# Patient Record
Sex: MALE | Race: BLACK OR AFRICAN AMERICAN | NOT HISPANIC OR LATINO | Employment: OTHER | URBAN - METROPOLITAN AREA
[De-identification: names, ages, dates, MRNs, and addresses within clinical notes are randomized per-mention and may not be internally consistent; named-entity substitution may affect disease eponyms.]

---

## 2017-02-23 ENCOUNTER — TRANSCRIBE ORDERS (OUTPATIENT)
Dept: PAIN MEDICINE | Facility: CLINIC | Age: 77
End: 2017-02-23

## 2017-02-23 DIAGNOSIS — M54.2 CERVICALGIA: Primary | ICD-10-CM

## 2017-08-19 ENCOUNTER — HOSPITAL ENCOUNTER (EMERGENCY)
Facility: HOSPITAL | Age: 77
Discharge: HOME | End: 2017-08-19
Attending: EMERGENCY MEDICINE | Admitting: EMERGENCY MEDICINE

## 2017-08-19 ENCOUNTER — APPOINTMENT (OUTPATIENT)
Dept: CT IMAGING | Facility: HOSPITAL | Age: 77
End: 2017-08-19

## 2017-08-19 VITALS
WEIGHT: 185 LBS | HEIGHT: 70 IN | TEMPERATURE: 97.9 F | RESPIRATION RATE: 16 BRPM | OXYGEN SATURATION: 96 % | HEART RATE: 69 BPM | SYSTOLIC BLOOD PRESSURE: 126 MMHG | DIASTOLIC BLOOD PRESSURE: 90 MMHG | BODY MASS INDEX: 26.48 KG/M2

## 2017-08-19 DIAGNOSIS — R10.30 LOWER ABDOMINAL PAIN: ICD-10-CM

## 2017-08-19 DIAGNOSIS — N32.89 MASS OF URINARY BLADDER: Primary | ICD-10-CM

## 2017-08-19 LAB
ALBUMIN SERPL-MCNC: 4.2 G/DL (ref 3.2–4.8)
ALBUMIN/GLOB SERPL: 1.2 G/DL (ref 1.5–2.5)
ALP SERPL-CCNC: 147 U/L (ref 25–100)
ALT SERPL W P-5'-P-CCNC: 10 U/L (ref 7–40)
ANION GAP SERPL CALCULATED.3IONS-SCNC: 7 MMOL/L (ref 3–11)
AST SERPL-CCNC: 18 U/L (ref 0–33)
BASOPHILS # BLD AUTO: 0.02 10*3/MM3 (ref 0–0.2)
BASOPHILS NFR BLD AUTO: 0.3 % (ref 0–1)
BILIRUB SERPL-MCNC: 0.4 MG/DL (ref 0.3–1.2)
BILIRUB UR QL STRIP: NEGATIVE
BUN BLD-MCNC: 12 MG/DL (ref 9–23)
BUN/CREAT SERPL: 13.3 (ref 7–25)
CALCIUM SPEC-SCNC: 9.4 MG/DL (ref 8.7–10.4)
CHLORIDE SERPL-SCNC: 106 MMOL/L (ref 99–109)
CLARITY UR: CLEAR
CO2 SERPL-SCNC: 29 MMOL/L (ref 20–31)
COLOR UR: YELLOW
CREAT BLD-MCNC: 0.9 MG/DL (ref 0.6–1.3)
D-LACTATE SERPL-SCNC: 0.8 MMOL/L (ref 0.5–2)
DEPRECATED RDW RBC AUTO: 48.4 FL (ref 37–54)
EOSINOPHIL # BLD AUTO: 0.31 10*3/MM3 (ref 0–0.3)
EOSINOPHIL NFR BLD AUTO: 5.1 % (ref 0–3)
ERYTHROCYTE [DISTWIDTH] IN BLOOD BY AUTOMATED COUNT: 15 % (ref 11.3–14.5)
GFR SERPL CREATININE-BSD FRML MDRD: 99 ML/MIN/1.73
GLOBULIN UR ELPH-MCNC: 3.5 GM/DL
GLUCOSE BLD-MCNC: 102 MG/DL (ref 70–100)
GLUCOSE UR STRIP-MCNC: NEGATIVE MG/DL
HCT VFR BLD AUTO: 38.1 % (ref 38.9–50.9)
HGB BLD-MCNC: 12.4 G/DL (ref 13.1–17.5)
HGB UR QL STRIP.AUTO: NEGATIVE
IMM GRANULOCYTES # BLD: 0.01 10*3/MM3 (ref 0–0.03)
IMM GRANULOCYTES NFR BLD: 0.2 % (ref 0–0.6)
KETONES UR QL STRIP: NEGATIVE
LEUKOCYTE ESTERASE UR QL STRIP.AUTO: NEGATIVE
LIPASE SERPL-CCNC: 32 U/L (ref 6–51)
LYMPHOCYTES # BLD AUTO: 1.53 10*3/MM3 (ref 0.6–4.8)
LYMPHOCYTES NFR BLD AUTO: 25.2 % (ref 24–44)
MCH RBC QN AUTO: 28.6 PG (ref 27–31)
MCHC RBC AUTO-ENTMCNC: 32.5 G/DL (ref 32–36)
MCV RBC AUTO: 88 FL (ref 80–99)
MONOCYTES # BLD AUTO: 0.72 10*3/MM3 (ref 0–1)
MONOCYTES NFR BLD AUTO: 11.8 % (ref 0–12)
NEUTROPHILS # BLD AUTO: 3.49 10*3/MM3 (ref 1.5–8.3)
NEUTROPHILS NFR BLD AUTO: 57.4 % (ref 41–71)
NITRITE UR QL STRIP: NEGATIVE
PH UR STRIP.AUTO: 6.5 [PH] (ref 5–8)
PLATELET # BLD AUTO: 223 10*3/MM3 (ref 150–450)
PMV BLD AUTO: 9.9 FL (ref 6–12)
POTASSIUM BLD-SCNC: 3.2 MMOL/L (ref 3.5–5.5)
PROT SERPL-MCNC: 7.7 G/DL (ref 5.7–8.2)
PROT UR QL STRIP: NEGATIVE
RBC # BLD AUTO: 4.33 10*6/MM3 (ref 4.2–5.76)
SODIUM BLD-SCNC: 142 MMOL/L (ref 132–146)
SP GR UR STRIP: 1.01 (ref 1–1.03)
UROBILINOGEN UR QL STRIP: NORMAL
WBC NRBC COR # BLD: 6.08 10*3/MM3 (ref 3.5–10.8)

## 2017-08-19 PROCEDURE — 83690 ASSAY OF LIPASE: CPT | Performed by: EMERGENCY MEDICINE

## 2017-08-19 PROCEDURE — 0 IOPAMIDOL 61 % SOLUTION: Performed by: EMERGENCY MEDICINE

## 2017-08-19 PROCEDURE — 85025 COMPLETE CBC W/AUTO DIFF WBC: CPT | Performed by: EMERGENCY MEDICINE

## 2017-08-19 PROCEDURE — 74177 CT ABD & PELVIS W/CONTRAST: CPT

## 2017-08-19 PROCEDURE — 83605 ASSAY OF LACTIC ACID: CPT | Performed by: EMERGENCY MEDICINE

## 2017-08-19 PROCEDURE — 80053 COMPREHEN METABOLIC PANEL: CPT | Performed by: EMERGENCY MEDICINE

## 2017-08-19 PROCEDURE — 99283 EMERGENCY DEPT VISIT LOW MDM: CPT

## 2017-08-19 PROCEDURE — 81003 URINALYSIS AUTO W/O SCOPE: CPT | Performed by: EMERGENCY MEDICINE

## 2017-08-19 PROCEDURE — 96360 HYDRATION IV INFUSION INIT: CPT

## 2017-08-19 RX ORDER — OXYCODONE AND ACETAMINOPHEN 10; 325 MG/1; MG/1
1 TABLET ORAL EVERY 6 HOURS PRN
COMMUNITY

## 2017-08-19 RX ORDER — NAPROXEN 500 MG/1
500 TABLET ORAL 2 TIMES DAILY PRN
Qty: 10 TABLET | Refills: 0 | Status: SHIPPED | OUTPATIENT
Start: 2017-08-19 | End: 2017-08-24

## 2017-08-19 RX ORDER — TAMSULOSIN HYDROCHLORIDE 0.4 MG/1
1 CAPSULE ORAL NIGHTLY
COMMUNITY

## 2017-08-19 RX ORDER — SODIUM CHLORIDE 0.9 % (FLUSH) 0.9 %
10 SYRINGE (ML) INJECTION AS NEEDED
Status: DISCONTINUED | OUTPATIENT
Start: 2017-08-19 | End: 2017-08-19 | Stop reason: HOSPADM

## 2017-08-19 RX ORDER — ASPIRIN 81 MG/1
81 TABLET ORAL DAILY
COMMUNITY
End: 2021-10-07

## 2017-08-19 RX ADMIN — SODIUM CHLORIDE 1000 ML: 9 INJECTION, SOLUTION INTRAVENOUS at 05:08

## 2017-08-19 RX ADMIN — IOPAMIDOL 85 ML: 612 INJECTION, SOLUTION INTRAVENOUS at 06:15

## 2017-08-19 NOTE — ED PROVIDER NOTES
Subjective   Patient is a 77 y.o. male presenting with abdominal pain.   Abdominal Pain   Pain location:  Suprapubic  Pain quality: cramping    Pain radiates to:  Does not radiate  Onset quality:  Gradual  Duration:  8 weeks  Timing:  Intermittent  Progression:  Unchanged  Chronicity:  Chronic  Context: previous surgery    Context: not awakening from sleep, not recent illness, not sick contacts and not suspicious food intake    Relieved by:  Nothing  Worsened by:  Position changes  Ineffective treatments:  OTC medications (narcotic based pain medication)  Associated symptoms: no chest pain, no chills, no cough, no diarrhea, no dysuria, no fatigue, no fever, no melena, no nausea, no shortness of breath, no sore throat and no vomiting        Review of Systems   Constitutional: Negative for chills, fatigue and fever.   HENT: Negative for congestion, ear pain, postnasal drip, sinus pressure and sore throat.    Eyes: Negative for pain, redness and visual disturbance.   Respiratory: Negative for cough, chest tightness and shortness of breath.    Cardiovascular: Negative for chest pain, palpitations and leg swelling.   Gastrointestinal: Positive for abdominal pain. Negative for anal bleeding, blood in stool, diarrhea, melena, nausea and vomiting.   Endocrine: Negative for polydipsia and polyuria.   Genitourinary: Negative for difficulty urinating, dysuria, frequency and urgency.   Musculoskeletal: Negative for arthralgias, back pain and neck pain.   Skin: Negative for pallor and rash.   Allergic/Immunologic: Negative for environmental allergies and immunocompromised state.   Neurological: Negative for dizziness, weakness and headaches.   Hematological: Negative for adenopathy.   Psychiatric/Behavioral: Negative for confusion, self-injury and suicidal ideas. The patient is not nervous/anxious.    All other systems reviewed and are negative.      Past Medical History:   Diagnosis Date   • Gout    • Hypertension    • Migraine     • Sciatica    • Thyroid disease        No Known Allergies    Past Surgical History:   Procedure Laterality Date   • BACK SURGERY     • NECK SURGERY         History reviewed. No pertinent family history.    Social History     Social History   • Marital status:      Spouse name: N/A   • Number of children: N/A   • Years of education: N/A     Social History Main Topics   • Smoking status: Former Smoker   • Smokeless tobacco: None      Comment: QUIT 30 YEARS AGO    • Alcohol use No   • Drug use: No   • Sexual activity: Not Asked     Other Topics Concern   • None     Social History Narrative           Objective   Physical Exam   Constitutional: He is oriented to person, place, and time. He appears well-developed and well-nourished.  Non-toxic appearance. No distress.   HENT:   Head: Normocephalic and atraumatic.   Right Ear: External ear normal.   Left Ear: External ear normal.   Nose: Nose normal.   Eyes: EOM and lids are normal. Pupils are equal, round, and reactive to light.   Neck: Normal range of motion. Neck supple. No tracheal deviation present.   Cardiovascular: Normal rate, regular rhythm and normal heart sounds.  Exam reveals no gallop, no friction rub and no decreased pulses.    No murmur heard.  Pulmonary/Chest: Effort normal and breath sounds normal. No respiratory distress. He has no decreased breath sounds. He has no wheezes. He has no rhonchi. He has no rales.   Abdominal: Soft. Normal appearance and bowel sounds are normal. There is tenderness in the right lower quadrant and left lower quadrant. There is no rebound, no guarding and no CVA tenderness.       Musculoskeletal: Normal range of motion. He exhibits no deformity.   Lymphadenopathy:     He has no cervical adenopathy.   Neurological: He is alert and oriented to person, place, and time. He has normal strength. No cranial nerve deficit or sensory deficit.   Skin: Skin is warm and dry. No rash noted. He is not diaphoretic.   Psychiatric: He  has a normal mood and affect. His speech is normal and behavior is normal. Judgment and thought content normal. Cognition and memory are normal.   Nursing note and vitals reviewed.      Procedures         ED Course  ED Course                  MDM  Number of Diagnoses or Management Options  Lower abdominal pain: new and requires workup  Mass of urinary bladder: new and requires workup  Diagnosis management comments: Patient had CT that showed debris versus mass in bladder, no other acute abnormalities, and previous graft of aorta was normal in appearance.     Urine clean and clean, no signs of blood.     Patient has been referred to Dr. Alegre of Urology for outpatient evaluation.     DC home appearing well, advised to take home pain medication as prescribed.        Amount and/or Complexity of Data Reviewed  Clinical lab tests: ordered and reviewed  Tests in the radiology section of CPT®: ordered and reviewed  Decide to obtain previous medical records or to obtain history from someone other than the patient: yes  Obtain history from someone other than the patient: yes  Review and summarize past medical records: yes  Independent visualization of images, tracings, or specimens: yes    Patient Progress  Patient progress: stable      Final diagnoses:   Mass of urinary bladder   Lower abdominal pain            Tomasa Sandoval MD  08/19/17 1000

## 2017-09-12 ENCOUNTER — APPOINTMENT (OUTPATIENT)
Dept: GENERAL RADIOLOGY | Facility: HOSPITAL | Age: 77
End: 2017-09-12

## 2017-09-12 ENCOUNTER — HOSPITAL ENCOUNTER (EMERGENCY)
Facility: HOSPITAL | Age: 77
Discharge: HOME | End: 2017-09-12
Attending: EMERGENCY MEDICINE | Admitting: EMERGENCY MEDICINE

## 2017-09-12 VITALS
HEIGHT: 70 IN | TEMPERATURE: 98.4 F | SYSTOLIC BLOOD PRESSURE: 145 MMHG | DIASTOLIC BLOOD PRESSURE: 93 MMHG | HEART RATE: 70 BPM | RESPIRATION RATE: 18 BRPM | OXYGEN SATURATION: 94 % | BODY MASS INDEX: 26.48 KG/M2 | WEIGHT: 185 LBS

## 2017-09-12 DIAGNOSIS — R05.3 PERSISTENT COUGH: ICD-10-CM

## 2017-09-12 DIAGNOSIS — R10.9 NONSPECIFIC ABDOMINAL PAIN: Primary | ICD-10-CM

## 2017-09-12 LAB
ALBUMIN SERPL-MCNC: 3.9 G/DL (ref 3.2–4.8)
ALBUMIN/GLOB SERPL: 1.2 G/DL (ref 1.5–2.5)
ALP SERPL-CCNC: 145 U/L (ref 25–100)
ALT SERPL W P-5'-P-CCNC: 19 U/L (ref 7–40)
ANION GAP SERPL CALCULATED.3IONS-SCNC: 6 MMOL/L (ref 3–11)
AST SERPL-CCNC: 24 U/L (ref 0–33)
BACTERIA UR QL AUTO: ABNORMAL /HPF
BASOPHILS # BLD AUTO: 0.01 10*3/MM3 (ref 0–0.2)
BASOPHILS NFR BLD AUTO: 0.2 % (ref 0–1)
BILIRUB SERPL-MCNC: 0.4 MG/DL (ref 0.3–1.2)
BILIRUB UR QL STRIP: NEGATIVE
BUN BLD-MCNC: 7 MG/DL (ref 9–23)
BUN/CREAT SERPL: 8.8 (ref 7–25)
CALCIUM SPEC-SCNC: 9 MG/DL (ref 8.7–10.4)
CHLORIDE SERPL-SCNC: 109 MMOL/L (ref 99–109)
CLARITY UR: ABNORMAL
CO2 SERPL-SCNC: 26 MMOL/L (ref 20–31)
COLOR UR: YELLOW
CREAT BLD-MCNC: 0.8 MG/DL (ref 0.6–1.3)
DEPRECATED RDW RBC AUTO: 47.3 FL (ref 37–54)
EOSINOPHIL # BLD AUTO: 0.29 10*3/MM3 (ref 0–0.3)
EOSINOPHIL NFR BLD AUTO: 4.4 % (ref 0–3)
ERYTHROCYTE [DISTWIDTH] IN BLOOD BY AUTOMATED COUNT: 14.9 % (ref 11.3–14.5)
GFR SERPL CREATININE-BSD FRML MDRD: 114 ML/MIN/1.73
GLOBULIN UR ELPH-MCNC: 3.2 GM/DL
GLUCOSE BLD-MCNC: 93 MG/DL (ref 70–100)
GLUCOSE UR STRIP-MCNC: NEGATIVE MG/DL
HCT VFR BLD AUTO: 36.4 % (ref 38.9–50.9)
HGB BLD-MCNC: 11.8 G/DL (ref 13.1–17.5)
HGB UR QL STRIP.AUTO: NEGATIVE
HOLD SPECIMEN: NORMAL
HOLD SPECIMEN: NORMAL
HYALINE CASTS UR QL AUTO: ABNORMAL /LPF
IMM GRANULOCYTES # BLD: 0 10*3/MM3 (ref 0–0.03)
IMM GRANULOCYTES NFR BLD: 0 % (ref 0–0.6)
KETONES UR QL STRIP: NEGATIVE
LEUKOCYTE ESTERASE UR QL STRIP.AUTO: NEGATIVE
LIPASE SERPL-CCNC: 32 U/L (ref 6–51)
LYMPHOCYTES # BLD AUTO: 1.46 10*3/MM3 (ref 0.6–4.8)
LYMPHOCYTES NFR BLD AUTO: 22.4 % (ref 24–44)
MCH RBC QN AUTO: 28.2 PG (ref 27–31)
MCHC RBC AUTO-ENTMCNC: 32.4 G/DL (ref 32–36)
MCV RBC AUTO: 87.1 FL (ref 80–99)
MONOCYTES # BLD AUTO: 0.73 10*3/MM3 (ref 0–1)
MONOCYTES NFR BLD AUTO: 11.2 % (ref 0–12)
NEUTROPHILS # BLD AUTO: 4.03 10*3/MM3 (ref 1.5–8.3)
NEUTROPHILS NFR BLD AUTO: 61.8 % (ref 41–71)
NITRITE UR QL STRIP: NEGATIVE
PH UR STRIP.AUTO: 7 [PH] (ref 5–8)
PLATELET # BLD AUTO: 221 10*3/MM3 (ref 150–450)
PMV BLD AUTO: 9.4 FL (ref 6–12)
POTASSIUM BLD-SCNC: 3.2 MMOL/L (ref 3.5–5.5)
PROT SERPL-MCNC: 7.1 G/DL (ref 5.7–8.2)
PROT UR QL STRIP: NEGATIVE
RBC # BLD AUTO: 4.18 10*6/MM3 (ref 4.2–5.76)
RBC # UR: ABNORMAL /HPF
REF LAB TEST METHOD: ABNORMAL
SODIUM BLD-SCNC: 141 MMOL/L (ref 132–146)
SP GR UR STRIP: 1.01 (ref 1–1.03)
SQUAMOUS #/AREA URNS HPF: ABNORMAL /HPF
UROBILINOGEN UR QL STRIP: ABNORMAL
WBC NRBC COR # BLD: 6.52 10*3/MM3 (ref 3.5–10.8)
WBC UR QL AUTO: ABNORMAL /HPF
WHOLE BLOOD HOLD SPECIMEN: NORMAL
WHOLE BLOOD HOLD SPECIMEN: NORMAL

## 2017-09-12 PROCEDURE — 81001 URINALYSIS AUTO W/SCOPE: CPT | Performed by: EMERGENCY MEDICINE

## 2017-09-12 PROCEDURE — 71020 HC CHEST PA AND LATERAL: CPT

## 2017-09-12 PROCEDURE — 99284 EMERGENCY DEPT VISIT MOD MDM: CPT

## 2017-09-12 PROCEDURE — 83690 ASSAY OF LIPASE: CPT | Performed by: EMERGENCY MEDICINE

## 2017-09-12 PROCEDURE — 80053 COMPREHEN METABOLIC PANEL: CPT | Performed by: EMERGENCY MEDICINE

## 2017-09-12 PROCEDURE — 85025 COMPLETE CBC W/AUTO DIFF WBC: CPT | Performed by: EMERGENCY MEDICINE

## 2017-09-12 RX ORDER — OXYCODONE AND ACETAMINOPHEN 10; 325 MG/1; MG/1
1 TABLET ORAL ONCE
Status: COMPLETED | OUTPATIENT
Start: 2017-09-12 | End: 2017-09-12

## 2017-09-12 RX ORDER — SODIUM CHLORIDE 0.9 % (FLUSH) 0.9 %
10 SYRINGE (ML) INJECTION AS NEEDED
Status: DISCONTINUED | OUTPATIENT
Start: 2017-09-12 | End: 2017-09-13 | Stop reason: HOSPADM

## 2017-09-12 RX ADMIN — OXYCODONE HYDROCHLORIDE AND ACETAMINOPHEN 1 TABLET: 10; 325 TABLET ORAL at 19:55

## 2017-09-12 NOTE — ED PROVIDER NOTES
Subjective   HPI Comments: George Lomeli is a 77 y.o.male who presents to the ED with c/o abdominal pain. He reports having an abdominal aortic aneurysm repaired 5-6 months ago at [facility]. Since, he has been experiencing chronic lower abdominal pain radiating into his back. The pain has remained constant since and has very gradually worsened over time. His pain specifically worsens with ambulation and position changes such as bending, sitting, and standing. The pain does not worsen during bowel movements. He was seen here on 8/19 for the same complaints. He had a CT abdomen/pelvis performed showing a mass on his bladder. He also c/o cough producing green/yellow sputum but denies nausea, vomiting, abnormal bowel movements, fevers, or any other complaints at this time.    Patient is a 77 y.o. male presenting with abdominal pain.   History provided by:  Patient  Abdominal Pain   Pain location:  LLQ and RLQ  Pain radiates to:  Back  Pain severity:  Moderate  Onset quality:  Gradual  Timing:  Constant  Progression:  Worsening  Chronicity:  Chronic  Context: previous surgery (AAA repair )    Relieved by:  Nothing  Worsened by:  Position changes (Ambulation )  Ineffective treatments:  None tried  Associated symptoms: cough    Associated symptoms: no constipation, no diarrhea, no fever, no nausea and no vomiting    Risk factors: being elderly        Review of Systems   Constitutional: Negative for fever.   Respiratory: Positive for cough.    Gastrointestinal: Positive for abdominal pain. Negative for blood in stool, constipation, diarrhea, nausea and vomiting.   Musculoskeletal: Positive for back pain.   All other systems reviewed and are negative.      Past Medical History:   Diagnosis Date   • Gout    • Hypertension    • Migraine    • Sciatica    • Thyroid disease        No Known Allergies    Past Surgical History:   Procedure Laterality Date   • BACK SURGERY     • NECK SURGERY         History reviewed. No pertinent family  history.    Social History     Social History   • Marital status:      Spouse name: N/A   • Number of children: N/A   • Years of education: N/A     Social History Main Topics   • Smoking status: Former Smoker   • Smokeless tobacco: None      Comment: QUIT 30 YEARS AGO    • Alcohol use No   • Drug use: No   • Sexual activity: Not Asked     Other Topics Concern   • None     Social History Narrative         Objective   Physical Exam   Constitutional: He is oriented to person, place, and time. He appears well-developed and well-nourished. No distress.   HENT:   Head: Normocephalic and atraumatic.   Nose: Nose normal.   Mouth/Throat: Oropharynx is clear and moist.   Airway patent. Pharynx benign. Appears well hydrated.    Eyes: Conjunctivae are normal. No scleral icterus.   Neck: Normal range of motion. Neck supple.   Cardiovascular: Normal rate, regular rhythm and normal heart sounds.    No murmur heard.  Pulmonary/Chest: Effort normal. No respiratory distress. He has rales (left base).   Abdominal: Soft. Bowel sounds are normal. There is tenderness (With palpation of the right lower quadrant it induces pain in the left lower quadrant ).   Musculoskeletal: Normal range of motion. He exhibits no edema.   Lymphadenopathy:     He has no cervical adenopathy.   Neurological: He is alert and oriented to person, place, and time.   Skin: Skin is warm and dry.   Psychiatric: He has a normal mood and affect. His behavior is normal.   Nursing note and vitals reviewed.      Procedures         ED Course  ED Course     Recent Results (from the past 24 hour(s))   Comprehensive Metabolic Panel    Collection Time: 09/12/17  6:21 PM   Result Value Ref Range    Glucose 93 70 - 100 mg/dL    BUN 7 (L) 9 - 23 mg/dL    Creatinine 0.80 0.60 - 1.30 mg/dL    Sodium 141 132 - 146 mmol/L    Potassium 3.2 (L) 3.5 - 5.5 mmol/L    Chloride 109 99 - 109 mmol/L    CO2 26.0 20.0 - 31.0 mmol/L    Calcium 9.0 8.7 - 10.4 mg/dL    Total Protein 7.1  5.7 - 8.2 g/dL    Albumin 3.90 3.20 - 4.80 g/dL    ALT (SGPT) 19 7 - 40 U/L    AST (SGOT) 24 0 - 33 U/L    Alkaline Phosphatase 145 (H) 25 - 100 U/L    Total Bilirubin 0.4 0.3 - 1.2 mg/dL    eGFR  African Amer 114 >60 mL/min/1.73    Globulin 3.2 gm/dL    A/G Ratio 1.2 (L) 1.5 - 2.5 g/dL    BUN/Creatinine Ratio 8.8 7.0 - 25.0    Anion Gap 6.0 3.0 - 11.0 mmol/L   Lipase    Collection Time: 09/12/17  6:21 PM   Result Value Ref Range    Lipase 32 6 - 51 U/L   Light Blue Top    Collection Time: 09/12/17  6:21 PM   Result Value Ref Range    Extra Tube hold for add-on    Green Top (Gel)    Collection Time: 09/12/17  6:21 PM   Result Value Ref Range    Extra Tube Hold for add-ons.    Lavender Top    Collection Time: 09/12/17  6:21 PM   Result Value Ref Range    Extra Tube hold for add-on    Gold Top - SST    Collection Time: 09/12/17  6:21 PM   Result Value Ref Range    Extra Tube Hold for add-ons.    CBC Auto Differential    Collection Time: 09/12/17  6:21 PM   Result Value Ref Range    WBC 6.52 3.50 - 10.80 10*3/mm3    RBC 4.18 (L) 4.20 - 5.76 10*6/mm3    Hemoglobin 11.8 (L) 13.1 - 17.5 g/dL    Hematocrit 36.4 (L) 38.9 - 50.9 %    MCV 87.1 80.0 - 99.0 fL    MCH 28.2 27.0 - 31.0 pg    MCHC 32.4 32.0 - 36.0 g/dL    RDW 14.9 (H) 11.3 - 14.5 %    RDW-SD 47.3 37.0 - 54.0 fl    MPV 9.4 6.0 - 12.0 fL    Platelets 221 150 - 450 10*3/mm3    Neutrophil % 61.8 41.0 - 71.0 %    Lymphocyte % 22.4 (L) 24.0 - 44.0 %    Monocyte % 11.2 0.0 - 12.0 %    Eosinophil % 4.4 (H) 0.0 - 3.0 %    Basophil % 0.2 0.0 - 1.0 %    Immature Grans % 0.0 0.0 - 0.6 %    Neutrophils, Absolute 4.03 1.50 - 8.30 10*3/mm3    Lymphocytes, Absolute 1.46 0.60 - 4.80 10*3/mm3    Monocytes, Absolute 0.73 0.00 - 1.00 10*3/mm3    Eosinophils, Absolute 0.29 0.00 - 0.30 10*3/mm3    Basophils, Absolute 0.01 0.00 - 0.20 10*3/mm3    Immature Grans, Absolute 0.00 0.00 - 0.03 10*3/mm3   Urinalysis With / Culture If Indicated    Collection Time: 09/12/17  7:59 PM   Result  Value Ref Range    Color, UA Yellow Yellow, Straw    Appearance, UA Cloudy (A) Clear    pH, UA 7.0 5.0 - 8.0    Specific Gravity, UA 1.011 1.001 - 1.030    Glucose, UA Negative Negative    Ketones, UA Negative Negative    Bilirubin, UA Negative Negative    Blood, UA Negative Negative    Protein, UA Negative Negative    Leuk Esterase, UA Negative Negative    Nitrite, UA Negative Negative    Urobilinogen, UA 1.0 E.U./dL 0.2 - 1.0 E.U./dL   Urinalysis, Microscopic Only    Collection Time: 09/12/17  7:59 PM   Result Value Ref Range    RBC, UA 0-2 None Seen, 0-2 /HPF    WBC, UA 0-2 (A) None Seen /HPF    Bacteria, UA None Seen None Seen, Trace /HPF    Squamous Epithelial Cells, UA None Seen None Seen, 0-2 /HPF    Hyaline Casts, UA 0-6 0 - 6 /LPF    Methodology Automated Microscopy      Note: In addition to lab results from this visit, the labs listed above may include labs taken at another facility or during a different encounter within the last 24 hours. Please correlate lab times with ED admission and discharge times for further clarification of the services performed during this visit.    XR Chest 2 View   Final Result   Abnormal   1.  No acute findings.  No consolidation.   2.  COPD.        THIS DOCUMENT HAS BEEN ELECTRONICALLY SIGNED BY ALEA MIRAMONTES MD        Vitals:    09/12/17 1800 09/12/17 1900 09/12/17 1956 09/12/17 2100   BP: 122/89 137/92  145/93   BP Location:       Patient Position:       Pulse:  67  67   Resp:       Temp:       TempSrc:       SpO2: 96% 95% 96% 96%   Weight:       Height:         Medications   sodium chloride 0.9 % flush 10 mL (not administered)   oxyCODONE-acetaminophen (PERCOCET)  MG per tablet 1 tablet (1 tablet Oral Given 9/12/17 1955)     ECG/EMG Results (last 24 hours)     ** No results found for the last 24 hours. **                        MDM    Final diagnoses:   Nonspecific abdominal pain   Persistent cough       Documentation assistance provided by jess Lewis.   Information recorded by the scribe was done at my direction and has been verified and validated by me.     Nallely Lewis  09/12/17 1750       Nallely Lewis  09/12/17 1915       Kyler Warner MD  09/12/17 2766

## 2017-09-13 NOTE — DISCHARGE INSTRUCTIONS
Dr. Connor Douglas probably won't be able to see you this week, but call tomorrow to set up the earliest possible appointment.  Continue your usual activities, medications, and diet.  Return to the ER if you have worsening pain or other worrisome problems.

## 2017-09-21 ENCOUNTER — APPOINTMENT (OUTPATIENT)
Dept: GENERAL RADIOLOGY | Facility: HOSPITAL | Age: 77
End: 2017-09-21

## 2017-09-21 ENCOUNTER — HOSPITAL ENCOUNTER (OUTPATIENT)
Facility: HOSPITAL | Age: 77
Setting detail: OBSERVATION
Discharge: HOME | End: 2017-09-22
Attending: EMERGENCY MEDICINE | Admitting: FAMILY MEDICINE

## 2017-09-21 DIAGNOSIS — R07.9 CHEST PAIN, UNSPECIFIED: Primary | ICD-10-CM

## 2017-09-21 LAB
ALBUMIN SERPL-MCNC: 4.5 G/DL (ref 3.2–4.8)
ALBUMIN/GLOB SERPL: 1.2 G/DL (ref 1.5–2.5)
ALP SERPL-CCNC: 174 U/L (ref 25–100)
ALT SERPL W P-5'-P-CCNC: 18 U/L (ref 7–40)
ANION GAP SERPL CALCULATED.3IONS-SCNC: 6 MMOL/L (ref 3–11)
AST SERPL-CCNC: 24 U/L (ref 0–33)
BASOPHILS # BLD AUTO: 0.01 10*3/MM3 (ref 0–0.2)
BASOPHILS NFR BLD AUTO: 0.1 % (ref 0–1)
BILIRUB SERPL-MCNC: 0.5 MG/DL (ref 0.3–1.2)
BNP SERPL-MCNC: 54 PG/ML (ref 0–100)
BUN BLD-MCNC: 8 MG/DL (ref 9–23)
BUN/CREAT SERPL: 8 (ref 7–25)
CALCIUM SPEC-SCNC: 9.8 MG/DL (ref 8.7–10.4)
CHLORIDE SERPL-SCNC: 106 MMOL/L (ref 99–109)
CO2 SERPL-SCNC: 27 MMOL/L (ref 20–31)
CREAT BLD-MCNC: 1 MG/DL (ref 0.6–1.3)
DEPRECATED RDW RBC AUTO: 48.6 FL (ref 37–54)
EOSINOPHIL # BLD AUTO: 0.34 10*3/MM3 (ref 0–0.3)
EOSINOPHIL NFR BLD AUTO: 4.5 % (ref 0–3)
ERYTHROCYTE [DISTWIDTH] IN BLOOD BY AUTOMATED COUNT: 15.1 % (ref 11.3–14.5)
GFR SERPL CREATININE-BSD FRML MDRD: 88 ML/MIN/1.73
GLOBULIN UR ELPH-MCNC: 3.9 GM/DL
GLUCOSE BLD-MCNC: 105 MG/DL (ref 70–100)
HCT VFR BLD AUTO: 39.2 % (ref 38.9–50.9)
HGB BLD-MCNC: 12.9 G/DL (ref 13.1–17.5)
IMM GRANULOCYTES # BLD: 0.01 10*3/MM3 (ref 0–0.03)
IMM GRANULOCYTES NFR BLD: 0.1 % (ref 0–0.6)
LIPASE SERPL-CCNC: 39 U/L (ref 6–51)
LYMPHOCYTES # BLD AUTO: 1.89 10*3/MM3 (ref 0.6–4.8)
LYMPHOCYTES NFR BLD AUTO: 24.8 % (ref 24–44)
MCH RBC QN AUTO: 28.9 PG (ref 27–31)
MCHC RBC AUTO-ENTMCNC: 32.9 G/DL (ref 32–36)
MCV RBC AUTO: 87.7 FL (ref 80–99)
MONOCYTES # BLD AUTO: 0.83 10*3/MM3 (ref 0–1)
MONOCYTES NFR BLD AUTO: 10.9 % (ref 0–12)
NEUTROPHILS # BLD AUTO: 4.54 10*3/MM3 (ref 1.5–8.3)
NEUTROPHILS NFR BLD AUTO: 59.6 % (ref 41–71)
PLATELET # BLD AUTO: 224 10*3/MM3 (ref 150–450)
PMV BLD AUTO: 10.1 FL (ref 6–12)
POTASSIUM BLD-SCNC: 3.3 MMOL/L (ref 3.5–5.5)
PROT SERPL-MCNC: 8.4 G/DL (ref 5.7–8.2)
RBC # BLD AUTO: 4.47 10*6/MM3 (ref 4.2–5.76)
SODIUM BLD-SCNC: 139 MMOL/L (ref 132–146)
TROPONIN I SERPL-MCNC: 0.01 NG/ML (ref 0–0.07)
WBC NRBC COR # BLD: 7.62 10*3/MM3 (ref 3.5–10.8)

## 2017-09-21 PROCEDURE — 99284 EMERGENCY DEPT VISIT MOD MDM: CPT

## 2017-09-21 PROCEDURE — 83880 ASSAY OF NATRIURETIC PEPTIDE: CPT | Performed by: EMERGENCY MEDICINE

## 2017-09-21 PROCEDURE — 93010 ELECTROCARDIOGRAM REPORT: CPT | Performed by: INTERNAL MEDICINE

## 2017-09-21 PROCEDURE — 93005 ELECTROCARDIOGRAM TRACING: CPT | Performed by: EMERGENCY MEDICINE

## 2017-09-21 PROCEDURE — 84484 ASSAY OF TROPONIN QUANT: CPT

## 2017-09-21 PROCEDURE — 83690 ASSAY OF LIPASE: CPT | Performed by: EMERGENCY MEDICINE

## 2017-09-21 PROCEDURE — 80053 COMPREHEN METABOLIC PANEL: CPT | Performed by: EMERGENCY MEDICINE

## 2017-09-21 PROCEDURE — 83735 ASSAY OF MAGNESIUM: CPT | Performed by: INTERNAL MEDICINE

## 2017-09-21 PROCEDURE — 85025 COMPLETE CBC W/AUTO DIFF WBC: CPT | Performed by: EMERGENCY MEDICINE

## 2017-09-21 PROCEDURE — 71010 HC CHEST PA OR AP: CPT

## 2017-09-21 RX ORDER — NITROGLYCERIN 0.4 MG/1
0.4 TABLET SUBLINGUAL
Status: DISCONTINUED | OUTPATIENT
Start: 2017-09-21 | End: 2017-09-22 | Stop reason: SDUPTHER

## 2017-09-21 RX ORDER — SODIUM CHLORIDE 0.9 % (FLUSH) 0.9 %
10 SYRINGE (ML) INJECTION AS NEEDED
Status: DISCONTINUED | OUTPATIENT
Start: 2017-09-21 | End: 2017-09-22 | Stop reason: HOSPADM

## 2017-09-21 RX ORDER — ASPIRIN 81 MG/1
324 TABLET, CHEWABLE ORAL ONCE
Status: COMPLETED | OUTPATIENT
Start: 2017-09-21 | End: 2017-09-21

## 2017-09-21 RX ADMIN — NITROGLYCERIN 0.4 MG: 0.4 TABLET SUBLINGUAL at 22:51

## 2017-09-21 RX ADMIN — ASPIRIN 81 MG 324 MG: 81 TABLET ORAL at 22:22

## 2017-09-22 ENCOUNTER — APPOINTMENT (OUTPATIENT)
Dept: CARDIOLOGY | Facility: HOSPITAL | Age: 77
End: 2017-09-22
Attending: INTERNAL MEDICINE

## 2017-09-22 VITALS
HEIGHT: 70 IN | HEART RATE: 68 BPM | BODY MASS INDEX: 26.48 KG/M2 | RESPIRATION RATE: 18 BRPM | TEMPERATURE: 97.7 F | OXYGEN SATURATION: 97 % | DIASTOLIC BLOOD PRESSURE: 92 MMHG | WEIGHT: 185 LBS | SYSTOLIC BLOOD PRESSURE: 133 MMHG

## 2017-09-22 PROBLEM — N32.89 BLADDER MASS: Status: ACTIVE | Noted: 2017-09-22

## 2017-09-22 PROBLEM — R07.9 CHEST PAIN, UNSPECIFIED: Status: ACTIVE | Noted: 2017-09-22

## 2017-09-22 PROBLEM — I71.40 ABDOMINAL AORTIC ANEURYSM: Status: ACTIVE | Noted: 2017-09-22

## 2017-09-22 PROBLEM — R07.9 CHEST PAIN, UNSPECIFIED: Status: RESOLVED | Noted: 2017-09-22 | Resolved: 2017-09-22

## 2017-09-22 LAB
ANION GAP SERPL CALCULATED.3IONS-SCNC: 4 MMOL/L (ref 3–11)
ARTICHOKE IGE QN: 98 MG/DL (ref 0–130)
BACTERIA UR QL AUTO: ABNORMAL /HPF
BH CV STRESS BP STAGE 1: NORMAL
BH CV STRESS BP STAGE 3: NORMAL
BH CV STRESS COMMENTS STAGE 1: NORMAL
BH CV STRESS DOSE REGADENOSON STAGE 1: 0.4
BH CV STRESS DURATION MIN STAGE 1: 1
BH CV STRESS DURATION MIN STAGE 2: 1
BH CV STRESS DURATION MIN STAGE 3: 1
BH CV STRESS DURATION MIN STAGE 4: 1
BH CV STRESS DURATION SEC STAGE 1: 0
BH CV STRESS DURATION SEC STAGE 2: 0
BH CV STRESS DURATION SEC STAGE 3: 0
BH CV STRESS DURATION SEC STAGE 4: 0
BH CV STRESS HR STAGE 1: 74
BH CV STRESS HR STAGE 2: 75
BH CV STRESS HR STAGE 3: 77
BH CV STRESS HR STAGE 4: 75
BH CV STRESS O2 STAGE 3: 80
BH CV STRESS PROTOCOL 1: NORMAL
BH CV STRESS RECOVERY BP: NORMAL MMHG
BH CV STRESS RECOVERY HR: 74 BPM
BH CV STRESS RECOVERY O2: 97 %
BH CV STRESS STAGE 1: 1
BH CV STRESS STAGE 2: 2
BH CV STRESS STAGE 3: 3
BH CV STRESS STAGE 4: 4
BILIRUB UR QL STRIP: ABNORMAL
BUN BLD-MCNC: 8 MG/DL (ref 9–23)
BUN/CREAT SERPL: 10 (ref 7–25)
CALCIUM SPEC-SCNC: 9.1 MG/DL (ref 8.7–10.4)
CHLORIDE SERPL-SCNC: 105 MMOL/L (ref 99–109)
CHOLEST SERPL-MCNC: 143 MG/DL (ref 0–200)
CLARITY UR: ABNORMAL
CO2 SERPL-SCNC: 29 MMOL/L (ref 20–31)
COLOR UR: ABNORMAL
CREAT BLD-MCNC: 0.8 MG/DL (ref 0.6–1.3)
DEPRECATED RDW RBC AUTO: 47.6 FL (ref 37–54)
ERYTHROCYTE [DISTWIDTH] IN BLOOD BY AUTOMATED COUNT: 14.8 % (ref 11.3–14.5)
GFR SERPL CREATININE-BSD FRML MDRD: 114 ML/MIN/1.73
GLUCOSE BLD-MCNC: 105 MG/DL (ref 70–100)
GLUCOSE UR STRIP-MCNC: NEGATIVE MG/DL
HBA1C MFR BLD: 5.6 % (ref 4.8–5.6)
HCT VFR BLD AUTO: 35.5 % (ref 38.9–50.9)
HDLC SERPL-MCNC: 37 MG/DL (ref 40–60)
HGB BLD-MCNC: 11.7 G/DL (ref 13.1–17.5)
HGB UR QL STRIP.AUTO: NEGATIVE
HOLD SPECIMEN: NORMAL
HOLD SPECIMEN: NORMAL
KETONES UR QL STRIP: NEGATIVE
LEUKOCYTE ESTERASE UR QL STRIP.AUTO: NEGATIVE
LV EF NUC BP: 54 %
MAGNESIUM SERPL-MCNC: 1.7 MG/DL (ref 1.3–2.7)
MAGNESIUM SERPL-MCNC: 1.9 MG/DL (ref 1.3–2.7)
MAXIMAL PREDICTED HEART RATE: 143 BPM
MCH RBC QN AUTO: 28.9 PG (ref 27–31)
MCHC RBC AUTO-ENTMCNC: 33 G/DL (ref 32–36)
MCV RBC AUTO: 87.7 FL (ref 80–99)
NITRITE UR QL STRIP: NEGATIVE
PERCENT MAX PREDICTED HR: 55.94 %
PH UR STRIP.AUTO: 6 [PH] (ref 5–8)
PLATELET # BLD AUTO: 211 10*3/MM3 (ref 150–450)
PMV BLD AUTO: 10.6 FL (ref 6–12)
POTASSIUM BLD-SCNC: 3 MMOL/L (ref 3.5–5.5)
PROT UR QL STRIP: ABNORMAL
RBC # BLD AUTO: 4.05 10*6/MM3 (ref 4.2–5.76)
RBC # UR: ABNORMAL /HPF
REF LAB TEST METHOD: ABNORMAL
SODIUM BLD-SCNC: 138 MMOL/L (ref 132–146)
SP GR UR STRIP: >=1.03 (ref 1–1.03)
SQUAMOUS #/AREA URNS HPF: ABNORMAL /HPF
STRESS BASELINE BP: NORMAL MMHG
STRESS BASELINE HR: 68 BPM
STRESS O2 SAT REST: 99 %
STRESS PERCENT HR: 66 %
STRESS POST O2 SAT PEAK: 97 %
STRESS POST PEAK BP: NORMAL MMHG
STRESS POST PEAK HR: 80 BPM
STRESS TARGET HR: 122 BPM
TRIGL SERPL-MCNC: 66 MG/DL (ref 0–150)
TROPONIN I SERPL-MCNC: 0.01 NG/ML
TROPONIN I SERPL-MCNC: 0.01 NG/ML (ref 0–0.07)
TSH SERPL DL<=0.05 MIU/L-ACNC: 4.59 MIU/ML (ref 0.35–5.35)
UROBILINOGEN UR QL STRIP: ABNORMAL
WBC NRBC COR # BLD: 7.29 10*3/MM3 (ref 3.5–10.8)
WBC UR QL AUTO: ABNORMAL /HPF
WHOLE BLOOD HOLD SPECIMEN: NORMAL
WHOLE BLOOD HOLD SPECIMEN: NORMAL

## 2017-09-22 PROCEDURE — 81001 URINALYSIS AUTO W/SCOPE: CPT | Performed by: EMERGENCY MEDICINE

## 2017-09-22 PROCEDURE — 84443 ASSAY THYROID STIM HORMONE: CPT | Performed by: INTERNAL MEDICINE

## 2017-09-22 PROCEDURE — 83735 ASSAY OF MAGNESIUM: CPT | Performed by: PHYSICIAN ASSISTANT

## 2017-09-22 PROCEDURE — G0378 HOSPITAL OBSERVATION PER HR: HCPCS

## 2017-09-22 PROCEDURE — A9555 RB82 RUBIDIUM: HCPCS | Performed by: FAMILY MEDICINE

## 2017-09-22 PROCEDURE — 93005 ELECTROCARDIOGRAM TRACING: CPT | Performed by: EMERGENCY MEDICINE

## 2017-09-22 PROCEDURE — 78492 MYOCRD IMG PET MLT RST&STRS: CPT

## 2017-09-22 PROCEDURE — 93018 CV STRESS TEST I&R ONLY: CPT | Performed by: INTERNAL MEDICINE

## 2017-09-22 PROCEDURE — 96372 THER/PROPH/DIAG INJ SC/IM: CPT

## 2017-09-22 PROCEDURE — 93005 ELECTROCARDIOGRAM TRACING: CPT | Performed by: INTERNAL MEDICINE

## 2017-09-22 PROCEDURE — 85027 COMPLETE CBC AUTOMATED: CPT | Performed by: INTERNAL MEDICINE

## 2017-09-22 PROCEDURE — 25010000002 REGADENOSON 0.4 MG/5ML SOLUTION: Performed by: INTERNAL MEDICINE

## 2017-09-22 PROCEDURE — 84484 ASSAY OF TROPONIN QUANT: CPT

## 2017-09-22 PROCEDURE — 83036 HEMOGLOBIN GLYCOSYLATED A1C: CPT | Performed by: INTERNAL MEDICINE

## 2017-09-22 PROCEDURE — 78492 MYOCRD IMG PET MLT RST&STRS: CPT | Performed by: INTERNAL MEDICINE

## 2017-09-22 PROCEDURE — 87086 URINE CULTURE/COLONY COUNT: CPT | Performed by: EMERGENCY MEDICINE

## 2017-09-22 PROCEDURE — 0 RUBIDIUM CHLORIDE: Performed by: FAMILY MEDICINE

## 2017-09-22 PROCEDURE — 93017 CV STRESS TEST TRACING ONLY: CPT

## 2017-09-22 PROCEDURE — 80061 LIPID PANEL: CPT | Performed by: INTERNAL MEDICINE

## 2017-09-22 PROCEDURE — 25010000002 HEPARIN (PORCINE) PER 1000 UNITS: Performed by: INTERNAL MEDICINE

## 2017-09-22 PROCEDURE — 93010 ELECTROCARDIOGRAM REPORT: CPT | Performed by: INTERNAL MEDICINE

## 2017-09-22 PROCEDURE — 80048 BASIC METABOLIC PNL TOTAL CA: CPT | Performed by: INTERNAL MEDICINE

## 2017-09-22 PROCEDURE — 84484 ASSAY OF TROPONIN QUANT: CPT | Performed by: INTERNAL MEDICINE

## 2017-09-22 PROCEDURE — 99236 HOSP IP/OBS SAME DATE HI 85: CPT | Performed by: INTERNAL MEDICINE

## 2017-09-22 PROCEDURE — 78491 MYOCRD IMG PET 1STD RST/STRS: CPT

## 2017-09-22 RX ORDER — MAGNESIUM SULFATE HEPTAHYDRATE 40 MG/ML
2 INJECTION, SOLUTION INTRAVENOUS AS NEEDED
Status: DISCONTINUED | OUTPATIENT
Start: 2017-09-22 | End: 2017-09-22 | Stop reason: HOSPADM

## 2017-09-22 RX ORDER — MAGNESIUM SULFATE HEPTAHYDRATE 40 MG/ML
4 INJECTION, SOLUTION INTRAVENOUS AS NEEDED
Status: DISCONTINUED | OUTPATIENT
Start: 2017-09-22 | End: 2017-09-22 | Stop reason: HOSPADM

## 2017-09-22 RX ORDER — ACETAMINOPHEN 325 MG/1
650 TABLET ORAL EVERY 4 HOURS PRN
Status: DISCONTINUED | OUTPATIENT
Start: 2017-09-22 | End: 2017-09-22 | Stop reason: HOSPADM

## 2017-09-22 RX ORDER — POTASSIUM CHLORIDE 1.5 G/1.77G
40 POWDER, FOR SOLUTION ORAL AS NEEDED
Status: DISCONTINUED | OUTPATIENT
Start: 2017-09-22 | End: 2017-09-22 | Stop reason: HOSPADM

## 2017-09-22 RX ORDER — OXYCODONE AND ACETAMINOPHEN 10; 325 MG/1; MG/1
1 TABLET ORAL EVERY 6 HOURS PRN
Status: DISCONTINUED | OUTPATIENT
Start: 2017-09-22 | End: 2017-09-22 | Stop reason: HOSPADM

## 2017-09-22 RX ORDER — SODIUM CHLORIDE 0.9 % (FLUSH) 0.9 %
1-10 SYRINGE (ML) INJECTION AS NEEDED
Status: DISCONTINUED | OUTPATIENT
Start: 2017-09-22 | End: 2017-09-22 | Stop reason: HOSPADM

## 2017-09-22 RX ORDER — ALLOPURINOL 100 MG/1
100 TABLET ORAL DAILY
Status: DISCONTINUED | OUTPATIENT
Start: 2017-09-22 | End: 2017-09-22 | Stop reason: HOSPADM

## 2017-09-22 RX ORDER — NITROGLYCERIN 0.4 MG/1
0.4 TABLET SUBLINGUAL
Status: DISCONTINUED | OUTPATIENT
Start: 2017-09-22 | End: 2017-09-22 | Stop reason: HOSPADM

## 2017-09-22 RX ORDER — AMLODIPINE BESYLATE 10 MG/1
10 TABLET ORAL DAILY
Status: DISCONTINUED | OUTPATIENT
Start: 2017-09-22 | End: 2017-09-22 | Stop reason: HOSPADM

## 2017-09-22 RX ORDER — ONDANSETRON 2 MG/ML
4 INJECTION INTRAMUSCULAR; INTRAVENOUS EVERY 6 HOURS PRN
Status: DISCONTINUED | OUTPATIENT
Start: 2017-09-22 | End: 2017-09-22 | Stop reason: HOSPADM

## 2017-09-22 RX ORDER — LEVOTHYROXINE SODIUM 0.03 MG/1
25 TABLET ORAL DAILY
Status: DISCONTINUED | OUTPATIENT
Start: 2017-09-22 | End: 2017-09-22 | Stop reason: HOSPADM

## 2017-09-22 RX ORDER — LISINOPRIL 40 MG/1
40 TABLET ORAL DAILY
Status: DISCONTINUED | OUTPATIENT
Start: 2017-09-22 | End: 2017-09-22 | Stop reason: HOSPADM

## 2017-09-22 RX ORDER — NITROGLYCERIN 0.4 MG/1
0.4 TABLET SUBLINGUAL
Qty: 90 TABLET | Refills: 0 | Status: SHIPPED | OUTPATIENT
Start: 2017-09-22 | End: 2018-04-12

## 2017-09-22 RX ORDER — POTASSIUM CHLORIDE 7.45 MG/ML
10 INJECTION INTRAVENOUS
Status: DISCONTINUED | OUTPATIENT
Start: 2017-09-22 | End: 2017-09-22 | Stop reason: HOSPADM

## 2017-09-22 RX ORDER — POTASSIUM CHLORIDE 750 MG/1
40 CAPSULE, EXTENDED RELEASE ORAL AS NEEDED
Status: DISCONTINUED | OUTPATIENT
Start: 2017-09-22 | End: 2017-09-22 | Stop reason: HOSPADM

## 2017-09-22 RX ORDER — TAMSULOSIN HYDROCHLORIDE 0.4 MG/1
0.4 CAPSULE ORAL NIGHTLY
Status: DISCONTINUED | OUTPATIENT
Start: 2017-09-22 | End: 2017-09-22 | Stop reason: HOSPADM

## 2017-09-22 RX ORDER — POTASSIUM CHLORIDE 750 MG/1
20 TABLET, EXTENDED RELEASE ORAL 2 TIMES DAILY
Qty: 30 TABLET | Refills: 0 | Status: SHIPPED | OUTPATIENT
Start: 2017-09-22 | End: 2018-09-10

## 2017-09-22 RX ORDER — CARVEDILOL 12.5 MG/1
25 TABLET ORAL 2 TIMES DAILY WITH MEALS
Status: DISCONTINUED | OUTPATIENT
Start: 2017-09-22 | End: 2017-09-22 | Stop reason: HOSPADM

## 2017-09-22 RX ORDER — HEPARIN SODIUM 5000 [USP'U]/ML
5000 INJECTION, SOLUTION INTRAVENOUS; SUBCUTANEOUS EVERY 12 HOURS SCHEDULED
Status: DISCONTINUED | OUTPATIENT
Start: 2017-09-22 | End: 2017-09-22 | Stop reason: HOSPADM

## 2017-09-22 RX ORDER — ONDANSETRON 4 MG/1
4 TABLET, FILM COATED ORAL EVERY 6 HOURS PRN
Status: DISCONTINUED | OUTPATIENT
Start: 2017-09-22 | End: 2017-09-22 | Stop reason: HOSPADM

## 2017-09-22 RX ORDER — ASPIRIN 81 MG/1
81 TABLET ORAL DAILY
Status: DISCONTINUED | OUTPATIENT
Start: 2017-09-22 | End: 2017-09-22 | Stop reason: HOSPADM

## 2017-09-22 RX ADMIN — LISINOPRIL 40 MG: 40 TABLET ORAL at 09:11

## 2017-09-22 RX ADMIN — RUBIDIUM CHLORIDE RB-82 1 DOSE: 150 INJECTION, SOLUTION INTRAVENOUS at 08:15

## 2017-09-22 RX ADMIN — ASPIRIN 81 MG: 81 TABLET, COATED ORAL at 09:12

## 2017-09-22 RX ADMIN — CARVEDILOL 25 MG: 12.5 TABLET, FILM COATED ORAL at 09:11

## 2017-09-22 RX ADMIN — RUBIDIUM CHLORIDE RB-82 1 DOSE: 150 INJECTION, SOLUTION INTRAVENOUS at 08:25

## 2017-09-22 RX ADMIN — REGADENOSON 0.4 MG: 0.08 INJECTION, SOLUTION INTRAVENOUS at 08:25

## 2017-09-22 RX ADMIN — HEPARIN SODIUM 5000 UNITS: 5000 INJECTION, SOLUTION INTRAVENOUS; SUBCUTANEOUS at 03:19

## 2017-09-22 RX ADMIN — AMLODIPINE BESYLATE 10 MG: 10 TABLET ORAL at 09:12

## 2017-09-22 RX ADMIN — HEPARIN SODIUM 5000 UNITS: 5000 INJECTION, SOLUTION INTRAVENOUS; SUBCUTANEOUS at 09:10

## 2017-09-22 RX ADMIN — POTASSIUM CHLORIDE 40 MEQ: 750 CAPSULE, EXTENDED RELEASE ORAL at 14:43

## 2017-09-22 RX ADMIN — OXYCODONE HYDROCHLORIDE AND ACETAMINOPHEN 1 TABLET: 10; 325 TABLET ORAL at 03:19

## 2017-09-22 RX ADMIN — ALLOPURINOL 100 MG: 100 TABLET ORAL at 09:12

## 2017-09-22 RX ADMIN — LEVOTHYROXINE SODIUM 25 MCG: 25 TABLET ORAL at 05:59

## 2017-09-22 NOTE — H&P
[facility] Medicine Services  HISTORY AND PHYSICAL    Primary Care Physician: Kaycee Douglas MD    Subjective     Chief Complaint:  CHEST PAIN    History of Present Illness:   Mr. Lomeli is a 77 year old AA male who presented to [facility] emergency department this evening with complaints of sudden onset dull constant left sided chest pain radiating to his left arm rated 7/10. PT reports that he was sitting on the couch this afternoon watching TV when his pain began. Since he did not experience any changes or decrease in pain he was prompted to the ED. Pain does increase with movement and does not change with rest. No complaints of shortness of breath, accompanying nausea or vomiting, dizziness or lightheadedness. He denies any previous similar episodes or history of chest pain. Chest paiun was relieved with two nitroglycerin in the emergency department this evening and he was to be discharged home, however once he stood to gather his things and leave his chest pain returned. Chest pain currently is 5/10 in his left upper chest, radiating into his left shoulder, and is described as a dull aching pain. Pain is not reproducible. He will be admitted at this time to [facility] Services for further evaluation and treatment.     Review of Systems   Constitutional: Positive for activity change. Negative for appetite change, chills, diaphoresis, fatigue and fever.   HENT: Negative.    Eyes: Negative for visual disturbance.   Respiratory: Negative for cough, choking, chest tightness, shortness of breath and wheezing.    Cardiovascular: Positive for chest pain. Negative for palpitations and leg swelling.   Gastrointestinal: Negative for abdominal distention, abdominal pain, blood in stool, constipation, diarrhea, nausea and vomiting.   Genitourinary: Negative for difficulty urinating, dysuria and hematuria.   Musculoskeletal: Negative.    Skin: Negative.    Neurological: Negative for  dizziness, tremors, syncope, facial asymmetry, speech difficulty, weakness, light-headedness and numbness.   Psychiatric/Behavioral: Negative.    Otherwise complete 10 system ROS performed and negative except as mentioned in the HPI.    Past Medical History:   Diagnosis Date   • Broken neck    • Gout    • Hypertension    • Migraine    • Sciatica    • Thyroid cancer      Past Surgical History:   Procedure Laterality Date   • BACK SURGERY     • EYE SURGERY     • MULTIPLE TOOTH EXTRACTIONS     • NECK SURGERY      PINS/RODS IN NECK   • THYROIDECTOMY / EXCISION CYST THYROID       Family History   Problem Relation Age of Onset   • Hypertension Mother      Social History     Social History   • Marital status:      Spouse name: N/A   • Number of children: N/A   • Years of education: N/A     Occupational History   • Not on file.     Social History Main Topics   • Smoking status: Former Smoker     Quit date: 1997   • Smokeless tobacco: Never Used      Comment: QUIT 30 YEARS AGO    • Alcohol use No   • Drug use: No   • Sexual activity: Not on file     Other Topics Concern   • Not on file     Social History Narrative   • No narrative on file     Medications:  No current facility-administered medications on file prior to encounter.      Current Outpatient Prescriptions on File Prior to Encounter   Medication Sig   • allopurinol (ZYLOPRIM) 100 MG tablet Take 100 mg by mouth Daily.   • amLODIPine (NORVASC) 10 MG tablet Take 10 mg by mouth Daily.   • aspirin 81 MG EC tablet Take 81 mg by mouth Daily.   • carvedilol (COREG) 25 MG tablet Take 25 mg by mouth 2 (Two) Times a Day With Meals.   • levothyroxine (SYNTHROID, LEVOTHROID) 25 MCG tablet Take 25 mcg by mouth Daily.   • lisinopril (PRINIVIL,ZESTRIL) 40 MG tablet Take 40 mg by mouth Daily.   • oxyCODONE-acetaminophen (PERCOCET)  MG per tablet Take 1 tablet by mouth Every 6 (Six) Hours As Needed for Moderate Pain .   • potassium chloride (K-DUR,KLOR-CON) 10 MEQ CR  "tablet Take 10 mEq by mouth 2 (Two) Times a Day.   • tamsulosin (FLOMAX) 0.4 MG capsule 24 hr capsule Take 1 capsule by mouth Every Night.   • HYDROcodone-acetaminophen (NORCO) 5-325 MG per tablet Take 1 tablet by mouth Every 6 (Six) Hours As Needed for severe pain (7-10).     Allergies:  No Known Allergies    Objective     Physical Exam:  Vital Signs: /86  Pulse 68  Temp 98.2 °F (36.8 °C) (Oral)   Resp 18  Ht 70\" (177.8 cm)  Wt 185 lb (83.9 kg)  SpO2 95%  BMI 26.54 kg/m2  Physical Exam   Constitutional: He is oriented to person, place, and time. He appears well-developed and well-nourished. No distress.   HENT:   Head: Normocephalic and atraumatic.   Nose: Nose normal.   Mouth/Throat: Oropharynx is clear and moist. No oropharyngeal exudate.   Eyes: Conjunctivae and EOM are normal. Pupils are equal, round, and reactive to light. Right eye exhibits no discharge. Left eye exhibits no discharge. No scleral icterus.   Neck: Normal range of motion. Neck supple. No JVD present. No tracheal deviation present. No thyromegaly present.   Cardiovascular: Normal rate, regular rhythm, normal heart sounds and intact distal pulses.  Exam reveals no gallop and no friction rub.    No murmur heard.  Pulmonary/Chest: Effort normal and breath sounds normal. No stridor. No respiratory distress. He has no wheezes. He has no rales. He exhibits no tenderness.   Abdominal: Soft. Bowel sounds are normal. He exhibits no distension and no mass. There is no tenderness. There is no rebound and no guarding. No hernia.   Musculoskeletal: Normal range of motion. He exhibits edema (trace BLE ). He exhibits no tenderness or deformity.   Lymphadenopathy:     He has no cervical adenopathy.   Neurological: He is alert and oriented to person, place, and time. He has normal reflexes.   Skin: Skin is warm and dry. No rash noted. He is not diaphoretic. No erythema. No pallor.   Psychiatric: He has a normal mood and affect. His behavior is " normal. Judgment and thought content normal.     Results Reviewed:    Results from last 7 days  Lab Units 09/21/17  2221   WBC 10*3/mm3 7.62   HEMOGLOBIN g/dL 12.9*   PLATELETS 10*3/mm3 224       Results from last 7 days  Lab Units 09/21/17  2221   SODIUM mmol/L 139   POTASSIUM mmol/L 3.3*   CO2 mmol/L 27.0   CREATININE mg/dL 1.00   GLUCOSE mg/dL 105*   CALCIUM mg/dL 9.8     Results for PARTH BENOIT (MRN [id number]) as of 9/22/2017 02:42   Ref. Range 9/21/2017 22:22 9/21/2017 22:51 9/22/2017 01:45 9/22/2017 01:51 9/22/2017 01:53   Troponin I Latest Ref Range: 0.00 - 0.07 ng/mL 0.01    0.01     Color, UA Latest Ref Range: Yellow, Straw  Dark Yellow (A)   Appearance, UA Latest Ref Range: Clear  Turbid (A)   Specific Gravity, UA Latest Ref Range: 1.001 - 1.030  >=1.030   pH, UA Latest Ref Range: 5.0 - 8.0  6.0   Glucose, UA Latest Ref Range: Negative  Negative   Ketones, UA Latest Ref Range: Negative  Negative   Blood, UA Latest Ref Range: Negative  Negative   Nitrite, UA Latest Ref Range: Negative  Negative   Leuk Esterase, UA Latest Ref Range: Negative  Negative   Protein, UA Latest Ref Range: Negative  30 mg/dL (1+) (A)   Bilirubin, UA Latest Ref Range: Negative  Small (1+) (A)   Urobilinogen, UA Latest Ref Range: 0.2 - 1.0 E.U./dL  0.2 E.U./dL     EXAM:    XR Chest, 1 View  09/21/2017     CLINICAL HISTORY:    77 years old, male; Pain; Chest pain; Type not specified; Additional info:   Chest pain triage protocol     TECHNIQUE:    Frontal view of the chest.     COMPARISON:    CR - XR CHEST 2 VW 9/12/2017 8:04:23 PM     FINDINGS:    Prominent lung markings/peribronchial thickening without definite evidence of   consolidation, no evidence of pleural effusion or pneumothorax.  Cardiomegaly   with normal lung vascularity.  Tortuous calcific aortic arch and descending   thoracic aorta.       IMPRESSION:    Chronic cardiopulmonary changes without evidence of an active lung   parenchymal lesion.     I have personally  reviewed and interpreted available lab data, radiology studies and ECG obtained at time of admission.     Assessment / Plan     Problem List:       Assessment: Mr. Lomeli is a 77 year old male who presented to [facility] ED this evening with complaints of midsternal chest pain radiating into his left arm.     Plan:  1. Chest Pain:  -Admit to telemetry  -Oxygen supplementation as needed to keep saturations >92%  -NPO  -PET Stress in AM    -Last stress in 2001, saw Dr. Giraldo in 2015 & no follow ups since  -Cardiology Consult in AM pending stress test results  -EKG in AM   -Troponin, FLP, HA1c in AM   -HOLD BETA BLOCKERS until stress tests completed    2. Hypokalemia:  -Replacement per protocol  -Magnesium added to previously drawn labs  -recheck labs in AM     3.HTN:  -Continue home medications, hold beta blocker until tomorrow evening  -Continue to monitor during hospitalization    4. Thyroid Cancer:  -History of thyroid cancer  -Partial vs. Full thyroidectomy, PT is unsure  -TSH in AM     -Fall Precautions   -CM to see in AM to ensure PT has established PCP at time of discharge  -Depending on Stress test results, PT potential D/C this afternoon      DVT prophylaxis: TEDS/SCDS, SubQ Heparin   Code Status: FULL  Admission Status: Patient will be admitted to OBSERVATION.     Radha Alba RN EXTENDER 09/22/17 1:47 AM     Documentation assistance provided by Radha Alba RN EXTENDER Information recorded by the RN scribe was done at my direction and has been verified and validated by me for accuracy to reflect my history, exam, assessment and plan.    Thor Garg MD

## 2017-09-22 NOTE — ED PROVIDER NOTES
Subjective   HPI Comments: George Lomeli is a 77 y.o.male who presents to the ED with c/o chest pain with onset at 1600 today. He reports that he was lifting his 25 pound dog onto his couch when he suddenly started experiencing pain to his left chest region that radiated into his left arm. He describes his pain as a dull, constant pain. He notes that his pain has not went away which prompted his visit to the ED. He states that he has taken baby aspirins with minimal relief. His sx worsens with movement. He denies SOA, diaphoresis, dizziness, nausea, or any other complaints at this time. He notes that he has not had pain similar to this before and has no hx of heart disease. He states that he has gotten a stress test done several years ago.   Patient is a 77 y.o. male presenting with chest pain.   History provided by:  Patient  Chest Pain   Pain location:  L chest  Pain quality: dull    Pain radiates to:  L arm  Pain severity:  Moderate  Onset quality:  Sudden  Timing:  Constant  Progression:  Worsening  Chronicity:  New  Relieved by:  Nothing  Worsened by:  Nothing  Ineffective treatments: baby aspirins.  Associated symptoms: no diaphoresis, no dizziness, no nausea and no shortness of breath        Review of Systems   Constitutional: Negative for diaphoresis.   Respiratory: Negative for shortness of breath.    Cardiovascular: Positive for chest pain.   Gastrointestinal: Negative for nausea.   Neurological: Negative for dizziness.   All other systems reviewed and are negative.      Past Medical History:   Diagnosis Date   • Broken neck    • Gout    • Hypertension    • Migraine    • Sciatica    • Thyroid cancer        No Known Allergies    Past Surgical History:   Procedure Laterality Date   • BACK SURGERY     • EYE SURGERY     • MULTIPLE TOOTH EXTRACTIONS     • NECK SURGERY      PINS/RODS IN NECK   • THYROIDECTOMY / EXCISION CYST THYROID         Family History   Problem Relation Age of Onset   • Hypertension Mother         Social History     Social History   • Marital status:      Spouse name: N/A   • Number of children: N/A   • Years of education: N/A     Occupational History   •  Retired     Social History Main Topics   • Smoking status: Former Smoker     Quit date: 1997   • Smokeless tobacco: Never Used      Comment: QUIT 30 YEARS AGO    • Alcohol use No   • Drug use: No   • Sexual activity: Defer     Other Topics Concern   • None     Social History Narrative         Objective   Physical Exam   Constitutional: He is oriented to person, place, and time. He appears well-developed and well-nourished. No distress.   HENT:   Head: Normocephalic and atraumatic.   Nose: Nose normal.   Eyes: Conjunctivae are normal. No scleral icterus.   Neck: Normal range of motion. Neck supple.   Cardiovascular: Normal rate, regular rhythm and normal heart sounds.    No murmur heard.  Pulmonary/Chest: Effort normal and breath sounds normal. No respiratory distress. He exhibits tenderness.   Mild left pectoral tenderness that partially reproduces his pain.   Abdominal: Soft. Bowel sounds are normal. There is no tenderness.   Musculoskeletal: Normal range of motion.   Neurological: He is alert and oriented to person, place, and time.   Skin: Skin is warm and dry.   Psychiatric: He has a normal mood and affect. His behavior is normal.   Nursing note and vitals reviewed.      Procedures         ED Course  ED Course   EKG SR, one PVC, IVCD.  CXR negative.  Labs benign 8 hours since onset of pain.  Partial relief from NTG.  Reproducible with arm movement.  Chest pain free on rechecks.  Workup is negative for acute serious pathology.  I discussed findings and concerns with patient in detail.  We discussed in particular that while acute MI has been excluded today, it is not possible to exclude underlying CAD based on ED workup alone.  We discussed the importance of immediate-term followup for provocative testing such as stress testing as well as  methods of arranging that.  We discussed the importance of office followup subsequent to testing to discuss results and any further testing or treatment that may be indicated.  Patient is advised to return to the ED for any concerning symptoms, especially prior to the completion of further outpatient testing.    Pt got up to leave to go home and had acute recurrence of his pain, worse than before.  He is no longer comfortable being discharged and with recurrence I think admission is most prudent.                      MDM  Number of Diagnoses or Management Options  Chest pain, unspecified:      Amount and/or Complexity of Data Reviewed  Clinical lab tests: ordered and reviewed  Tests in the radiology section of CPT®: ordered and reviewed  Independent visualization of images, tracings, or specimens: yes        Final diagnoses:   Chest pain, unspecified       Documentation assistance provided by jess Espinosa.  Information recorded by the scribe was done at my direction and has been verified and validated by me.     Francisca Castro  09/21/17 8314       Tristan Lozano MD  09/22/17 0054       Tristan Lozano MD  09/22/17 1238

## 2017-09-22 NOTE — DISCHARGE SUMMARY
[facility]  DISCHARGE SUMMARY       Date of Admission: 2017  Date of Discharge:  2017  Primary Care Physician: Kaycee Douglas MD  Consulting Physician(s)          None           Discharge Diagnoses:  Active Hospital Problems (** Indicates Principal Problem)    Diagnosis Date Noted   •  Possible bladder mass noted on 2017 CT, needs outpatient Urology eval [N32.89] 2017   • Abdominal aortic aneurysm with recent repair at [place] [I71.4] 2017   • Hypertension [I10]    • Gout [M10.9]    • Thyroid cancer [C73]       Resolved Hospital Problems    Diagnosis Date Noted Date Resolved   • **Chest pain, unspecified [R07.9] 2017       Presenting Problem/History of Present Illness  Chest pain, unspecified [R07.9]   History of Present Illness on Day of Discharge:   Pt sitting up in bed, fully dressed, anxious to be dc home dc family has a  to attend. Pt reports chest pain resolved without reoccurrence while getting stress test. Pt reports still some intermittent left arm soreness with movement. No new complaints. Pt denies SOA, diaphoresis, lightheadedness, N/V. Pt understands needs to follow up with PCP within 1 week of discharge for post hospitalization evaluation and possible w/u for noncardiac causes of chest discomfort. Pt rstates has not been referred to urologist as recommended last ED visit, discussed importance of getting this arrange given findings on prior CT abd&pelvis.Also discussed s/sx USA or several back pain for which pt should return for further evaluation.    Hospital Course  Patient is a 77 y.o. male with PMH of HTN, thyroid cancer, and gout presented to the ED 17 with c/o left sided chest pain at rest radiating to left arm, increased with movement, relieved by ntg x2 in ED. Pt was admitted to the hospitalist service, troponin trended and negative. Lipase, BNP, TSH and A1c normal. FLP showed LDL 98, HDL 37, TG  66.  PET Stress test performed showed :  Interpretation Summary      · Rest EF = 50% Stress EF = 54%.  · Left ventricular ejection fraction is normal (Calculated EF = 54%).  · Myocardial perfusion imaging indicates a normal myocardial perfusion study with no evidence of ischemia.  · Impressions are consistent with a low risk study.         Chest pain has resolved, pt denies diaphoresis/SOA, neck/jaw pain, dyspepsia or N/V. Reports some intermittent left arm soreness.     Hypokalemia, appears chronic based on review of prior lab hx, received SS replacement, Mg normal. Pt is on KCl 10mEqKCl at home, increased to 20mEq BID x2 wk, follow up with PCP for further monitoring/management.      + RBC on U/A, recommend follow up with PCP for further monitoring. Review of records shows patient had CT of abd in 8/2017 showing debris vs mass in bladder, pt was recommended to follow up with Urology as outpt. Recommend PCP to help facilitate if not already arranged.     Patient is afebrile, BP stable, satting in high 90s on RA. He is clinically improved and medically appropriate for discharge home. Follow up with PCP in 1 week.      Procedures Performed  none       Consults:   Consults     No orders found for last 30 day(s).          Pertinent Test Results:  Lab Results (most recent)     Procedure Component Value Units Date/Time    CBC & Differential [[id number]] Collected:  09/21/17 2221    Specimen:  Blood Updated:  09/21/17 2237    Narrative:       The following orders were created for panel order CBC & Differential.  Procedure                               Abnormality         Status                     ---------                               -----------         ------                     CBC Auto Differential[[id number]]        Abnormal            Final result                 Please view results for these tests on the individual orders.    CBC Auto Differential [[id number]]  (Abnormal) Collected:  09/21/17 2221    Specimen:  Blood  Updated:  09/21/17 2237     WBC 7.62 10*3/mm3      RBC 4.47 10*6/mm3      Hemoglobin 12.9 (L) g/dL      Hematocrit 39.2 %      MCV 87.7 fL      MCH 28.9 pg      MCHC 32.9 g/dL      RDW 15.1 (H) %      RDW-SD 48.6 fl      MPV 10.1 fL      Platelets 224 10*3/mm3      Neutrophil % 59.6 %      Lymphocyte % 24.8 %      Monocyte % 10.9 %      Eosinophil % 4.5 (H) %      Basophil % 0.1 %      Immature Grans % 0.1 %      Neutrophils, Absolute 4.54 10*3/mm3      Lymphocytes, Absolute 1.89 10*3/mm3      Monocytes, Absolute 0.83 10*3/mm3      Eosinophils, Absolute 0.34 (H) 10*3/mm3      Basophils, Absolute 0.01 10*3/mm3      Immature Grans, Absolute 0.01 10*3/mm3     POC Troponin, Rapid [[id number]]  (Normal) Collected:  09/21/17 2222    Specimen:  Blood Updated:  09/21/17 2245     Troponin I 0.01 ng/mL       Serial Number: [id number]:  [id number]       Comprehensive Metabolic Panel [[id number]]  (Abnormal) Collected:  09/21/17 2221    Specimen:  Blood Updated:  09/21/17 2253     Glucose 105 (H) mg/dL      BUN 8 (L) mg/dL      Creatinine 1.00 mg/dL      Sodium 139 mmol/L      Potassium 3.3 (L) mmol/L      Chloride 106 mmol/L      CO2 27.0 mmol/L      Calcium 9.8 mg/dL      Total Protein 8.4 (H) g/dL      Albumin 4.50 g/dL      ALT (SGPT) 18 U/L      AST (SGOT) 24 U/L      Alkaline Phosphatase 174 (H) U/L      Total Bilirubin 0.5 mg/dL      eGFR  African Amer 88 mL/min/1.73      Globulin 3.9 gm/dL      A/G Ratio 1.2 (L) g/dL      BUN/Creatinine Ratio 8.0     Anion Gap 6.0 mmol/L     Narrative:       National Kidney Foundation Guidelines    Stage     Description        GFR  1         Normal or High     90+  2         Mild decrease      60-89  3         Moderate decrease  30-59  4         Severe decrease    15-29  5         Kidney failure     <15    Lipase [[id number]]  (Normal) Collected:  09/21/17 2221    Specimen:  Blood Updated:  09/21/17 2253     Lipase 39 U/L     BNP [[id number]]  (Normal) Collected:  09/21/17 2221     Specimen:  Blood Updated:  09/21/17 2300     BNP 54.0 pg/mL     POC Troponin, Rapid [[id number]]  (Normal) Collected:  09/22/17 0153    Specimen:  Blood Updated:  09/22/17 0210     Troponin I 0.01 ng/mL       Serial Number: [id number]:  [id number]       Urinalysis With / Culture If Indicated [[id number]]  (Abnormal) Collected:  09/22/17 0151    Specimen:  Urine from Urine, Clean Catch Updated:  09/22/17 0235     Color, UA Dark Yellow (A)     Appearance, UA Turbid (A)     pH, UA 6.0     Specific Gravity, UA >=1.030     Glucose, UA Negative     Ketones, UA Negative     Bilirubin, UA Small (1+) (A)     Blood, UA Negative     Protein, UA 30 mg/dL (1+) (A)     Leuk Esterase, UA Negative     Nitrite, UA Negative     Urobilinogen, UA 0.2 E.U./dL    Urine Culture [[id number]] Collected:  09/22/17 0151    Specimen:  Urine from Urine, Clean Catch Updated:  09/22/17 0235    Urinalysis, Microscopic Only [[id number]]  (Abnormal) Collected:  09/22/17 0151    Specimen:  Urine from Urine, Clean Catch Updated:  09/22/17 0252     RBC, UA 3-6 (A) /HPF      WBC, UA 13-20 (A) /HPF      Bacteria, UA 1+ (A) /HPF      Squamous Epithelial Cells, UA 13-20 (A) /HPF      Methodology Manual Light Microscopy    Magnesium [[id number]]  (Normal) Collected:  09/21/17 2221    Specimen:  Blood Updated:  09/22/17 0359     Magnesium 1.9 mg/dL     CBC (No Diff) [[id number]]  (Abnormal) Collected:  09/22/17 0616    Specimen:  Blood Updated:  09/22/17 0727     WBC 7.29 10*3/mm3      RBC 4.05 (L) 10*6/mm3      Hemoglobin 11.7 (L) g/dL      Hematocrit 35.5 (L) %      MCV 87.7 fL      MCH 28.9 pg      MCHC 33.0 g/dL      RDW 14.8 (H) %      RDW-SD 47.6 fl      MPV 10.6 fL      Platelets 211 10*3/mm3     Hemoglobin A1c [[id number]]  (Normal) Collected:  09/22/17 0713    Specimen:  Blood Updated:  09/22/17 0735     Hemoglobin A1C 5.60 %     Narrative:       The American Diabetes Association recommends maintenance of Hemoglobin A1C at 7.0% or lower. Goals for  Hemoglobin A1C reduction may need to be modified if hypoglycemia is a problem.    Basic Metabolic Panel [[id number]]  (Abnormal) Collected:  09/22/17 0616    Specimen:  Blood Updated:  09/22/17 0753     Glucose 105 (H) mg/dL      BUN 8 (L) mg/dL      Creatinine 0.80 mg/dL      Sodium 138 mmol/L      Potassium 3.0 (L) mmol/L      Chloride 105 mmol/L      CO2 29.0 mmol/L      Calcium 9.1 mg/dL      eGFR  African Amer 114 mL/min/1.73      BUN/Creatinine Ratio 10.0     Anion Gap 4.0 mmol/L     Narrative:       National Kidney Foundation Guidelines    Stage     Description        GFR  1         Normal or High     90+  2         Mild decrease      60-89  3         Moderate decrease  30-59  4         Severe decrease    15-29  5         Kidney failure     <15    Lipid Panel [[id number]]  (Abnormal) Collected:  09/22/17 0616    Specimen:  Blood Updated:  09/22/17 0753     Total Cholesterol 143 mg/dL      Triglycerides 66 mg/dL      HDL Cholesterol 37 (L) mg/dL      LDL Cholesterol  98 mg/dL     Narrative:       Troponin [[id number]]  (Normal) Collected:  09/22/17 0616    Specimen:  Blood Updated:  09/22/17 0753     Troponin I 0.007 ng/mL     TSH [[id number]]  (Normal) Collected:  09/22/17 0616    Specimen:  Blood Updated:  09/22/17 0753     TSH 4.594 mIU/mL     Magnesium [[id number]]  (Normal) Collected:  09/22/17 0616    Specimen:  Blood Updated:  09/22/17 1525     Magnesium 1.7 mg/dL         Imaging Results (most recent)     Procedure Component Value Units Date/Time    XR Chest 1 View [[id number]]  (Abnormal) Collected:  09/21/17 2212     Updated:  09/21/17 2328    Narrative:       EXAM:    XR Chest, 1 View    CLINICAL HISTORY:    77 years old, male; Pain; Chest pain; Type not specified; Additional info:   Chest pain triage protocol    TECHNIQUE:    Frontal view of the chest.    COMPARISON:    CR - XR CHEST 2 VW 9/12/2017 8:04:23 PM    FINDINGS:    Prominent lung markings/peribronchial thickening without definite evidence of  "  consolidation, no evidence of pleural effusion or pneumothorax.  Cardiomegaly   with normal lung vascularity.  Tortuous calcific aortic arch and descending   thoracic aorta.        Impression:         Chronic cardiopulmonary changes without evidence of an active lung   parenchymal lesion.       THIS DOCUMENT HAS BEEN ELECTRONICALLY SIGNED BY SUSANNA BROOKS MD        Condition on Discharge:  stable    Physical Exam on Discharge:/92 (BP Location: Left arm, Patient Position: Lying)  Pulse 68  Temp 97.7 °F (36.5 °C) (Oral)   Resp 18  Ht 70\" (177.8 cm)  Wt 185 lb (83.9 kg)  SpO2 97%  BMI 26.54 kg/m2  Physical Exam  Constitutional: sitting up in bed, fully dressed appears in no acute distress, awake, alert  Eyes: PERRLA, sclerae anicteric, no conjunctival injection  HENT: NCAT  Neck: Supple, , trachea midline  Respiratory: Clear to auscultation bilaterally, nonlabored respirations   Cardiovascular: RRR, no murmurs, rubs, or gallops, palpable pedal pulses bilaterally  Gastrointestinal: Positive bowel sounds, soft, nontender, nondistended  Musculoskeletal: No bilateral ankle edema, no clubbing or cyanosis to bilateral lower extremities  Psychiatric: Oriented x 3, appropriate affect, cooperative  Neurologic: speech is clear, follows commands, no focal deficits  Skin: warm, dry    Discharge Disposition  Home or Self Care    Discharge Medications   George Lomeli   Home Medication Instructions LINDSEY:[id number]    Printed on:09/22/17 1524   Medication Information                      allopurinol (ZYLOPRIM) 100 MG tablet  Take 100 mg by mouth Daily.             amLODIPine (NORVASC) 10 MG tablet  Take 10 mg by mouth Daily.             aspirin 81 MG EC tablet  Take 81 mg by mouth Daily.             carvedilol (COREG) 25 MG tablet  Take 25 mg by mouth 2 (Two) Times a Day With Meals.             levothyroxine (SYNTHROID, LEVOTHROID) 25 MCG tablet  Take 25 mcg by mouth Daily.             lisinopril (PRINIVIL,ZESTRIL) 40 MG " tablet  Take 40 mg by mouth Daily.             nitroglycerin (NITROSTAT) 0.4 MG SL tablet  Place 1 tablet under the tongue Every 5 (Five) Minutes As Needed for Chest Pain. Take no more than 3 doses in 15 minutes.             oxyCODONE-acetaminophen (PERCOCET)  MG per tablet  Take 1 tablet by mouth Every 6 (Six) Hours As Needed for Moderate Pain .             potassium chloride (K-DUR,KLOR-CON) 10 MEQ CR tablet  Take 2 tablets by mouth 2 (Two) Times a Day.             tamsulosin (FLOMAX) 0.4 MG capsule 24 hr capsule  Take 1 capsule by mouth Every Night.                 Discharge Diet:   Diet Instructions     Advance Diet As Tolerated           Diet: Cardiac; Thin       Discharge Diet:  Cardiac   Fluid Consistency:  Thin                 Discharge Care Plan / Instructions:    Activity at Discharge:   Activity Instructions     Activity as Tolerated                     Follow-up Appointments  No future appointments.  Additional Instructions for the Follow-ups that You Need to Schedule     Discharge Follow-up with PCP    As directed    Follow Up Details:  Patient need to follow up with his PCP next week for evaluation of noncardiac causes of Chest pain as well as to monitor low potassium level.                 Test Results Pending at Discharge   Order Current Status    Magnesium In process    Urine Culture In process           Casie M Mayne, PA-C 09/22/17 3:24 PM    Time: Discharge 40 min    Please note that portions of this note may have been completed with a voice recognition program. Efforts were made to edit the dictations, but occasionally words are mistranscribed.

## 2017-09-22 NOTE — NURSING NOTE
Pt states he is concerned that he is out of his pain medication (Percocet) at home and states that he does not have enough available to help his pain that he has in his neck, back, ankles, and hands.

## 2017-09-22 NOTE — PROGRESS NOTES
Discharge Planning Assessment  [facility]     Patient Name: George Lomeli  MRN: [id number]  Today's Date: 9/22/2017    Admit Date: 9/21/2017          Discharge Needs Assessment       09/22/17 1526    Living Environment    Lives With spouse    Living Arrangements house    Home Accessibility no concerns    Stair Railings at Home none    Type of Financial/Environmental Concern none    Transportation Available car;family or friend will provide    Living Environment    Provides Primary Care For no one    Primary Care Provided By spouse/significant other    Quality Of Family Relationships helpful;supportive    Able to Return to Prior Living Arrangements yes    Discharge Needs Assessment    Concerns To Be Addressed no discharge needs identified;denies needs/concerns at this time    Readmission Within The Last 30 Days no previous admission in last 30 days    Equipment Currently Used at Home cane, straight    Equipment Needed After Discharge none    Discharge Disposition home or self-care            Discharge Plan       09/22/17 1526    Case Management/Social Work Plan    Plan Home     Patient/Family In Agreement With Plan yes    Additional Comments Spoke with patient at bedside, he resides in [facility] with his wife who is helpful and supportive. His PCP is Kaycee Douglas. He uses a cane on occasion but denies any other needs for DME for discharge. He has no other concerns or issues at this time and his goal is to return home when medically ready - CM to follow - -1102         Discharge Placement     No information found        Expected Discharge Date and Time     Expected Discharge Date Expected Discharge Time    Sep 22, 2017               Demographic Summary       09/22/17 1522    Referral Information    Admission Type observation    Arrived From still a patient    Referral Source admission list    Reason For Consult discharge planning    Record Reviewed history and physical;medical record    Contact  Information    Permission Granted to Share Information With     Primary Care Physician Information    Name Kaycee Douglas            Functional Status       09/22/17 1525    Functional Status Current    Current Functional Level Comment Please see nurses notes     Functional Status Prior    Ambulation 0-->independent    Transferring 0-->independent    Toileting 0-->independent    Bathing 0-->independent    Dressing 0-->independent    Eating 0-->independent    Communication 0-->understands/communicates without difficulty    Swallowing 0-->swallows foods/liquids without difficulty    IADL    Medications independent    Meal Preparation independent    Housekeeping independent    Laundry independent    Shopping independent    Oral Care independent    Activity Tolerance    Current Activity Limitations none    Usual Activity Tolerance good    Current Activity Tolerance moderate    Cognitive/Perceptual/Developmental    Current Mental Status/Cognitive Functioning no deficits noted    Employment/Financial    Employment/Finance Comments [facility] [employer] medicare - no concerns             Psychosocial     None            Abuse/Neglect     None            Legal     None            Substance Abuse     None            Patient Forms     None          Loyda Burton RN

## 2017-09-22 NOTE — PLAN OF CARE
Problem: Patient Care Overview (Adult)  Goal: Plan of Care Review  Outcome: Ongoing (interventions implemented as appropriate)    09/22/17 1454   Coping/Psychosocial Response Interventions   Plan Of Care Reviewed With patient   Patient Care Overview   Progress improving         Problem: Acute Coronary Syndrome (ACS) (Adult)  Goal: Signs and Symptoms of Listed Potential Problems Will be Absent or Manageable (Acute Coronary Syndrome)  Outcome: Ongoing (interventions implemented as appropriate)    09/22/17 1454   Acute Coronary Syndrome (ACS)   Problems Assessed (Acute Coronary Syndrome (ACS)) all   Problems Present (Acute Coronary Syndrome (ACS)) none         09/22/17 1454   Acute Coronary Syndrome (ACS)   Problems Assessed (Acute Coronary Syndrome (ACS)) all   Problems Present (Acute Coronary Syndrome (ACS)) chest pain (angina)         Problem: Pain, Chronic (Adult)  Goal: Identify Related Risk Factors and Signs and Symptoms  Outcome: Ongoing (interventions implemented as appropriate)    09/22/17 1454   Pain, Chronic   Related Risk Factors (Chronic Pain) disease process   Signs and Symptoms (Chronic Pain) fatigue/weakness       Goal: Acceptable Pain Control/Comfort Level  Outcome: Ongoing (interventions implemented as appropriate)    09/22/17 1454   Pain, Chronic (Adult)   Acceptable Pain Control/Comfort Level making progress toward outcome

## 2017-09-24 ENCOUNTER — HOSPITAL ENCOUNTER (EMERGENCY)
Facility: HOSPITAL | Age: 77
Discharge: HOME | End: 2017-09-24
Attending: EMERGENCY MEDICINE | Admitting: EMERGENCY MEDICINE

## 2017-09-24 VITALS
RESPIRATION RATE: 16 BRPM | SYSTOLIC BLOOD PRESSURE: 117 MMHG | DIASTOLIC BLOOD PRESSURE: 85 MMHG | WEIGHT: 185 LBS | TEMPERATURE: 97.7 F | HEART RATE: 70 BPM | BODY MASS INDEX: 26.48 KG/M2 | HEIGHT: 70 IN | OXYGEN SATURATION: 97 %

## 2017-09-24 DIAGNOSIS — M54.12 CERVICAL RADICULOPATHY: Primary | ICD-10-CM

## 2017-09-24 LAB — BACTERIA SPEC AEROBE CULT: NORMAL

## 2017-09-24 PROCEDURE — 99282 EMERGENCY DEPT VISIT SF MDM: CPT

## 2017-09-24 RX ORDER — PREDNISONE 20 MG/1
20 TABLET ORAL DAILY
Qty: 10 TABLET | Refills: 0 | OUTPATIENT
Start: 2017-09-24 | End: 2018-03-16

## 2017-09-24 NOTE — ED PROVIDER NOTES
Subjective   HPI Comments: Patient just discharged from hospital 2 days ago for workup for chest pain which was negative.  This included a negative stress test with a normal EF.  Mention the left arm pain prior to his discharge he was anxious to leave due to attending a .  His return today compiling of still having pain with some numbness in the medial portion left arm.  Does not radiate down into specific fingers but does appear to be radicular in nature.  Patient had a cervical fracture required surgery about a year ago.  He is Very limited range of motion with this.  No new falls or new trauma.    Patient is a 77 y.o. male presenting with upper extremity pain.   History provided by:  Patient   used: No    Upper Extremity Issue   Location:  Arm  Arm location:  L arm  Injury: no    Pain details:     Quality:  Dull and burning    Radiates to:  L elbow    Severity:  Moderate    Onset quality:  Gradual    Timing:  Intermittent    Progression:  Waxing and waning  Handedness:  Right-handed  Dislocation: no    Relieved by:  Nothing  Worsened by:  Nothing  Ineffective treatments:  None tried  Associated symptoms: neck pain    Associated symptoms: no back pain, no fever, no swelling and no tingling        Review of Systems   Constitutional: Negative for chills and fever.   HENT: Negative for rhinorrhea and sore throat.    Respiratory: Negative for chest tightness, shortness of breath and wheezing.    Cardiovascular: Negative for chest pain and palpitations.   Gastrointestinal: Negative for abdominal pain, nausea and vomiting.   Genitourinary: Negative for dysuria, frequency, hematuria and urgency.   Musculoskeletal: Positive for neck pain. Negative for back pain.   Skin: Negative for pallor and rash.   Neurological: Negative for weakness and headaches.   Psychiatric/Behavioral: Negative.    All other systems reviewed and are negative.      Past Medical History:   Diagnosis Date   • AAA (abdominal  aortic aneurysm)    • Broken neck    • Gout    • Hypertension    • Migraine    • Sciatica    • Thyroid cancer        No Known Allergies    Past Surgical History:   Procedure Laterality Date   • BACK SURGERY     • EYE SURGERY     • MULTIPLE TOOTH EXTRACTIONS     • NECK SURGERY      PINS/RODS IN NECK   • THYROIDECTOMY / EXCISION CYST THYROID         Family History   Problem Relation Age of Onset   • Hypertension Mother        Social History     Social History   • Marital status:      Spouse name: N/A   • Number of children: N/A   • Years of education: N/A     Occupational History   •  Retired     Social History Main Topics   • Smoking status: Former Smoker     Quit date: 1997   • Smokeless tobacco: Never Used      Comment: QUIT 30 YEARS AGO    • Alcohol use No   • Drug use: No   • Sexual activity: Defer     Other Topics Concern   • None     Social History Narrative   • None           Objective   Physical Exam   Constitutional: He is oriented to person, place, and time. He appears well-developed and well-nourished.   HENT:   Head: Normocephalic and atraumatic.   Right Ear: External ear normal.   Left Ear: External ear normal.   Nose: Nose normal.   Mouth/Throat: Oropharynx is clear and moist.   Eyes: Conjunctivae and EOM are normal. Pupils are equal, round, and reactive to light. No scleral icterus.   Neck: Normal range of motion. No thyromegaly present.   Cardiovascular: Normal rate, regular rhythm and normal heart sounds.    Pulmonary/Chest: Effort normal and breath sounds normal. No respiratory distress. He has no wheezes. He has no rales. He exhibits no tenderness.   Abdominal: Soft. Bowel sounds are normal. He exhibits no distension. There is no tenderness.   Musculoskeletal: Normal range of motion.   Pain is unable to be re-created.   Lymphadenopathy:     He has no cervical adenopathy.   Neurological: He is alert and oriented to person, place, and time. He has normal reflexes. He displays normal reflexes.  No cranial nerve deficit. Coordination normal.   Skin: Skin is warm and dry.   Psychiatric: He has a normal mood and affect. His behavior is normal. Judgment and thought content normal.   Nursing note and vitals reviewed.      Procedures         ED Course  ED Course                  MDM  Number of Diagnoses or Management Options  Cervical radiculopathy: new and requires workup     Amount and/or Complexity of Data Reviewed  Tests in the radiology section of CPT®: reviewed and ordered  Discuss the patient with other providers: yes    Patient Progress  Patient progress: stable      Final diagnoses:   Cervical radiculopathy            GABRIELA Wesley  09/25/17 7579

## 2017-09-26 ENCOUNTER — APPOINTMENT (OUTPATIENT)
Dept: MRI IMAGING | Facility: HOSPITAL | Age: 77
End: 2017-09-26

## 2017-09-26 ENCOUNTER — HOSPITAL ENCOUNTER (EMERGENCY)
Facility: HOSPITAL | Age: 77
Discharge: HOME | End: 2017-09-26
Attending: EMERGENCY MEDICINE | Admitting: EMERGENCY MEDICINE

## 2017-09-26 VITALS
RESPIRATION RATE: 16 BRPM | SYSTOLIC BLOOD PRESSURE: 137 MMHG | TEMPERATURE: 98.1 F | DIASTOLIC BLOOD PRESSURE: 90 MMHG | OXYGEN SATURATION: 96 % | HEIGHT: 62 IN | HEART RATE: 72 BPM | BODY MASS INDEX: 34.04 KG/M2 | WEIGHT: 185 LBS

## 2017-09-26 DIAGNOSIS — M48.02 FORAMINAL STENOSIS OF CERVICAL REGION: ICD-10-CM

## 2017-09-26 DIAGNOSIS — M54.12 LEFT CERVICAL RADICULOPATHY: Primary | ICD-10-CM

## 2017-09-26 LAB
ALBUMIN SERPL-MCNC: 4.6 G/DL (ref 3.2–4.8)
ALBUMIN/GLOB SERPL: 1.3 G/DL (ref 1.5–2.5)
ALP SERPL-CCNC: 159 U/L (ref 25–100)
ALT SERPL W P-5'-P-CCNC: 16 U/L (ref 7–40)
ANION GAP SERPL CALCULATED.3IONS-SCNC: 5 MMOL/L (ref 3–11)
AST SERPL-CCNC: 23 U/L (ref 0–33)
BASOPHILS # BLD AUTO: 0.01 10*3/MM3 (ref 0–0.2)
BASOPHILS NFR BLD AUTO: 0.2 % (ref 0–1)
BILIRUB SERPL-MCNC: 0.4 MG/DL (ref 0.3–1.2)
BUN BLD-MCNC: 9 MG/DL (ref 9–23)
BUN/CREAT SERPL: 9 (ref 7–25)
CALCIUM SPEC-SCNC: 9.7 MG/DL (ref 8.7–10.4)
CHLORIDE SERPL-SCNC: 108 MMOL/L (ref 99–109)
CO2 SERPL-SCNC: 30 MMOL/L (ref 20–31)
CREAT BLD-MCNC: 1 MG/DL (ref 0.6–1.3)
D-LACTATE SERPL-SCNC: 0.6 MMOL/L (ref 0.5–2)
DEPRECATED RDW RBC AUTO: 49.4 FL (ref 37–54)
EOSINOPHIL # BLD AUTO: 0.36 10*3/MM3 (ref 0–0.3)
EOSINOPHIL NFR BLD AUTO: 5.5 % (ref 0–3)
ERYTHROCYTE [DISTWIDTH] IN BLOOD BY AUTOMATED COUNT: 15.3 % (ref 11.3–14.5)
GFR SERPL CREATININE-BSD FRML MDRD: 88 ML/MIN/1.73
GLOBULIN UR ELPH-MCNC: 3.6 GM/DL
GLUCOSE BLD-MCNC: 104 MG/DL (ref 70–100)
HCT VFR BLD AUTO: 40.2 % (ref 38.9–50.9)
HGB BLD-MCNC: 13 G/DL (ref 13.1–17.5)
IMM GRANULOCYTES # BLD: 0.01 10*3/MM3 (ref 0–0.03)
IMM GRANULOCYTES NFR BLD: 0.2 % (ref 0–0.6)
LYMPHOCYTES # BLD AUTO: 1.53 10*3/MM3 (ref 0.6–4.8)
LYMPHOCYTES NFR BLD AUTO: 23.2 % (ref 24–44)
MCH RBC QN AUTO: 28.6 PG (ref 27–31)
MCHC RBC AUTO-ENTMCNC: 32.3 G/DL (ref 32–36)
MCV RBC AUTO: 88.5 FL (ref 80–99)
MONOCYTES # BLD AUTO: 0.58 10*3/MM3 (ref 0–1)
MONOCYTES NFR BLD AUTO: 8.8 % (ref 0–12)
NEUTROPHILS # BLD AUTO: 4.1 10*3/MM3 (ref 1.5–8.3)
NEUTROPHILS NFR BLD AUTO: 62.1 % (ref 41–71)
PLATELET # BLD AUTO: 217 10*3/MM3 (ref 150–450)
PMV BLD AUTO: 10 FL (ref 6–12)
POTASSIUM BLD-SCNC: 3.8 MMOL/L (ref 3.5–5.5)
PROT SERPL-MCNC: 8.2 G/DL (ref 5.7–8.2)
RBC # BLD AUTO: 4.54 10*6/MM3 (ref 4.2–5.76)
SODIUM BLD-SCNC: 143 MMOL/L (ref 132–146)
WBC NRBC COR # BLD: 6.59 10*3/MM3 (ref 3.5–10.8)

## 2017-09-26 PROCEDURE — 99284 EMERGENCY DEPT VISIT MOD MDM: CPT

## 2017-09-26 PROCEDURE — 83605 ASSAY OF LACTIC ACID: CPT | Performed by: EMERGENCY MEDICINE

## 2017-09-26 PROCEDURE — 80053 COMPREHEN METABOLIC PANEL: CPT | Performed by: EMERGENCY MEDICINE

## 2017-09-26 PROCEDURE — 25010000002 ONDANSETRON PER 1 MG: Performed by: EMERGENCY MEDICINE

## 2017-09-26 PROCEDURE — 96374 THER/PROPH/DIAG INJ IV PUSH: CPT

## 2017-09-26 PROCEDURE — 25010000002 DEXAMETHASONE PER 1 MG: Performed by: EMERGENCY MEDICINE

## 2017-09-26 PROCEDURE — 96375 TX/PRO/DX INJ NEW DRUG ADDON: CPT

## 2017-09-26 PROCEDURE — 96376 TX/PRO/DX INJ SAME DRUG ADON: CPT

## 2017-09-26 PROCEDURE — 25010000002 HYDROMORPHONE PER 4 MG: Performed by: EMERGENCY MEDICINE

## 2017-09-26 PROCEDURE — 85025 COMPLETE CBC W/AUTO DIFF WBC: CPT | Performed by: EMERGENCY MEDICINE

## 2017-09-26 PROCEDURE — 93005 ELECTROCARDIOGRAM TRACING: CPT

## 2017-09-26 PROCEDURE — 72141 MRI NECK SPINE W/O DYE: CPT

## 2017-09-26 RX ORDER — PREDNISONE 20 MG/1
TABLET ORAL
Qty: 10 TABLET | Refills: 0 | OUTPATIENT
Start: 2017-09-26 | End: 2018-03-16

## 2017-09-26 RX ORDER — SODIUM CHLORIDE 0.9 % (FLUSH) 0.9 %
10 SYRINGE (ML) INJECTION AS NEEDED
Status: DISCONTINUED | OUTPATIENT
Start: 2017-09-26 | End: 2017-09-27 | Stop reason: HOSPADM

## 2017-09-26 RX ORDER — GABAPENTIN 300 MG/1
300 CAPSULE ORAL ONCE
Status: COMPLETED | OUTPATIENT
Start: 2017-09-26 | End: 2017-09-26

## 2017-09-26 RX ORDER — DEXAMETHASONE SODIUM PHOSPHATE 4 MG/ML
4 INJECTION, SOLUTION INTRA-ARTICULAR; INTRALESIONAL; INTRAMUSCULAR; INTRAVENOUS; SOFT TISSUE ONCE
Status: COMPLETED | OUTPATIENT
Start: 2017-09-26 | End: 2017-09-26

## 2017-09-26 RX ORDER — ONDANSETRON 2 MG/ML
4 INJECTION INTRAMUSCULAR; INTRAVENOUS ONCE
Status: COMPLETED | OUTPATIENT
Start: 2017-09-26 | End: 2017-09-26

## 2017-09-26 RX ORDER — HYDROMORPHONE HYDROCHLORIDE 1 MG/ML
0.5 INJECTION, SOLUTION INTRAMUSCULAR; INTRAVENOUS; SUBCUTANEOUS ONCE
Status: COMPLETED | OUTPATIENT
Start: 2017-09-26 | End: 2017-09-26

## 2017-09-26 RX ADMIN — DEXAMETHASONE SODIUM PHOSPHATE 4 MG: 4 INJECTION, SOLUTION INTRAMUSCULAR; INTRAVENOUS at 20:18

## 2017-09-26 RX ADMIN — HYDROMORPHONE HYDROCHLORIDE 0.5 MG: 1 INJECTION, SOLUTION INTRAMUSCULAR; INTRAVENOUS; SUBCUTANEOUS at 20:18

## 2017-09-26 RX ADMIN — ONDANSETRON 4 MG: 2 INJECTION INTRAMUSCULAR; INTRAVENOUS at 20:18

## 2017-09-26 RX ADMIN — GABAPENTIN 300 MG: 300 CAPSULE ORAL at 20:18

## 2017-09-26 RX ADMIN — HYDROMORPHONE HYDROCHLORIDE 1 MG: 1 INJECTION, SOLUTION INTRAMUSCULAR; INTRAVENOUS; SUBCUTANEOUS at 21:33

## 2017-11-22 ENCOUNTER — HOSPITAL ENCOUNTER (EMERGENCY)
Facility: HOSPITAL | Age: 77
Discharge: HOME | End: 2017-11-22
Attending: EMERGENCY MEDICINE | Admitting: EMERGENCY MEDICINE

## 2017-11-22 ENCOUNTER — APPOINTMENT (OUTPATIENT)
Dept: CT IMAGING | Facility: HOSPITAL | Age: 77
End: 2017-11-22

## 2017-11-22 ENCOUNTER — APPOINTMENT (OUTPATIENT)
Dept: GENERAL RADIOLOGY | Facility: HOSPITAL | Age: 77
End: 2017-11-22

## 2017-11-22 VITALS
BODY MASS INDEX: 25.77 KG/M2 | HEART RATE: 64 BPM | OXYGEN SATURATION: 96 % | DIASTOLIC BLOOD PRESSURE: 89 MMHG | RESPIRATION RATE: 16 BRPM | HEIGHT: 70 IN | SYSTOLIC BLOOD PRESSURE: 134 MMHG | WEIGHT: 180 LBS | TEMPERATURE: 97.9 F

## 2017-11-22 DIAGNOSIS — J01.00 ACUTE NON-RECURRENT MAXILLARY SINUSITIS: Primary | ICD-10-CM

## 2017-11-22 DIAGNOSIS — R10.2 SUPRAPUBIC ABDOMINAL PAIN: ICD-10-CM

## 2017-11-22 LAB
ALBUMIN SERPL-MCNC: 4 G/DL (ref 3.2–4.8)
ALBUMIN/GLOB SERPL: 1.3 G/DL (ref 1.5–2.5)
ALP SERPL-CCNC: 127 U/L (ref 25–100)
ALT SERPL W P-5'-P-CCNC: 15 U/L (ref 7–40)
ANION GAP SERPL CALCULATED.3IONS-SCNC: 6 MMOL/L (ref 3–11)
AST SERPL-CCNC: 18 U/L (ref 0–33)
BASOPHILS # BLD AUTO: 0.01 10*3/MM3 (ref 0–0.2)
BASOPHILS NFR BLD AUTO: 0.1 % (ref 0–1)
BILIRUB SERPL-MCNC: 0.5 MG/DL (ref 0.3–1.2)
BILIRUB UR QL STRIP: NEGATIVE
BUN BLD-MCNC: 11 MG/DL (ref 9–23)
BUN/CREAT SERPL: 12.2 (ref 7–25)
CALCIUM SPEC-SCNC: 8.7 MG/DL (ref 8.7–10.4)
CHLORIDE SERPL-SCNC: 109 MMOL/L (ref 99–109)
CLARITY UR: CLEAR
CO2 SERPL-SCNC: 29 MMOL/L (ref 20–31)
COLOR UR: YELLOW
CREAT BLD-MCNC: 0.9 MG/DL (ref 0.6–1.3)
DEPRECATED RDW RBC AUTO: 49.5 FL (ref 37–54)
EOSINOPHIL # BLD AUTO: 0.25 10*3/MM3 (ref 0–0.3)
EOSINOPHIL NFR BLD AUTO: 3.1 % (ref 0–3)
ERYTHROCYTE [DISTWIDTH] IN BLOOD BY AUTOMATED COUNT: 15.3 % (ref 11.3–14.5)
FLUAV AG NPH QL: NEGATIVE
FLUBV AG NPH QL IA: NEGATIVE
GFR SERPL CREATININE-BSD FRML MDRD: 99 ML/MIN/1.73
GLOBULIN UR ELPH-MCNC: 3 GM/DL
GLUCOSE BLD-MCNC: 104 MG/DL (ref 70–100)
GLUCOSE UR STRIP-MCNC: NEGATIVE MG/DL
HCT VFR BLD AUTO: 38.3 % (ref 38.9–50.9)
HGB BLD-MCNC: 12.3 G/DL (ref 13.1–17.5)
HGB UR QL STRIP.AUTO: NEGATIVE
IMM GRANULOCYTES # BLD: 0.01 10*3/MM3 (ref 0–0.03)
IMM GRANULOCYTES NFR BLD: 0.1 % (ref 0–0.6)
KETONES UR QL STRIP: NEGATIVE
LEUKOCYTE ESTERASE UR QL STRIP.AUTO: NEGATIVE
LYMPHOCYTES # BLD AUTO: 1.18 10*3/MM3 (ref 0.6–4.8)
LYMPHOCYTES NFR BLD AUTO: 14.7 % (ref 24–44)
MCH RBC QN AUTO: 28.3 PG (ref 27–31)
MCHC RBC AUTO-ENTMCNC: 32.1 G/DL (ref 32–36)
MCV RBC AUTO: 88.2 FL (ref 80–99)
MONOCYTES # BLD AUTO: 0.68 10*3/MM3 (ref 0–1)
MONOCYTES NFR BLD AUTO: 8.5 % (ref 0–12)
NEUTROPHILS # BLD AUTO: 5.91 10*3/MM3 (ref 1.5–8.3)
NEUTROPHILS NFR BLD AUTO: 73.5 % (ref 41–71)
NITRITE UR QL STRIP: NEGATIVE
PH UR STRIP.AUTO: 7 [PH] (ref 5–8)
PLATELET # BLD AUTO: 180 10*3/MM3 (ref 150–450)
PMV BLD AUTO: 9.6 FL (ref 6–12)
POTASSIUM BLD-SCNC: 3.3 MMOL/L (ref 3.5–5.5)
PROT SERPL-MCNC: 7 G/DL (ref 5.7–8.2)
PROT UR QL STRIP: NEGATIVE
RBC # BLD AUTO: 4.34 10*6/MM3 (ref 4.2–5.76)
SODIUM BLD-SCNC: 144 MMOL/L (ref 132–146)
SP GR UR STRIP: 1.01 (ref 1–1.03)
UROBILINOGEN UR QL STRIP: NORMAL
WBC NRBC COR # BLD: 8.04 10*3/MM3 (ref 3.5–10.8)

## 2017-11-22 PROCEDURE — 71020 HC CHEST PA AND LATERAL: CPT

## 2017-11-22 PROCEDURE — 99284 EMERGENCY DEPT VISIT MOD MDM: CPT

## 2017-11-22 PROCEDURE — 74176 CT ABD & PELVIS W/O CONTRAST: CPT

## 2017-11-22 PROCEDURE — 87804 INFLUENZA ASSAY W/OPTIC: CPT | Performed by: PHYSICIAN ASSISTANT

## 2017-11-22 PROCEDURE — 85025 COMPLETE CBC W/AUTO DIFF WBC: CPT | Performed by: PHYSICIAN ASSISTANT

## 2017-11-22 PROCEDURE — 80053 COMPREHEN METABOLIC PANEL: CPT | Performed by: PHYSICIAN ASSISTANT

## 2017-11-22 PROCEDURE — 81003 URINALYSIS AUTO W/O SCOPE: CPT | Performed by: PHYSICIAN ASSISTANT

## 2017-11-22 RX ORDER — POTASSIUM CHLORIDE 750 MG/1
40 CAPSULE, EXTENDED RELEASE ORAL ONCE
Status: COMPLETED | OUTPATIENT
Start: 2017-11-22 | End: 2017-11-22

## 2017-11-22 RX ORDER — AMOXICILLIN AND CLAVULANATE POTASSIUM 875; 125 MG/1; MG/1
1 TABLET, FILM COATED ORAL ONCE
Status: COMPLETED | OUTPATIENT
Start: 2017-11-22 | End: 2017-11-22

## 2017-11-22 RX ORDER — AMOXICILLIN AND CLAVULANATE POTASSIUM 875; 125 MG/1; MG/1
1 TABLET, FILM COATED ORAL EVERY 12 HOURS
Qty: 20 TABLET | Refills: 0 | Status: SHIPPED | OUTPATIENT
Start: 2017-11-22 | End: 2018-07-27

## 2017-11-22 RX ADMIN — POTASSIUM CHLORIDE 40 MEQ: 750 CAPSULE, EXTENDED RELEASE ORAL at 13:36

## 2017-11-22 RX ADMIN — AMOXICILLIN AND CLAVULANATE POTASSIUM 1 TABLET: 875; 125 TABLET, FILM COATED ORAL at 15:07

## 2017-11-22 NOTE — DISCHARGE INSTRUCTIONS
Patient's history and exam is consistent with acute sinusitis.  We will prescribe 10 day course of Augmentin 875 mg by mouth twice daily.  Patient also was concerned due to questionable bladder mass on CT scan from 8/19/2017, and he also has history of microscopic hematuria.  Repeat urinalysis today showed no hematuria.  Repeat CT scan of the abdomen and pelvis showed no pelvic mass or abnormal fluid collection.  Patient was recommended previously to follow-up with Dr. Alegre, urologist.  He still needs to do that.  Also recommend follow-up with Dr. Douglas, his PCP, next week for recheck on sinusitis.  He is to return if any worsening symptoms.

## 2017-11-22 NOTE — ED PROVIDER NOTES
"Subjective   HPI Comments: This is a very sweet 77-year-old -American male that presents to the ER with multiple complaints.  He has been having increased upper respiratory symptoms for the last 10 days including nasal congestion, rhinorrhea, postnasal drainage, and sinus pressure.  He reports a mild, nonproductive cough.  He says that the congestion is now in his \"throat area\".  He denies any chest tightness, chest congestion, or pleuritic chest pain.  He denies any shortness of breath or hemoptysis.  He reports subjective fever with intermittent chills and sweats, but these are more pronounced at night.  He denies any known sick contacts.  He denies any frequent history of allergic rhinitis.  He also is concerned about a \"bladder issue\".  He was seen in our ER at [facility] in August, 2017.  At that time he was told that he had a questionable bladder mass per CT scan.  He was advised to follow-up with Dr. Alegre, urologist.  Patient said he went to set that up and it was going to cost too much money out of pocket, so he did not follow up with urology.  He describes intermittent right suprapubic abdominal pain, and he is concerned because he did not get to follow up with the specialist.  He tells me that he will switch back to Medicare in January 2018 and will follow-up at that time.  He is really worried about this questionable bladder mass and would like for us to recheck that.  He denies any dysuria, urgency, frequency, or gross hematuria.  He denies any flank or back pain.  He denies any bowel changes.  He also tells me that he has not had his influenza vaccine this season.    Patient is a 77 y.o. male presenting with URI.   History provided by:  Patient  URI   Presenting symptoms: congestion, cough, fever (subjective fever with chills and sweats.) and rhinorrhea    Presenting symptoms: no ear pain and no sore throat    Severity:  Moderate  Onset quality:  Gradual  Duration:  10 days  Timing:  " "Constant  Progression:  Worsening  Chronicity:  New  Relieved by:  Nothing  Worsened by:  Nothing  Ineffective treatments:  None tried  Associated symptoms: headaches (\"sinus\" headache) and sinus pain (sinus pressure)    Associated symptoms: no arthralgias, no myalgias, no neck pain, no swollen glands and no wheezing    Risk factors: being elderly    Risk factors: no sick contacts        Review of Systems   Constitutional: Positive for chills, diaphoresis and fever (subjective fever with chills and sweats.). Negative for appetite change.   HENT: Positive for congestion, postnasal drip, rhinorrhea and sinus pain (sinus pressure). Negative for ear pain and sore throat.    Eyes: Negative.    Respiratory: Positive for cough. Negative for chest tightness, shortness of breath and wheezing.    Cardiovascular: Negative for chest pain and palpitations.   Gastrointestinal: Negative for abdominal pain, constipation, diarrhea, nausea and vomiting.   Genitourinary: Positive for hematuria (History of microscopic hematuria at last ER visit.  Pt tried to follow up but he had to pay too much out of pocket so he is going to follow up at the first of 2018.). Negative for difficulty urinating, flank pain and frequency.   Musculoskeletal: Negative for arthralgias, back pain, myalgias and neck pain.   Skin: Negative.    Neurological: Positive for headaches (\"sinus\" headache). Negative for dizziness, syncope and weakness.   Psychiatric/Behavioral: Negative.        Past Medical History:   Diagnosis Date   • AAA (abdominal aortic aneurysm)    • Broken neck    • Gout    • Hypertension    • Migraine    • Sciatica    • Thyroid cancer        No Known Allergies    Past Surgical History:   Procedure Laterality Date   • BACK SURGERY     • EYE SURGERY     • MULTIPLE TOOTH EXTRACTIONS     • NECK SURGERY      PINS/RODS IN NECK   • THYROIDECTOMY / EXCISION CYST THYROID         Family History   Problem Relation Age of Onset   • Hypertension Mother  "       Social History     Social History   • Marital status:      Spouse name: N/A   • Number of children: N/A   • Years of education: N/A     Occupational History   •  Retired     Social History Main Topics   • Smoking status: Former Smoker     Quit date: 1997   • Smokeless tobacco: Never Used      Comment: QUIT 30 YEARS AGO    • Alcohol use No   • Drug use: No   • Sexual activity: Defer     Other Topics Concern   • None     Social History Narrative           Objective   Physical Exam   Constitutional: He is oriented to person, place, and time. He appears well-developed and well-nourished. No distress.   HENT:   Head: Normocephalic and atraumatic.   Right Ear: Hearing and tympanic membrane normal.   Left Ear: Hearing and tympanic membrane normal.   Nose: Mucosal edema present. Right sinus exhibits maxillary sinus tenderness. Right sinus exhibits no frontal sinus tenderness. Left sinus exhibits maxillary sinus tenderness. Left sinus exhibits no frontal sinus tenderness.   Positive audible nasal congestion and post-nasal drainage.   Eyes: Conjunctivae and EOM are normal. Pupils are equal, round, and reactive to light. No scleral icterus.   Neck: Normal range of motion. Neck supple.   Cardiovascular: Normal rate, regular rhythm and normal heart sounds.    Pulmonary/Chest: Effort normal and breath sounds normal. No respiratory distress.   Abdominal: Soft. Bowel sounds are normal. He exhibits no distension. There is no hepatosplenomegaly. There is tenderness in the suprapubic area. There is no rigidity, no rebound, no guarding, no CVA tenderness and no tenderness at McBurney's point. No hernia.   Negative flank or CVA TTP.  Mild right suprapubic TTP.   Musculoskeletal: Normal range of motion. He exhibits no edema.   Lymphadenopathy:     He has no cervical adenopathy.   Neurological: He is alert and oriented to person, place, and time.   Skin: Skin is warm and dry. He is not diaphoretic.   Psychiatric: He has a  normal mood and affect. His behavior is normal.   Nursing note and vitals reviewed.      Procedures         ED Course  ED Course   Comment By Time   I reviewed previous CT scan of the abdomen and pelvis without contrast on 8/19/2017.  Impression showed a small filling defect in the urinary bladder.  Differential diagnoses is debris versus bladder mass.  Patient also had an enlarged prostate. Rachel Mckenna PA-C 11/22 1201   Workup reveals negative influenza A and B screen.  CBC shows a normal white blood cell count at 8.04.  Chemistries are within normal limits except for mild hypokalemia with a potassium of 3.3.  Chest x-ray shows chronic changes but no acute infiltrative process.  Urinalysis is completely within normal limits.  There is no evidence of microscopic hematuria.  We are awaiting CT scan of the abdomen and pelvis without contrast results. Rachel Mckenna PA-C 11/22 2645   Called the radiology department because it appears as though CT scan of the abdomen and pelvis has been dictated and read by the radiologist.  There is no report that is able to be accessed.  The radiology tech says preliminary report shows no acute abnormality.  I will await the formal report to get her better evaluation of the bladder before discharging patient.  The tech said the formal read should be back before long Rachel Mckenna PA-C 11/22 1353   CT scan showed no pelvic mass or abnormal fluid collection.  Discussed these results with patient, and he is very thankful and blast.  I still encouraged him to follow up with Dr. Alegre due to enlarged prostate and history of microscopic hematuria.  He verbalized understanding. Rachel Mckenna PA-C 11/22 3056          Recent Results (from the past 24 hour(s))   Influenza Antigen, Rapid - Swab, Nasopharynx    Collection Time: 11/22/17 11:56 AM   Result Value Ref Range    Influenza A Ag, EIA Negative Negative    Influenza B Ag, EIA Negative Negative   Comprehensive Metabolic Panel     Collection Time: 11/22/17 12:01 PM   Result Value Ref Range    Glucose 104 (H) 70 - 100 mg/dL    BUN 11 9 - 23 mg/dL    Creatinine 0.90 0.60 - 1.30 mg/dL    Sodium 144 132 - 146 mmol/L    Potassium 3.3 (L) 3.5 - 5.5 mmol/L    Chloride 109 99 - 109 mmol/L    CO2 29.0 20.0 - 31.0 mmol/L    Calcium 8.7 8.7 - 10.4 mg/dL    Total Protein 7.0 5.7 - 8.2 g/dL    Albumin 4.00 3.20 - 4.80 g/dL    ALT (SGPT) 15 7 - 40 U/L    AST (SGOT) 18 0 - 33 U/L    Alkaline Phosphatase 127 (H) 25 - 100 U/L    Total Bilirubin 0.5 0.3 - 1.2 mg/dL    eGFR  African Amer 99 >60 mL/min/1.73    Globulin 3.0 gm/dL    A/G Ratio 1.3 (L) 1.5 - 2.5 g/dL    BUN/Creatinine Ratio 12.2 7.0 - 25.0    Anion Gap 6.0 3.0 - 11.0 mmol/L   CBC Auto Differential    Collection Time: 11/22/17 12:01 PM   Result Value Ref Range    WBC 8.04 3.50 - 10.80 10*3/mm3    RBC 4.34 4.20 - 5.76 10*6/mm3    Hemoglobin 12.3 (L) 13.1 - 17.5 g/dL    Hematocrit 38.3 (L) 38.9 - 50.9 %    MCV 88.2 80.0 - 99.0 fL    MCH 28.3 27.0 - 31.0 pg    MCHC 32.1 32.0 - 36.0 g/dL    RDW 15.3 (H) 11.3 - 14.5 %    RDW-SD 49.5 37.0 - 54.0 fl    MPV 9.6 6.0 - 12.0 fL    Platelets 180 150 - 450 10*3/mm3    Neutrophil % 73.5 (H) 41.0 - 71.0 %    Lymphocyte % 14.7 (L) 24.0 - 44.0 %    Monocyte % 8.5 0.0 - 12.0 %    Eosinophil % 3.1 (H) 0.0 - 3.0 %    Basophil % 0.1 0.0 - 1.0 %    Immature Grans % 0.1 0.0 - 0.6 %    Neutrophils, Absolute 5.91 1.50 - 8.30 10*3/mm3    Lymphocytes, Absolute 1.18 0.60 - 4.80 10*3/mm3    Monocytes, Absolute 0.68 0.00 - 1.00 10*3/mm3    Eosinophils, Absolute 0.25 0.00 - 0.30 10*3/mm3    Basophils, Absolute 0.01 0.00 - 0.20 10*3/mm3    Immature Grans, Absolute 0.01 0.00 - 0.03 10*3/mm3   Urinalysis With / Culture If Indicated - Urine, Clean Catch    Collection Time: 11/22/17 12:38 PM   Result Value Ref Range    Color, UA Yellow Yellow, Straw    Appearance, UA Clear Clear    pH, UA 7.0 5.0 - 8.0    Specific Gravity, UA 1.007 1.001 - 1.030    Glucose, UA Negative Negative     "Ketones, UA Negative Negative    Bilirubin, UA Negative Negative    Blood, UA Negative Negative    Protein, UA Negative Negative    Leuk Esterase, UA Negative Negative    Nitrite, UA Negative Negative    Urobilinogen, UA 1.0 E.U./dL 0.2 - 1.0 E.U./dL     Note: In addition to lab results from this visit, the labs listed above may include labs taken at another facility or during a different encounter within the last 24 hours. Please correlate lab times with ED admission and discharge times for further clarification of the services performed during this visit.    XR Chest 2 View   Preliminary Result   Chronic change; no active disease.       D:  11/22/2017   E:  11/22/2017              CT Abdomen Pelvis Without Contrast    (Results Pending)     Vitals:    11/22/17 1107 11/22/17 1236 11/22/17 1422   BP: 157/89 164/88 134/89   BP Location: Left arm     Patient Position: Sitting     Pulse: 88 67 64   Resp: 18 16 16   Temp: 97.9 °F (36.6 °C)     TempSrc: Oral     SpO2: 96% 96% 96%   Weight: 180 lb (81.6 kg)     Height: 70\" (177.8 cm)       Medications   amoxicillin-clavulanate (AUGMENTIN) 875-125 MG per tablet 1 tablet (not administered)   potassium chloride (MICRO-K) CR capsule 40 mEq (40 mEq Oral Given 11/22/17 1336)     ECG/EMG Results (last 24 hours)     ** No results found for the last 24 hours. **                [facility]    Final diagnoses:   Acute non-recurrent maxillary sinusitis   Suprapubic abdominal pain            Rachel Mckenna PA-C  11/22/17 1437    "

## 2018-03-12 ENCOUNTER — CONSULT (OUTPATIENT)
Dept: ONCOLOGY | Facility: CLINIC | Age: 78
End: 2018-03-12

## 2018-03-12 VITALS
HEIGHT: 70 IN | SYSTOLIC BLOOD PRESSURE: 130 MMHG | WEIGHT: 199 LBS | RESPIRATION RATE: 18 BRPM | HEART RATE: 83 BPM | TEMPERATURE: 98.3 F | DIASTOLIC BLOOD PRESSURE: 89 MMHG | BODY MASS INDEX: 28.49 KG/M2

## 2018-03-12 DIAGNOSIS — C82.95 FOLLICULAR LYMPHOMA OF LYMPH NODES OF INGUINAL REGION, UNSPECIFIED FOLLICULAR LYMPHOMA TYPE (HCC): Primary | ICD-10-CM

## 2018-03-12 PROBLEM — C85.90 NON-HODGKIN'S LYMPHOMA: Status: ACTIVE | Noted: 2018-03-12

## 2018-03-12 PROCEDURE — 99205 OFFICE O/P NEW HI 60 MIN: CPT | Performed by: INTERNAL MEDICINE

## 2018-03-12 RX ORDER — POTASSIUM CHLORIDE 750 MG/1
10 TABLET, FILM COATED, EXTENDED RELEASE ORAL TAKE AS DIRECTED
COMMUNITY
Start: 2018-02-28

## 2018-03-12 RX ORDER — AMLODIPINE BESYLATE 5 MG/1
5 TABLET ORAL DAILY
COMMUNITY
Start: 2018-02-28

## 2018-03-12 NOTE — PROGRESS NOTES
CHIEF COMPLAINT: Management of lymphoma    REASON FOR REFERRAL: Same      RECORDS OBTAINED  Records of the patients history including those obtained from  [facility] were reviewed and summarized in detail.      HISTORY OF PRESENT ILLNESS:  The patient is a 77 y.o.  male, referred for non-Hodgkin lymphoma.  Had been having left inguinal pains and went for scanning that found left groin node.  Needle biopsy of this was called lymphoma CD10 positive and Fish testing is still pending.  Hence cannot say at this junction the exact subtype of lymphoma.  He has not been having bed drenching night sweats.  No unexplained weight loss.  Has not had any known anemia, thrombocytopenia, leukopenia.  Has not had frequent infections.  CT did not show any splenomegaly or other significant adenopathy.  The left inguinal pain has subsided since the needle biopsy.  He has an abdominal aortic aneurysm that is being watched and had previously been repaired.  Apparently 15+ years ago he was treated at [facility] for lymphoma for which she received right neck radiation and some chemotherapy.  He is not sure of the subtype of lymphoma nor of the type of chemotherapy nor the number of radiation treatments and this was far enough back that our records have been expunged.    REVIEW OF SYSTEMS:  A 14 point review of systems was performed and is negative except as noted above.    Past Medical History:   Diagnosis Date   • AAA (abdominal aortic aneurysm)    • Broken neck    • Gout    • Hypertension    • Migraine    • Sciatica    • Thyroid cancer      Past Surgical History:   Procedure Laterality Date   • BACK SURGERY     • EYE SURGERY     • MULTIPLE TOOTH EXTRACTIONS     • NECK SURGERY      PINS/RODS IN NECK   • THYROIDECTOMY / EXCISION CYST THYROID         Current Outpatient Prescriptions on File Prior to Visit   Medication Sig Dispense Refill   • allopurinol (ZYLOPRIM) 100 MG tablet Take 100 mg by mouth Daily.     •  amoxicillin-clavulanate (AUGMENTIN) 875-125 MG per tablet Take 1 tablet by mouth Every 12 (Twelve) Hours. 20 tablet 0   • aspirin 81 MG EC tablet Take 81 mg by mouth Daily.     • carvedilol (COREG) 25 MG tablet Take 25 mg by mouth 2 (Two) Times a Day With Meals.     • HYDROcodone-acetaminophen (NORCO) 5-325 MG per tablet Take 1 tablet by mouth Every 6 (Six) Hours As Needed for severe pain (7-10). 15 tablet 0   • levothyroxine (SYNTHROID, LEVOTHROID) 25 MCG tablet Take 25 mcg by mouth Daily.     • lisinopril (PRINIVIL,ZESTRIL) 40 MG tablet Take 40 mg by mouth Daily.     • nitroglycerin (NITROSTAT) 0.4 MG SL tablet Place 1 tablet under the tongue Every 5 (Five) Minutes As Needed for Chest Pain. Take no more than 3 doses in 15 minutes. 90 tablet 0   • oxyCODONE-acetaminophen (PERCOCET)  MG per tablet Take 1 tablet by mouth Every 6 (Six) Hours As Needed for Moderate Pain .     • potassium chloride (K-DUR,KLOR-CON) 10 MEQ CR tablet Take 2 tablets by mouth 2 (Two) Times a Day. 30 tablet 0   • predniSONE (DELTASONE) 20 MG tablet Take 1 tablet by mouth Daily. 10 tablet 0   • predniSONE (DELTASONE) 20 MG tablet 2 pills daily for 5 days 10 tablet 0   • tamsulosin (FLOMAX) 0.4 MG capsule 24 hr capsule Take 1 capsule by mouth Every Night.     • [DISCONTINUED] amLODIPine (NORVASC) 10 MG tablet Take 10 mg by mouth Daily.       No current facility-administered medications on file prior to visit.        No Known Allergies    Social History     Social History   • Marital status:      Occupational History   •  Retired     Social History Main Topics   • Smoking status: Former Smoker     Quit date: 1997   • Smokeless tobacco: Never Used      Comment: QUIT 30 YEARS AGO    • Alcohol use No   • Drug use: No   • Sexual activity: Defer     Other Topics Concern   • Not on file       Family History   Problem Relation Age of Onset   • Hypertension Mother    • Hypertension Sister        PHYSICAL EXAM:    /89   Pulse 83    "Temp 98.3 °F (36.8 °C)   Resp 18   Ht 177.8 cm (70\")   Wt 90.3 kg (199 lb)   BMI 28.55 kg/m²     ECOG: (0) Fully active, able to carry on all predisease performance without restriction  General: well appearing male in no acute distress  HEENT: sclera anicteric, oropharynx clear  Lymphatics: no cervical, supraclavicular, inguinal, or axillary adenopathy  Cardiovascular: regular rate and rhythm, no murmurs  Neck: Supple; No thyromegaly  Lungs: clear to auscultation bilaterally. No respiratory distress.   Abdomen: soft, nontender, nondistended.  No palpable organomegaly  Extremities: no cyanosis, clubbing, edema, or cords  Skin: no rashes, lesions, bruising, or petechiae  Neuro: Alert and oriented x 4; Moving all extremities.  Psych: No anxiety or depression    No visits with results within 6 Week(s) from this visit.   Latest known visit with results is:   Admission on 11/22/2017, Discharged on 11/22/2017   Component Date Value Ref Range Status   • Color, UA 11/22/2017 Yellow  Yellow, Straw Final   • Appearance, UA 11/22/2017 Clear  Clear Final   • pH, UA 11/22/2017 7.0  5.0 - 8.0 Final   • Specific Gravity, UA 11/22/2017 1.007  1.001 - 1.030 Final   • Glucose, UA 11/22/2017 Negative  Negative Final   • Ketones, UA 11/22/2017 Negative  Negative Final   • Bilirubin, UA 11/22/2017 Negative  Negative Final   • Blood, UA 11/22/2017 Negative  Negative Final   • Protein, UA 11/22/2017 Negative  Negative Final   • Leuk Esterase, UA 11/22/2017 Negative  Negative Final   • Nitrite, UA 11/22/2017 Negative  Negative Final   • Urobilinogen, UA 11/22/2017 1.0 E.U./dL  0.2 - 1.0 E.U./dL Final   • Influenza A Ag, EIA 11/22/2017 Negative  Negative Final   • Influenza B Ag, EIA 11/22/2017 Negative  Negative Final   • Glucose 11/22/2017 104* 70 - 100 mg/dL Final   • BUN 11/22/2017 11  9 - 23 mg/dL Final   • Creatinine 11/22/2017 0.90  0.60 - 1.30 mg/dL Final   • Sodium 11/22/2017 144  132 - 146 mmol/L Final   • Potassium 11/22/2017 " 3.3* 3.5 - 5.5 mmol/L Final   • Chloride 11/22/2017 109  99 - 109 mmol/L Final   • CO2 11/22/2017 29.0  20.0 - 31.0 mmol/L Final   • Calcium 11/22/2017 8.7  8.7 - 10.4 mg/dL Final   • Total Protein 11/22/2017 7.0  5.7 - 8.2 g/dL Final   • Albumin 11/22/2017 4.00  3.20 - 4.80 g/dL Final   • ALT (SGPT) 11/22/2017 15  7 - 40 U/L Final   • AST (SGOT) 11/22/2017 18  0 - 33 U/L Final   • Alkaline Phosphatase 11/22/2017 127* 25 - 100 U/L Final   • Total Bilirubin 11/22/2017 0.5  0.3 - 1.2 mg/dL Final   • eGFR  African Amer 11/22/2017 99  >60 mL/min/1.73 Final   • Globulin 11/22/2017 3.0  gm/dL Final   • A/G Ratio 11/22/2017 1.3* 1.5 - 2.5 g/dL Final   • BUN/Creatinine Ratio 11/22/2017 12.2  7.0 - 25.0 Final   • Anion Gap 11/22/2017 6.0  3.0 - 11.0 mmol/L Final   • WBC 11/22/2017 8.04  3.50 - 10.80 10*3/mm3 Final   • RBC 11/22/2017 4.34  4.20 - 5.76 10*6/mm3 Final   • Hemoglobin 11/22/2017 12.3* 13.1 - 17.5 g/dL Final   • Hematocrit 11/22/2017 38.3* 38.9 - 50.9 % Final   • MCV 11/22/2017 88.2  80.0 - 99.0 fL Final   • MCH 11/22/2017 28.3  27.0 - 31.0 pg Final   • MCHC 11/22/2017 32.1  32.0 - 36.0 g/dL Final   • RDW 11/22/2017 15.3* 11.3 - 14.5 % Final   • RDW-SD 11/22/2017 49.5  37.0 - 54.0 fl Final   • MPV 11/22/2017 9.6  6.0 - 12.0 fL Final   • Platelets 11/22/2017 180  150 - 450 10*3/mm3 Final   • Neutrophil % 11/22/2017 73.5* 41.0 - 71.0 % Final   • Lymphocyte % 11/22/2017 14.7* 24.0 - 44.0 % Final   • Monocyte % 11/22/2017 8.5  0.0 - 12.0 % Final   • Eosinophil % 11/22/2017 3.1* 0.0 - 3.0 % Final   • Basophil % 11/22/2017 0.1  0.0 - 1.0 % Final   • Immature Grans % 11/22/2017 0.1  0.0 - 0.6 % Final   • Neutrophils, Absolute 11/22/2017 5.91  1.50 - 8.30 10*3/mm3 Final   • Lymphocytes, Absolute 11/22/2017 1.18  0.60 - 4.80 10*3/mm3 Final   • Monocytes, Absolute 11/22/2017 0.68  0.00 - 1.00 10*3/mm3 Final   • Eosinophils, Absolute 11/22/2017 0.25  0.00 - 0.30 10*3/mm3 Final   • Basophils, Absolute 11/22/2017 0.01  0.00 -  "0.20 10*3/mm3 Final   • Immature Grans, Absolute 11/22/2017 0.01  0.00 - 0.03 10*3/mm3 Final       Assessment/Plan     1. History of left neck lymphoma status post radiation, unknown type of chemotherapy, unknown stage, unknown subtype of lymphoma and records are absent  2. Left groin lymphoma low-grade CD10 positive Fish panel pending  3. Abdominal aortic aneurysm    Discussion: I will get his CBC, CMP, LDH, uric acid, and await the official results.  We'll get his PET scan for completion staging.  If the only area of abnormality in the left inguinal area I may give some radiation but if he has stage II or higher disease then I probably would go with watchful waiting until he becomes anemic, thrombocytopenic, has 3 more than 3 cm nodes, one more than 7 cm node, splenomegaly, effusions, \"B\" symptoms, or failure to thrive.  Presently he feels fine in the left groin pain that led to all this has resolved on its own.  Prior blood counts have been normal.  The meantime he has follow-up with Dr. Jesse Darling for the enlarging aneurysm.      Thor Ruggiero MD    3/12/2018  "

## 2018-03-13 PROBLEM — C82.90 FOLLICULAR LYMPHOMA: Status: ACTIVE | Noted: 2018-03-12

## 2018-03-15 ENCOUNTER — HOSPITAL ENCOUNTER (OUTPATIENT)
Dept: PET IMAGING | Facility: HOSPITAL | Age: 78
Discharge: HOME | End: 2018-03-15
Attending: INTERNAL MEDICINE | Admitting: INTERNAL MEDICINE

## 2018-03-15 ENCOUNTER — HOSPITAL ENCOUNTER (OUTPATIENT)
Dept: PET IMAGING | Facility: HOSPITAL | Age: 78
Discharge: HOME | End: 2018-03-15
Attending: INTERNAL MEDICINE

## 2018-03-15 DIAGNOSIS — C82.95 FOLLICULAR LYMPHOMA OF LYMPH NODES OF INGUINAL REGION, UNSPECIFIED FOLLICULAR LYMPHOMA TYPE (HCC): ICD-10-CM

## 2018-03-15 LAB
ALBUMIN SERPL-MCNC: 4.2 G/DL (ref 3.2–4.8)
ALBUMIN/GLOB SERPL: 1.3 G/DL (ref 1.5–2.5)
ALP SERPL-CCNC: 132 U/L (ref 25–100)
ALT SERPL W P-5'-P-CCNC: 12 U/L (ref 7–40)
ANION GAP SERPL CALCULATED.3IONS-SCNC: 8 MMOL/L (ref 3–11)
AST SERPL-CCNC: 21 U/L (ref 0–33)
BASOPHILS # BLD AUTO: 0.01 10*3/MM3 (ref 0–0.2)
BASOPHILS NFR BLD AUTO: 0.1 % (ref 0–1)
BILIRUB SERPL-MCNC: 0.4 MG/DL (ref 0.3–1.2)
BUN BLD-MCNC: 11 MG/DL (ref 9–23)
BUN/CREAT SERPL: 13.8 (ref 7–25)
CALCIUM SPEC-SCNC: 9.2 MG/DL (ref 8.7–10.4)
CHLORIDE SERPL-SCNC: 106 MMOL/L (ref 99–109)
CO2 SERPL-SCNC: 29 MMOL/L (ref 20–31)
CREAT BLD-MCNC: 0.8 MG/DL (ref 0.6–1.3)
DEPRECATED RDW RBC AUTO: 49.1 FL (ref 37–54)
EOSINOPHIL # BLD AUTO: 0.27 10*3/MM3 (ref 0–0.3)
EOSINOPHIL NFR BLD AUTO: 3.4 % (ref 0–3)
ERYTHROCYTE [DISTWIDTH] IN BLOOD BY AUTOMATED COUNT: 15.1 % (ref 11.3–14.5)
GFR SERPL CREATININE-BSD FRML MDRD: 114 ML/MIN/1.73
GLOBULIN UR ELPH-MCNC: 3.2 GM/DL
GLUCOSE BLD-MCNC: 103 MG/DL (ref 70–100)
GLUCOSE BLDC GLUCOMTR-MCNC: 109 MG/DL (ref 70–130)
HCT VFR BLD AUTO: 38.7 % (ref 38.9–50.9)
HGB BLD-MCNC: 12.4 G/DL (ref 13.1–17.5)
IMM GRANULOCYTES # BLD: 0.02 10*3/MM3 (ref 0–0.03)
IMM GRANULOCYTES NFR BLD: 0.3 % (ref 0–0.6)
LDH SERPL-CCNC: 289 U/L (ref 120–246)
LYMPHOCYTES # BLD AUTO: 1.41 10*3/MM3 (ref 0.6–4.8)
LYMPHOCYTES NFR BLD AUTO: 18 % (ref 24–44)
MCH RBC QN AUTO: 28.6 PG (ref 27–31)
MCHC RBC AUTO-ENTMCNC: 32 G/DL (ref 32–36)
MCV RBC AUTO: 89.4 FL (ref 80–99)
MONOCYTES # BLD AUTO: 0.67 10*3/MM3 (ref 0–1)
MONOCYTES NFR BLD AUTO: 8.6 % (ref 0–12)
NEUTROPHILS # BLD AUTO: 5.45 10*3/MM3 (ref 1.5–8.3)
NEUTROPHILS NFR BLD AUTO: 69.6 % (ref 41–71)
PLATELET # BLD AUTO: 245 10*3/MM3 (ref 150–450)
PMV BLD AUTO: 10.4 FL (ref 6–12)
POTASSIUM BLD-SCNC: 3.9 MMOL/L (ref 3.5–5.5)
PROT SERPL-MCNC: 7.4 G/DL (ref 5.7–8.2)
RBC # BLD AUTO: 4.33 10*6/MM3 (ref 4.2–5.76)
SODIUM BLD-SCNC: 143 MMOL/L (ref 132–146)
URATE SERPL-MCNC: 4.2 MG/DL (ref 3.7–9.2)
WBC NRBC COR # BLD: 7.83 10*3/MM3 (ref 3.5–10.8)

## 2018-03-15 PROCEDURE — 0 FLUDEOXYGLUCOSE F18 SOLUTION: Performed by: INTERNAL MEDICINE

## 2018-03-15 PROCEDURE — 83615 LACTATE (LD) (LDH) ENZYME: CPT | Performed by: INTERNAL MEDICINE

## 2018-03-15 PROCEDURE — 78816 PET IMAGE W/CT FULL BODY: CPT

## 2018-03-15 PROCEDURE — 85025 COMPLETE CBC W/AUTO DIFF WBC: CPT | Performed by: INTERNAL MEDICINE

## 2018-03-15 PROCEDURE — 80053 COMPREHEN METABOLIC PANEL: CPT | Performed by: INTERNAL MEDICINE

## 2018-03-15 PROCEDURE — A9552 F18 FDG: HCPCS | Performed by: INTERNAL MEDICINE

## 2018-03-15 PROCEDURE — 84550 ASSAY OF BLOOD/URIC ACID: CPT | Performed by: INTERNAL MEDICINE

## 2018-03-15 PROCEDURE — 82962 GLUCOSE BLOOD TEST: CPT

## 2018-03-15 RX ADMIN — FLUDEOXYGLUCOSE F18 1 DOSE: 300 INJECTION INTRAVENOUS at 12:32

## 2018-03-19 ENCOUNTER — OFFICE VISIT (OUTPATIENT)
Dept: ONCOLOGY | Facility: CLINIC | Age: 78
End: 2018-03-19

## 2018-03-19 VITALS
HEART RATE: 78 BPM | HEIGHT: 70 IN | SYSTOLIC BLOOD PRESSURE: 133 MMHG | DIASTOLIC BLOOD PRESSURE: 91 MMHG | TEMPERATURE: 98.4 F | WEIGHT: 195 LBS | BODY MASS INDEX: 27.92 KG/M2 | RESPIRATION RATE: 13 BRPM

## 2018-03-19 DIAGNOSIS — C82.95 FOLLICULAR LYMPHOMA OF LYMPH NODES OF INGUINAL REGION, UNSPECIFIED FOLLICULAR LYMPHOMA TYPE (HCC): Primary | ICD-10-CM

## 2018-03-19 PROCEDURE — 99214 OFFICE O/P EST MOD 30 MIN: CPT | Performed by: INTERNAL MEDICINE

## 2018-03-19 NOTE — PROGRESS NOTES
CHIEF COMPLAINT: Follicular lymphoma management    Problem List:  Oncology/Hematology History    1. Follicular lymphoma:  -  initial consultation 3/12/18: Had been having left inguinal pains and went for scanning that found left groin node.  Needle biopsy of this was called lymphoma CD10 positive and Fish testing positive for translocation (14;18) consistent with follicular lymphoma though could also be seen in diffuse large B cell.  BCL 6 /3q rearranged.  No Myc..  Hence cannot say at this junction the exact subtype of lymphoma.  He has not been having bed drenching night sweats.  No unexplained weight loss.  Has not had any known anemia, thrombocytopenia, leukopenia.  Has not had frequent infections.  CT did not show any splenomegaly or other significant adenopathy.  The left inguinal pain has subsided since the needle biopsy.  He has an abdominal aortic aneurysm that is being watched and had previously been repaired.  Apparently 15+ years ago he was treated at [facility] for lymphoma for which she received right neck radiation and some chemotherapy.  He is not sure of the subtype of lymphoma nor of the type of chemotherapy nor the number of radiation treatments and this was far enough back that our records have been expunged.          Follicular lymphoma    3/12/2018 Initial Diagnosis     Follicular lymphoma       3/15/2018 Imaging     PET     IMPRESSION:     Multiple hypermetabolic abnormalities are identified but the  distribution is unusual. Although there is bulky hypermetabolic rishi  mass in the left inguinal area and in the right internal iliac rishi  chain certainly consistent with active malignant lymphoma, there is an  intense abnormal area of tracer uptake and hypermetabolic activity  throughout the marrow or intramedullary portion of the left ilium and  iliac wing in the absence of cortical destruction. This is an intensely  hypermetabolic abnormal lengthy intraosseous abnormality.     The  lower extremity dedicated datasets are negative.     Intermediate lymph nodes are identified in the medial inguinal region  which are not above malignant threshold.     Above the pelvis, there is no evidence of additional hypermetabolic  focus or active neoplastic disease, and the chest, mediastinum and neck  are clear.            HISTORY OF PRESENT ILLNESS:  The patient is a 77 y.o. male, here for follow up on management of Follicular lymphoma.  I reviewed his PET scan results and images thereof.  I went over the results with him.  His LDH was slightly elevated at 289 upper limit of normal 246.  Alkaline phosphatase 132 hemoglobin 12.4 but with otherwise normal counts.  No bulky adenopathy on CT.  Currently asymptomatic for this rishi recurrence in a patient with a known history of follicular lymphoma.      Past Medical History:   Diagnosis Date   • AAA (abdominal aortic aneurysm)    • Broken neck    • Gout    • Hypertension    • Migraine    • Sciatica    • Thyroid cancer      Past Surgical History:   Procedure Laterality Date   • BACK SURGERY     • EYE SURGERY     • MULTIPLE TOOTH EXTRACTIONS     • NECK SURGERY      PINS/RODS IN NECK   • THYROIDECTOMY / EXCISION CYST THYROID         No Known Allergies    Family History and Social History reviewed and changed as necessary      REVIEW OF SYSTEM:   Review of Systems   Constitutional: Negative for appetite change, chills, diaphoresis, fatigue, fever and unexpected weight change.   HENT:   Negative for mouth sores, sore throat and trouble swallowing.    Eyes: Negative for icterus.   Respiratory: Negative for cough, hemoptysis and shortness of breath.    Cardiovascular: Negative for chest pain, leg swelling and palpitations.   Gastrointestinal: Negative for abdominal distention, abdominal pain, blood in stool, constipation, diarrhea, nausea and vomiting.   Endocrine: Negative for hot flashes.   Genitourinary: Negative for bladder incontinence, difficulty urinating,  "dysuria, frequency and hematuria.    Musculoskeletal: Negative for gait problem, neck pain and neck stiffness.   Skin: Negative for rash.   Neurological: Negative for dizziness, gait problem, headaches, light-headedness and numbness.   Hematological: Negative for adenopathy. Does not bruise/bleed easily.   Psychiatric/Behavioral: Negative for depression. The patient is not nervous/anxious.    All other systems reviewed and are negative.       PHYSICAL EXAM    Vitals:    03/19/18 0957   BP: 133/91   Pulse: 78   Resp: 13   Temp: 98.4 °F (36.9 °C)   TempSrc: Temporal Artery    Weight: 88.5 kg (195 lb)   Height: 177.8 cm (70\")     Constitutional: Appears well-developed and well-nourished. No distress.   ECOG: (0) Fully active, able to carry on all predisease performance without restriction  HENT:   Head: Normocephalic.   Mouth/Throat: Oropharynx is clear and moist.   Eyes: Conjunctivae are normal. Pupils are equal, round, and reactive to light. No scleral icterus.   Neck: Neck supple. No JVD present. No thyromegaly present.   Cardiovascular: Normal rate, regular rhythm and normal heart sounds.    Pulmonary/Chest: Breath sounds normal. No respiratory distress.   Abdominal: Soft. Exhibits no distension and no mass. There is no hepatosplenomegaly. There is no tenderness. There is no rebound and no guarding.   Musculoskeletal:Exhibits no edema, tenderness or deformity.   Neurological: Alert and oriented to person, place, and time. Exhibits normal muscle tone.   Skin: No ecchymosis, no petechiae and no rash noted. Not diaphoretic. No cyanosis. Nails show no clubbing.   Psychiatric: Normal mood and affect.   Vitals reviewed.      Nm Pet Whole Body    Result Date: 3/15/2018   Multiple hypermetabolic abnormalities are identified but the distribution is unusual. Although there is bulky hypermetabolic rishi mass in the left inguinal area and in the right internal iliac rishi chain certainly consistent with active malignant " lymphoma, there is an intense abnormal area of tracer uptake and hypermetabolic activity throughout the marrow or intramedullary portion of the left ilium and iliac wing in the absence of cortical destruction. This is an intensely hypermetabolic abnormal lengthy intraosseous abnormality.  The lower extremity dedicated datasets are negative.  Intermediate lymph nodes are identified in the medial inguinal region which are not above malignant threshold.  Above the pelvis, there is no evidence of additional hypermetabolic focus or active neoplastic disease, and the chest, mediastinum and neck are clear.  DICTATED:     03/15/2018 EDITED/ls :     03/15/2018    This report was finalized on 3/15/2018 3:52 PM by Dr. Juvenal Crowell MD.              ASSESSMENT & PLAN:    1.  Recurrent follicular lymphoma  2. History of lymphoma  3. Adenopathy  4. Aneurysm    Discussion: I would not treat until he meets Gelf criteria which he does not.  This would be 3 more than 3 cm nodes, one more than 7 cm node, significant pancytopenia is, symptomatic disease such as B symptoms, etc.  He does not meet such criteria presently.  We will repeat his blood work again and examine him in 3 months.  No treatment in the interim.  I would intervene for early signs or symptoms of infection with Dr. Douglas his primary care.  I also encouraged him to follow-up with Dr. Darling regarding the aneurysm.  He will see my nurse practitioner in 3 months.  If the nodes are not significantly enlarging then we will see him again 3 months after that with blood work as well as CTs which will be 6 months from his current scanning.  This is stage IV given the diffuse marrow involvement on imaging but that is not be indication for treatment  In and of itself.  Discussed with the patient 25 of 30 minutes face-to-face regarding the complexity of this diagnosis, the surveillance and treatment thereof.  Thor Ruggiero MD    03/19/2018

## 2018-04-09 ENCOUNTER — TELEPHONE (OUTPATIENT)
Dept: ONCOLOGY | Facility: CLINIC | Age: 78
End: 2018-04-09

## 2018-04-09 NOTE — TELEPHONE ENCOUNTER
----- Message from Tianna Chavez sent at 4/9/2018  1:44 PM EDT -----  Regarding: DARSHANA-PAIN IN HIP LEFT  Contact: [phone]  Patient called and the pain in his hip is getting worse does he need to to go to the ER or come in and see Dr. Ruggiero?

## 2018-04-09 NOTE — TELEPHONE ENCOUNTER
Spoke with patient.  Pain is in his left hip in area of PET abnormality.  It is getting worse.  Discussed with Dr. Ruggiero.  He will see patient later this week to evaluate.  Advised patient he can go to the ER is pain becomes more severe, but we can try to assess from office visit.  Call transferred to [place] in phone room to schedule on Thursday.

## 2018-04-12 ENCOUNTER — LAB (OUTPATIENT)
Dept: LAB | Facility: HOSPITAL | Age: 78
End: 2018-04-12

## 2018-04-12 ENCOUNTER — OFFICE VISIT (OUTPATIENT)
Dept: ONCOLOGY | Facility: CLINIC | Age: 78
End: 2018-04-12

## 2018-04-12 VITALS
RESPIRATION RATE: 18 BRPM | HEIGHT: 70 IN | BODY MASS INDEX: 28.2 KG/M2 | TEMPERATURE: 98.1 F | SYSTOLIC BLOOD PRESSURE: 140 MMHG | DIASTOLIC BLOOD PRESSURE: 88 MMHG | WEIGHT: 197 LBS | HEART RATE: 78 BPM

## 2018-04-12 DIAGNOSIS — C82.95 FOLLICULAR LYMPHOMA OF LYMPH NODES OF INGUINAL REGION, UNSPECIFIED FOLLICULAR LYMPHOMA TYPE (HCC): Primary | ICD-10-CM

## 2018-04-12 DIAGNOSIS — C82.95 FOLLICULAR LYMPHOMA OF LYMPH NODES OF INGUINAL REGION, UNSPECIFIED FOLLICULAR LYMPHOMA TYPE (HCC): ICD-10-CM

## 2018-04-12 LAB
ALBUMIN SERPL-MCNC: 4 G/DL (ref 3.2–4.8)
ALBUMIN/GLOB SERPL: 1.3 G/DL (ref 1.5–2.5)
ALP SERPL-CCNC: 145 U/L (ref 25–100)
ALT SERPL W P-5'-P-CCNC: 15 U/L (ref 7–40)
ANION GAP SERPL CALCULATED.3IONS-SCNC: 9 MMOL/L (ref 3–11)
AST SERPL-CCNC: 19 U/L (ref 0–33)
BILIRUB SERPL-MCNC: 0.5 MG/DL (ref 0.3–1.2)
BUN BLD-MCNC: 10 MG/DL (ref 9–23)
BUN/CREAT SERPL: 11.1 (ref 7–25)
CALCIUM SPEC-SCNC: 8.6 MG/DL (ref 8.7–10.4)
CHLORIDE SERPL-SCNC: 107 MMOL/L (ref 99–109)
CO2 SERPL-SCNC: 28 MMOL/L (ref 20–31)
CREAT BLD-MCNC: 0.9 MG/DL (ref 0.6–1.3)
ERYTHROCYTE [DISTWIDTH] IN BLOOD BY AUTOMATED COUNT: 16 % (ref 11.3–14.5)
GFR SERPL CREATININE-BSD FRML MDRD: 99 ML/MIN/1.73
GLOBULIN UR ELPH-MCNC: 3 GM/DL
GLUCOSE BLD-MCNC: 107 MG/DL (ref 70–100)
HCT VFR BLD AUTO: 38.4 % (ref 38.9–50.9)
HGB BLD-MCNC: 12.2 G/DL (ref 13.1–17.5)
LYMPHOCYTES # BLD AUTO: 1.8 10*3/MM3 (ref 0.6–4.8)
LYMPHOCYTES NFR BLD AUTO: 21.7 % (ref 24–44)
MCH RBC QN AUTO: 27.9 PG (ref 27–31)
MCHC RBC AUTO-ENTMCNC: 31.9 G/DL (ref 32–36)
MCV RBC AUTO: 87.6 FL (ref 80–99)
MONOCYTES # BLD AUTO: 0.2 10*3/MM3 (ref 0–1)
MONOCYTES NFR BLD AUTO: 1.9 % (ref 0–12)
NEUTROPHILS # BLD AUTO: 6.5 10*3/MM3 (ref 1.5–8.3)
NEUTROPHILS NFR BLD AUTO: 76.4 % (ref 41–71)
PLATELET # BLD AUTO: 263 10*3/MM3 (ref 150–450)
PMV BLD AUTO: 7 FL (ref 6–12)
POTASSIUM BLD-SCNC: 3.4 MMOL/L (ref 3.5–5.5)
PROT SERPL-MCNC: 7 G/DL (ref 5.7–8.2)
RBC # BLD AUTO: 4.38 10*6/MM3 (ref 4.2–5.76)
SODIUM BLD-SCNC: 144 MMOL/L (ref 132–146)
WBC NRBC COR # BLD: 8.5 10*3/MM3 (ref 3.5–10.8)

## 2018-04-12 PROCEDURE — 36415 COLL VENOUS BLD VENIPUNCTURE: CPT

## 2018-04-12 PROCEDURE — 99212 OFFICE O/P EST SF 10 MIN: CPT | Performed by: INTERNAL MEDICINE

## 2018-04-12 PROCEDURE — 80053 COMPREHEN METABOLIC PANEL: CPT

## 2018-04-12 PROCEDURE — 85025 COMPLETE CBC W/AUTO DIFF WBC: CPT

## 2018-04-12 NOTE — PROGRESS NOTES
CHIEF COMPLAINT: Management of follicular lymphoma    Problem List:  Oncology/Hematology History    1. Follicular lymphoma:  -  initial consultation 3/12/18: Had been having left inguinal pains and went for scanning that found left groin node.  Needle biopsy of this was called lymphoma CD10 positive and Fish testing positive for translocation (14;18) consistent with follicular lymphoma though could also be seen in diffuse large B cell.  BCL 6 /3q rearranged.  No Myc..  Hence cannot say at this junction the exact subtype of lymphoma.  He has not been having bed drenching night sweats.  No unexplained weight loss.  Has not had any known anemia, thrombocytopenia, leukopenia.  Has not had frequent infections.  CT did not show any splenomegaly or other significant adenopathy.  The left inguinal pain has subsided since the needle biopsy.  He has an abdominal aortic aneurysm that is being watched and had previously been repaired.  Apparently 15+ years ago he was treated at [facility] for lymphoma for which she received right neck radiation and some chemotherapy.  He is not sure of the subtype of lymphoma nor of the type of chemotherapy nor the number of radiation treatments and this was far enough back that our records have been expunged.          Follicular lymphoma    3/12/2018 Initial Diagnosis     Follicular lymphoma       3/15/2018 Imaging     PET     IMPRESSION:     Multiple hypermetabolic abnormalities are identified but the  distribution is unusual. Although there is bulky hypermetabolic rishi  mass in the left inguinal area and in the right internal iliac rishi  chain certainly consistent with active malignant lymphoma, there is an  intense abnormal area of tracer uptake and hypermetabolic activity  throughout the marrow or intramedullary portion of the left ilium and  iliac wing in the absence of cortical destruction. This is an intensely  hypermetabolic abnormal lengthy intraosseous abnormality.     The  lower extremity dedicated datasets are negative.     Intermediate lymph nodes are identified in the medial inguinal region  which are not above malignant threshold.     Above the pelvis, there is no evidence of additional hypermetabolic  focus or active neoplastic disease, and the chest, mediastinum and neck  are clear.            HISTORY OF PRESENT ILLNESS:  The patient is a 77 y.o. male, here for follow up on management of Follicular lymphoma.  He came back in sooner than his previous visit because of left hip pain that was worsening in the area where he had significant marrow abnormality without ap bony abnormality on prior scanning.  Subsequently however the pain has resolved since he called and that's been several days now that he is back to normal.      Past Medical History:   Diagnosis Date   • AAA (abdominal aortic aneurysm)    • Broken neck    • Gout    • Hypertension    • Migraine    • Sciatica    • Thyroid cancer      Past Surgical History:   Procedure Laterality Date   • BACK SURGERY     • EYE SURGERY     • MULTIPLE TOOTH EXTRACTIONS     • NECK SURGERY      PINS/RODS IN NECK   • THYROIDECTOMY / EXCISION CYST THYROID         No Known Allergies    Family History and Social History reviewed and changed as necessary      REVIEW OF SYSTEM:   Review of Systems   Constitutional: Negative for appetite change, chills, diaphoresis, fatigue, fever and unexpected weight change.   HENT:   Negative for mouth sores, sore throat and trouble swallowing.    Eyes: Negative for icterus.   Respiratory: Negative for cough, hemoptysis and shortness of breath.    Cardiovascular: Negative for chest pain, leg swelling and palpitations.   Gastrointestinal: Negative for abdominal distention, abdominal pain, blood in stool, constipation, diarrhea, nausea and vomiting.   Endocrine: Negative for hot flashes.   Genitourinary: Negative for bladder incontinence, difficulty urinating, dysuria, frequency and hematuria.   "  Musculoskeletal: Negative for gait problem, neck pain and neck stiffness.   Skin: Negative for rash.   Neurological: Negative for dizziness, gait problem, headaches, light-headedness and numbness.   Hematological: Negative for adenopathy. Does not bruise/bleed easily.   Psychiatric/Behavioral: Negative for depression. The patient is not nervous/anxious.    All other systems reviewed and are negative.       PHYSICAL EXAM    Vitals:    04/12/18 0913   BP: 140/88   Pulse: 78   Resp: 18   Temp: 98.1 °F (36.7 °C)   Weight: 89.4 kg (197 lb)   Height: 177.8 cm (70\")     Constitutional: Appears well-developed and well-nourished. No distress.   ECOG: (0) Fully active, able to carry on all predisease performance without restriction  HENT:   Head: Normocephalic.   Mouth/Throat: Oropharynx is clear and moist.   Eyes: Conjunctivae are normal. Pupils are equal, round, and reactive to light. No scleral icterus.   Neck: Neck supple. No JVD present. No thyromegaly present.   Cardiovascular: Normal rate, regular rhythm and normal heart sounds.    Pulmonary/Chest: Breath sounds normal. No respiratory distress.   Abdominal: Soft. Exhibits no distension and no mass. There is no hepatosplenomegaly. There is no tenderness. There is no rebound and no guarding.   Musculoskeletal:Exhibits no edema, tenderness or deformity.   Neurological: Alert and oriented to person, place, and time. Exhibits normal muscle tone.   Skin: No ecchymosis, no petechiae and no rash noted. Not diaphoretic. No cyanosis. Nails show no clubbing.   Psychiatric: Normal mood and affect.   Vitals reviewed.      No radiology results for the last 7 days          ASSESSMENT & PLAN:    1.  Transient bone pain  2. Follicular lymphoma    Discussion: I do suspect that his pain is related to the marrow activity pressing out on the cortical bone of the iliac area in the left.  Having said that, as I stated on my prior dictation, as long as the pain is gone and he is feeling " good and he does not meet the GELF criteria for treatment and he will keep his prior follow-up appointment with me but can see me, as he did today, should the pain come back or new issues arise.      Thor Ruggiero MD    04/12/2018

## 2018-05-16 ENCOUNTER — TELEPHONE (OUTPATIENT)
Dept: ONCOLOGY | Facility: CLINIC | Age: 78
End: 2018-05-16

## 2018-05-16 NOTE — TELEPHONE ENCOUNTER
Left vm stating Dr. Ruggiero wants to move appointment up to sometime in the next 1-2 weeks.  Ted scheduled a new appointment and left vm with appointment details.

## 2018-05-24 ENCOUNTER — OFFICE VISIT (OUTPATIENT)
Dept: ONCOLOGY | Facility: CLINIC | Age: 78
End: 2018-05-24

## 2018-05-24 VITALS
HEIGHT: 70 IN | BODY MASS INDEX: 27.64 KG/M2 | DIASTOLIC BLOOD PRESSURE: 89 MMHG | HEART RATE: 80 BPM | RESPIRATION RATE: 16 BRPM | SYSTOLIC BLOOD PRESSURE: 142 MMHG | WEIGHT: 193.1 LBS | OXYGEN SATURATION: 97 % | TEMPERATURE: 98.9 F

## 2018-05-24 DIAGNOSIS — C82.95 FOLLICULAR LYMPHOMA OF LYMPH NODES OF INGUINAL REGION, UNSPECIFIED FOLLICULAR LYMPHOMA TYPE (HCC): Primary | ICD-10-CM

## 2018-05-24 PROCEDURE — 99213 OFFICE O/P EST LOW 20 MIN: CPT | Performed by: INTERNAL MEDICINE

## 2018-05-24 NOTE — PROGRESS NOTES
CHIEF COMPLAINT: Follow-up follicular lymphoma    Problem List:  Oncology/Hematology History    1. Follicular lymphoma:  -  initial consultation 3/12/18: Had been having left inguinal pains and went for scanning that found left groin node.  Needle biopsy of this was called lymphoma CD10 positive and Fish testing positive for translocation (14;18) consistent with follicular lymphoma though could also be seen in diffuse large B cell.  BCL 6 /3q rearranged.  No Myc..  Hence cannot say at this junction the exact subtype of lymphoma.  He has not been having bed drenching night sweats.  No unexplained weight loss.  Has not had any known anemia, thrombocytopenia, leukopenia.  Has not had frequent infections.  CT did not show any splenomegaly or other significant adenopathy.  The left inguinal pain has subsided since the needle biopsy.  He has an abdominal aortic aneurysm that is being watched and had previously been repaired.  Apparently 15+ years ago he was treated at [facility] for lymphoma for which she received right neck radiation and some chemotherapy.  He is not sure of the subtype of lymphoma nor of the type of chemotherapy nor the number of radiation treatments and this was far enough back that our records have been expunged.          Follicular lymphoma    3/12/2018 Initial Diagnosis     Follicular lymphoma       3/15/2018 Imaging     PET     IMPRESSION:     Multiple hypermetabolic abnormalities are identified but the  distribution is unusual. Although there is bulky hypermetabolic rishi  mass in the left inguinal area and in the right internal iliac rishi  chain certainly consistent with active malignant lymphoma, there is an  intense abnormal area of tracer uptake and hypermetabolic activity  throughout the marrow or intramedullary portion of the left ilium and  iliac wing in the absence of cortical destruction. This is an intensely  hypermetabolic abnormal lengthy intraosseous abnormality.     The lower  extremity dedicated datasets are negative.     Intermediate lymph nodes are identified in the medial inguinal region  which are not above malignant threshold.     Above the pelvis, there is no evidence of additional hypermetabolic  focus or active neoplastic disease, and the chest, mediastinum and neck  are clear.            HISTORY OF PRESENT ILLNESS:  The patient is a 77 y.o. male, here for follow up on management of Follicular lymphoma.  He was due to see us in June.  Due to some perceived increasing fullness in the bilateral inguinal region with occasional pinching he came in sooner.  No fevers or chills but drenching night sweats.      Past Medical History:   Diagnosis Date   • AAA (abdominal aortic aneurysm)    • Broken neck    • Gout    • Hypertension    • Migraine    • Sciatica    • Thyroid cancer      Past Surgical History:   Procedure Laterality Date   • BACK SURGERY     • EYE SURGERY     • MULTIPLE TOOTH EXTRACTIONS     • NECK SURGERY      PINS/RODS IN NECK   • THYROIDECTOMY / EXCISION CYST THYROID         No Known Allergies    Family History and Social History reviewed and changed as necessary      REVIEW OF SYSTEM:   Review of Systems   Constitutional: Negative for appetite change, chills, diaphoresis, fatigue, fever and unexpected weight change.   HENT:   Negative for mouth sores, sore throat and trouble swallowing.    Eyes: Negative for icterus.   Respiratory: Negative for cough, hemoptysis and shortness of breath.    Cardiovascular: Negative for chest pain, leg swelling and palpitations.   Gastrointestinal: Negative for abdominal distention, abdominal pain, blood in stool, constipation, diarrhea, nausea and vomiting.   Endocrine: Negative for hot flashes.   Genitourinary: Negative for bladder incontinence, difficulty urinating, dysuria, frequency and hematuria.    Musculoskeletal: Negative for gait problem, neck pain and neck stiffness.   Skin: Negative for rash.   Neurological: Negative for  "dizziness, gait problem, headaches, light-headedness and numbness.   Hematological: Negative for adenopathy. Does not bruise/bleed easily.   Psychiatric/Behavioral: Negative for depression. The patient is not nervous/anxious.    All other systems reviewed and are negative.       PHYSICAL EXAM    Vitals:    05/24/18 1453   BP: 142/89   Pulse: 80   Resp: 16   Temp: 98.9 °F (37.2 °C)   TempSrc: Temporal Artery    SpO2: 97%   Weight: 87.6 kg (193 lb 1.6 oz)   Height: 177.8 cm (70\")     Constitutional: Appears well-developed and well-nourished. No distress.   ECOG: (0) Fully active, able to carry on all predisease performance without restriction  HENT:   Head: Normocephalic.   Mouth/Throat: Oropharynx is clear and moist.   Eyes: Conjunctivae are normal. Pupils are equal, round, and reactive to light. No scleral icterus.   Neck: Neck supple. No JVD present. No thyromegaly present.   Cardiovascular: Normal rate, regular rhythm and normal heart sounds.    Pulmonary/Chest: Breath sounds normal. No respiratory distress.   Abdominal: Soft. Exhibits no distension and no mass. There is no hepatosplenomegaly. There is no tenderness. There is no rebound and no guarding.   Musculoskeletal:Exhibits no edema, tenderness or deformity.   Neurological: Alert and oriented to person, place, and time. Exhibits normal muscle tone.   Skin: No ecchymosis, no petechiae and no rash noted. Not diaphoretic. No cyanosis. Nails show no clubbing.   Psychiatric: Normal mood and affect.   Vitals reviewed.  Nodes: Bilateral 3 cm inguinal adenopathy.  No palpable cervical or axillary adenopathy    No radiology results for the last 7 days          ASSESSMENT & PLAN:    1.  Follicular lymphoma  2. Inguinal adenopathy    Discussion: his nodes are reasonably good-sized in the bilateral inguinal region but not particularly bothersome to him.  Occasionally there is a little bit of a pinch in that area.  No hernia in that area.  No frequent infections.  No bed " drenching night sweats.  I will check his blood counts prior to return in 2 months along with his CTs but he will see us sooner if the nodes are becoming more bothersome to him.  We will treat when he reaches G ELF criteria.  Discussed with patient 15 minutes greater than 50% counseling.      Thor Ruggiero MD    05/24/2018

## 2018-06-07 ENCOUNTER — APPOINTMENT (OUTPATIENT)
Dept: MRI IMAGING | Facility: HOSPITAL | Age: 78
End: 2018-06-07

## 2018-06-07 ENCOUNTER — HOSPITAL ENCOUNTER (EMERGENCY)
Facility: HOSPITAL | Age: 78
Discharge: HOME | End: 2018-06-07
Attending: EMERGENCY MEDICINE | Admitting: EMERGENCY MEDICINE

## 2018-06-07 VITALS
DIASTOLIC BLOOD PRESSURE: 91 MMHG | BODY MASS INDEX: 27.63 KG/M2 | RESPIRATION RATE: 14 BRPM | HEIGHT: 70 IN | TEMPERATURE: 98.2 F | OXYGEN SATURATION: 97 % | HEART RATE: 75 BPM | WEIGHT: 193 LBS | SYSTOLIC BLOOD PRESSURE: 133 MMHG

## 2018-06-07 DIAGNOSIS — Z85.72 HISTORY OF FOLLICULAR LYMPHOMA: ICD-10-CM

## 2018-06-07 DIAGNOSIS — M54.32 LEFT SIDED SCIATICA: ICD-10-CM

## 2018-06-07 DIAGNOSIS — E87.6 HYPOKALEMIA: ICD-10-CM

## 2018-06-07 DIAGNOSIS — M54.10 RADICULAR PAIN OF LEFT LOWER EXTREMITY: ICD-10-CM

## 2018-06-07 DIAGNOSIS — M51.36 BULGING LUMBAR DISC: ICD-10-CM

## 2018-06-07 DIAGNOSIS — M54.42 LOW BACK PAIN WITH LEFT-SIDED SCIATICA, UNSPECIFIED BACK PAIN LATERALITY, UNSPECIFIED CHRONICITY: Primary | ICD-10-CM

## 2018-06-07 LAB
ALBUMIN SERPL-MCNC: 4.02 G/DL (ref 3.2–4.8)
ALBUMIN/GLOB SERPL: 1.3 G/DL (ref 1.5–2.5)
ALP SERPL-CCNC: 127 U/L (ref 25–100)
ALT SERPL W P-5'-P-CCNC: 12 U/L (ref 7–40)
ANION GAP SERPL CALCULATED.3IONS-SCNC: 11 MMOL/L (ref 3–11)
AST SERPL-CCNC: 21 U/L (ref 0–33)
BASOPHILS # BLD AUTO: 0.01 10*3/MM3 (ref 0–0.2)
BASOPHILS NFR BLD AUTO: 0.1 % (ref 0–1)
BILIRUB SERPL-MCNC: 0.5 MG/DL (ref 0.3–1.2)
BILIRUB UR QL STRIP: NEGATIVE
BUN BLD-MCNC: 11 MG/DL (ref 9–23)
BUN/CREAT SERPL: 12.5 (ref 7–25)
CALCIUM SPEC-SCNC: 9 MG/DL (ref 8.7–10.4)
CHLORIDE SERPL-SCNC: 107 MMOL/L (ref 99–109)
CLARITY UR: CLEAR
CO2 SERPL-SCNC: 24 MMOL/L (ref 20–31)
COLOR UR: YELLOW
CREAT BLD-MCNC: 0.88 MG/DL (ref 0.6–1.3)
DEPRECATED RDW RBC AUTO: 50.5 FL (ref 37–54)
EOSINOPHIL # BLD AUTO: 0.22 10*3/MM3 (ref 0–0.3)
EOSINOPHIL NFR BLD AUTO: 2.9 % (ref 0–3)
ERYTHROCYTE [DISTWIDTH] IN BLOOD BY AUTOMATED COUNT: 15.7 % (ref 11.3–14.5)
GFR SERPL CREATININE-BSD FRML MDRD: 102 ML/MIN/1.73
GLOBULIN UR ELPH-MCNC: 3.2 GM/DL
GLUCOSE BLD-MCNC: 75 MG/DL (ref 70–100)
GLUCOSE UR STRIP-MCNC: NEGATIVE MG/DL
HCT VFR BLD AUTO: 36.4 % (ref 38.9–50.9)
HGB BLD-MCNC: 11.7 G/DL (ref 13.1–17.5)
HGB UR QL STRIP.AUTO: NEGATIVE
IMM GRANULOCYTES # BLD: 0.02 10*3/MM3 (ref 0–0.03)
IMM GRANULOCYTES NFR BLD: 0.3 % (ref 0–0.6)
KETONES UR QL STRIP: NEGATIVE
LEUKOCYTE ESTERASE UR QL STRIP.AUTO: NEGATIVE
LYMPHOCYTES # BLD AUTO: 1.2 10*3/MM3 (ref 0.6–4.8)
LYMPHOCYTES NFR BLD AUTO: 15.9 % (ref 24–44)
MCH RBC QN AUTO: 28.5 PG (ref 27–31)
MCHC RBC AUTO-ENTMCNC: 32.1 G/DL (ref 32–36)
MCV RBC AUTO: 88.8 FL (ref 80–99)
MONOCYTES # BLD AUTO: 0.8 10*3/MM3 (ref 0–1)
MONOCYTES NFR BLD AUTO: 10.6 % (ref 0–12)
NEUTROPHILS # BLD AUTO: 5.33 10*3/MM3 (ref 1.5–8.3)
NEUTROPHILS NFR BLD AUTO: 70.5 % (ref 41–71)
NITRITE UR QL STRIP: NEGATIVE
PH UR STRIP.AUTO: 7 [PH] (ref 5–8)
PLATELET # BLD AUTO: 211 10*3/MM3 (ref 150–450)
PMV BLD AUTO: 10.1 FL (ref 6–12)
POTASSIUM BLD-SCNC: 3.3 MMOL/L (ref 3.5–5.5)
PROT SERPL-MCNC: 7.2 G/DL (ref 5.7–8.2)
PROT UR QL STRIP: NEGATIVE
RBC # BLD AUTO: 4.1 10*6/MM3 (ref 4.2–5.76)
SODIUM BLD-SCNC: 142 MMOL/L (ref 132–146)
SP GR UR STRIP: 1.01 (ref 1–1.03)
UROBILINOGEN UR QL STRIP: ABNORMAL
WBC NRBC COR # BLD: 7.56 10*3/MM3 (ref 3.5–10.8)

## 2018-06-07 PROCEDURE — 99284 EMERGENCY DEPT VISIT MOD MDM: CPT

## 2018-06-07 PROCEDURE — 80053 COMPREHEN METABOLIC PANEL: CPT | Performed by: PHYSICIAN ASSISTANT

## 2018-06-07 PROCEDURE — 81003 URINALYSIS AUTO W/O SCOPE: CPT | Performed by: PHYSICIAN ASSISTANT

## 2018-06-07 PROCEDURE — 72148 MRI LUMBAR SPINE W/O DYE: CPT

## 2018-06-07 PROCEDURE — 85025 COMPLETE CBC W/AUTO DIFF WBC: CPT | Performed by: PHYSICIAN ASSISTANT

## 2018-06-07 RX ORDER — POTASSIUM CHLORIDE 750 MG/1
40 CAPSULE, EXTENDED RELEASE ORAL ONCE
Status: COMPLETED | OUTPATIENT
Start: 2018-06-07 | End: 2018-06-07

## 2018-06-07 RX ADMIN — POTASSIUM CHLORIDE 40 MEQ: 750 CAPSULE, EXTENDED RELEASE ORAL at 21:59

## 2018-06-07 NOTE — ED PROVIDER NOTES
Subjective   Mr. George Lomeli is a 77 y.o. male with a hx of follicular lymphoma of the left inguinal region who presents to the ED with c/o left leg pain with onset 2 days ago. He report that 2 days ago he developed pain around the site of his lymphoma in his left groin, which shoots down his left leg. He notees the pain is worse when standing. However, he denies urinary frequency, diarrhea, constipation, numbness, SoA, and CP as well as redness, warmth and swelling to his left leg. He denies any urinary or bowel incontinence or perianal numbness.  He is seeing Dr. Ruggiero for his lymphoma. He has a hx of gout, HTN, AAA, and sciatica. He sees a pain clinic for chronic back pain and migraines. No other acute complaints at this time.        History provided by:  Patient  Lower Extremity Issue   Location:  Leg  Time since incident:  2 days  Injury: no    Leg location:  L leg  Pain details:     Quality:  Shooting    Severity:  Moderate    Onset quality:  Gradual    Duration:  2 days    Timing:  Constant    Progression:  Worsening  Chronicity:  New  Associated symptoms: no decreased ROM, no numbness and no tingling        Review of Systems   Respiratory: Negative for shortness of breath.    Cardiovascular: Negative for chest pain and leg swelling.   Gastrointestinal: Negative for constipation and diarrhea.   Genitourinary: Negative for frequency.   Musculoskeletal:        Left leg pain   Neurological: Negative for numbness.   All other systems reviewed and are negative.      Past Medical History:   Diagnosis Date   • AAA (abdominal aortic aneurysm)    • Broken neck    • Gout    • Hypertension    • Migraine    • Sciatica    • Thyroid cancer        No Known Allergies    Past Surgical History:   Procedure Laterality Date   • BACK SURGERY     • EYE SURGERY     • MULTIPLE TOOTH EXTRACTIONS     • NECK SURGERY      PINS/RODS IN NECK   • THYROIDECTOMY / EXCISION CYST THYROID         Family History   Problem Relation Age of Onset    • Hypertension Mother    • Hypertension Sister        Social History     Social History   • Marital status:      Occupational History   •  Retired     Social History Main Topics   • Smoking status: Former Smoker     Quit date: 1997   • Smokeless tobacco: Never Used      Comment: QUIT 30 YEARS AGO    • Alcohol use No   • Drug use: No   • Sexual activity: Defer     Other Topics Concern   • Not on file         Objective   Physical Exam   Constitutional: He is oriented to person, place, and time. He appears well-developed and well-nourished. No distress.   Patient is a sweet elderly male who is friendly and talkative. No acute signs of pain or distress.   HENT:   Head: Normocephalic and atraumatic.   Nose: Nose normal.   Eyes: Conjunctivae are normal. No scleral icterus.   Neck: Normal range of motion. Neck supple.   Cardiovascular: Normal rate, regular rhythm, normal heart sounds and intact distal pulses.    No murmur heard.  Strong distal pulses to BLE.   Pulmonary/Chest: Effort normal and breath sounds normal. No respiratory distress.   Abdominal: Soft. There is no tenderness.   Musculoskeletal: Normal range of motion. He exhibits edema and tenderness.   No particular lumbar TTP. TTP of posterior buttocks with radicular pain to left anterior thigh. Patient has a palpable mass to the left inguinal region, which he says is chronic 2/2 lymphoma. Trace pedal edema to bilateral lower extremities. Patient has positive left straight leg raises at 60 degrees.    Neurological: He is alert and oriented to person, place, and time.   Reflex Scores:       Patellar reflexes are 2+ on the right side and 2+ on the left side.  Skin: Skin is warm and dry. He is not diaphoretic.   No erythema or increased warmth to LLE.   Psychiatric: He has a normal mood and affect. His behavior is normal.   Nursing note and vitals reviewed.      Procedures         ED Course  ED Course as of Jun 07 2301   Thu Jun 07, 2018 2121 Patient just  went to have MRI of LS spine.  [FC]   2121 CBC and chemistries were essentially within normal limits except for mild hypokalemia.  Potassium level is 3.3.  Patient takes KCl 20 mEq by mouth twice daily.  We will give him an extra dose of KCl 40 mEq here in the ER by mouth.  Urinalysis is completely negative.  [FC]   2249 MRI of the lumbar spine without contrast reveals a moderate disc bulge at L3-L4 with moderate to severe right and moderate left foraminal canal stenosis.  There is also a significant bulging disc at L4-L5 with moderate to severe bilateral foraminal canal stenosis.  Patient denies any urinary or bowel incontinence.  Recommend evaluation by neurosurgery, Dr. Jay.  Patient already attends a chronic pain clinic and he needs to continue with all of his routine chronic pain medications.  Exam is stable.  [FC]   2259 Discussed all results with patient.  He verbalized understanding.  He is resting comfortably.  Vital signs are stable.  He is ready for discharge.  [FC]      ED Course User Index  [FC] Rachel Mckenna PA-C     Recent Results (from the past 24 hour(s))   Comprehensive Metabolic Panel    Collection Time: 06/07/18  8:20 PM   Result Value Ref Range    Glucose 75 70 - 100 mg/dL    BUN 11 9 - 23 mg/dL    Creatinine 0.88 0.60 - 1.30 mg/dL    Sodium 142 132 - 146 mmol/L    Potassium 3.3 (L) 3.5 - 5.5 mmol/L    Chloride 107 99 - 109 mmol/L    CO2 24.0 20.0 - 31.0 mmol/L    Calcium 9.0 8.7 - 10.4 mg/dL    Total Protein 7.2 5.7 - 8.2 g/dL    Albumin 4.02 3.20 - 4.80 g/dL    ALT (SGPT) 12 7 - 40 U/L    AST (SGOT) 21 0 - 33 U/L    Alkaline Phosphatase 127 (H) 25 - 100 U/L    Total Bilirubin 0.5 0.3 - 1.2 mg/dL    eGFR  African Amer 102 >60 mL/min/1.73    Globulin 3.2 gm/dL    A/G Ratio 1.3 (L) 1.5 - 2.5 g/dL    BUN/Creatinine Ratio 12.5 7.0 - 25.0    Anion Gap 11.0 3.0 - 11.0 mmol/L   Urinalysis With / Microscopic If Indicated (No Culture) - Urine, Clean Catch    Collection Time: 06/07/18  8:20 PM    Result Value Ref Range    Color, UA Yellow Yellow, Straw    Appearance, UA Clear Clear    pH, UA 7.0 5.0 - 8.0    Specific Gravity, UA 1.009 1.001 - 1.030    Glucose, UA Negative Negative    Ketones, UA Negative Negative    Bilirubin, UA Negative Negative    Blood, UA Negative Negative    Protein, UA Negative Negative    Leuk Esterase, UA Negative Negative    Nitrite, UA Negative Negative    Urobilinogen, UA 2.0 E.U./dL (A) 0.2 - 1.0 E.U./dL   CBC Auto Differential    Collection Time: 06/07/18  8:20 PM   Result Value Ref Range    WBC 7.56 3.50 - 10.80 10*3/mm3    RBC 4.10 (L) 4.20 - 5.76 10*6/mm3    Hemoglobin 11.7 (L) 13.1 - 17.5 g/dL    Hematocrit 36.4 (L) 38.9 - 50.9 %    MCV 88.8 80.0 - 99.0 fL    MCH 28.5 27.0 - 31.0 pg    MCHC 32.1 32.0 - 36.0 g/dL    RDW 15.7 (H) 11.3 - 14.5 %    RDW-SD 50.5 37.0 - 54.0 fl    MPV 10.1 6.0 - 12.0 fL    Platelets 211 150 - 450 10*3/mm3    Neutrophil % 70.5 41.0 - 71.0 %    Lymphocyte % 15.9 (L) 24.0 - 44.0 %    Monocyte % 10.6 0.0 - 12.0 %    Eosinophil % 2.9 0.0 - 3.0 %    Basophil % 0.1 0.0 - 1.0 %    Immature Grans % 0.3 0.0 - 0.6 %    Neutrophils, Absolute 5.33 1.50 - 8.30 10*3/mm3    Lymphocytes, Absolute 1.20 0.60 - 4.80 10*3/mm3    Monocytes, Absolute 0.80 0.00 - 1.00 10*3/mm3    Eosinophils, Absolute 0.22 0.00 - 0.30 10*3/mm3    Basophils, Absolute 0.01 0.00 - 0.20 10*3/mm3    Immature Grans, Absolute 0.02 0.00 - 0.03 10*3/mm3     Note: In addition to lab results from this visit, the labs listed above may include labs taken at another facility or during a different encounter within the last 24 hours. Please correlate lab times with ED admission and discharge times for further clarification of the services performed during this visit.    MRI Lumbar Spine Without Contrast   Final Result     Study is limited by susceptibility artifact greatest at L1-L2.        No definite significant central canal stenosis.        Moderate to severe right and moderate left foraminal  "stenosis at L3-L4.     Moderate to severe bilateral foraminal stenosis at L4-L5.      THIS DOCUMENT HAS BEEN ELECTRONICALLY SIGNED BY ZULMA FRANKS MD        Vitals:    06/07/18 1847   BP: 141/85   BP Location: Left arm   Patient Position: Sitting   Pulse: 75   Resp: 14   Temp: 98.2 °F (36.8 °C)   TempSrc: Oral   SpO2: 96%   Weight: 87.5 kg (193 lb)   Height: 177.8 cm (70\")     Medications   potassium chloride (MICRO-K) CR capsule 40 mEq (40 mEq Oral Given 6/7/18 2159)     ECG/EMG Results (last 24 hours)     ** No results found for the last 24 hours. **                      MDM    Final diagnoses:   Low back pain with left-sided sciatica, unspecified back pain laterality, unspecified chronicity   Left sided sciatica   Radicular pain of left lower extremity   Bulging lumbar disc   History of follicular lymphoma   Hypokalemia       Documentation assistance provided by jess Banda.  Information recorded by the crystalibestee was done at my direction and has been verified and validated by me.     Loyda Banda  06/07/18 1933       Loyda Banda  06/07/18 1936       Loyda Banda  06/07/18 2257       Rachel Mckenna PA-C  06/07/18 2301    "

## 2018-06-08 ENCOUNTER — TELEPHONE (OUTPATIENT)
Dept: NEUROSURGERY | Facility: CLINIC | Age: 78
End: 2018-06-08

## 2018-06-08 NOTE — DISCHARGE INSTRUCTIONS
Take Percocet 10mg every 6 hours as directed for your pain.    Follow up with Dr. Jay, neurosurgery, for further evaluation.    Continue to take your routine potassium supplement and other medications as previously prescribed.    Return to the ER if you develop any acute or worsening symptoms.

## 2018-06-08 NOTE — TELEPHONE ENCOUNTER
SW patient about the call concerning ER followup.    He was made aware that Dr. Jay would need to review the records since he was booking so far out for his appointments.  I also confirmed the patient had cervical sx with [facility] in 2004 and Lumbar sx in 2015 with [facility] Juarez.      Pt decided to call the other office to get an appointment with his most current surgeon to get an appointment in follow up.    Pt was made aware to call our office again should we be needed.

## 2018-07-24 ENCOUNTER — LAB (OUTPATIENT)
Dept: LAB | Facility: HOSPITAL | Age: 78
End: 2018-07-24

## 2018-07-24 ENCOUNTER — HOSPITAL ENCOUNTER (OUTPATIENT)
Dept: CT IMAGING | Facility: HOSPITAL | Age: 78
Discharge: HOME | End: 2018-07-24
Attending: INTERNAL MEDICINE | Admitting: INTERNAL MEDICINE

## 2018-07-24 DIAGNOSIS — C82.95 FOLLICULAR LYMPHOMA OF LYMPH NODES OF INGUINAL REGION, UNSPECIFIED FOLLICULAR LYMPHOMA TYPE (HCC): ICD-10-CM

## 2018-07-24 LAB
ALBUMIN SERPL-MCNC: 4.12 G/DL (ref 3.2–4.8)
ALBUMIN/GLOB SERPL: 1.3 G/DL (ref 1.5–2.5)
ALP SERPL-CCNC: 138 U/L (ref 25–100)
ALT SERPL W P-5'-P-CCNC: 13 U/L (ref 7–40)
ANION GAP SERPL CALCULATED.3IONS-SCNC: 7 MMOL/L (ref 3–11)
AST SERPL-CCNC: 19 U/L (ref 0–33)
BASOPHILS # BLD AUTO: 0.02 10*3/MM3 (ref 0–0.2)
BASOPHILS NFR BLD AUTO: 0.3 % (ref 0–1)
BILIRUB SERPL-MCNC: 0.4 MG/DL (ref 0.3–1.2)
BUN BLD-MCNC: 9 MG/DL (ref 9–23)
BUN/CREAT SERPL: 9.3 (ref 7–25)
CALCIUM SPEC-SCNC: 9 MG/DL (ref 8.7–10.4)
CHLORIDE SERPL-SCNC: 107 MMOL/L (ref 99–109)
CO2 SERPL-SCNC: 29 MMOL/L (ref 20–31)
CREAT BLD-MCNC: 0.97 MG/DL (ref 0.6–1.3)
DEPRECATED RDW RBC AUTO: 51.6 FL (ref 37–54)
EOSINOPHIL # BLD AUTO: 0.34 10*3/MM3 (ref 0–0.3)
EOSINOPHIL NFR BLD AUTO: 4.7 % (ref 0–3)
ERYTHROCYTE [DISTWIDTH] IN BLOOD BY AUTOMATED COUNT: 15.7 % (ref 11.3–14.5)
GFR SERPL CREATININE-BSD FRML MDRD: 91 ML/MIN/1.73
GLOBULIN UR ELPH-MCNC: 3.2 GM/DL
GLUCOSE BLD-MCNC: 93 MG/DL (ref 70–100)
HCT VFR BLD AUTO: 36.8 % (ref 38.9–50.9)
HGB BLD-MCNC: 11.7 G/DL (ref 13.1–17.5)
IMM GRANULOCYTES # BLD: 0.01 10*3/MM3 (ref 0–0.03)
IMM GRANULOCYTES NFR BLD: 0.1 % (ref 0–0.6)
LYMPHOCYTES # BLD AUTO: 1.18 10*3/MM3 (ref 0.6–4.8)
LYMPHOCYTES NFR BLD AUTO: 16.1 % (ref 24–44)
MCH RBC QN AUTO: 28.6 PG (ref 27–31)
MCHC RBC AUTO-ENTMCNC: 31.8 G/DL (ref 32–36)
MCV RBC AUTO: 90 FL (ref 80–99)
MONOCYTES # BLD AUTO: 0.66 10*3/MM3 (ref 0–1)
MONOCYTES NFR BLD AUTO: 9 % (ref 0–12)
NEUTROPHILS # BLD AUTO: 5.11 10*3/MM3 (ref 1.5–8.3)
NEUTROPHILS NFR BLD AUTO: 69.9 % (ref 41–71)
PLATELET # BLD AUTO: 244 10*3/MM3 (ref 150–450)
PMV BLD AUTO: 11 FL (ref 6–12)
POTASSIUM BLD-SCNC: 3.5 MMOL/L (ref 3.5–5.5)
PROT SERPL-MCNC: 7.3 G/DL (ref 5.7–8.2)
RBC # BLD AUTO: 4.09 10*6/MM3 (ref 4.2–5.76)
SODIUM BLD-SCNC: 143 MMOL/L (ref 132–146)
WBC NRBC COR # BLD: 7.31 10*3/MM3 (ref 3.5–10.8)

## 2018-07-24 PROCEDURE — 71250 CT THORAX DX C-: CPT

## 2018-07-24 PROCEDURE — 80053 COMPREHEN METABOLIC PANEL: CPT

## 2018-07-24 PROCEDURE — 74176 CT ABD & PELVIS W/O CONTRAST: CPT

## 2018-07-24 PROCEDURE — 36415 COLL VENOUS BLD VENIPUNCTURE: CPT

## 2018-07-24 PROCEDURE — 85025 COMPLETE CBC W/AUTO DIFF WBC: CPT

## 2018-07-27 ENCOUNTER — OFFICE VISIT (OUTPATIENT)
Dept: ONCOLOGY | Facility: CLINIC | Age: 78
End: 2018-07-27

## 2018-07-27 VITALS
HEIGHT: 70 IN | RESPIRATION RATE: 16 BRPM | TEMPERATURE: 98 F | WEIGHT: 188 LBS | SYSTOLIC BLOOD PRESSURE: 135 MMHG | DIASTOLIC BLOOD PRESSURE: 87 MMHG | BODY MASS INDEX: 26.92 KG/M2 | HEART RATE: 81 BPM

## 2018-07-27 DIAGNOSIS — C82.95 FOLLICULAR LYMPHOMA OF LYMPH NODES OF INGUINAL REGION, UNSPECIFIED FOLLICULAR LYMPHOMA TYPE (HCC): Primary | ICD-10-CM

## 2018-07-27 PROCEDURE — 99213 OFFICE O/P EST LOW 20 MIN: CPT | Performed by: NURSE PRACTITIONER

## 2018-07-27 NOTE — PROGRESS NOTES
CHIEF COMPLAINT: Follow-up follicular lymphoma      Problem List:  Oncology/Hematology History    1. Follicular lymphoma:  -  initial consultation 3/12/18: Had been having left inguinal pains and went for scanning that found left groin node.  Needle biopsy of this was called lymphoma CD10 positive and Fish testing positive for translocation (14;18) consistent with follicular lymphoma though could also be seen in diffuse large B cell.  BCL 6 /3q rearranged.  No Myc..  Hence cannot say at this junction the exact subtype of lymphoma.  He has not been having bed drenching night sweats.  No unexplained weight loss.  Has not had any known anemia, thrombocytopenia, leukopenia.  Has not had frequent infections.  CT did not show any splenomegaly or other significant adenopathy.  The left inguinal pain has subsided since the needle biopsy.  He has an abdominal aortic aneurysm that is being watched and had previously been repaired.  Apparently 15+ years ago he was treated at [facility] for lymphoma for which she received right neck radiation and some chemotherapy.  He is not sure of the subtype of lymphoma nor of the type of chemotherapy nor the number of radiation treatments and this was far enough back that our records have been expunged.          Follicular lymphoma (CMS/HCC)    3/12/2018 Initial Diagnosis     Follicular lymphoma       3/15/2018 Imaging     PET     IMPRESSION:     Multiple hypermetabolic abnormalities are identified but the  distribution is unusual. Although there is bulky hypermetabolic rishi  mass in the left inguinal area and in the right internal iliac rishi  chain certainly consistent with active malignant lymphoma, there is an  intense abnormal area of tracer uptake and hypermetabolic activity  throughout the marrow or intramedullary portion of the left ilium and  iliac wing in the absence of cortical destruction. This is an intensely  hypermetabolic abnormal lengthy intraosseous abnormality.      The lower extremity dedicated datasets are negative.     Intermediate lymph nodes are identified in the medial inguinal region  which are not above malignant threshold.     Above the pelvis, there is no evidence of additional hypermetabolic  focus or active neoplastic disease, and the chest, mediastinum and neck  are clear.         6/7/2018 Imaging     MRI lumbar spine IMPRESSION:    Study is limited by susceptibility artifact greatest at L1-L2.       No definite significant central canal stenosis.       Moderate to severe right and moderate left foraminal stenosis at L3-L4.    Moderate to severe bilateral foraminal stenosis at L4-L5.         7/24/2018 Imaging     CT chest IMPRESSION:  There are no acute findings. Specifically, there is no  axillary, mediastinal or hilar lymphadenopathy. There is bronchiectasis  in the medial segment of the left lower lobe. This was also present on  CT/PET examination of 03/15/2018.  CT abdomen and pelvis IMPRESSION:  Left inguinal lymphadenopathy, unchanged when compared to a  whole-body PET CT scan of 03/15/2018.            HISTORY OF PRESENT ILLNESS:  The patient is a 78 y.o. male, here for follow up on management of Follicular lymphoma.  Overall feeling well.  No fevers or chills or drenching night sweats.  Does have a fair amount of anxiety over his diagnosis of lymphoma.        Past Medical History:   Diagnosis Date   • AAA (abdominal aortic aneurysm) (CMS/HCC)    • Broken neck (CMS/HCC)    • Gout    • Hypertension    • Migraine    • Sciatica    • Thyroid cancer (CMS/HCC)      Past Surgical History:   Procedure Laterality Date   • BACK SURGERY     • EYE SURGERY     • MULTIPLE TOOTH EXTRACTIONS     • NECK SURGERY      PINS/RODS IN NECK   • THYROIDECTOMY / EXCISION CYST THYROID         No Known Allergies    Family History and Social History reviewed and changed as necessary      REVIEW OF SYSTEM:   Review of Systems   Constitutional: Negative for appetite change, chills,  "diaphoresis, fatigue, fever and unexpected weight change.   HENT:   Negative for mouth sores, sore throat and trouble swallowing.    Eyes: Negative for icterus.   Respiratory: Negative for cough, hemoptysis and shortness of breath.    Cardiovascular: Negative for chest pain, leg swelling and palpitations.   Gastrointestinal: Negative for abdominal distention, abdominal pain, blood in stool, constipation, diarrhea, nausea and vomiting.   Endocrine: Negative for hot flashes.   Genitourinary: Negative for bladder incontinence, difficulty urinating, dysuria, frequency and hematuria.    Musculoskeletal: Negative for gait problem, neck pain and neck stiffness.   Skin: Negative for rash.   Neurological: Negative for dizziness, gait problem, headaches, light-headedness and numbness.   Hematological: Negative for adenopathy. Does not bruise/bleed easily.   Psychiatric/Behavioral: Negative for depression. The patient is nervous/anxious.    All other systems reviewed and are negative.       PHYSICAL EXAM    Vitals:    07/27/18 1331   BP: 135/87   Pulse: 81   Resp: 16   Temp: 98 °F (36.7 °C)   Weight: 85.3 kg (188 lb)   Height: 177.8 cm (70\")     Constitutional: Appears well-developed and well-nourished. No distress.   ECOG: (0) Fully active, able to carry on all predisease performance without restriction  HENT:   Head: Normocephalic.   Mouth/Throat: Oropharynx is clear and moist.   Eyes: Conjunctivae are normal. Pupils are equal, round, and reactive to light. No scleral icterus.   Neck: Neck supple. No JVD present. No thyromegaly present.   Cardiovascular: Normal rate, regular rhythm and normal heart sounds.    Pulmonary/Chest: Breath sounds normal. No respiratory distress.   Abdominal: Soft. Exhibits no distension and no mass. There is no hepatosplenomegaly. There is no tenderness. There is no rebound and no guarding.   Musculoskeletal:Exhibits no edema, tenderness or deformity.   Neurological: Alert and oriented to person, " place, and time. Exhibits normal muscle tone.   Skin: No ecchymosis, no petechiae and no rash noted. Not diaphoretic. No cyanosis. Nails show no clubbing.   Psychiatric: Normal mood and affect.   Vitals reviewed.  Nodes: Bilateral 3 cm inguinal adenopathy.  No palpable cervical or axillary adenopathy    Ct Abdomen Pelvis Without Contrast    Result Date: 7/24/2018  Left inguinal lymphadenopathy, unchanged when compared to a whole-body PET CT scan of 03/15/2018.  D:  07/24/2018 E:  07/24/2018    This report was finalized on 7/24/2018 6:02 PM by Dr. Regan Vizcaino MD.      Ct Chest Without Contrast    Result Date: 7/24/2018  There are no acute findings. Specifically, there is no axillary, mediastinal or hilar lymphadenopathy. There is bronchiectasis in the medial segment of the left lower lobe. This was also present on CT/PET examination of 03/15/2018.  D:  07/24/2018 E:  07/24/2018  This report was finalized on 7/24/2018 6:02 PM by Dr. Regan Vizcaino MD.              ASSESSMENT & PLAN:    1. Follicular lymphoma  2. Inguinal adenopathy    Discussion: Overall doing well, stable left inguinal node on CT, no other adenopathy.  No frequent infections.  No bed drenching night sweats.  Labs are normal.  I will check his blood counts again prior to return in 3 months along with physical exam.  We will treat when he reaches GELF criteria:    · Local symptoms due to progressive or bulky rishi disease  · Compromise of normal organ function due to progressive or bulky disease  · Presence of systemic B symptoms (i.e., fevers, weight loss, night sweats)  · Presence of symptomatic extranodal disease, such as effusions  · Cytopenias due to extensive bone marrow infiltration, autoimmune hemolytic anemia or thrombocytopenia, or hypersplenism  · An increase in disease tempo    Discussed with patient 15 minutes greater than 50% counseling.      Laxmi Scott, APRN    07/27/2018

## 2018-08-28 DIAGNOSIS — C82.95 FOLLICULAR LYMPHOMA OF LYMPH NODES OF INGUINAL REGION, UNSPECIFIED FOLLICULAR LYMPHOMA TYPE (HCC): Primary | ICD-10-CM

## 2018-08-28 RX ORDER — HYDROCODONE BITARTRATE AND ACETAMINOPHEN 5; 325 MG/1; MG/1
1-2 TABLET ORAL EVERY 6 HOURS PRN
Qty: 100 TABLET | Refills: 0 | Status: SHIPPED | OUTPATIENT
Start: 2018-08-28 | End: 2018-09-11 | Stop reason: ALTCHOICE

## 2018-08-28 RX ORDER — HYDROCODONE BITARTRATE AND ACETAMINOPHEN 5; 325 MG/1; MG/1
1-2 TABLET ORAL EVERY 6 HOURS PRN
Qty: 100 TABLET | Refills: 0 | Status: SHIPPED | OUTPATIENT
Start: 2018-08-28 | End: 2018-08-28 | Stop reason: SDUPTHER

## 2018-08-28 NOTE — TELEPHONE ENCOUNTER
----- Message from Natty Alston sent at 8/28/2018 10:31 AM EDT -----  Regarding: [place] - PAIN   Contact: [phone]  PATIENT CALLED AND SAID HE IS IN A LOT OF PAIN FROM WHERE HIS LYMPHOMA IS ALL THE WAY DOWN HIS LEFT LEG.

## 2018-08-28 NOTE — TELEPHONE ENCOUNTER
Patient states he has pain in his left groin where he has an enlarged lymph Node.  The pain radiates down his leg and around to his buttocks.  It isn't new, but it is worse.  He is considering going to the ER.  He feels like the node is a little bigger.  Has had chronic intermittent swelling in LLE that is not any worse.      Discussed with Dr. Ruggiero.  Will send rx for norco 5/325 mg 1-2 tabs PO Q6, #100.  Dr. Ruggiero also wants him to see rad/onc for potential radiation to enlarged node in groin.  Patient notified and appointment given for Thursday @ 2:15 to see Dr. Ruggiero. He will go to the ER if he is miserable despite the pain medication.   Pharmacy verified. Message sent to Ken COVARRUBIAS to schedule appointments.

## 2018-08-30 ENCOUNTER — OFFICE VISIT (OUTPATIENT)
Dept: ONCOLOGY | Facility: CLINIC | Age: 78
End: 2018-08-30

## 2018-08-30 ENCOUNTER — LAB (OUTPATIENT)
Dept: LAB | Facility: HOSPITAL | Age: 78
End: 2018-08-30

## 2018-08-30 VITALS
BODY MASS INDEX: 26.92 KG/M2 | WEIGHT: 188 LBS | SYSTOLIC BLOOD PRESSURE: 148 MMHG | HEIGHT: 70 IN | RESPIRATION RATE: 18 BRPM | DIASTOLIC BLOOD PRESSURE: 86 MMHG | HEART RATE: 76 BPM | TEMPERATURE: 98.3 F

## 2018-08-30 DIAGNOSIS — C82.95 FOLLICULAR LYMPHOMA OF LYMPH NODES OF INGUINAL REGION, UNSPECIFIED FOLLICULAR LYMPHOMA TYPE (HCC): Primary | ICD-10-CM

## 2018-08-30 DIAGNOSIS — C82.95 FOLLICULAR LYMPHOMA OF LYMPH NODES OF INGUINAL REGION, UNSPECIFIED FOLLICULAR LYMPHOMA TYPE (HCC): ICD-10-CM

## 2018-08-30 LAB
ALBUMIN SERPL-MCNC: 4.52 G/DL (ref 3.2–4.8)
ALBUMIN/GLOB SERPL: 1.7 G/DL (ref 1.5–2.5)
ALP SERPL-CCNC: 179 U/L (ref 25–100)
ALT SERPL W P-5'-P-CCNC: 11 U/L (ref 7–40)
ANION GAP SERPL CALCULATED.3IONS-SCNC: 7 MMOL/L (ref 3–11)
AST SERPL-CCNC: 20 U/L (ref 0–33)
BASOPHILS # BLD AUTO: 0.01 10*3/MM3 (ref 0–0.2)
BASOPHILS NFR BLD AUTO: 0.1 % (ref 0–1)
BILIRUB SERPL-MCNC: 0.6 MG/DL (ref 0.3–1.2)
BUN BLD-MCNC: 12 MG/DL (ref 9–23)
BUN/CREAT SERPL: 11.3 (ref 7–25)
CALCIUM SPEC-SCNC: 9.6 MG/DL (ref 8.7–10.4)
CHLORIDE SERPL-SCNC: 105 MMOL/L (ref 99–109)
CO2 SERPL-SCNC: 28 MMOL/L (ref 20–31)
CREAT BLD-MCNC: 1.06 MG/DL (ref 0.6–1.3)
DEPRECATED RDW RBC AUTO: 51.7 FL (ref 37–54)
EOSINOPHIL # BLD AUTO: 0.25 10*3/MM3 (ref 0–0.3)
EOSINOPHIL NFR BLD AUTO: 2.5 % (ref 0–3)
ERYTHROCYTE [DISTWIDTH] IN BLOOD BY AUTOMATED COUNT: 15.6 % (ref 11.3–14.5)
GFR SERPL CREATININE-BSD FRML MDRD: 82 ML/MIN/1.73
GLOBULIN UR ELPH-MCNC: 2.7 GM/DL
GLUCOSE BLD-MCNC: 90 MG/DL (ref 70–100)
HCT VFR BLD AUTO: 37.1 % (ref 38.9–50.9)
HGB BLD-MCNC: 11.8 G/DL (ref 13.1–17.5)
IMM GRANULOCYTES # BLD: 0.02 10*3/MM3 (ref 0–0.03)
IMM GRANULOCYTES NFR BLD: 0.2 % (ref 0–0.6)
LYMPHOCYTES # BLD AUTO: 1.62 10*3/MM3 (ref 0.6–4.8)
LYMPHOCYTES NFR BLD AUTO: 16.3 % (ref 24–44)
MCH RBC QN AUTO: 28.7 PG (ref 27–31)
MCHC RBC AUTO-ENTMCNC: 31.8 G/DL (ref 32–36)
MCV RBC AUTO: 90.3 FL (ref 80–99)
MONOCYTES # BLD AUTO: 0.72 10*3/MM3 (ref 0–1)
MONOCYTES NFR BLD AUTO: 7.2 % (ref 0–12)
NEUTROPHILS # BLD AUTO: 7.34 10*3/MM3 (ref 1.5–8.3)
NEUTROPHILS NFR BLD AUTO: 73.9 % (ref 41–71)
PLATELET # BLD AUTO: 233 10*3/MM3 (ref 150–450)
PMV BLD AUTO: 10 FL (ref 6–12)
POTASSIUM BLD-SCNC: 3.5 MMOL/L (ref 3.5–5.5)
PROT SERPL-MCNC: 7.2 G/DL (ref 5.7–8.2)
RBC # BLD AUTO: 4.11 10*6/MM3 (ref 4.2–5.76)
SODIUM BLD-SCNC: 140 MMOL/L (ref 132–146)
WBC NRBC COR # BLD: 9.94 10*3/MM3 (ref 3.5–10.8)

## 2018-08-30 PROCEDURE — 85025 COMPLETE CBC W/AUTO DIFF WBC: CPT

## 2018-08-30 PROCEDURE — 36415 COLL VENOUS BLD VENIPUNCTURE: CPT

## 2018-08-30 PROCEDURE — 99214 OFFICE O/P EST MOD 30 MIN: CPT | Performed by: INTERNAL MEDICINE

## 2018-08-30 PROCEDURE — 80053 COMPREHEN METABOLIC PANEL: CPT

## 2018-08-30 RX ORDER — PREDNISONE 50 MG/1
TABLET ORAL
Qty: 10 TABLET | Refills: 5 | Status: SHIPPED | OUTPATIENT
Start: 2018-08-30 | End: 2018-09-11 | Stop reason: SDDI

## 2018-08-30 NOTE — PROGRESS NOTES
CHIEF COMPLAINT: Painful inguinal swelling    Problem List:  Oncology/Hematology History    1. Follicular lymphoma:  -  initial consultation 3/12/18: Had been having left inguinal pains and went for scanning that found left groin node.  Needle biopsy of this was called lymphoma CD10 positive and Fish testing positive for translocation (14;18) consistent with follicular lymphoma though could also be seen in diffuse large B cell.  BCL 6 /3q rearranged.  No Myc..  Hence cannot say at this junction the exact subtype of lymphoma.  He has not been having bed drenching night sweats.  No unexplained weight loss.  Has not had any known anemia, thrombocytopenia, leukopenia.  Has not had frequent infections.  CT did not show any splenomegaly or other significant adenopathy.  The left inguinal pain has subsided since the needle biopsy.  He has an abdominal aortic aneurysm that is being watched and had previously been repaired.  Apparently 15+ years ago he was treated at [facility] for lymphoma for which she received right neck radiation and some chemotherapy.  He is not sure of the subtype of lymphoma nor of the type of chemotherapy nor the number of radiation treatments and this was far enough back that our records have been expunged.          Follicular lymphoma (CMS/HCC)    3/12/2018 Initial Diagnosis     Follicular lymphoma       3/15/2018 Imaging     PET     IMPRESSION:     Multiple hypermetabolic abnormalities are identified but the  distribution is unusual. Although there is bulky hypermetabolic rishi  mass in the left inguinal area and in the right internal iliac rishi  chain certainly consistent with active malignant lymphoma, there is an  intense abnormal area of tracer uptake and hypermetabolic activity  throughout the marrow or intramedullary portion of the left ilium and  iliac wing in the absence of cortical destruction. This is an intensely  hypermetabolic abnormal lengthy intraosseous abnormality.     The  lower extremity dedicated datasets are negative.     Intermediate lymph nodes are identified in the medial inguinal region  which are not above malignant threshold.     Above the pelvis, there is no evidence of additional hypermetabolic  focus or active neoplastic disease, and the chest, mediastinum and neck  are clear.         6/7/2018 Imaging     MRI lumbar spine IMPRESSION:    Study is limited by susceptibility artifact greatest at L1-L2.       No definite significant central canal stenosis.       Moderate to severe right and moderate left foraminal stenosis at L3-L4.    Moderate to severe bilateral foraminal stenosis at L4-L5.         7/24/2018 Imaging     CT chest IMPRESSION:  There are no acute findings. Specifically, there is no  axillary, mediastinal or hilar lymphadenopathy. There is bronchiectasis  in the medial segment of the left lower lobe. This was also present on  CT/PET examination of 03/15/2018.  CT abdomen and pelvis IMPRESSION:  Left inguinal lymphadenopathy, unchanged when compared to a  whole-body PET CT scan of 03/15/2018.            HISTORY OF PRESENT ILLNESS:  The patient is a 78 y.o. male, here for follow up on management of follicular lymphoma now with painful inguinal swelling.      Past Medical History:   Diagnosis Date   • AAA (abdominal aortic aneurysm) (CMS/HCC)    • Broken neck (CMS/HCC)    • Gout    • Hypertension    • Migraine    • Sciatica    • Thyroid cancer (CMS/HCC)      Past Surgical History:   Procedure Laterality Date   • BACK SURGERY     • EYE SURGERY     • MULTIPLE TOOTH EXTRACTIONS     • NECK SURGERY      PINS/RODS IN NECK   • THYROIDECTOMY / EXCISION CYST THYROID         No Known Allergies    Family History and Social History reviewed and changed as necessary      REVIEW OF SYSTEM:   Review of Systems   Constitutional: Negative for appetite change, chills, diaphoresis, fatigue, fever and unexpected weight change.   HENT:   Negative for mouth sores, sore throat and trouble  "swallowing.    Eyes: Negative for icterus.   Respiratory: Negative for cough, hemoptysis and shortness of breath.    Cardiovascular: Negative for chest pain, leg swelling and palpitations.   Gastrointestinal: Negative for abdominal distention, abdominal pain, blood in stool, constipation, diarrhea, nausea and vomiting.   Endocrine: Negative for hot flashes.   Genitourinary: Negative for bladder incontinence, difficulty urinating, dysuria, frequency and hematuria.    Musculoskeletal: Negative for gait problem, neck pain and neck stiffness.   Skin: Negative for rash.   Neurological: Negative for dizziness, gait problem, headaches, light-headedness and numbness.   Hematological: Negative for adenopathy. Does not bruise/bleed easily.   Psychiatric/Behavioral: Negative for depression. The patient is not nervous/anxious.    All other systems reviewed and are negative.       PHYSICAL EXAM    Vitals:    08/30/18 1425   BP: 148/86   Pulse: 76   Resp: 18   Temp: 98.3 °F (36.8 °C)   Weight: 85.3 kg (188 lb)   Height: 177.8 cm (70\")     Constitutional: Appears well-developed and well-nourished. No distress.   ECOG: (0) Fully active, able to carry on all predisease performance without restriction  HENT:   Head: Normocephalic.   Mouth/Throat: Oropharynx is clear and moist.   Eyes: Conjunctivae are normal. Pupils are equal, round, and reactive to light. No scleral icterus.   Neck: Neck supple. No JVD present. No thyromegaly present.   Cardiovascular: Normal rate, regular rhythm and normal heart sounds.    Pulmonary/Chest: Breath sounds normal. No respiratory distress.   Abdominal: Soft. Exhibits no distension and no mass. There is no hepatosplenomegaly. There is no tenderness. There is no rebound and no guarding.   Musculoskeletal:Exhibits no edema, tenderness or deformity.   Neurological: Alert and oriented to person, place, and time. Exhibits normal muscle tone.   Skin: No ecchymosis, no petechiae and no rash noted. Not " diaphoretic. No cyanosis. Nails show no clubbing.   Psychiatric: Normal mood and affect.   Vitals reviewed.      No radiology results for the last 7 days          ASSESSMENT & PLAN:    1.  Follicular lymphoma  2. Painful inguinal rishi involvement    Discussion: he has become markedly more symptomatic with swelling in the inguinal region.  He has an appointment to see radiation oncology September 10 we will start him on steroids in the near term which hopefully will mitigate some of the swelling and pain.  We'll give him prednisone 100 mg daily for the next 5 days.  Would not treat him systemically as this is a localized painful process and he has no bulky disease elsewhere and he is not anemic or thrombocytopenic and at 78 I would hold on systemic therapy for now if possible.  It is certain that this will not control his disease systemically but I think this should mitigate his main symptom.  Having said all this, his pain seems disproportional to the degree of his rishi involvement on getting an MRI of his pelvis for Dr. Christianson to review.  Discussed with patient 25 minutes greater than 50% spent counseling.      Thor Ruggiero MD    08/30/2018

## 2018-09-06 ENCOUNTER — HOSPITAL ENCOUNTER (OUTPATIENT)
Dept: MRI IMAGING | Facility: HOSPITAL | Age: 78
Discharge: HOME | End: 2018-09-06
Attending: INTERNAL MEDICINE | Admitting: INTERNAL MEDICINE

## 2018-09-06 DIAGNOSIS — C82.95 FOLLICULAR LYMPHOMA OF LYMPH NODES OF INGUINAL REGION, UNSPECIFIED FOLLICULAR LYMPHOMA TYPE (HCC): ICD-10-CM

## 2018-09-06 PROCEDURE — A9577 INJ MULTIHANCE: HCPCS | Performed by: INTERNAL MEDICINE

## 2018-09-06 PROCEDURE — 72197 MRI PELVIS W/O & W/DYE: CPT

## 2018-09-06 PROCEDURE — 0 GADOBENATE DIMEGLUMINE 529 MG/ML SOLUTION: Performed by: INTERNAL MEDICINE

## 2018-09-06 RX ADMIN — GADOBENATE DIMEGLUMINE 15 ML: 529 INJECTION, SOLUTION INTRAVENOUS at 12:45

## 2018-09-10 ENCOUNTER — OFFICE VISIT (OUTPATIENT)
Dept: RADIATION ONCOLOGY | Facility: HOSPITAL | Age: 78
End: 2018-09-10

## 2018-09-10 ENCOUNTER — HOSPITAL ENCOUNTER (OUTPATIENT)
Dept: RADIATION ONCOLOGY | Facility: HOSPITAL | Age: 78
Setting detail: RADIATION/ONCOLOGY SERIES
Discharge: HOME | End: 2018-09-10

## 2018-09-10 VITALS
RESPIRATION RATE: 18 BRPM | DIASTOLIC BLOOD PRESSURE: 90 MMHG | BODY MASS INDEX: 26.75 KG/M2 | SYSTOLIC BLOOD PRESSURE: 135 MMHG | WEIGHT: 186.4 LBS | OXYGEN SATURATION: 95 % | TEMPERATURE: 97.7 F | HEART RATE: 83 BPM

## 2018-09-10 DIAGNOSIS — C82.95 FOLLICULAR LYMPHOMA OF LYMPH NODES OF INGUINAL REGION, UNSPECIFIED FOLLICULAR LYMPHOMA TYPE (HCC): ICD-10-CM

## 2018-09-10 PROCEDURE — G0463 HOSPITAL OUTPT CLINIC VISIT: HCPCS | Performed by: RADIOLOGY
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
## 2018-09-11 ENCOUNTER — OFFICE VISIT (OUTPATIENT)
Dept: PULMONOLOGY | Facility: CLINIC | Age: 78
End: 2018-09-11

## 2018-09-11 VITALS
HEART RATE: 80 BPM | RESPIRATION RATE: 16 BRPM | TEMPERATURE: 98.1 F | DIASTOLIC BLOOD PRESSURE: 60 MMHG | WEIGHT: 188 LBS | OXYGEN SATURATION: 97 % | SYSTOLIC BLOOD PRESSURE: 110 MMHG | BODY MASS INDEX: 27.85 KG/M2 | HEIGHT: 69 IN

## 2018-09-11 DIAGNOSIS — C82.95 FOLLICULAR LYMPHOMA OF LYMPH NODES OF INGUINAL REGION, UNSPECIFIED FOLLICULAR LYMPHOMA TYPE (HCC): ICD-10-CM

## 2018-09-11 DIAGNOSIS — R06.02 SHORTNESS OF BREATH: Primary | ICD-10-CM

## 2018-09-11 PROCEDURE — 94726 PLETHYSMOGRAPHY LUNG VOLUMES: CPT | Performed by: NURSE PRACTITIONER

## 2018-09-11 PROCEDURE — 99214 OFFICE O/P EST MOD 30 MIN: CPT | Performed by: NURSE PRACTITIONER

## 2018-09-11 PROCEDURE — 94729 DIFFUSING CAPACITY: CPT | Performed by: NURSE PRACTITIONER

## 2018-09-11 PROCEDURE — 94375 RESPIRATORY FLOW VOLUME LOOP: CPT | Performed by: NURSE PRACTITIONER

## 2018-09-11 NOTE — PROGRESS NOTES
[facility] Pulmonary Evaluation    CHIEF COMPLAINT    Cough     Refered by:  Kaycee Douglas MD        HISTORY OF PRESENT ILLNESS    George Lomeli is a 78 y.o.male here today for evaluation of a cough and congestion.  He feels primary care provider with a acute sinusitis and bronchitis.  Since then he completed a course of antibiotics.  He did have some thick green secretions was have resolved now.  He has no cough or sputum production currently.  He denies a chronic cough.  He denies any chronic dyspnea.  He denies any chronic wheezing.  He denies chronic postnasal drainage or seasonal allergy symptoms.    He denies any chronic reflux or dysphagia symptoms.    He also follows up with Dr. 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  His MRI was compatible with advanced systemic malignancy, with plans for pelvic radiation therapy to start in the next few days.      Patient Active Problem List   Diagnosis   • Sciatica   • Migraine   • Hypertension   • Gout   • Broken neck (CMS/HCC)   •  Possible bladder mass noted on 8/2017 CT, needs outpatient Urology eval   • Abdominal aortic aneurysm with recent repair at [place]   • Follicular lymphoma (CMS/HCC)       Allergies   Allergen Reactions   • Contrast Dye Nausea And Vomiting       Current Outpatient Prescriptions:   •  allopurinol (ZYLOPRIM) 100 MG tablet, Take 100 mg by mouth Daily., Disp: , Rfl:   •  amLODIPine (NORVASC) 5 MG tablet, Take 5 mg by mouth Daily., Disp: , Rfl:   •  aspirin 81 MG EC tablet, Take 81 mg by mouth Daily., Disp: , Rfl:   •  carvedilol (COREG) 25 MG tablet, Take 25 mg by mouth 2 (Two) Times a Day With Meals., Disp: , Rfl:   •  levothyroxine (SYNTHROID, LEVOTHROID) 25 MCG tablet, Take 25 mcg by mouth Daily., Disp: , Rfl:   •  lisinopril (PRINIVIL,ZESTRIL) 40 MG tablet, Take 40 mg by mouth Daily., Disp: , Rfl:   •  oxyCODONE-acetaminophen (PERCOCET)  MG per tablet, Take 1 tablet by mouth Every 6 (Six) Hours As Needed  "for Moderate Pain ., Disp: , Rfl:   •  potassium chloride (K-DUR) 10 MEQ CR tablet, Take 10 mEq by mouth Take As Directed., Disp: , Rfl:   •  predniSONE (DELTASONE) 50 MG tablet, Take two tablets daily for 5 days then discontinue until further notice., Disp: 10 tablet, Rfl: 5  •  tamsulosin (FLOMAX) 0.4 MG capsule 24 hr capsule, Take 1 capsule by mouth Every Night., Disp: , Rfl:     MEDICATION LIST AND ALLERGIES REVIEWED.    Social History   Substance Use Topics   • Smoking status: Former Smoker     Quit date: 1997   • Smokeless tobacco: Never Used      Comment: QUIT 30 YEARS AGO    • Alcohol use No       FAMILY AND SOCIAL HISTORY REVIEWED AND UPDATED IN EPIC    Review of Systems   Constitutional: Negative for chills, fatigue, fever and unexpected weight change.   HENT: Negative for congestion, nosebleeds, postnasal drip, rhinorrhea, sinus pressure and trouble swallowing.    Respiratory: Negative for cough, chest tightness, shortness of breath and wheezing.    Cardiovascular: Negative for chest pain and leg swelling.   Gastrointestinal: Negative for abdominal pain, constipation, diarrhea, nausea and vomiting.   Genitourinary: Negative for dysuria, frequency, hematuria and urgency.   Musculoskeletal: Negative for myalgias.   Neurological: Negative for dizziness, weakness, numbness and headaches.   All other systems reviewed and are negative.  .    /60   Pulse 80   Temp 98.1 °F (36.7 °C)   Resp 16   Ht 175.3 cm (69\")   Wt 85.3 kg (188 lb)   SpO2 97% Comment: resting, RA  BMI 27.76 kg/m²     There is no immunization history on file for this patient.    Physical Exam   Constitutional: He is oriented to person, place, and time. He appears well-developed and well-nourished.   HENT:   Head: Normocephalic and atraumatic.   Eyes: Pupils are equal, round, and reactive to light. EOM are normal.   Neck: Normal range of motion. Neck supple.   Cardiovascular: Normal rate and regular rhythm.    No murmur " heard.  Pulmonary/Chest: Effort normal. No respiratory distress. He has wheezes (left). He has no rales.   Abdominal: Soft. Bowel sounds are normal. He exhibits no distension.   Musculoskeletal: Normal range of motion. He exhibits no edema.   Neurological: He is alert and oriented to person, place, and time.   Skin: Skin is warm and dry. No erythema.   Psychiatric: He has a normal mood and affect. His behavior is normal.   Vitals reviewed.      RESULTS    Lab Results   Component Value Date    WBC 9.94 08/30/2018    HGB 11.8 (L) 08/30/2018    HCT 37.1 (L) 08/30/2018    MCV 90.3 08/30/2018     08/30/2018     Lab Results   Component Value Date    GLUCOSE 90 08/30/2018    CALCIUM 9.6 08/30/2018     08/30/2018    K 3.5 08/30/2018    CO2 28.0 08/30/2018     08/30/2018    BUN 12 08/30/2018    CREATININE 1.06 08/30/2018    EGFRIFAFRI 82 08/30/2018    BCR 11.3 08/30/2018    ANIONGAP 7.0 08/30/2018       PFTs done in the office today, and read by me:  Mild restriction with a forced vital capacity 2.38, 67%.  Total lung capacity 4.74, 77%.  Normal adjusted diffusion    PA/LAT CXR done in the office today, and reviewed by me:      CT Chest 7/24/18  FINDINGS: There is no axillary lymphadenopathy. There is no mediastinal  or hilar lymphadenopathy. There is no pericardial or pleural effusion.  Images displayed at lung window settings reveal postinflammatory apical  changes with no pulmonary mass, nodule or consolidation. There is  bronchiectasis in the medial segment of the left lower lobe.     IMPRESSION:  There are no acute findings. Specifically, there is no  axillary, mediastinal or hilar lymphadenopathy. There is bronchiectasis  in the medial segment of the left lower lobe. This was also present on  CT/PET examination of 03/15/2018.     D:  07/24/2018      PROBLEM LIST    Problem List Items Addressed This Visit     None      Visit Diagnoses     Shortness of breath    -  Primary    Relevant Orders     Pulmonary Function Test (Completed)            DISCUSSION    He does have a bit of bronchiectasis as well as restriction on his PFTs, most likely related to some chronic inflamation.    Chronically he denies any symptoms such as a chronic cough or sputum production.  He denies any dyspnea or audible wheezing.  I did offer him some prophylactic treatment with an inhaled corticosteroid, he like to defer at this time and follow-up in the future if he has any worsening symptoms.    He is getting ready to start radiation therapy for his lymphoma.    Continued follow-up as needed.        Bibiana Zavaleta, APRN  09/11/20182:58 PM  Electronically signed     Please note that portions of this note were completed with a voice recognition program. Efforts were made to edit the dictations, but occasionally words are mistranscribed.      CC: Kaycee Douglas MD

## 2018-09-13 ENCOUNTER — HOSPITAL ENCOUNTER (OUTPATIENT)
Dept: RADIATION ONCOLOGY | Facility: HOSPITAL | Age: 78
Discharge: HOME | End: 2018-09-13

## 2018-09-13 PROCEDURE — 77334 RADIATION TREATMENT AID(S): CPT | Performed by: RADIOLOGY

## 2018-09-13 PROCEDURE — 77290 THER RAD SIMULAJ FIELD CPLX: CPT | Performed by: RADIOLOGY

## 2018-09-24 PROCEDURE — 77334 RADIATION TREATMENT AID(S): CPT | Performed by: RADIOLOGY

## 2018-09-24 PROCEDURE — 77300 RADIATION THERAPY DOSE PLAN: CPT | Performed by: RADIOLOGY

## 2018-09-24 PROCEDURE — 77307 TELETHX ISODOSE PLAN CPLX: CPT | Performed by: RADIOLOGY

## 2018-09-26 ENCOUNTER — HOSPITAL ENCOUNTER (OUTPATIENT)
Dept: RADIATION ONCOLOGY | Facility: HOSPITAL | Age: 78
Discharge: HOME | End: 2018-09-26

## 2018-09-26 PROCEDURE — 77280 THER RAD SIMULAJ FIELD SMPL: CPT | Performed by: RADIOLOGY

## 2018-09-27 ENCOUNTER — HOSPITAL ENCOUNTER (OUTPATIENT)
Dept: RADIATION ONCOLOGY | Facility: HOSPITAL | Age: 78
Discharge: HOME | End: 2018-09-27

## 2018-09-27 PROCEDURE — 77412 RADIATION TX DELIVERY LVL 3: CPT | Performed by: RADIOLOGY

## 2018-09-28 ENCOUNTER — HOSPITAL ENCOUNTER (OUTPATIENT)
Dept: RADIATION ONCOLOGY | Facility: HOSPITAL | Age: 78
Discharge: HOME | End: 2018-09-28

## 2018-09-28 PROCEDURE — 77412 RADIATION TX DELIVERY LVL 3: CPT | Performed by: RADIOLOGY

## 2018-09-28 PROCEDURE — 77336 RADIATION PHYSICS CONSULT: CPT | Performed by: RADIOLOGY

## 2018-10-01 ENCOUNTER — HOSPITAL ENCOUNTER (OUTPATIENT)
Dept: RADIATION ONCOLOGY | Facility: HOSPITAL | Age: 78
Discharge: HOME | End: 2018-10-01

## 2018-10-01 ENCOUNTER — HOSPITAL ENCOUNTER (OUTPATIENT)
Dept: RADIATION ONCOLOGY | Facility: HOSPITAL | Age: 78
Setting detail: RADIATION/ONCOLOGY SERIES
Discharge: HOME | End: 2018-10-01

## 2018-10-01 PROCEDURE — 77412 RADIATION TX DELIVERY LVL 3: CPT | Performed by: RADIOLOGY

## 2018-10-02 ENCOUNTER — HOSPITAL ENCOUNTER (OUTPATIENT)
Dept: RADIATION ONCOLOGY | Facility: HOSPITAL | Age: 78
Discharge: HOME | End: 2018-10-02

## 2018-10-02 ENCOUNTER — DOCUMENTATION (OUTPATIENT)
Dept: NUTRITION | Facility: HOSPITAL | Age: 78
End: 2018-10-02

## 2018-10-02 VITALS — BODY MASS INDEX: 27.47 KG/M2 | WEIGHT: 186 LBS

## 2018-10-02 PROCEDURE — 77412 RADIATION TX DELIVERY LVL 3: CPT | Performed by: RADIOLOGY

## 2018-10-02 NOTE — PROGRESS NOTES
ONC Nutrition    Diagnosis: Clinical Stage IV Lymphoma presenting with a localized LN in the left groin and iliac wing  Treatment:  Patient has completed 4/15 treatments to the groin and iliac wing    Weight 186 lbs / patient reports stable weight    Met with patient during status checks; patient states that he is eating well without note of any nutritional impact symptoms. Reinforced the importance of eating a well balanced diet with emphasis on hydration, higher protein intake and maintaining weight.  Will follow as needed.

## 2018-10-03 ENCOUNTER — HOSPITAL ENCOUNTER (OUTPATIENT)
Dept: RADIATION ONCOLOGY | Facility: HOSPITAL | Age: 78
Discharge: HOME | End: 2018-10-03

## 2018-10-03 PROCEDURE — 77412 RADIATION TX DELIVERY LVL 3: CPT | Performed by: RADIOLOGY

## 2018-10-03 PROCEDURE — 77417 THER RADIOLOGY PORT IMAGE(S): CPT | Performed by: RADIOLOGY

## 2018-10-04 ENCOUNTER — HOSPITAL ENCOUNTER (OUTPATIENT)
Dept: RADIATION ONCOLOGY | Facility: HOSPITAL | Age: 78
Discharge: HOME | End: 2018-10-04

## 2018-10-04 PROCEDURE — 77412 RADIATION TX DELIVERY LVL 3: CPT | Performed by: RADIOLOGY

## 2018-10-05 ENCOUNTER — HOSPITAL ENCOUNTER (OUTPATIENT)
Dept: RADIATION ONCOLOGY | Facility: HOSPITAL | Age: 78
Discharge: HOME | End: 2018-10-05

## 2018-10-05 PROCEDURE — 77412 RADIATION TX DELIVERY LVL 3: CPT | Performed by: RADIOLOGY

## 2018-10-05 PROCEDURE — 77336 RADIATION PHYSICS CONSULT: CPT | Performed by: RADIOLOGY

## 2018-10-08 ENCOUNTER — HOSPITAL ENCOUNTER (OUTPATIENT)
Dept: RADIATION ONCOLOGY | Facility: HOSPITAL | Age: 78
Discharge: HOME | End: 2018-10-08

## 2018-10-08 VITALS — BODY MASS INDEX: 27.9 KG/M2 | WEIGHT: 188.9 LBS

## 2018-10-08 PROCEDURE — 77412 RADIATION TX DELIVERY LVL 3: CPT | Performed by: RADIOLOGY

## 2018-10-09 ENCOUNTER — HOSPITAL ENCOUNTER (OUTPATIENT)
Dept: RADIATION ONCOLOGY | Facility: HOSPITAL | Age: 78
Discharge: HOME | End: 2018-10-09

## 2018-10-09 PROCEDURE — 77412 RADIATION TX DELIVERY LVL 3: CPT | Performed by: RADIOLOGY

## 2018-10-10 ENCOUNTER — HOSPITAL ENCOUNTER (OUTPATIENT)
Dept: RADIATION ONCOLOGY | Facility: HOSPITAL | Age: 78
Discharge: HOME | End: 2018-10-10

## 2018-10-10 PROCEDURE — 77412 RADIATION TX DELIVERY LVL 3: CPT | Performed by: RADIOLOGY

## 2018-10-10 PROCEDURE — 77417 THER RADIOLOGY PORT IMAGE(S): CPT | Performed by: RADIOLOGY

## 2018-10-11 ENCOUNTER — HOSPITAL ENCOUNTER (OUTPATIENT)
Dept: RADIATION ONCOLOGY | Facility: HOSPITAL | Age: 78
Discharge: HOME | End: 2018-10-11

## 2018-10-11 PROCEDURE — 77336 RADIATION PHYSICS CONSULT: CPT | Performed by: RADIOLOGY

## 2018-10-11 PROCEDURE — 77412 RADIATION TX DELIVERY LVL 3: CPT | Performed by: RADIOLOGY

## 2018-10-12 ENCOUNTER — HOSPITAL ENCOUNTER (OUTPATIENT)
Dept: RADIATION ONCOLOGY | Facility: HOSPITAL | Age: 78
Discharge: HOME | End: 2018-10-12

## 2018-10-12 PROCEDURE — 77412 RADIATION TX DELIVERY LVL 3: CPT | Performed by: RADIOLOGY

## 2018-10-15 ENCOUNTER — HOSPITAL ENCOUNTER (OUTPATIENT)
Dept: RADIATION ONCOLOGY | Facility: HOSPITAL | Age: 78
Discharge: HOME | End: 2018-10-15

## 2018-10-15 PROCEDURE — 77412 RADIATION TX DELIVERY LVL 3: CPT | Performed by: RADIOLOGY

## 2018-10-16 ENCOUNTER — HOSPITAL ENCOUNTER (OUTPATIENT)
Dept: RADIATION ONCOLOGY | Facility: HOSPITAL | Age: 78
Discharge: HOME | End: 2018-10-16

## 2018-10-16 VITALS — BODY MASS INDEX: 28.15 KG/M2 | WEIGHT: 190.6 LBS

## 2018-10-16 PROCEDURE — 77412 RADIATION TX DELIVERY LVL 3: CPT | Performed by: RADIOLOGY

## 2018-10-17 ENCOUNTER — HOSPITAL ENCOUNTER (OUTPATIENT)
Dept: RADIATION ONCOLOGY | Facility: HOSPITAL | Age: 78
Discharge: HOME | End: 2018-10-17

## 2018-10-17 PROCEDURE — 77412 RADIATION TX DELIVERY LVL 3: CPT | Performed by: RADIOLOGY

## 2018-10-25 ENCOUNTER — OFFICE VISIT (OUTPATIENT)
Dept: ONCOLOGY | Facility: CLINIC | Age: 78
End: 2018-10-25

## 2018-10-25 ENCOUNTER — LAB (OUTPATIENT)
Dept: LAB | Facility: HOSPITAL | Age: 78
End: 2018-10-25

## 2018-10-25 VITALS
SYSTOLIC BLOOD PRESSURE: 133 MMHG | HEIGHT: 69 IN | DIASTOLIC BLOOD PRESSURE: 93 MMHG | OXYGEN SATURATION: 97 % | RESPIRATION RATE: 18 BRPM | TEMPERATURE: 97.6 F | HEART RATE: 75 BPM | BODY MASS INDEX: 28.29 KG/M2 | WEIGHT: 191 LBS

## 2018-10-25 DIAGNOSIS — C82.95 FOLLICULAR LYMPHOMA OF LYMPH NODES OF INGUINAL REGION, UNSPECIFIED FOLLICULAR LYMPHOMA TYPE (HCC): Primary | ICD-10-CM

## 2018-10-25 DIAGNOSIS — C82.95 FOLLICULAR LYMPHOMA OF LYMPH NODES OF INGUINAL REGION, UNSPECIFIED FOLLICULAR LYMPHOMA TYPE (HCC): ICD-10-CM

## 2018-10-25 LAB
ALBUMIN SERPL-MCNC: 4.09 G/DL (ref 3.2–4.8)
ALBUMIN/GLOB SERPL: 1.8 G/DL (ref 1.5–2.5)
ALP SERPL-CCNC: 122 U/L (ref 25–100)
ALT SERPL W P-5'-P-CCNC: 16 U/L (ref 7–40)
ANION GAP SERPL CALCULATED.3IONS-SCNC: 7 MMOL/L (ref 3–11)
AST SERPL-CCNC: 19 U/L (ref 0–33)
BILIRUB SERPL-MCNC: 0.4 MG/DL (ref 0.3–1.2)
BUN BLD-MCNC: 12 MG/DL (ref 9–23)
BUN/CREAT SERPL: 13.8 (ref 7–25)
CALCIUM SPEC-SCNC: 8.9 MG/DL (ref 8.7–10.4)
CHLORIDE SERPL-SCNC: 106 MMOL/L (ref 99–109)
CO2 SERPL-SCNC: 28 MMOL/L (ref 20–31)
CREAT BLD-MCNC: 0.87 MG/DL (ref 0.6–1.3)
ERYTHROCYTE [DISTWIDTH] IN BLOOD BY AUTOMATED COUNT: 16.5 % (ref 11.3–14.5)
GFR SERPL CREATININE-BSD FRML MDRD: 103 ML/MIN/1.73
GLOBULIN UR ELPH-MCNC: 2.3 GM/DL
GLUCOSE BLD-MCNC: 107 MG/DL (ref 70–100)
HCT VFR BLD AUTO: 35.2 % (ref 38.9–50.9)
HGB BLD-MCNC: 11.5 G/DL (ref 13.1–17.5)
LYMPHOCYTES # BLD AUTO: 0.9 10*3/MM3 (ref 0.6–4.8)
LYMPHOCYTES NFR BLD AUTO: 13 % (ref 24–44)
MCH RBC QN AUTO: 29.6 PG (ref 27–31)
MCHC RBC AUTO-ENTMCNC: 32.7 G/DL (ref 32–36)
MCV RBC AUTO: 90.8 FL (ref 80–99)
MONOCYTES # BLD AUTO: 0.6 10*3/MM3 (ref 0–1)
MONOCYTES NFR BLD AUTO: 8.9 % (ref 0–12)
NEUTROPHILS # BLD AUTO: 5.3 10*3/MM3 (ref 1.5–8.3)
NEUTROPHILS NFR BLD AUTO: 78.1 % (ref 41–71)
PLATELET # BLD AUTO: 182 10*3/MM3 (ref 150–450)
PMV BLD AUTO: 7.2 FL (ref 6–12)
POTASSIUM BLD-SCNC: 3.4 MMOL/L (ref 3.5–5.5)
PROT SERPL-MCNC: 6.4 G/DL (ref 5.7–8.2)
RBC # BLD AUTO: 3.88 10*6/MM3 (ref 4.2–5.76)
SODIUM BLD-SCNC: 141 MMOL/L (ref 132–146)
WBC NRBC COR # BLD: 6.8 10*3/MM3 (ref 3.5–10.8)

## 2018-10-25 PROCEDURE — 99213 OFFICE O/P EST LOW 20 MIN: CPT | Performed by: INTERNAL MEDICINE

## 2018-10-25 PROCEDURE — 36415 COLL VENOUS BLD VENIPUNCTURE: CPT

## 2018-10-25 PROCEDURE — 80053 COMPREHEN METABOLIC PANEL: CPT

## 2018-10-25 PROCEDURE — 85025 COMPLETE CBC W/AUTO DIFF WBC: CPT

## 2018-10-25 NOTE — PROGRESS NOTES
CHIEF COMPLAINT: fu nhl    Problem List:  Oncology/Hematology History    1. Follicular lymphoma:  -  initial consultation 3/12/18: Had been having left inguinal pains and went for scanning that found left groin node.  Needle biopsy of this was called lymphoma CD10 positive and Fish testing positive for translocation (14;18) consistent with follicular lymphoma though could also be seen in diffuse large B cell.  BCL 6 /3q rearranged.  No Myc..  Hence cannot say at this junction the exact subtype of lymphoma.  He has not been having bed drenching night sweats.  No unexplained weight loss.  Has not had any known anemia, thrombocytopenia, leukopenia.  Has not had frequent infections.  CT did not show any splenomegaly or other significant adenopathy.  The left inguinal pain has subsided since the needle biopsy.  He has an abdominal aortic aneurysm that is being watched and had previously been repaired.  Apparently 15+ years ago he was treated at [facility] for lymphoma for which he received right neck radiation and some chemotherapy.  He is not sure of the subtype of lymphoma nor of the type of chemotherapy nor the number of radiation treatments and this was far enough back that our records have been expunged.          Follicular lymphoma (CMS/HCC)    3/12/2018 Initial Diagnosis     Follicular lymphoma       3/15/2018 Imaging     PET     IMPRESSION:     Multiple hypermetabolic abnormalities are identified but the  distribution is unusual. Although there is bulky hypermetabolic rishi  mass in the left inguinal area and in the right internal iliac rishi  chain certainly consistent with active malignant lymphoma, there is an  intense abnormal area of tracer uptake and hypermetabolic activity  throughout the marrow or intramedullary portion of the left ilium and  iliac wing in the absence of cortical destruction. This is an intensely  hypermetabolic abnormal lengthy intraosseous abnormality.     The lower extremity  dedicated datasets are negative.     Intermediate lymph nodes are identified in the medial inguinal region  which are not above malignant threshold.     Above the pelvis, there is no evidence of additional hypermetabolic  focus or active neoplastic disease, and the chest, mediastinum and neck  are clear.         6/7/2018 Imaging     MRI lumbar spine IMPRESSION:    Study is limited by susceptibility artifact greatest at L1-L2.       No definite significant central canal stenosis.       Moderate to severe right and moderate left foraminal stenosis at L3-L4.    Moderate to severe bilateral foraminal stenosis at L4-L5.         7/24/2018 Imaging     CT chest IMPRESSION:  There are no acute findings. Specifically, there is no  axillary, mediastinal or hilar lymphadenopathy. There is bronchiectasis  in the medial segment of the left lower lobe. This was also present on  CT/PET examination of 03/15/2018.  CT abdomen and pelvis IMPRESSION:  Left inguinal lymphadenopathy, unchanged when compared to a  whole-body PET CT scan of 03/15/2018.         9/6/2018 Imaging     MRI pelvis  1. Decreasing size of patient's left inguinal mass.  2. Interval development of diffuse marrow edema and enhancement,  practically throughout the left ilium, and adjacent soft tissue disease  deep to the left iliacus muscle, raising suspicion for lymphoma.         9/27/2018 - 10/17/2018 Radiation     Radiation OncologyTreatment Course:  George Lomeli received 3000 cGy in 15 fractions to left groin & iliac via External Beam Radiation - EBRT.            HISTORY OF PRESENT ILLNESS:  The patient is a 78 y.o. male, here for follow up on management of nhl.  His pain is gone since radiation to the iliac wing.      Past Medical History:   Diagnosis Date   • AAA (abdominal aortic aneurysm) (CMS/HCC)    • Arthritis    • Broken neck (CMS/HCC)    • Gout    • Hypertension    • Lymphoma (CMS/HCC)    • Migraine    • Sciatica    • Thyroid cancer (CMS/HCC)      Past  "Surgical History:   Procedure Laterality Date   • BACK SURGERY     • EYE SURGERY     • MULTIPLE TOOTH EXTRACTIONS     • NECK SURGERY      PINS/RODS IN NECK   • THYROIDECTOMY / EXCISION CYST THYROID         Allergies   Allergen Reactions   • Contrast Dye Nausea And Vomiting       Family History and Social History reviewed and changed as necessary      REVIEW OF SYSTEM:   Review of Systems   Constitutional: Negative for appetite change, chills, diaphoresis, fatigue, fever and unexpected weight change.   HENT:   Negative for mouth sores, sore throat and trouble swallowing.    Eyes: Negative for icterus.   Respiratory: Negative for cough, hemoptysis and shortness of breath.    Cardiovascular: Negative for chest pain, leg swelling and palpitations.   Gastrointestinal: Negative for abdominal distention, abdominal pain, blood in stool, constipation, diarrhea, nausea and vomiting.   Endocrine: Negative for hot flashes.   Genitourinary: Negative for bladder incontinence, difficulty urinating, dysuria, frequency and hematuria.    Musculoskeletal: Negative for gait problem, neck pain and neck stiffness.   Skin: Negative for rash.   Neurological: Negative for dizziness, gait problem, headaches, light-headedness and numbness.   Hematological: Negative for adenopathy. Does not bruise/bleed easily.   Psychiatric/Behavioral: Negative for depression. The patient is not nervous/anxious.    All other systems reviewed and are negative.       PHYSICAL EXAM    Vitals:    10/25/18 1425   BP: 133/93   Pulse: 75   Resp: 18   Temp: 97.6 °F (36.4 °C)   SpO2: 97%   Weight: 86.6 kg (191 lb)   Height: 175.3 cm (69\")     Constitutional: Appears well-developed and well-nourished. No distress.   ECOG: (1) Restricted in physically strenuous activity, ambulatory and able to do work of light nature  HENT:   Head: Normocephalic.   Mouth/Throat: Oropharynx is clear and moist.   Eyes: Conjunctivae are normal. Pupils are equal, round, and reactive to " light. No scleral icterus.   Neck: Neck supple. No JVD present. No thyromegaly present.   Cardiovascular: Normal rate, regular rhythm and normal heart sounds.    Pulmonary/Chest: Breath sounds normal. No respiratory distress.   Abdominal: Soft. Exhibits no distension and no mass. There is no hepatosplenomegaly. There is no tenderness. There is no rebound and no guarding.   Musculoskeletal:Exhibits no edema, tenderness or deformity.   Neurological: Alert and oriented to person, place, and time. Exhibits normal muscle tone.   Skin: No ecchymosis, no petechiae and no rash noted. Not diaphoretic. No cyanosis. Nails show no clubbing.   Psychiatric: Normal mood and affect.   Vitals reviewed.      No radiology results for the last 7 days          ASSESSMENT & PLAN:    1.  Follow-up non-Hodgkin lymphoma: His pain has resolved with radiation.  We will repeat his CAT scans and let work prior to return in a month but as long as he has no bulky disease in his counts are doing well ongoing to continue conservative management.  Discussed with patient 15 minutes greater than 50% counseling      Thor Ruggiero MD    10/25/2018

## 2018-11-23 ENCOUNTER — OFFICE VISIT (OUTPATIENT)
Dept: RADIATION ONCOLOGY | Facility: HOSPITAL | Age: 78
End: 2018-11-23

## 2018-11-23 ENCOUNTER — HOSPITAL ENCOUNTER (OUTPATIENT)
Dept: RADIATION ONCOLOGY | Facility: HOSPITAL | Age: 78
Setting detail: RADIATION/ONCOLOGY SERIES
Discharge: HOME | End: 2018-11-23

## 2018-11-23 VITALS
RESPIRATION RATE: 18 BRPM | HEART RATE: 86 BPM | OXYGEN SATURATION: 97 % | WEIGHT: 192.6 LBS | BODY MASS INDEX: 28.44 KG/M2 | TEMPERATURE: 96.8 F | DIASTOLIC BLOOD PRESSURE: 86 MMHG | SYSTOLIC BLOOD PRESSURE: 121 MMHG

## 2018-11-23 DIAGNOSIS — J20.8 ACUTE BRONCHITIS DUE TO OTHER SPECIFIED ORGANISMS: Primary | ICD-10-CM

## 2018-11-23 DIAGNOSIS — C82.95 FOLLICULAR LYMPHOMA OF LYMPH NODES OF INGUINAL REGION, UNSPECIFIED FOLLICULAR LYMPHOMA TYPE (HCC): ICD-10-CM

## 2018-11-23 PROCEDURE — G0463 HOSPITAL OUTPT CLINIC VISIT: HCPCS | Performed by: RADIOLOGY

## 2018-11-23 RX ORDER — GUAIFENESIN DEXTROMETHORPHAN HYDROBROMIDE ORAL SOLUTION 10; 100 MG/5ML; MG/5ML
5 SOLUTION ORAL EVERY 4 HOURS PRN
Status: DISCONTINUED | OUTPATIENT
Start: 2018-11-23 | End: 2019-03-04

## 2018-11-23 RX ORDER — SULFAMETHOXAZOLE AND TRIMETHOPRIM 800; 160 MG/1; MG/1
1 TABLET ORAL 2 TIMES DAILY
Qty: 14 TABLET | Refills: 0 | Status: SHIPPED | OUTPATIENT
Start: 2018-11-23 | End: 2018-12-06

## 2018-11-23 NOTE — PROGRESS NOTES
FOLLOW UP NOTE    PATIENT:                                                      George Lomeli  MEDICAL RECORD #:                        [id number]  :                                                          1940  COMPLETION DATE:    10/17/2018  DIAGNOSIS:     Cancer Staging  Follicular lymphoma (CMS/HCC)  Staging form: Hodgkin And Non-Hodgkin Lymphoma, AJCC 8th Edition  - Clinical: Stage IV - Signed by Thor Ruggiero MD on 3/19/2018        BRIEF HISTORY:    ***    MEDICATIONS: Medication reconciliation for the patient was reviewed and confirmed in the electronic medical record.    Review of Systems - Oncology    KPS ***%    Physical Exam    VITAL SIGNS:   Vitals:    18 1011   BP: 121/86   Pulse: 86   Resp: 18   Temp: 96.8 °F (36 °C)   TempSrc: Temporal   SpO2: 97%   Weight: 87.4 kg (192 lb 9.6 oz)   PainSc: 0-No pain       The following portions of the patient's history were reviewed and updated as appropriate: allergies, current medications, past family history, past medical history, past social history, past surgical history and problem list.         There are no diagnoses linked to this encounter.     IMPRESSION:  ***    RECOMMENDATIONS:  ***    No Follow-up on file.    Dari Marley RN    Errors in dictation may reflect use of voice recognition software and not all errors in transcription may have been detected prior to signing.

## 2018-11-23 NOTE — PROGRESS NOTES
FOLLOW UP NOTE    PATIENT:                                                      George Lomeli  MEDICAL RECORD #:                        [id number]  :                                                          1940  COMPLETION DATE:    10/17/2018  DIAGNOSIS:     Cancer Staging  Follicular lymphoma (CMS/HCC)  Staging form: Hodgkin And Non-Hodgkin Lymphoma, AJCC 8th Edition  - Clinical: Stage IV - Signed by Thro Ruggiero MD on 3/19/2018    BRIEF HISTORY:  Mr. Lomeli is a 78-year-old gentleman with a stage IV follicular lymphoma involving the left inguinal, left external iliac, and left iliac wing.  He completed a course of radiation therapy to [address] on 2018.  He returns to clinic today for scheduled follow-up visit.  He reports that he no longer has a palpable nodule in his left groin.  He also denies having any symptoms of urinary urgency, frequency, or other gastrointestinal symptoms.  His only complaints today are related to productive cough and fatigue.  He states that he is had a cold for upwards of 10 days now, and over the past week he is seen the new production of green sputum.  He is had only a few and occasional subjective fevers.  He denies night sweats, cold chills, or weight loss.    MEDICATIONS: Medication reconciliation for the patient was reviewed and confirmed in the electronic medical record.    Review of Systems   Constitutional: Positive for fatigue.   Respiratory: Positive for cough.         Reports yellow-green nasal drainage?- uncertain if nasal drainage or sputum   Musculoskeletal: Positive for arthralgias, back pain and neck pain.   Neurological: Positive for headaches (few times per week).   All other systems reviewed and are negative.      KPS 90%    Physical Exam   Constitutional: He is oriented to person, place, and time. He appears well-developed and well-nourished. No distress.   HENT:   Head: Normocephalic and atraumatic.   Mouth/Throat: Oropharynx is clear and moist.    Eyes: Conjunctivae and EOM are normal. Pupils are equal, round, and reactive to light.   Neck: Normal range of motion. Neck supple.   Cardiovascular: Normal rate and regular rhythm. Exam reveals no friction rub.   No murmur heard.  Pulmonary/Chest: Effort normal. He has no wheezes.   Coarse breaths sounds bilaterally, with increasing crankles and rhonchi particularly in the bases.  Occas wheezing.   Abdominal: Soft. Bowel sounds are normal. He exhibits no distension and no mass. There is no tenderness.   Genitourinary:   Genitourinary Comments: There are no palpable adenopathy in the left or right groin   Musculoskeletal: Normal range of motion. He exhibits no edema.   He no longer has tenderness to palpation over the left iliac wing or the SI joint   Lymphadenopathy:     He has no cervical adenopathy.   Neurological: He is alert and oriented to person, place, and time.   Skin: Skin is warm and dry.   Psychiatric: He has a normal mood and affect. His behavior is normal. Judgment and thought content normal.   Nursing note and vitals reviewed.      VITAL SIGNS:   Vitals:    11/23/18 1011   BP: 121/86   Pulse: 86   Resp: 18   Temp: 96.8 °F (36 °C)   TempSrc: Temporal   SpO2: 97%   Weight: 87.4 kg (192 lb 9.6 oz)   PainSc: 0-No pain       The following portions of the patient's history were reviewed and updated as appropriate: allergies, current medications, past family history, past medical history, past social history, past surgical history and problem list.         George was seen today for follicular lymphoma of lymph nodes of inguinal region.    Diagnoses and all orders for this visit:    Acute bronchitis due to other specified organisms  -     dextromethorphan-guaifenesin (ROBITUSSIN-DM)  MG/5ML liquid 5 mL; Take 5 mL by mouth Every 4 (Four) Hours As Needed for Cough.  -     sulfamethoxazole-trimethoprim (BACTRIM DS) 800-160 MG per tablet; Take 1 tablet by mouth 2 (Two) Times a Day.    Follicular lymphoma of  lymph nodes of inguinal region, unspecified follicular lymphoma type (CMS/HCC)         IMPRESSION:  Mr. Lomeli is a 78-year-old gentleman with a stage IV follicular lymphoma who is a little over 1 month out from treatment and appears to had a very good response in the left groin and iliac wing based on his symptoms.  He is due for repeat imaging which is scheduled for next week as he also has a follow-up with Dr. Monik thomas.  With regards to his follicular lymphoma, I will see him back in 3 months.    He also has symptoms today consistent with a acute bacterial bronchitis based on green sputum, shortness of breath, and sputum purulence.  I have given him prescriptions today for Bactrim DS and Guaifenesin cough suppressant.    RECOMMENDATIONS:      Return in about 3 months (around 2/23/2019) for Office Visit.    Milo Christianson MD    Errors in dictation may reflect use of voice recognition software and not all errors in transcription may have been detected prior to signing.

## 2018-11-27 ENCOUNTER — HOSPITAL ENCOUNTER (OUTPATIENT)
Dept: CT IMAGING | Facility: HOSPITAL | Age: 78
Discharge: HOME | End: 2018-11-27
Attending: INTERNAL MEDICINE | Admitting: INTERNAL MEDICINE

## 2018-11-27 DIAGNOSIS — C82.95 FOLLICULAR LYMPHOMA OF LYMPH NODES OF INGUINAL REGION, UNSPECIFIED FOLLICULAR LYMPHOMA TYPE (HCC): ICD-10-CM

## 2018-11-27 PROCEDURE — 63710000001 BARIUM 2 % SUSPENSION: Performed by: INTERNAL MEDICINE

## 2018-11-27 PROCEDURE — 71250 CT THORAX DX C-: CPT

## 2018-11-27 PROCEDURE — 74176 CT ABD & PELVIS W/O CONTRAST: CPT

## 2018-11-27 PROCEDURE — A9270 NON-COVERED ITEM OR SERVICE: HCPCS | Performed by: INTERNAL MEDICINE

## 2018-11-27 RX ADMIN — BARIUM SULFATE 450 ML: 21 SUSPENSION ORAL at 11:56

## 2018-12-06 ENCOUNTER — LAB (OUTPATIENT)
Dept: LAB | Facility: HOSPITAL | Age: 78
End: 2018-12-06

## 2018-12-06 ENCOUNTER — OFFICE VISIT (OUTPATIENT)
Dept: ONCOLOGY | Facility: CLINIC | Age: 78
End: 2018-12-06

## 2018-12-06 VITALS
WEIGHT: 195 LBS | BODY MASS INDEX: 28.88 KG/M2 | DIASTOLIC BLOOD PRESSURE: 88 MMHG | OXYGEN SATURATION: 97 % | RESPIRATION RATE: 16 BRPM | TEMPERATURE: 97.6 F | HEIGHT: 69 IN | HEART RATE: 83 BPM | SYSTOLIC BLOOD PRESSURE: 148 MMHG

## 2018-12-06 DIAGNOSIS — C82.95 FOLLICULAR LYMPHOMA OF LYMPH NODES OF INGUINAL REGION, UNSPECIFIED FOLLICULAR LYMPHOMA TYPE (HCC): Primary | Chronic | ICD-10-CM

## 2018-12-06 DIAGNOSIS — C82.95 FOLLICULAR LYMPHOMA OF LYMPH NODES OF INGUINAL REGION, UNSPECIFIED FOLLICULAR LYMPHOMA TYPE (HCC): ICD-10-CM

## 2018-12-06 PROBLEM — C82.90 FOLLICULAR LYMPHOMA (HCC): Chronic | Status: ACTIVE | Noted: 2018-03-12

## 2018-12-06 LAB
ALBUMIN SERPL-MCNC: 4.22 G/DL (ref 3.2–4.8)
ALBUMIN/GLOB SERPL: 1.9 G/DL (ref 1.5–2.5)
ALP SERPL-CCNC: 115 U/L (ref 25–100)
ALT SERPL W P-5'-P-CCNC: 13 U/L (ref 7–40)
ANION GAP SERPL CALCULATED.3IONS-SCNC: 7 MMOL/L (ref 3–11)
AST SERPL-CCNC: 18 U/L (ref 0–33)
BILIRUB SERPL-MCNC: 0.4 MG/DL (ref 0.3–1.2)
BUN BLD-MCNC: 11 MG/DL (ref 9–23)
BUN/CREAT SERPL: 11.8 (ref 7–25)
CALCIUM SPEC-SCNC: 9 MG/DL (ref 8.7–10.4)
CHLORIDE SERPL-SCNC: 107 MMOL/L (ref 99–109)
CO2 SERPL-SCNC: 30 MMOL/L (ref 20–31)
CREAT BLD-MCNC: 0.93 MG/DL (ref 0.6–1.3)
ERYTHROCYTE [DISTWIDTH] IN BLOOD BY AUTOMATED COUNT: 15.4 % (ref 11.3–14.5)
GFR SERPL CREATININE-BSD FRML MDRD: 95 ML/MIN/1.73
GLOBULIN UR ELPH-MCNC: 2.3 GM/DL
GLUCOSE BLD-MCNC: 92 MG/DL (ref 70–100)
HCT VFR BLD AUTO: 35.1 % (ref 38.9–50.9)
HGB BLD-MCNC: 11.6 G/DL (ref 13.1–17.5)
LYMPHOCYTES # BLD AUTO: 1 10*3/MM3 (ref 0.6–4.8)
LYMPHOCYTES NFR BLD AUTO: 15.5 % (ref 24–44)
MCH RBC QN AUTO: 30 PG (ref 27–31)
MCHC RBC AUTO-ENTMCNC: 33.1 G/DL (ref 32–36)
MCV RBC AUTO: 90.6 FL (ref 80–99)
MONOCYTES # BLD AUTO: 0.4 10*3/MM3 (ref 0–1)
MONOCYTES NFR BLD AUTO: 6.1 % (ref 0–12)
NEUTROPHILS # BLD AUTO: 5.2 10*3/MM3 (ref 1.5–8.3)
NEUTROPHILS NFR BLD AUTO: 78.4 % (ref 41–71)
PLATELET # BLD AUTO: 236 10*3/MM3 (ref 150–450)
PMV BLD AUTO: 7.2 FL (ref 6–12)
POTASSIUM BLD-SCNC: 3.9 MMOL/L (ref 3.5–5.5)
PROT SERPL-MCNC: 6.5 G/DL (ref 5.7–8.2)
RBC # BLD AUTO: 3.87 10*6/MM3 (ref 4.2–5.76)
SODIUM BLD-SCNC: 144 MMOL/L (ref 132–146)
WBC NRBC COR # BLD: 6.6 10*3/MM3 (ref 3.5–10.8)

## 2018-12-06 PROCEDURE — 80053 COMPREHEN METABOLIC PANEL: CPT

## 2018-12-06 PROCEDURE — 99213 OFFICE O/P EST LOW 20 MIN: CPT | Performed by: INTERNAL MEDICINE

## 2018-12-06 PROCEDURE — 36415 COLL VENOUS BLD VENIPUNCTURE: CPT

## 2018-12-06 PROCEDURE — 85025 COMPLETE CBC W/AUTO DIFF WBC: CPT

## 2018-12-06 NOTE — PROGRESS NOTES
CHIEF COMPLAINT: Follow-up follicular lymphoma    Problem List:  Oncology/Hematology History    1. Follicular lymphoma:  -  initial consultation 3/12/18: Had been having left inguinal pains and went for scanning that found left groin node.  Needle biopsy of this was called lymphoma CD10 positive and Fish testing positive for translocation (14;18) consistent with follicular lymphoma though could also be seen in diffuse large B cell.  BCL 6 /3q rearranged.  No Myc..  Hence cannot say at this junction the exact subtype of lymphoma.  He has not been having bed drenching night sweats.  No unexplained weight loss.  Has not had any known anemia, thrombocytopenia, leukopenia.  Has not had frequent infections.  CT did not show any splenomegaly or other significant adenopathy.  The left inguinal pain has subsided since the needle biopsy.  He has an abdominal aortic aneurysm that is being watched and had previously been repaired.  Apparently 15+ years ago he was treated at [facility] for lymphoma for which he received right neck radiation and some chemotherapy.  He is not sure of the subtype of lymphoma nor of the type of chemotherapy nor the number of radiation treatments and this was far enough back that our records have been expunged.          Follicular lymphoma (CMS/HCC)    3/12/2018 Initial Diagnosis     Follicular lymphoma       3/15/2018 Imaging     PET     IMPRESSION:     Multiple hypermetabolic abnormalities are identified but the  distribution is unusual. Although there is bulky hypermetabolic rishi  mass in the left inguinal area and in the right internal iliac rishi  chain certainly consistent with active malignant lymphoma, there is an  intense abnormal area of tracer uptake and hypermetabolic activity  throughout the marrow or intramedullary portion of the left ilium and  iliac wing in the absence of cortical destruction. This is an intensely  hypermetabolic abnormal lengthy intraosseous abnormality.      The lower extremity dedicated datasets are negative.     Intermediate lymph nodes are identified in the medial inguinal region  which are not above malignant threshold.     Above the pelvis, there is no evidence of additional hypermetabolic  focus or active neoplastic disease, and the chest, mediastinum and neck  are clear.         6/7/2018 Imaging     MRI lumbar spine IMPRESSION:    Study is limited by susceptibility artifact greatest at L1-L2.       No definite significant central canal stenosis.       Moderate to severe right and moderate left foraminal stenosis at L3-L4.    Moderate to severe bilateral foraminal stenosis at L4-L5.         7/24/2018 Imaging     CT chest IMPRESSION:  There are no acute findings. Specifically, there is no  axillary, mediastinal or hilar lymphadenopathy. There is bronchiectasis  in the medial segment of the left lower lobe. This was also present on  CT/PET examination of 03/15/2018.  CT abdomen and pelvis IMPRESSION:  Left inguinal lymphadenopathy, unchanged when compared to a  whole-body PET CT scan of 03/15/2018.         9/6/2018 Imaging     MRI pelvis  1. Decreasing size of patient's left inguinal mass.  2. Interval development of diffuse marrow edema and enhancement,  practically throughout the left ilium, and adjacent soft tissue disease  deep to the left iliacus muscle, raising suspicion for lymphoma.         9/27/2018 - 10/17/2018 Radiation     Radiation OncologyTreatment Course:  George Lomeli received 3000 cGy in 15 fractions to left groin & iliac via External Beam Radiation - EBRT.         11/27/2018 Imaging     CT chest abdomen and pelvis  IMPRESSION: Bronchiectasis in the lungs but no significant mediastinal adenopathy or axillary adenopathy or cervical adenopathy.  There is a mildly enlarged left inguinal lymph node. The  node has smooth ovoid margins and has central low density consistent  with a necrotic node. Lastly more superiorly and anteriorly in a region  where  there are surgical clips there is a lentiform mass which is much  much smaller than on the previous examination of 07/24/2018.            HISTORY OF PRESENT ILLNESS:  The patient is a 78 y.o. male, here for follow up on management of follicular lymphoma.  Had a good response to radiation as can be seen from the CAT scan chest abdomen and pelvis results of which I reviewed with him above.  Does have a cold for which she is on antibiotics but his bronchiectasis is actually stable.      Past Medical History:   Diagnosis Date   • AAA (abdominal aortic aneurysm) (CMS/HCC)    • Arthritis    • Broken neck (CMS/HCC)    • Gout    • Hypertension    • Lymphoma (CMS/HCC)    • Migraine    • Sciatica    • Thyroid cancer (CMS/HCC)      Past Surgical History:   Procedure Laterality Date   • BACK SURGERY     • EYE SURGERY     • MULTIPLE TOOTH EXTRACTIONS     • NECK SURGERY      PINS/RODS IN NECK   • THYROIDECTOMY / EXCISION CYST THYROID         Allergies   Allergen Reactions   • Contrast Dye Nausea And Vomiting       Family History and Social History reviewed and changed as necessary      REVIEW OF SYSTEM:   Review of Systems   Constitutional: Negative for appetite change, chills, diaphoresis, fatigue, fever and unexpected weight change.   HENT:   Negative for mouth sores, sore throat and trouble swallowing.    Eyes: Negative for icterus.   Respiratory: Negative for cough, hemoptysis and shortness of breath.    Cardiovascular: Negative for chest pain, leg swelling and palpitations.   Gastrointestinal: Negative for abdominal distention, abdominal pain, blood in stool, constipation, diarrhea, nausea and vomiting.   Endocrine: Negative for hot flashes.   Genitourinary: Negative for bladder incontinence, difficulty urinating, dysuria, frequency and hematuria.    Musculoskeletal: Negative for gait problem, neck pain and neck stiffness.   Skin: Negative for rash.   Neurological: Negative for dizziness, gait problem, headaches,  "light-headedness and numbness.   Hematological: Negative for adenopathy. Does not bruise/bleed easily.   Psychiatric/Behavioral: Negative for depression. The patient is not nervous/anxious.    All other systems reviewed and are negative.       PHYSICAL EXAM    Vitals:    12/06/18 1455   BP: 148/88   Pulse: 83   Resp: 16   Temp: 97.6 °F (36.4 °C)   SpO2: 97%   Weight: 88.5 kg (195 lb)   Height: 175.3 cm (69\")     Constitutional: Appears well-developed and well-nourished. No distress.   ECOG: (1) Restricted in physically strenuous activity, ambulatory and able to do work of light nature  HENT:   Head: Normocephalic.   Mouth/Throat: Oropharynx is clear and moist.   Eyes: Conjunctivae are normal. Pupils are equal, round, and reactive to light. No scleral icterus.   Neck: Neck supple. No JVD present. No thyromegaly present.   Cardiovascular: Normal rate, regular rhythm and normal heart sounds.    Pulmonary/Chest: Breath sounds normal. No respiratory distress.   Abdominal: Soft. Exhibits no distension and no mass. There is no hepatosplenomegaly. There is no tenderness. There is no rebound and no guarding.   Musculoskeletal:Exhibits no edema, tenderness or deformity.   Neurological: Alert and oriented to person, place, and time. Exhibits normal muscle tone.   Skin: No ecchymosis, no petechiae and no rash noted. Not diaphoretic. No cyanosis. Nails show no clubbing.   Psychiatric: Normal mood and affect.   Vitals reviewed.  Nodes: No palpable cervical axillary or inguinal adenopathy.    No radiology results for the last 7 days          ASSESSMENT & PLAN:    1.  Follicular lymphoma: No indication for treatment at present with no bulky disease, no significant anemia, no significant thrombocytopenia, and while he does have an upper respiratory infection, he has not had these incessantly.  Will not check his quantitative immunoglobulins therefore at this junction but will have him get a CAT scan and labs done just prior to " return to my nurse practitioner in 4 months and if that is stable then we will go to 6 month intervals.      Thor Ruggiero MD    12/06/2018

## 2019-03-04 ENCOUNTER — APPOINTMENT (OUTPATIENT)
Dept: GENERAL RADIOLOGY | Facility: HOSPITAL | Age: 79
End: 2019-03-04

## 2019-03-04 ENCOUNTER — HOSPITAL ENCOUNTER (EMERGENCY)
Facility: HOSPITAL | Age: 79
Discharge: HOME | End: 2019-03-04
Attending: EMERGENCY MEDICINE | Admitting: EMERGENCY MEDICINE

## 2019-03-04 VITALS
HEIGHT: 70 IN | BODY MASS INDEX: 26.48 KG/M2 | HEART RATE: 70 BPM | WEIGHT: 185 LBS | TEMPERATURE: 99 F | RESPIRATION RATE: 16 BRPM | DIASTOLIC BLOOD PRESSURE: 87 MMHG | SYSTOLIC BLOOD PRESSURE: 137 MMHG | OXYGEN SATURATION: 94 %

## 2019-03-04 DIAGNOSIS — J06.9 VIRAL URI: Primary | ICD-10-CM

## 2019-03-04 DIAGNOSIS — R11.0 NAUSEA: ICD-10-CM

## 2019-03-04 LAB
ANION GAP SERPL CALCULATED.3IONS-SCNC: 10 MMOL/L (ref 3–11)
BASOPHILS # BLD AUTO: 0.01 10*3/MM3 (ref 0–0.2)
BASOPHILS NFR BLD AUTO: 0.1 % (ref 0–1)
BUN BLD-MCNC: 11 MG/DL (ref 9–23)
BUN/CREAT SERPL: 12.5 (ref 7–25)
CALCIUM SPEC-SCNC: 9.2 MG/DL (ref 8.7–10.4)
CHLORIDE SERPL-SCNC: 105 MMOL/L (ref 99–109)
CO2 SERPL-SCNC: 26 MMOL/L (ref 20–31)
CREAT BLD-MCNC: 0.88 MG/DL (ref 0.6–1.3)
DEPRECATED RDW RBC AUTO: 50.4 FL (ref 37–54)
EOSINOPHIL # BLD AUTO: 0.18 10*3/MM3 (ref 0–0.3)
EOSINOPHIL NFR BLD AUTO: 1.8 % (ref 0–3)
ERYTHROCYTE [DISTWIDTH] IN BLOOD BY AUTOMATED COUNT: 15.5 % (ref 11.3–14.5)
FLUAV AG NPH QL: NEGATIVE
FLUBV AG NPH QL IA: NEGATIVE
GFR SERPL CREATININE-BSD FRML MDRD: 102 ML/MIN/1.73
GLUCOSE BLD-MCNC: 122 MG/DL (ref 70–100)
HCT VFR BLD AUTO: 38.2 % (ref 38.9–50.9)
HGB BLD-MCNC: 12.3 G/DL (ref 13.1–17.5)
IMM GRANULOCYTES # BLD AUTO: 0.02 10*3/MM3 (ref 0–0.05)
IMM GRANULOCYTES NFR BLD AUTO: 0.2 % (ref 0–0.6)
LYMPHOCYTES # BLD AUTO: 0.41 10*3/MM3 (ref 0.6–4.8)
LYMPHOCYTES NFR BLD AUTO: 4.1 % (ref 24–44)
MCH RBC QN AUTO: 28.9 PG (ref 27–31)
MCHC RBC AUTO-ENTMCNC: 32.2 G/DL (ref 32–36)
MCV RBC AUTO: 89.7 FL (ref 80–99)
MONOCYTES # BLD AUTO: 0.85 10*3/MM3 (ref 0–1)
MONOCYTES NFR BLD AUTO: 8.4 % (ref 0–12)
NEUTROPHILS # BLD AUTO: 8.67 10*3/MM3 (ref 1.5–8.3)
NEUTROPHILS NFR BLD AUTO: 85.6 % (ref 41–71)
PLATELET # BLD AUTO: 222 10*3/MM3 (ref 150–450)
PMV BLD AUTO: 10.9 FL (ref 6–12)
POTASSIUM BLD-SCNC: 3.1 MMOL/L (ref 3.5–5.5)
RBC # BLD AUTO: 4.26 10*6/MM3 (ref 4.2–5.76)
SODIUM BLD-SCNC: 141 MMOL/L (ref 132–146)
WBC NRBC COR # BLD: 10.12 10*3/MM3 (ref 3.5–10.8)

## 2019-03-04 PROCEDURE — 99284 EMERGENCY DEPT VISIT MOD MDM: CPT

## 2019-03-04 PROCEDURE — 85025 COMPLETE CBC W/AUTO DIFF WBC: CPT | Performed by: EMERGENCY MEDICINE

## 2019-03-04 PROCEDURE — 87804 INFLUENZA ASSAY W/OPTIC: CPT | Performed by: EMERGENCY MEDICINE

## 2019-03-04 PROCEDURE — 80048 BASIC METABOLIC PNL TOTAL CA: CPT | Performed by: EMERGENCY MEDICINE

## 2019-03-04 PROCEDURE — 71046 X-RAY EXAM CHEST 2 VIEWS: CPT

## 2019-03-04 RX ORDER — ONDANSETRON 4 MG/1
4 TABLET, FILM COATED ORAL EVERY 8 HOURS PRN
Qty: 10 TABLET | Refills: 0 | OUTPATIENT
Start: 2019-03-04 | End: 2021-03-21

## 2019-03-04 NOTE — ED PROVIDER NOTES
Subjective   George Lomeli is a 78 y.o.male who presents to the ED with complaints of nausea. The patient reports his symptoms have been worsening since they onset two days ago. He has has tried utilizing over the counter medications, but he has not experienced any relief. He also complains of rhinorrhea, cough, congestion, postnasal drip, fever, and chills, but denies any chest pain, diarrhea, vomiting, or abdominal pain. Additionally, he has a history of lymphoma. Moreover, he did receive a flu shot this year. There are no other complaints at this time.         History provided by:  Patient  Nausea   The primary symptoms include fever and nausea. Primary symptoms do not include abdominal pain, vomiting or diarrhea. The illness began 2 days ago. The onset was sudden. The problem has been gradually worsening.   The illness is also significant for chills.       Review of Systems   Constitutional: Positive for chills and fever.   HENT: Positive for congestion, postnasal drip and rhinorrhea.    Respiratory: Positive for cough.    Cardiovascular: Negative for chest pain.   Gastrointestinal: Positive for nausea. Negative for abdominal pain, diarrhea and vomiting.   All other systems reviewed and are negative.      Past Medical History:   Diagnosis Date   • AAA (abdominal aortic aneurysm) (CMS/HCC)    • Arthritis    • Broken neck (CMS/HCC)    • Gout    • Hypertension    • Lymphoma (CMS/HCC)    • Migraine    • Sciatica    • Thyroid cancer (CMS/HCC)        Allergies   Allergen Reactions   • Contrast Dye Nausea And Vomiting       Past Surgical History:   Procedure Laterality Date   • BACK SURGERY     • EYE SURGERY     • MULTIPLE TOOTH EXTRACTIONS     • NECK SURGERY      PINS/RODS IN NECK   • THYROIDECTOMY / EXCISION CYST THYROID         Family History   Problem Relation Age of Onset   • Hypertension Mother    • Hypertension Sister        Social History     Socioeconomic History   • Marital status:      Spouse name:  Not on file   • Number of children: Not on file   • Years of education: Not on file   • Highest education level: Not on file   Occupational History     Employer: RETIRED   Tobacco Use   • Smoking status: Former Smoker     Last attempt to quit:      Years since quittin.1   • Smokeless tobacco: Never Used   • Tobacco comment: QUIT 30 YEARS AGO    Substance and Sexual Activity   • Alcohol use: No   • Drug use: No   • Sexual activity: Defer         Objective   Physical Exam   Constitutional: He is oriented to person, place, and time. He appears well-developed and well-nourished. No distress.   Slender male with a congested voice   HENT:   Head: Normocephalic and atraumatic.   Nose: Nose normal.   Mouth/Throat: Oropharynx is clear and moist.   Airway patent. Pharynx benign.    Eyes: Conjunctivae and EOM are normal. Pupils are equal, round, and reactive to light. No scleral icterus.   Neck: Normal range of motion. Neck supple.   Cardiovascular: Normal rate, regular rhythm and normal heart sounds.   No murmur heard.  Pulmonary/Chest: Effort normal. No respiratory distress. He has no wheezes. He has rales (scattered).   Abdominal: Soft. Bowel sounds are normal. There is no tenderness.   Musculoskeletal: Normal range of motion. He exhibits edema.   Trace pretibial edema.    Lymphadenopathy:     He has no cervical adenopathy.   Neurological: He is alert and oriented to person, place, and time.   Skin: Skin is warm and dry.   Psychiatric: He has a normal mood and affect. His behavior is normal.   Nursing note and vitals reviewed.      Procedures         ED Course     Recent Results (from the past 24 hour(s))   Basic Metabolic Panel    Collection Time: 19 11:15 AM   Result Value Ref Range    Glucose 122 (H) 70 - 100 mg/dL    BUN 11 9 - 23 mg/dL    Creatinine 0.88 0.60 - 1.30 mg/dL    Sodium 141 132 - 146 mmol/L    Potassium 3.1 (L) 3.5 - 5.5 mmol/L    Chloride 105 99 - 109 mmol/L    CO2 26.0 20.0 - 31.0 mmol/L     Calcium 9.2 8.7 - 10.4 mg/dL    eGFR  African Amer 102 >60 mL/min/1.73    BUN/Creatinine Ratio 12.5 7.0 - 25.0    Anion Gap 10.0 3.0 - 11.0 mmol/L   CBC Auto Differential    Collection Time: 03/04/19 11:15 AM   Result Value Ref Range    WBC 10.12 3.50 - 10.80 10*3/mm3    RBC 4.26 4.20 - 5.76 10*6/mm3    Hemoglobin 12.3 (L) 13.1 - 17.5 g/dL    Hematocrit 38.2 (L) 38.9 - 50.9 %    MCV 89.7 80.0 - 99.0 fL    MCH 28.9 27.0 - 31.0 pg    MCHC 32.2 32.0 - 36.0 g/dL    RDW 15.5 (H) 11.3 - 14.5 %    RDW-SD 50.4 37.0 - 54.0 fl    MPV 10.9 6.0 - 12.0 fL    Platelets 222 150 - 450 10*3/mm3    Neutrophil % 85.6 (H) 41.0 - 71.0 %    Lymphocyte % 4.1 (L) 24.0 - 44.0 %    Monocyte % 8.4 0.0 - 12.0 %    Eosinophil % 1.8 0.0 - 3.0 %    Basophil % 0.1 0.0 - 1.0 %    Immature Grans % 0.2 0.0 - 0.6 %    Neutrophils, Absolute 8.67 (H) 1.50 - 8.30 10*3/mm3    Lymphocytes, Absolute 0.41 (L) 0.60 - 4.80 10*3/mm3    Monocytes, Absolute 0.85 0.00 - 1.00 10*3/mm3    Eosinophils, Absolute 0.18 0.00 - 0.30 10*3/mm3    Basophils, Absolute 0.01 0.00 - 0.20 10*3/mm3    Immature Grans, Absolute 0.02 0.00 - 0.05 10*3/mm3   Influenza Antigen, Rapid - Swab, Nasopharynx    Collection Time: 03/04/19 11:32 AM   Result Value Ref Range    Influenza A Ag, EIA Negative Negative    Influenza B Ag, EIA Negative Negative     Note: In addition to lab results from this visit, the labs listed above may include labs taken at another facility or during a different encounter within the last 24 hours. Please correlate lab times with ED admission and discharge times for further clarification of the services performed during this visit.    XR Chest 2 View   Final Result   No acute cardiopulmonary disease.       D:  03/04/2019   E:  03/04/2019       This report was finalized on 3/4/2019 12:01 PM by Dr. Kristi Santacruz MD.            Vitals:    03/04/19 1359 03/04/19 1430 03/04/19 1436 03/04/19 1516   BP:  137/87     Pulse: 78 77  70   Resp:   16 16   Temp:    99 °F  (37.2 °C)   TempSrc:    Oral   SpO2: 94% 94%  94%   Weight:       Height:         Medications - No data to display  ECG/EMG Results (last 24 hours)     ** No results found for the last 24 hours. **        No orders to display                       MDM    Final diagnoses:   Viral URI   Nausea       Documentation assistance provided by jess Seay.  Information recorded by the scribe was done at my direction and has been verified and validated by me.     Darvin Seay  03/04/19 1034       Darvin Seay  03/04/19 7395       Kyler Warner MD  03/04/19 3409

## 2019-03-04 NOTE — DISCHARGE INSTRUCTIONS
Utilize Mucinex 600 milligrams twice daily.     Please review the medications you are supposed to be taking according to prior physician recommendations. I have not changed your home medications during this visit. If your discharge instructions indicate that I have changed your home medications, this is not the case, and you should disregard. If you have any questions about the medication you should be taking at home, please call your physician.

## 2019-03-07 ENCOUNTER — OFFICE VISIT (OUTPATIENT)
Dept: PULMONOLOGY | Facility: CLINIC | Age: 79
End: 2019-03-07

## 2019-03-07 VITALS
TEMPERATURE: 98 F | WEIGHT: 192.4 LBS | SYSTOLIC BLOOD PRESSURE: 120 MMHG | BODY MASS INDEX: 27.54 KG/M2 | DIASTOLIC BLOOD PRESSURE: 70 MMHG | HEIGHT: 70 IN | OXYGEN SATURATION: 91 % | HEART RATE: 77 BPM

## 2019-03-07 DIAGNOSIS — J20.8 ACUTE BRONCHITIS DUE TO OTHER SPECIFIED ORGANISMS: Primary | ICD-10-CM

## 2019-03-07 PROCEDURE — 99213 OFFICE O/P EST LOW 20 MIN: CPT | Performed by: NURSE PRACTITIONER

## 2019-03-07 RX ORDER — DOXYCYCLINE HYCLATE 100 MG/1
100 CAPSULE ORAL 2 TIMES DAILY
Qty: 20 CAPSULE | Refills: 0 | OUTPATIENT
Start: 2019-03-07 | End: 2019-03-18

## 2019-03-07 RX ORDER — PREDNISONE 10 MG/1
TABLET ORAL
Qty: 31 TABLET | Refills: 0 | Status: SHIPPED | OUTPATIENT
Start: 2019-03-07 | End: 2019-09-11

## 2019-03-07 NOTE — PROGRESS NOTES
[facility] Pulmonary Follow up    CHIEF COMPLAINT    Bronchitis, shortness of air    Subjective   HISTORY OF PRESENT ILLNESS    George Lomeli is a 78 y.o.male here today for one-week history of worsening cough and congestion.  He has had quite a productive cough with green to yellow thick difficult to produce sputum.  He is also had some associated fevers.  Nasal drainage and hoarseness.  He has noted some wheezing with chest tightness and worsening shortness of air with activity.    He was seen in the emergency department on March 4 for the symptoms as well.  His chest x-ray showed no acute process and his white blood cell count was normal.  It was felt to be a viral infection.  However since then his sputum has come more green in color as well as fevers.    I last saw him in September 2018 following an episode of recurrent bronchitis.  At that time his bronchitis had resolved.  His PFTs were normal with some mild bronchiectasis in the left lower lobe.    He has had no significant issues with recurrent bronchitis since I saw him.  He did continue to follow up with Dr. Ruggiero regarding his follicular lymphoma.   He completed a course of radiation therapy to his left groin and iliac area in October.      Patient Active Problem List   Diagnosis   • Sciatica   • Migraine   • Hypertension   • Gout   • Broken neck (CMS/HCC)   •  Possible bladder mass noted on 8/2017 CT, needs outpatient Urology eval   • Abdominal aortic aneurysm with recent repair at [facility]   • Follicular lymphoma (CMS/HCC)   • Acute bronchitis due to other specified organisms       Allergies   Allergen Reactions   • Contrast Dye Nausea And Vomiting       Current Outpatient Medications:   •  allopurinol (ZYLOPRIM) 100 MG tablet, Take 100 mg by mouth Daily., Disp: , Rfl:   •  amLODIPine (NORVASC) 5 MG tablet, Take 5 mg by mouth Daily., Disp: , Rfl:   •  aspirin 81 MG EC tablet, Take 81 mg by mouth Daily., Disp: , Rfl:   •  carvedilol (COREG) 25 MG tablet,  Take 25 mg by mouth 2 (Two) Times a Day With Meals., Disp: , Rfl:   •  levothyroxine (SYNTHROID, LEVOTHROID) 25 MCG tablet, Take 25 mcg by mouth Daily., Disp: , Rfl:   •  lisinopril (PRINIVIL,ZESTRIL) 40 MG tablet, Take 40 mg by mouth Daily., Disp: , Rfl:   •  ondansetron (ZOFRAN) 4 MG tablet, Take 1 tablet by mouth Every 8 (Eight) Hours As Needed for Nausea or Vomiting., Disp: 10 tablet, Rfl: 0  •  oxyCODONE-acetaminophen (PERCOCET)  MG per tablet, Take 1 tablet by mouth Every 6 (Six) Hours As Needed for Moderate Pain ., Disp: , Rfl:   •  potassium chloride (K-DUR) 10 MEQ CR tablet, Take 10 mEq by mouth Take As Directed., Disp: , Rfl:   •  tamsulosin (FLOMAX) 0.4 MG capsule 24 hr capsule, Take 1 capsule by mouth Every Night., Disp: , Rfl:   •  doxycycline (VIBRAMYCIN) 100 MG capsule, Take 1 capsule by mouth 2 (Two) Times a Day., Disp: 20 capsule, Rfl: 0  •  predniSONE (DELTASONE) 10 MG tablet, Take 4 tabs daily x 3 days, then take 3 tabs daily x 3 days, then take 2 tabs daily x 3 days, then take 1 tab daily x 3 days, Disp: 31 tablet, Rfl: 0  MEDICATION LIST AND ALLERGIES REVIEWED.    Social History     Tobacco Use   • Smoking status: Former Smoker     Last attempt to quit:      Years since quittin.1   • Smokeless tobacco: Never Used   • Tobacco comment: QUIT 30 YEARS AGO    Substance Use Topics   • Alcohol use: No   • Drug use: No       FAMILY AND SOCIAL HISTORY REVIEWED.    Review of Systems   Constitutional: Positive for fever. Negative for chills, fatigue and unexpected weight change.   HENT: Positive for congestion, postnasal drip and rhinorrhea. Negative for nosebleeds, sinus pressure and trouble swallowing.    Respiratory: Positive for chest tightness and wheezing. Negative for cough and shortness of breath.    Cardiovascular: Negative for chest pain and leg swelling.   Gastrointestinal: Positive for nausea. Negative for abdominal pain, constipation, diarrhea and vomiting.   Genitourinary:  "Negative for dysuria, frequency, hematuria and urgency.   Musculoskeletal: Negative for myalgias.   Neurological: Negative for dizziness, weakness, numbness and headaches.   All other systems reviewed and are negative.  .  Objective   /70 (BP Location: Left arm, Patient Position: Sitting, Cuff Size: Adult)   Pulse 77   Temp 98 °F (36.7 °C)   Ht 177.8 cm (70\")   Wt 87.3 kg (192 lb 6.4 oz)   SpO2 91% Comment: sitting room air  BMI 27.61 kg/m²      Immunization History   Administered Date(s) Administered   • Flu Vaccine High Dose PF 65YR+ 10/07/2018   • Influenza, Unspecified 11/01/2017   • Pneumococcal Polysaccharide (PPSV23) 12/12/2016       Physical Exam   Constitutional: He is oriented to person, place, and time. He appears well-developed and well-nourished.   HENT:   Head: Normocephalic and atraumatic.   Eyes: EOM are normal. Pupils are equal, round, and reactive to light.   Neck: Normal range of motion. Neck supple.   Cardiovascular: Normal rate and regular rhythm.   No murmur heard.  Pulmonary/Chest: Effort normal. No respiratory distress. He has wheezes. He has rales.   Abdominal: Soft. Bowel sounds are normal. He exhibits no distension.   Musculoskeletal: Normal range of motion. He exhibits no edema.   Neurological: He is alert and oriented to person, place, and time.   Skin: Skin is warm and dry. No erythema.   Psychiatric: He has a normal mood and affect. His behavior is normal.   Vitals reviewed.        RESULTS        EXAMINATION: XR CHEST 2 VW-03/04/2019:      INDICATION: Cough.      COMPARISON: 11/22/2017.     FINDINGS: Two-view chest reveals cardiac and mediastinal silhouettes  within normal limits. The lung fields are grossly clear. No focal  parenchymal opacification present.  No pleural effusion or pneumothorax.  Degenerative changes seen within the spine. Pulmonary vascularity is  within normal limits.         IMPRESSION:  No acute cardiopulmonary disease.     D:  03/04/2019  E:  " 03/04/2019     This report was finalized on 3/4/2019 12:01 PM by Dr. Kristi Santacruz MD.    Assessment/Plan     PROBLEM LIST    Problem List Items Addressed This Visit        Respiratory    Acute bronchitis due to other specified organisms - Primary    Relevant Medications    doxycycline (VIBRAMYCIN) 100 MG capsule            DISCUSSION    He does have an acute bronchitis.  I going to give him a course of doxycycline for 10 days as well as a prednisone taper.  We discussed the use of Mucinex twice a day to help with secretion clearance  Delsym over-the-counter for the cough.    Follow-up next week if no improvement.    I spent 15 minutes with the patient. I spent > 50% percent of this time counseling and discussing diagnostic testing, evaluation, treatment options and management.    Bibiana Zavaleta, APRN  03/07/20198:33 AM  Electronically signed     Please note that portions of this note were completed with a voice recognition program. Efforts were made to edit the dictations, but occasionally words are mistranscribed.      CC: Kaycee Douglas MD

## 2019-03-18 ENCOUNTER — APPOINTMENT (OUTPATIENT)
Dept: CT IMAGING | Facility: HOSPITAL | Age: 79
End: 2019-03-18

## 2019-03-18 ENCOUNTER — HOSPITAL ENCOUNTER (EMERGENCY)
Facility: HOSPITAL | Age: 79
Discharge: HOME | End: 2019-03-18
Attending: EMERGENCY MEDICINE | Admitting: EMERGENCY MEDICINE

## 2019-03-18 VITALS
HEART RATE: 80 BPM | BODY MASS INDEX: 31.92 KG/M2 | RESPIRATION RATE: 18 BRPM | HEIGHT: 64 IN | SYSTOLIC BLOOD PRESSURE: 128 MMHG | OXYGEN SATURATION: 94 % | DIASTOLIC BLOOD PRESSURE: 92 MMHG | WEIGHT: 187 LBS | TEMPERATURE: 98.2 F

## 2019-03-18 DIAGNOSIS — J47.1 BRONCHIECTASIS WITH ACUTE EXACERBATION (HCC): Primary | ICD-10-CM

## 2019-03-18 DIAGNOSIS — I10 ELEVATED BLOOD PRESSURE READING WITH DIAGNOSIS OF HYPERTENSION: ICD-10-CM

## 2019-03-18 LAB
ALBUMIN SERPL-MCNC: 3.68 G/DL (ref 3.2–4.8)
ALBUMIN/GLOB SERPL: 1.5 G/DL (ref 1.5–2.5)
ALP SERPL-CCNC: 88 U/L (ref 25–100)
ALT SERPL W P-5'-P-CCNC: 17 U/L (ref 7–40)
ANION GAP SERPL CALCULATED.3IONS-SCNC: 7 MMOL/L (ref 3–11)
AST SERPL-CCNC: 21 U/L (ref 0–33)
BASOPHILS # BLD AUTO: 0.01 10*3/MM3 (ref 0–0.2)
BASOPHILS NFR BLD AUTO: 0.1 % (ref 0–1)
BILIRUB SERPL-MCNC: 0.5 MG/DL (ref 0.3–1.2)
BUN BLD-MCNC: 10 MG/DL (ref 9–23)
BUN/CREAT SERPL: 11.9 (ref 7–25)
CALCIUM SPEC-SCNC: 8.8 MG/DL (ref 8.7–10.4)
CHLORIDE SERPL-SCNC: 105 MMOL/L (ref 99–109)
CO2 SERPL-SCNC: 29 MMOL/L (ref 20–31)
CREAT BLD-MCNC: 0.84 MG/DL (ref 0.6–1.3)
DEPRECATED RDW RBC AUTO: 52.3 FL (ref 37–54)
EOSINOPHIL # BLD AUTO: 0.18 10*3/MM3 (ref 0–0.3)
EOSINOPHIL NFR BLD AUTO: 2.6 % (ref 0–3)
ERYTHROCYTE [DISTWIDTH] IN BLOOD BY AUTOMATED COUNT: 15.7 % (ref 11.3–14.5)
GFR SERPL CREATININE-BSD FRML MDRD: 107 ML/MIN/1.73
GLOBULIN UR ELPH-MCNC: 2.5 GM/DL
GLUCOSE BLD-MCNC: 102 MG/DL (ref 70–100)
HCT VFR BLD AUTO: 35.7 % (ref 38.9–50.9)
HGB BLD-MCNC: 11.4 G/DL (ref 13.1–17.5)
IMM GRANULOCYTES # BLD AUTO: 0.03 10*3/MM3 (ref 0–0.05)
IMM GRANULOCYTES NFR BLD AUTO: 0.4 % (ref 0–0.6)
LIPASE SERPL-CCNC: 35 U/L (ref 6–51)
LYMPHOCYTES # BLD AUTO: 0.76 10*3/MM3 (ref 0.6–4.8)
LYMPHOCYTES NFR BLD AUTO: 11 % (ref 24–44)
MCH RBC QN AUTO: 29 PG (ref 27–31)
MCHC RBC AUTO-ENTMCNC: 31.9 G/DL (ref 32–36)
MCV RBC AUTO: 90.8 FL (ref 80–99)
MONOCYTES # BLD AUTO: 0.96 10*3/MM3 (ref 0–1)
MONOCYTES NFR BLD AUTO: 13.9 % (ref 0–12)
NEUTROPHILS # BLD AUTO: 4.99 10*3/MM3 (ref 1.5–8.3)
NEUTROPHILS NFR BLD AUTO: 72.4 % (ref 41–71)
PLATELET # BLD AUTO: 211 10*3/MM3 (ref 150–450)
PMV BLD AUTO: 10.6 FL (ref 6–12)
POTASSIUM BLD-SCNC: 3.4 MMOL/L (ref 3.5–5.5)
PROT SERPL-MCNC: 6.2 G/DL (ref 5.7–8.2)
RBC # BLD AUTO: 3.93 10*6/MM3 (ref 4.2–5.76)
SODIUM BLD-SCNC: 141 MMOL/L (ref 132–146)
TROPONIN I SERPL-MCNC: 0.01 NG/ML
WBC NRBC COR # BLD: 6.9 10*3/MM3 (ref 3.5–10.8)

## 2019-03-18 PROCEDURE — 93005 ELECTROCARDIOGRAM TRACING: CPT | Performed by: EMERGENCY MEDICINE

## 2019-03-18 PROCEDURE — 85025 COMPLETE CBC W/AUTO DIFF WBC: CPT | Performed by: EMERGENCY MEDICINE

## 2019-03-18 PROCEDURE — 80053 COMPREHEN METABOLIC PANEL: CPT | Performed by: EMERGENCY MEDICINE

## 2019-03-18 PROCEDURE — 83690 ASSAY OF LIPASE: CPT | Performed by: EMERGENCY MEDICINE

## 2019-03-18 PROCEDURE — 99284 EMERGENCY DEPT VISIT MOD MDM: CPT

## 2019-03-18 PROCEDURE — 71250 CT THORAX DX C-: CPT

## 2019-03-18 PROCEDURE — 74176 CT ABD & PELVIS W/O CONTRAST: CPT

## 2019-03-18 PROCEDURE — 84484 ASSAY OF TROPONIN QUANT: CPT | Performed by: EMERGENCY MEDICINE

## 2019-03-18 RX ORDER — GUAIFENESIN 600 MG/1
1200 TABLET, EXTENDED RELEASE ORAL ONCE
Status: COMPLETED | OUTPATIENT
Start: 2019-03-18 | End: 2019-03-18

## 2019-03-18 RX ORDER — BENZONATATE 100 MG/1
100 CAPSULE ORAL ONCE
Status: COMPLETED | OUTPATIENT
Start: 2019-03-18 | End: 2019-03-18

## 2019-03-18 RX ORDER — BENZONATATE 100 MG/1
100 CAPSULE ORAL 3 TIMES DAILY PRN
Qty: 6 CAPSULE | Refills: 0 | Status: SHIPPED | OUTPATIENT
Start: 2019-03-18 | End: 2019-04-15

## 2019-03-18 RX ORDER — AZITHROMYCIN 250 MG/1
250 TABLET, FILM COATED ORAL DAILY
Qty: 6 TABLET | Refills: 0 | Status: SHIPPED | OUTPATIENT
Start: 2019-03-18 | End: 2019-09-11

## 2019-03-18 RX ADMIN — BENZONATATE 100 MG: 100 CAPSULE ORAL at 17:52

## 2019-03-18 RX ADMIN — GUAIFENESIN 1200 MG: 600 TABLET, EXTENDED RELEASE ORAL at 17:52

## 2019-03-18 NOTE — DISCHARGE INSTRUCTIONS
Begin taking Mucinex and Zyrtec which are both available over-the-counter.  Return if worsening of your breathing or any concerns.

## 2019-03-18 NOTE — ED PROVIDER NOTES
Subjective   George Lomeli is a 78 y.o.male who presents to the ED with complaints of a cough. The patient repots his cough has been constant for the past three weeks. He states his cough produces a clear, green sputum. He has been evaluated for his current discomfort. During this time, he was given a prescription for an antibiotic and Prednisone. He has taken these medications, but he has not experienced any relief. He also complains of shortness of breath, abdominal pain, and a fever, but he denies any chills, vomiting, or constipation. Additionally, he has a history of thyroid cancer. There are no other complaints at this time.         History provided by:  Patient  Cough   Cough characteristics:  Productive  Sputum characteristics:  Clear and green  Severity:  Moderate  Onset quality:  Sudden  Duration:  3 weeks  Timing:  Constant  Progression:  Unchanged  Chronicity:  New  Relieved by:  Nothing  Worsened by:  Nothing  Ineffective treatments: Antibiotic and Prednisone.  Associated symptoms: fever and shortness of breath    Associated symptoms: no chills        Review of Systems   Constitutional: Positive for fever. Negative for chills.   Respiratory: Positive for cough and shortness of breath.    Gastrointestinal: Positive for abdominal pain. Negative for constipation and vomiting.   All other systems reviewed and are negative.      Past Medical History:   Diagnosis Date   • AAA (abdominal aortic aneurysm) (CMS/HCC)    • Arthritis    • Broken neck (CMS/HCC)    • Gout    • Hypertension    • Lymphoma (CMS/HCC)    • Migraine    • Sciatica    • Thyroid cancer (CMS/HCC)        Allergies   Allergen Reactions   • Contrast Dye Nausea And Vomiting       Past Surgical History:   Procedure Laterality Date   • BACK SURGERY     • EYE SURGERY     • MULTIPLE TOOTH EXTRACTIONS     • NECK SURGERY      PINS/RODS IN NECK   • THYROIDECTOMY / EXCISION CYST THYROID         Family History   Problem Relation Age of Onset   •  Hypertension Mother    • Hypertension Sister        Social History     Socioeconomic History   • Marital status:      Spouse name: Not on file   • Number of children: Not on file   • Years of education: Not on file   • Highest education level: Not on file   Occupational History     Employer: RETIRED   Tobacco Use   • Smoking status: Former Smoker     Last attempt to quit:      Years since quittin.2   • Smokeless tobacco: Never Used   • Tobacco comment: QUIT 30 YEARS AGO    Substance and Sexual Activity   • Alcohol use: No   • Drug use: No   • Sexual activity: Defer         Objective   Physical Exam   Constitutional: He is oriented to person, place, and time. He appears well-developed and well-nourished. No distress.   HENT:   Head: Normocephalic and atraumatic.   Nose: Nose normal.   Eyes: Conjunctivae are normal. No scleral icterus.   Neck: Normal range of motion. Neck supple.   Cardiovascular: Normal rate, regular rhythm, normal heart sounds and intact distal pulses.   No murmur heard.  Pulmonary/Chest: Effort normal and breath sounds normal. No respiratory distress.   Abdominal: Soft. Bowel sounds are normal. There is no tenderness.   Musculoskeletal: Normal range of motion. He exhibits no edema.   Neurological: He is alert and oriented to person, place, and time.   Skin: Skin is warm and dry.   Psychiatric: He has a normal mood and affect. His behavior is normal.   Nursing note and vitals reviewed.      Procedures         ED Course  ED Course as of Mar 18 2130   Mon Mar 18, 2019   1733 I have reviewed Mr. Lomeli's records.  He has had recent negative chest x-ray.  In the past he has been found to have bronchiectasis on CT chest.  Will obtain CT of his chest today as well as CT of his abdomen to evaluate his complaint of acute abdominal pain  [DT]    CT scan indicates possible acute infection, will place him on Zithromax and cough medicine  [DT]   192 I spoke with Mr. Chacon about findings,  he was unaware he had an area of bronchiectasis.  I will place him on Zithromax and have him follow-up with the pulmonary clinic  [DT]      ED Course User Index  [DT] Abhay Willis MD     Recent Results (from the past 24 hour(s))   Comprehensive Metabolic Panel    Collection Time: 03/18/19  5:56 PM   Result Value Ref Range    Glucose 102 (H) 70 - 100 mg/dL    BUN 10 9 - 23 mg/dL    Creatinine 0.84 0.60 - 1.30 mg/dL    Sodium 141 132 - 146 mmol/L    Potassium 3.4 (L) 3.5 - 5.5 mmol/L    Chloride 105 99 - 109 mmol/L    CO2 29.0 20.0 - 31.0 mmol/L    Calcium 8.8 8.7 - 10.4 mg/dL    Total Protein 6.2 5.7 - 8.2 g/dL    Albumin 3.68 3.20 - 4.80 g/dL    ALT (SGPT) 17 7 - 40 U/L    AST (SGOT) 21 0 - 33 U/L    Alkaline Phosphatase 88 25 - 100 U/L    Total Bilirubin 0.5 0.3 - 1.2 mg/dL    eGFR  African Amer 107 >60 mL/min/1.73    Globulin 2.5 gm/dL    A/G Ratio 1.5 1.5 - 2.5 g/dL    BUN/Creatinine Ratio 11.9 7.0 - 25.0    Anion Gap 7.0 3.0 - 11.0 mmol/L   Lipase    Collection Time: 03/18/19  5:56 PM   Result Value Ref Range    Lipase 35 6 - 51 U/L   CBC Auto Differential    Collection Time: 03/18/19  5:56 PM   Result Value Ref Range    WBC 6.90 3.50 - 10.80 10*3/mm3    RBC 3.93 (L) 4.20 - 5.76 10*6/mm3    Hemoglobin 11.4 (L) 13.1 - 17.5 g/dL    Hematocrit 35.7 (L) 38.9 - 50.9 %    MCV 90.8 80.0 - 99.0 fL    MCH 29.0 27.0 - 31.0 pg    MCHC 31.9 (L) 32.0 - 36.0 g/dL    RDW 15.7 (H) 11.3 - 14.5 %    RDW-SD 52.3 37.0 - 54.0 fl    MPV 10.6 6.0 - 12.0 fL    Platelets 211 150 - 450 10*3/mm3    Neutrophil % 72.4 (H) 41.0 - 71.0 %    Lymphocyte % 11.0 (L) 24.0 - 44.0 %    Monocyte % 13.9 (H) 0.0 - 12.0 %    Eosinophil % 2.6 0.0 - 3.0 %    Basophil % 0.1 0.0 - 1.0 %    Immature Grans % 0.4 0.0 - 0.6 %    Neutrophils, Absolute 4.99 1.50 - 8.30 10*3/mm3    Lymphocytes, Absolute 0.76 0.60 - 4.80 10*3/mm3    Monocytes, Absolute 0.96 0.00 - 1.00 10*3/mm3    Eosinophils, Absolute 0.18 0.00 - 0.30 10*3/mm3    Basophils, Absolute 0.01 0.00  - 0.20 10*3/mm3    Immature Grans, Absolute 0.03 0.00 - 0.05 10*3/mm3   Troponin    Collection Time: 03/18/19  5:56 PM   Result Value Ref Range    Troponin I 0.009 <=0.039 ng/mL     Note: In addition to lab results from this visit, the labs listed above may include labs taken at another facility or during a different encounter within the last 24 hours. Please correlate lab times with ED admission and discharge times for further clarification of the services performed during this visit.    CT Chest Without Contrast   Final Result   1. Unchanged acute/chronic postinfectious-inflammatory changes in the lungs.    Recommend followup for active pulmonary infection/inflammation.    2. Other nonemergent/incidental findings as described.       THIS DOCUMENT HAS BEEN ELECTRONICALLY SIGNED BY SUSANNA BROOKS MD      CT Abdomen Pelvis Without Contrast   Final Result   Numerous non-emergent/incidental findings as described without any acute    abdominopelvic abnormality.       COMMENT:     Suboptimal evaluation of bowel loops and abdominal organs due to lack of    intravenous and oral contrast.       THIS DOCUMENT HAS BEEN ELECTRONICALLY SIGNED BY SUSANNA BROOKS MD        Vitals:    03/18/19 1734 03/18/19 1800 03/18/19 1930 03/18/19 2000   BP:  122/88 131/83 128/92   BP Location:       Patient Position:       Pulse:       Resp:       Temp:       TempSrc:       SpO2: 99% 93%  94%   Weight:       Height:         Medications   benzonatate (TESSALON) capsule 100 mg (100 mg Oral Given 3/18/19 1752)   guaiFENesin (MUCINEX) 12 hr tablet 1,200 mg (1,200 mg Oral Given 3/18/19 1752)     ECG/EMG Results (last 24 hours)     ** No results found for the last 24 hours. **        ECG 12 Lead                             MDM  Number of Diagnoses or Management Options  Bronchiectasis with acute exacerbation (CMS/HCC): new and requires workup  Elevated blood pressure reading with diagnosis of hypertension: new and does not require workup     Amount  and/or Complexity of Data Reviewed  Clinical lab tests: reviewed and ordered  Tests in the radiology section of CPT®: ordered and reviewed  Review and summarize past medical records: yes  Independent visualization of images, tracings, or specimens: yes        Final diagnoses:   Bronchiectasis with acute exacerbation (CMS/HCC)   Elevated blood pressure reading with diagnosis of hypertension       Documentation assistance provided by jess Seay.  Information recorded by the scribe was done at my direction and has been verified and validated by me.     Darvin Seay  03/18/19 5985       Darvin Seay  03/18/19 1930       Abhay Willis MD  03/18/19 5339

## 2019-04-08 ENCOUNTER — HOSPITAL ENCOUNTER (OUTPATIENT)
Dept: CT IMAGING | Facility: HOSPITAL | Age: 79
Discharge: HOME | End: 2019-04-08
Admitting: INTERNAL MEDICINE

## 2019-04-08 DIAGNOSIS — C82.95 FOLLICULAR LYMPHOMA OF LYMPH NODES OF INGUINAL REGION, UNSPECIFIED FOLLICULAR LYMPHOMA TYPE (HCC): Chronic | ICD-10-CM

## 2019-04-08 PROCEDURE — 74176 CT ABD & PELVIS W/O CONTRAST: CPT

## 2019-04-08 PROCEDURE — 71250 CT THORAX DX C-: CPT

## 2019-04-15 ENCOUNTER — OFFICE VISIT (OUTPATIENT)
Dept: ONCOLOGY | Facility: CLINIC | Age: 79
End: 2019-04-15

## 2019-04-15 VITALS
TEMPERATURE: 97.8 F | BODY MASS INDEX: 32.1 KG/M2 | SYSTOLIC BLOOD PRESSURE: 143 MMHG | OXYGEN SATURATION: 97 % | WEIGHT: 188 LBS | RESPIRATION RATE: 16 BRPM | HEIGHT: 64 IN | HEART RATE: 80 BPM | DIASTOLIC BLOOD PRESSURE: 94 MMHG

## 2019-04-15 DIAGNOSIS — C82.55 DIFFUSE FOLLICLE CENTER LYMPHOMA OF LYMPH NODES OF INGUINAL REGION (HCC): Primary | Chronic | ICD-10-CM

## 2019-04-15 PROCEDURE — 99213 OFFICE O/P EST LOW 20 MIN: CPT | Performed by: NURSE PRACTITIONER

## 2019-04-15 NOTE — PROGRESS NOTES
CHIEF COMPLAINT: Follow-up follicular lymphoma    Problem List:  Oncology/Hematology History    1. Follicular lymphoma:  -  initial consultation 3/12/18: Had been having left inguinal pains and went for scanning that found left groin node.  Needle biopsy of this was called lymphoma CD10 positive and Fish testing positive for translocation (14;18) consistent with follicular lymphoma though could also be seen in diffuse large B cell.  BCL 6 /3q rearranged.  No Myc..  Hence cannot say at this junction the exact subtype of lymphoma.  He has not been having bed drenching night sweats.  No unexplained weight loss.  Has not had any known anemia, thrombocytopenia, leukopenia.  Has not had frequent infections.  CT did not show any splenomegaly or other significant adenopathy.  The left inguinal pain has subsided since the needle biopsy.  He has an abdominal aortic aneurysm that is being watched and had previously been repaired.  Apparently 15+ years ago he was treated at [facility] for lymphoma for which he received right neck radiation and some chemotherapy.  He is not sure of the subtype of lymphoma nor of the type of chemotherapy nor the number of radiation treatments and this was far enough back that our records have been expunged.          Follicular lymphoma (CMS/HCC)    3/12/2018 Initial Diagnosis     Follicular lymphoma         3/15/2018 Imaging     PET     IMPRESSION:     Multiple hypermetabolic abnormalities are identified but the  distribution is unusual. Although there is bulky hypermetabolic rishi  mass in the left inguinal area and in the right internal iliac rishi  chain certainly consistent with active malignant lymphoma, there is an  intense abnormal area of tracer uptake and hypermetabolic activity  throughout the marrow or intramedullary portion of the left ilium and  iliac wing in the absence of cortical destruction. This is an intensely  hypermetabolic abnormal lengthy intraosseous abnormality.      The lower extremity dedicated datasets are negative.     Intermediate lymph nodes are identified in the medial inguinal region  which are not above malignant threshold.     Above the pelvis, there is no evidence of additional hypermetabolic  focus or active neoplastic disease, and the chest, mediastinum and neck  are clear.         6/7/2018 Imaging     MRI lumbar spine IMPRESSION:    Study is limited by susceptibility artifact greatest at L1-L2.       No definite significant central canal stenosis.       Moderate to severe right and moderate left foraminal stenosis at L3-L4.    Moderate to severe bilateral foraminal stenosis at L4-L5.         7/24/2018 Imaging     CT chest IMPRESSION:  There are no acute findings. Specifically, there is no  axillary, mediastinal or hilar lymphadenopathy. There is bronchiectasis  in the medial segment of the left lower lobe. This was also present on  CT/PET examination of 03/15/2018.  CT abdomen and pelvis IMPRESSION:  Left inguinal lymphadenopathy, unchanged when compared to a  whole-body PET CT scan of 03/15/2018.         9/6/2018 Imaging     MRI pelvis  1. Decreasing size of patient's left inguinal mass.  2. Interval development of diffuse marrow edema and enhancement,  practically throughout the left ilium, and adjacent soft tissue disease  deep to the left iliacus muscle, raising suspicion for lymphoma.         9/27/2018 - 10/17/2018 Radiation     Radiation OncologyTreatment Course:  George Lomeli received 3000 cGy in 15 fractions to left groin & iliac via External Beam Radiation - EBRT.         11/27/2018 Imaging     CT chest abdomen and pelvis  IMPRESSION: Bronchiectasis in the lungs but no significant mediastinal adenopathy or axillary adenopathy or cervical adenopathy.  There is a mildly enlarged left inguinal lymph node. The  node has smooth ovoid margins and has central low density consistent  with a necrotic node. Lastly more superiorly and anteriorly in a region  where  there are surgical clips there is a lentiform mass which is much  much smaller than on the previous examination of 07/24/2018.            HISTORY OF PRESENT ILLNESS:  The patient is a 78 y.o. male, here for follow up on management of follicular lymphoma.  Reports that he still has a chronic nonproductive cough.  Has been following with pulmonology.  No fevers or chills.  No adenopathy that he is aware of.  He does have pain in his right hip radiates to his right thigh.  Does have a history of lower back pain and back surgery.    Past Medical History:   Diagnosis Date   • AAA (abdominal aortic aneurysm) (CMS/HCC)    • Arthritis    • Broken neck (CMS/HCC)    • Gout    • Hypertension    • Lymphoma (CMS/HCC)    • Migraine    • Sciatica    • Thyroid cancer (CMS/HCC)      Past Surgical History:   Procedure Laterality Date   • BACK SURGERY     • EYE SURGERY     • MULTIPLE TOOTH EXTRACTIONS     • NECK SURGERY      PINS/RODS IN NECK   • THYROIDECTOMY / EXCISION CYST THYROID         Allergies   Allergen Reactions   • Contrast Dye Nausea And Vomiting       Family History and Social History reviewed and changed as necessary      REVIEW OF SYSTEM:   Review of Systems   Constitutional: Negative for appetite change, chills, diaphoresis, fatigue, fever and unexpected weight change.   HENT:   Negative for mouth sores, sore throat and trouble swallowing.    Eyes: Negative for icterus.   Respiratory: Negative for cough, hemoptysis and shortness of breath.    Cardiovascular: Negative for chest pain, leg swelling and palpitations.   Gastrointestinal: Negative for abdominal distention, abdominal pain, blood in stool, constipation, diarrhea, nausea and vomiting.   Endocrine: Negative for hot flashes.   Genitourinary: Negative for bladder incontinence, difficulty urinating, dysuria, frequency and hematuria.    Musculoskeletal: Negative for gait problem, neck pain and neck stiffness. Positive for right hip pain, chronic.  Skin: Negative for  "rash.   Neurological: Negative for dizziness, gait problem, headaches, light-headedness and numbness.   Hematological: Negative for adenopathy. Does not bruise/bleed easily.   Psychiatric/Behavioral: Negative for depression. The patient is not nervous/anxious.    All other systems reviewed and are negative.       PHYSICAL EXAM    Vitals:    04/15/19 1023   BP: 143/94   Pulse: 80   Resp: 16   Temp: 97.8 °F (36.6 °C)   SpO2: 97%   Weight: 85.3 kg (188 lb)   Height: 162.6 cm (64\")     Constitutional: Appears well-developed and well-nourished. No distress.   ECOG: (1) Restricted in physically strenuous activity, ambulatory and able to do work of light nature  HENT:   Head: Normocephalic.   Mouth/Throat: Oropharynx is clear and moist.   Eyes: Conjunctivae are normal. Pupils are equal, round, and reactive to light. No scleral icterus.   Neck: Neck supple. No JVD present. No thyromegaly present.   Cardiovascular: Normal rate, regular rhythm and normal heart sounds.    Pulmonary/Chest: Breath sounds normal. No respiratory distress.   Abdominal: Soft. Exhibits no distension and no mass. There is no hepatosplenomegaly. There is no tenderness. There is no rebound and no guarding.   Musculoskeletal:Exhibits no edema, tenderness or deformity.   Neurological: Alert and oriented to person, place, and time. Exhibits normal muscle tone.   Skin: No ecchymosis, no petechiae and no rash noted. Not diaphoretic. No cyanosis. Nails show no clubbing.   Psychiatric: Normal mood and affect.   Vitals reviewed.  Nodes: No palpable cervical axillary or inguinal adenopathy.  Labs reviewed.   Recent Results (from the past 840 hour(s))   Comprehensive Metabolic Panel    Collection Time: 03/18/19  5:56 PM   Result Value Ref Range    Glucose 102 (H) 70 - 100 mg/dL    BUN 10 9 - 23 mg/dL    Creatinine 0.84 0.60 - 1.30 mg/dL    Sodium 141 132 - 146 mmol/L    Potassium 3.4 (L) 3.5 - 5.5 mmol/L    Chloride 105 99 - 109 mmol/L    CO2 29.0 20.0 - 31.0 " mmol/L    Calcium 8.8 8.7 - 10.4 mg/dL    Total Protein 6.2 5.7 - 8.2 g/dL    Albumin 3.68 3.20 - 4.80 g/dL    ALT (SGPT) 17 7 - 40 U/L    AST (SGOT) 21 0 - 33 U/L    Alkaline Phosphatase 88 25 - 100 U/L    Total Bilirubin 0.5 0.3 - 1.2 mg/dL    eGFR  African Amer 107 >60 mL/min/1.73    Globulin 2.5 gm/dL    A/G Ratio 1.5 1.5 - 2.5 g/dL    BUN/Creatinine Ratio 11.9 7.0 - 25.0    Anion Gap 7.0 3.0 - 11.0 mmol/L   Lipase    Collection Time: 03/18/19  5:56 PM   Result Value Ref Range    Lipase 35 6 - 51 U/L   CBC Auto Differential    Collection Time: 03/18/19  5:56 PM   Result Value Ref Range    WBC 6.90 3.50 - 10.80 10*3/mm3    RBC 3.93 (L) 4.20 - 5.76 10*6/mm3    Hemoglobin 11.4 (L) 13.1 - 17.5 g/dL    Hematocrit 35.7 (L) 38.9 - 50.9 %    MCV 90.8 80.0 - 99.0 fL    MCH 29.0 27.0 - 31.0 pg    MCHC 31.9 (L) 32.0 - 36.0 g/dL    RDW 15.7 (H) 11.3 - 14.5 %    RDW-SD 52.3 37.0 - 54.0 fl    MPV 10.6 6.0 - 12.0 fL    Platelets 211 150 - 450 10*3/mm3    Neutrophil % 72.4 (H) 41.0 - 71.0 %    Lymphocyte % 11.0 (L) 24.0 - 44.0 %    Monocyte % 13.9 (H) 0.0 - 12.0 %    Eosinophil % 2.6 0.0 - 3.0 %    Basophil % 0.1 0.0 - 1.0 %    Immature Grans % 0.4 0.0 - 0.6 %    Neutrophils, Absolute 4.99 1.50 - 8.30 10*3/mm3    Lymphocytes, Absolute 0.76 0.60 - 4.80 10*3/mm3    Monocytes, Absolute 0.96 0.00 - 1.00 10*3/mm3    Eosinophils, Absolute 0.18 0.00 - 0.30 10*3/mm3    Basophils, Absolute 0.01 0.00 - 0.20 10*3/mm3    Immature Grans, Absolute 0.03 0.00 - 0.05 10*3/mm3   Troponin    Collection Time: 03/18/19  5:56 PM   Result Value Ref Range    Troponin I 0.009 <=0.039 ng/mL       Ct Abdomen Pelvis Without Contrast    Result Date: 4/8/2019  There is a curvilinear soft tissue density just superior to the surgical clips in the left inguinal region. This is likely a result of radiation therapy. There is a single node in the left inferior inguinal region measuring 1.1 cm in short axis. That node is entirely stable in CT appearance and most  importantly demonstrates central lucency consistent with either normal fat or necrosis.  D:  04/08/2019 E:  04/08/2019    This report was finalized on 4/8/2019 4:55 PM by Dr. Regan Vizcaino MD.      Ct Chest Without Contrast    Result Date: 4/8/2019  Chronic bullous change in the left lower lobe. There are no acute findings.  D:  04/08/2019 E:  04/08/2019    This report was finalized on 4/8/2019 4:55 PM by Dr. Regan Vizcaino MD.          ASSESSMENT & PLAN:    1.  Follicular lymphoma: No indication for treatment at present with no bulky disease, no significant anemia, no significant thrombocytopenia.  He has pain in his right hip but this is chronic, no abnormalities on CT.  He also has persistent nonproductive cough, we will continue to follow with pulmonology, no acute findings on CT of the chest.  We will now go to 6 month intervals for surveillance.  We will see him back in 6 months with CT chest abdomen and pelvis without contrast along with a CBC and a CMP prior to return and I have ordered those today.    I spent 15 minutes with the patient. I spent > 50% percent of this time counseling and discussing prognosis, diagnostic testing, evaluation, current status and management.    Laxmi Scott, APRN    04/15/2019

## 2019-04-19 ENCOUNTER — OFFICE VISIT (OUTPATIENT)
Dept: RADIATION ONCOLOGY | Facility: HOSPITAL | Age: 79
End: 2019-04-19

## 2019-04-19 ENCOUNTER — HOSPITAL ENCOUNTER (OUTPATIENT)
Dept: RADIATION ONCOLOGY | Facility: HOSPITAL | Age: 79
Setting detail: RADIATION/ONCOLOGY SERIES
Discharge: HOME | End: 2019-04-19

## 2019-04-19 VITALS
RESPIRATION RATE: 18 BRPM | HEART RATE: 92 BPM | BODY MASS INDEX: 32.66 KG/M2 | WEIGHT: 190.3 LBS | OXYGEN SATURATION: 97 % | DIASTOLIC BLOOD PRESSURE: 98 MMHG | TEMPERATURE: 97 F | SYSTOLIC BLOOD PRESSURE: 141 MMHG

## 2019-04-19 DIAGNOSIS — C82.55 DIFFUSE FOLLICLE CENTER LYMPHOMA OF LYMPH NODES OF INGUINAL REGION (HCC): Chronic | ICD-10-CM

## 2019-04-19 PROCEDURE — G0463 HOSPITAL OUTPT CLINIC VISIT: HCPCS | Performed by: RADIOLOGY

## 2019-04-19 NOTE — PROGRESS NOTES
FOLLOW UP NOTE    PATIENT:                                                      George Lomeli  MEDICAL RECORD #:                        [id number]  :                                                          1940  COMPLETION DATE:    10/17/2018  DIAGNOSIS:     Cancer Staging  Follicular lymphoma (CMS/HCC)  Staging form: Hodgkin And Non-Hodgkin Lymphoma, AJCC 8th Edition  - Clinical: Stage IV - Signed by Thor Ruggiero MD on 3/19/2018    BRIEF HISTORY:  Mr. 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  He completed radiation therapy to a dose of 30 Gy on 2018.  He reports that he is doing well.  He denies any fevers, chills, night sweats, or weight loss.  He has no swelling in his left lower extremity.  He recently underwent CT scans that show a very good response with stability and no evidence of new disease.    MEDICATIONS: Medication reconciliation for the patient was reviewed and confirmed in the electronic medical record.    Review of Systems   Respiratory: Positive for cough (occ-clear to yellow).    Cardiovascular: Positive for leg swelling (right LE).   Musculoskeletal: Positive for arthralgias (right hip-down front of leg) and back pain (chronic).   All other systems reviewed and are negative.      KPS 80%    Physical Exam   Constitutional: He is oriented to person, place, and time. He appears well-developed and well-nourished. No distress.   HENT:   Head: Normocephalic and atraumatic.   Mouth/Throat: Oropharynx is clear and moist.   Eyes: Conjunctivae and EOM are normal. Pupils are equal, round, and reactive to light.   Neck: Normal range of motion. Neck supple.   Cardiovascular: Normal rate and regular rhythm. Exam reveals no friction rub.   No murmur heard.  Pulmonary/Chest: Effort normal and breath sounds normal. He has no wheezes.    Abdominal: Soft. Bowel sounds are normal. He exhibits no distension and no mass. There is no tenderness.   Genitourinary:   Genitourinary Comments: No palpable adenopathy in the left or right groin.   Musculoskeletal: Normal range of motion. He exhibits no edema.   Good range of motion in the left hip.  No palpable tenderness in the lumbar spine, left iliac wing, or bilateral hips.    No evidence of edema in the bilateral lower extremities.   Lymphadenopathy:     He has no cervical adenopathy.   Neurological: He is alert and oriented to person, place, and time.   Skin: Skin is warm and dry.   Psychiatric: He has a normal mood and affect. His behavior is normal. Judgment and thought content normal.   Nursing note and vitals reviewed.      VITAL SIGNS:   Vitals:    04/19/19 0923   BP: 141/98   Pulse: 92   Resp: 18   Temp: 97 °F (36.1 °C)   TempSrc: Temporal   SpO2: 97%   Weight: 86.3 kg (190 lb 4.8 oz)   PainSc:   8   PainLoc: Hip  Comment: right hip down from of right leg     IMAGING  I have personally reviewed the relevant imaging studies, as follows:  Ct Abdomen Pelvis Without Contrast    Result Date: 4/8/2019  There is a curvilinear soft tissue density just superior to the surgical clips in the left inguinal region. This is likely a result of radiation therapy. There is a single node in the left inferior inguinal region measuring 1.1 cm in short axis. That node is entirely stable in CT appearance and most importantly demonstrates central lucency consistent with either normal fat or necrosis.  D:  04/08/2019 E:  04/08/2019    This report was finalized on 4/8/2019 4:55 PM by Dr. Regan Vizcaino MD.      Ct Abdomen Pelvis Without Contrast    Result Date: 3/18/2019  Numerous non-emergent/incidental findings as described without any acute abdominopelvic abnormality. COMMENT:  Suboptimal evaluation of bowel loops and abdominal organs due to lack of intravenous and oral contrast. THIS DOCUMENT HAS BEEN ELECTRONICALLY  SIGNED BY SUSANNA BROOKS MD    Ct Chest Without Contrast    Result Date: 4/8/2019  Chronic bullous change in the left lower lobe. There are no acute findings.  D:  04/08/2019 E:  04/08/2019    This report was finalized on 4/8/2019 4:55 PM by Dr. Regan Vizcaino MD.      Ct Chest Without Contrast    Result Date: 3/18/2019  1. Unchanged acute/chronic postinfectious-inflammatory changes in the lungs. Recommend followup for active pulmonary infection/inflammation. 2. Other nonemergent/incidental findings as described. THIS DOCUMENT HAS BEEN ELECTRONICALLY SIGNED BY SUSANNA BROOKS MD      The following portions of the patient's history were reviewed and updated as appropriate: allergies, current medications, past family history, past medical history, past social history, past surgical history and problem list.         George was seen today for other.    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 Loemli is a 78 year old male with a relapsed follicular lymphoma.  Initially involving cervical lymph nodes post radiation, with a recurrence in the left groin lymph nodes and iliac wing.  He is now 6 months out from completion of radiation therapy and continues to show a very good response and clinically has no evidence of active disease.  He is also had a very good response in terms of his pain that he was experiencing in the left hip.  I will see him back in 6 months.    RECOMMENDATIONS:      Return in about 6 months (around 10/19/2019) for Office Visit.    Milo Christianson MD    Errors in dictation may reflect use of voice recognition software and not all errors in transcription may have been detected prior to signing.

## 2019-09-11 ENCOUNTER — OFFICE VISIT (OUTPATIENT)
Dept: PULMONOLOGY | Facility: CLINIC | Age: 79
End: 2019-09-11

## 2019-09-11 VITALS
DIASTOLIC BLOOD PRESSURE: 90 MMHG | OXYGEN SATURATION: 98 % | BODY MASS INDEX: 31.76 KG/M2 | TEMPERATURE: 99 F | SYSTOLIC BLOOD PRESSURE: 136 MMHG | HEART RATE: 77 BPM | WEIGHT: 186 LBS | HEIGHT: 64 IN | RESPIRATION RATE: 18 BRPM

## 2019-09-11 DIAGNOSIS — J42 CHRONIC BRONCHITIS, UNSPECIFIED CHRONIC BRONCHITIS TYPE (HCC): ICD-10-CM

## 2019-09-11 DIAGNOSIS — C82.55 DIFFUSE FOLLICLE CENTER LYMPHOMA OF LYMPH NODES OF INGUINAL REGION (HCC): Chronic | ICD-10-CM

## 2019-09-11 DIAGNOSIS — J47.9 BRONCHIECTASIS WITHOUT COMPLICATION (HCC): Primary | ICD-10-CM

## 2019-09-11 PROCEDURE — 94010 BREATHING CAPACITY TEST: CPT | Performed by: NURSE PRACTITIONER

## 2019-09-11 PROCEDURE — 99214 OFFICE O/P EST MOD 30 MIN: CPT | Performed by: NURSE PRACTITIONER

## 2019-09-11 NOTE — PROGRESS NOTES
[facility], [place], operated by [facility] Follow up    CHIEF COMPLAINT    Recurrent bronchitis, bronchiectasis of left lower lobe    Subjective   HISTORY OF PRESENT ILLNESS    George Lomeli is a 79 y.o.male here today for routine follow-up on his chronic bronchitis with bronchiectasis.  He continues to have quite a bit of secretion production mostly yellow to green color.  He states he has been on antibiotics since I last seen him.  He also states that they really do not change the consistency or sputum production.    He denies any significant reflux symptoms.  No nocturnal cough.  No dysphasia episodes.  He does have someEsophageal changes on his CT scan.  He denies any seasonal allergy symptoms, postnasal drainage or chronic sinus pressure.  He denies any fevers or chills.  No changes in appetite or weight.    History of enlarged inguinal lymph node with a positive biopsy s/p radiation therapy,as well as prior radiation therapy to his right neck several years ago due to lymphoma.    Patient Active Problem List   Diagnosis   • Sciatica   • Migraine   • Hypertension   • Gout   • Broken neck (CMS/HCC)   •  Possible bladder mass noted on 8/2017 CT, needs outpatient Urology eval   • Abdominal aortic aneurysm with recent repair at [place]   • Follicular lymphoma (CMS/HCC)   • Acute bronchitis due to other specified organisms   • Bronchiectasis without complication (CMS/HCC)       Allergies   Allergen Reactions   • Contrast Dye Nausea And Vomiting       Current Outpatient Medications:   •  allopurinol (ZYLOPRIM) 100 MG tablet, Take 100 mg by mouth Daily., Disp: , Rfl:   •  amLODIPine (NORVASC) 5 MG tablet, Take 5 mg by mouth Daily., Disp: , Rfl:   •  aspirin 81 MG EC tablet, Take 81 mg by mouth Daily., Disp: , Rfl:   •  carvedilol (COREG) 25 MG tablet, Take 25 mg by mouth 2 (Two) Times a Day With Meals., Disp: , Rfl:   •  levothyroxine (SYNTHROID, LEVOTHROID) 25 MCG tablet, Take 25 mcg by mouth Daily., Disp: , Rfl:   •  lisinopril  "(PRINIVIL,ZESTRIL) 40 MG tablet, Take 40 mg by mouth Daily., Disp: , Rfl:   •  ondansetron (ZOFRAN) 4 MG tablet, Take 1 tablet by mouth Every 8 (Eight) Hours As Needed for Nausea or Vomiting., Disp: 10 tablet, Rfl: 0  •  oxyCODONE-acetaminophen (PERCOCET)  MG per tablet, Take 1 tablet by mouth Every 6 (Six) Hours As Needed for Moderate Pain ., Disp: , Rfl:   •  potassium chloride (K-DUR) 10 MEQ CR tablet, Take 10 mEq by mouth Take As Directed., Disp: , Rfl:   •  tamsulosin (FLOMAX) 0.4 MG capsule 24 hr capsule, Take 1 capsule by mouth Every Night., Disp: , Rfl:   MEDICATION LIST AND ALLERGIES REVIEWED.    Social History     Tobacco Use   • Smoking status: Former Smoker     Packs/day: 1.00     Years: 15.00     Pack years: 15.00     Types: Cigarettes     Last attempt to quit:      Years since quittin.7   • Smokeless tobacco: Never Used   • Tobacco comment: QUIT 30 YEARS AGO    Substance Use Topics   • Alcohol use: No   • Drug use: No       FAMILY AND SOCIAL HISTORY REVIEWED.    Review of Systems   Constitutional: Negative for chills, fatigue, fever and unexpected weight change.   HENT: Negative for congestion, nosebleeds, postnasal drip, rhinorrhea, sinus pressure and trouble swallowing.    Respiratory: Positive for cough. Negative for chest tightness, shortness of breath and wheezing.    Cardiovascular: Negative for chest pain and leg swelling.   Gastrointestinal: Negative for abdominal pain, constipation, diarrhea, nausea and vomiting.   Genitourinary: Negative for dysuria, frequency, hematuria and urgency.   Musculoskeletal: Negative for myalgias.   Neurological: Negative for dizziness, weakness, numbness and headaches.   All other systems reviewed and are negative.  .  Objective   /90 (BP Location: Left arm, Patient Position: Sitting, Cuff Size: Adult)   Pulse 77   Temp 99 °F (37.2 °C)   Resp 18   Ht 162.6 cm (64\")   Wt 84.4 kg (186 lb)   SpO2 98% Comment: room air at rest  BMI 31.93 " kg/m²     Immunization History   Administered Date(s) Administered   • Flu Vaccine High Dose PF 65YR+ 10/07/2018   • Influenza, Unspecified 11/01/2017   • Pneumococcal Polysaccharide (PPSV23) 12/12/2016       Physical Exam   Constitutional: He is oriented to person, place, and time. He appears well-developed and well-nourished.   HENT:   Head: Normocephalic and atraumatic.   Eyes: EOM are normal. Pupils are equal, round, and reactive to light.   Neck: Normal range of motion. Neck supple.   Cardiovascular: Normal rate and regular rhythm.   No murmur heard.  Pulmonary/Chest: Effort normal. No respiratory distress. He has no wheezes. He has rales.   Abdominal: Soft. Bowel sounds are normal. He exhibits no distension.   Musculoskeletal: Normal range of motion. He exhibits no edema.   Neurological: He is alert and oriented to person, place, and time.   Skin: Skin is warm and dry. No erythema.   Psychiatric: He has a normal mood and affect. His behavior is normal.   Vitals reviewed.        RESULTS    PFTS in the office today, read by me:  No obstruction likely some restriction, no total lung capacity is today.  FVC is 2.20, 68% predicted  No significant changes from August 2018      EXAM:    CT Chest Without Contrast      EXAM DATE/TIME:    3/18/2019 6:02 PM      CLINICAL HISTORY:    78 years old, male; Signs and symptoms; Cough; Additional info: Persistent   cough, history of bronchiectasis      TECHNIQUE:    Axial computed tomography images of the chest without intravenous contrast.    All CT scans at this facility use at least one of these dose optimization   techniques: automated exposure control; mA and/or kV adjustment per patient   size (includes targeted exams where dose is matched to clinical indication); or   iterative reconstruction.    Coronal reformatted images were created and reviewed.      COMPARISON:    CT CHEST WO CONTRAST 11/27/2018 11:44 AM      FINDINGS:     LUNGS/PLEURA: No significant change in  cystic changes/cystic bronchiectasis   in the LEFT lower lobe. Less few small scattered bullae in the lungs   bilaterally. Peribronchial thickening with narrowing of the central and   peripheral airways without obstructive endobronchial mass or abnormality. Trace   RIGHT pleural effusion/thickening without pneumothorax.     .     MEDIASTINUM: Mild cardiomegaly with trace pericardial effusion. Calcified   mediastinal and hilar lymph nodes without bulky mediastinal or hilar lymph node   enlargement. Unchanged descending thoracic aortic aneurysm measuring   approximately 3.5 cm. Mildly distended fluid/air containing esophagus with mild   wall thickening suggestive of esophagitis/reflux.     .     OTHER STRUCTURES: Osteopenia, chronic degenerative changes in the visualized   spine and shoulder joints.      IMPRESSION:  1. Unchanged acute/chronic postinfectious-inflammatory changes in the lungs.   Recommend followup for active pulmonary infection/inflammation.   2. Other nonemergent/incidental findings as described.      THIS DOCUMENT HAS BEEN ELECTRONICALLY SIGNED BY SUSANNA BROOKS MD      Assessment/Plan     PROBLEM LIST    Problem List Items Addressed This Visit        Respiratory    Bronchiectasis without complication (CMS/HCC) - Primary    Relevant Orders    Spirometry Without Bronchodilator (Completed)       Hematopoietic and Hemostatic    Follicular lymphoma (CMS/HCC) (Chronic)      Other Visit Diagnoses     Chronic bronchitis, unspecified chronic bronchitis type (CMS/HCC)                DISCUSSION    We went over his testing today in the office including his CT scan of the chest and PFTs.  There is been no changes in the chronic cystic changes and bronchiectasis of the left lower lobe.    We did discuss with his bronchiectasis that he said he would likely have chronic sputum production.  The best option is for secretion clearance.  He is been hesitant to try inhalers in the past however currently he is agreeable to  use some nebulizers.    I gave him a prescription for a nebulizer machine to fill out a DME for insurance coverage.  Also gave him some samples of albuterol for his nebulizer with instructions to take them at least once daily in the morning to help with secretion clearance and then 2-3 times a day as needed.  He may also benefit from a flutter valve in the future.    He may also benefit from evaluation with gastroenterology for his mild esophageal wall thickening to rule out esophagitis and reflux.  Will defer that to his primary care.    Follow-up in 3 months to see how the nebulizers helped his secretions.    I spent 25 minutes with the patient. I spent > 50% percent of this time counseling and discussing diagnostic testing, evaluation, current status, treatment options and management.    Bibiana Zavaleta, APRN  09/11/20199:39 AM  Electronically signed     Please note that portions of this note were completed with a voice recognition program. Efforts were made to edit the dictations, but occasionally words are mistranscribed.      CC: Kaycee Douglas MD

## 2019-10-17 ENCOUNTER — LAB (OUTPATIENT)
Dept: LAB | Facility: HOSPITAL | Age: 79
End: 2019-10-17

## 2019-10-17 ENCOUNTER — OFFICE VISIT (OUTPATIENT)
Dept: ONCOLOGY | Facility: CLINIC | Age: 79
End: 2019-10-17

## 2019-10-17 VITALS
TEMPERATURE: 97.7 F | HEIGHT: 64 IN | OXYGEN SATURATION: 97 % | DIASTOLIC BLOOD PRESSURE: 88 MMHG | HEART RATE: 84 BPM | BODY MASS INDEX: 31.76 KG/M2 | SYSTOLIC BLOOD PRESSURE: 142 MMHG | WEIGHT: 186 LBS | RESPIRATION RATE: 16 BRPM

## 2019-10-17 DIAGNOSIS — C82.55 DIFFUSE FOLLICLE CENTER LYMPHOMA OF LYMPH NODES OF INGUINAL REGION (HCC): ICD-10-CM

## 2019-10-17 DIAGNOSIS — C82.55 DIFFUSE FOLLICLE CENTER LYMPHOMA OF LYMPH NODES OF INGUINAL REGION (HCC): Primary | Chronic | ICD-10-CM

## 2019-10-17 LAB
ALBUMIN SERPL-MCNC: 4.6 G/DL (ref 3.5–5.2)
ALBUMIN/GLOB SERPL: 1.4 G/DL
ALP SERPL-CCNC: 123 U/L (ref 39–117)
ALT SERPL W P-5'-P-CCNC: 10 U/L (ref 1–41)
ANION GAP SERPL CALCULATED.3IONS-SCNC: 8 MMOL/L (ref 5–15)
AST SERPL-CCNC: 16 U/L (ref 1–40)
BILIRUB SERPL-MCNC: 0.5 MG/DL (ref 0.2–1.2)
BUN BLD-MCNC: 7 MG/DL (ref 8–23)
BUN/CREAT SERPL: 8 (ref 7–25)
CALCIUM SPEC-SCNC: 9.4 MG/DL (ref 8.6–10.5)
CHLORIDE SERPL-SCNC: 105 MMOL/L (ref 98–107)
CO2 SERPL-SCNC: 30 MMOL/L (ref 22–29)
CREAT BLD-MCNC: 0.88 MG/DL (ref 0.76–1.27)
ERYTHROCYTE [DISTWIDTH] IN BLOOD BY AUTOMATED COUNT: 16.3 % (ref 12.3–15.4)
GFR SERPL CREATININE-BSD FRML MDRD: 101 ML/MIN/1.73
GLOBULIN UR ELPH-MCNC: 3.3 GM/DL
GLUCOSE BLD-MCNC: 109 MG/DL (ref 65–99)
HCT VFR BLD AUTO: 38.1 % (ref 37.5–51)
HGB BLD-MCNC: 12.6 G/DL (ref 13–17.7)
LYMPHOCYTES # BLD AUTO: 1.1 10*3/MM3 (ref 0.7–3.1)
LYMPHOCYTES NFR BLD AUTO: 16.4 % (ref 19.6–45.3)
MCH RBC QN AUTO: 30.4 PG (ref 26.6–33)
MCHC RBC AUTO-ENTMCNC: 33.1 G/DL (ref 31.5–35.7)
MCV RBC AUTO: 91.8 FL (ref 79–97)
MONOCYTES # BLD AUTO: 0.6 10*3/MM3 (ref 0.1–0.9)
MONOCYTES NFR BLD AUTO: 8.8 % (ref 5–12)
NEUTROPHILS # BLD AUTO: 5.2 10*3/MM3 (ref 1.7–7)
NEUTROPHILS NFR BLD AUTO: 74.8 % (ref 42.7–76)
PLATELET # BLD AUTO: 256 10*3/MM3 (ref 140–450)
PMV BLD AUTO: 6.5 FL (ref 6–12)
POTASSIUM BLD-SCNC: 3.5 MMOL/L (ref 3.5–5.2)
PROT SERPL-MCNC: 7.9 G/DL (ref 6–8.5)
RBC # BLD AUTO: 4.15 10*6/MM3 (ref 4.14–5.8)
SODIUM BLD-SCNC: 143 MMOL/L (ref 136–145)
WBC NRBC COR # BLD: 6.9 10*3/MM3 (ref 3.4–10.8)

## 2019-10-17 PROCEDURE — 85025 COMPLETE CBC W/AUTO DIFF WBC: CPT

## 2019-10-17 PROCEDURE — 99214 OFFICE O/P EST MOD 30 MIN: CPT | Performed by: INTERNAL MEDICINE

## 2019-10-17 PROCEDURE — 80053 COMPREHEN METABOLIC PANEL: CPT

## 2019-10-17 PROCEDURE — 36415 COLL VENOUS BLD VENIPUNCTURE: CPT

## 2019-10-17 NOTE — PROGRESS NOTES
CHIEF COMPLAINT: Follow-up follicular lymphoma    Problem List:  Oncology/Hematology History    1. Follicular lymphoma:  -  initial consultation 3/12/18: Had been having left inguinal pains and went for scanning that found left groin node.  Needle biopsy of this was called lymphoma CD10 positive and Fish testing positive for translocation (14;18) consistent with follicular lymphoma though could also be seen in diffuse large B cell.  BCL 6 /3q rearranged.  No Myc..  Hence cannot say at this junction the exact subtype of lymphoma.  He has not been having bed drenching night sweats.  No unexplained weight loss.  Has not had any known anemia, thrombocytopenia, leukopenia.  Has not had frequent infections.  CT did not show any splenomegaly or other significant adenopathy.  The left inguinal pain has subsided since the needle biopsy.  He has an abdominal aortic aneurysm that is being watched and had previously been repaired.  Apparently 15+ years ago he was treated at [facility] for lymphoma for which he received right neck radiation and some chemotherapy.  He is not sure of the subtype of lymphoma nor of the type of chemotherapy nor the number of radiation treatments and this was far enough back that our records have been expunged.          Follicular lymphoma (CMS/HCC)    3/12/2018 Initial Diagnosis     Follicular lymphoma         3/15/2018 Imaging     PET     IMPRESSION:     Multiple hypermetabolic abnormalities are identified but the  distribution is unusual. Although there is bulky hypermetabolic rishi  mass in the left inguinal area and in the right internal iliac rishi  chain certainly consistent with active malignant lymphoma, there is an  intense abnormal area of tracer uptake and hypermetabolic activity  throughout the marrow or intramedullary portion of the left ilium and  iliac wing in the absence of cortical destruction. This is an intensely  hypermetabolic abnormal lengthy intraosseous abnormality.      The lower extremity dedicated datasets are negative.     Intermediate lymph nodes are identified in the medial inguinal region  which are not above malignant threshold.     Above the pelvis, there is no evidence of additional hypermetabolic  focus or active neoplastic disease, and the chest, mediastinum and neck  are clear.         6/7/2018 Imaging     MRI lumbar spine IMPRESSION:    Study is limited by susceptibility artifact greatest at L1-L2.       No definite significant central canal stenosis.       Moderate to severe right and moderate left foraminal stenosis at L3-L4.    Moderate to severe bilateral foraminal stenosis at L4-L5.         7/24/2018 Imaging     CT chest IMPRESSION:  There are no acute findings. Specifically, there is no  axillary, mediastinal or hilar lymphadenopathy. There is bronchiectasis  in the medial segment of the left lower lobe. This was also present on  CT/PET examination of 03/15/2018.  CT abdomen and pelvis IMPRESSION:  Left inguinal lymphadenopathy, unchanged when compared to a  whole-body PET CT scan of 03/15/2018.         9/6/2018 Imaging     MRI pelvis  1. Decreasing size of patient's left inguinal mass.  2. Interval development of diffuse marrow edema and enhancement,  practically throughout the left ilium, and adjacent soft tissue disease  deep to the left iliacus muscle, raising suspicion for lymphoma.         9/27/2018 - 10/17/2018 Radiation     Radiation OncologyTreatment Course:  George Lomeli received 3000 cGy in 15 fractions to left groin & iliac via External Beam Radiation - EBRT.         11/27/2018 Imaging     CT chest abdomen and pelvis  IMPRESSION: Bronchiectasis in the lungs but no significant mediastinal adenopathy or axillary adenopathy or cervical adenopathy.  There is a mildly enlarged left inguinal lymph node. The  node has smooth ovoid margins and has central low density consistent  with a necrotic node. Lastly more superiorly and anteriorly in a region  where  there are surgical clips there is a lentiform mass which is much  much smaller than on the previous examination of 07/24/2018.            HISTORY OF PRESENT ILLNESS:  The patient is a 79 y.o. male, here for follow up on management of follicular lymphoma.  CBC unremarkable with hemoglobin 12.6 and white count 6900 platelets 256,000..  June 2019 labs at [facility] with unremarkable CMP.        Past Medical History:   Diagnosis Date   • AAA (abdominal aortic aneurysm) (CMS/HCC)    • Arthritis    • Broken neck (CMS/HCC)    • Gout    • Hypertension    • Lymphoma (CMS/HCC)    • Migraine    • Sciatica    • Thyroid cancer (CMS/HCC)      Past Surgical History:   Procedure Laterality Date   • BACK SURGERY     • EYE SURGERY     • MULTIPLE TOOTH EXTRACTIONS     • NECK SURGERY      PINS/RODS IN NECK   • THYROIDECTOMY / EXCISION CYST THYROID         Allergies   Allergen Reactions   • Contrast Dye Nausea And Vomiting       Family History and Social History reviewed and changed as necessary      REVIEW OF SYSTEM:   Review of Systems   Constitutional: Negative for appetite change, chills, diaphoresis, fatigue, fever and unexpected weight change.   HENT:   Negative for mouth sores, sore throat and trouble swallowing.    Eyes: Negative for icterus.   Respiratory: Negative for cough, hemoptysis and shortness of breath.    Cardiovascular: Negative for chest pain, leg swelling and palpitations.   Gastrointestinal: Negative for abdominal distention, abdominal pain, blood in stool, constipation, diarrhea, nausea and vomiting.   Endocrine: Negative for hot flashes.   Genitourinary: Negative for bladder incontinence, difficulty urinating, dysuria, frequency and hematuria.    Musculoskeletal: Negative for gait problem, neck pain and neck stiffness.   Skin: Negative for rash.   Neurological: Negative for dizziness, gait problem, headaches, light-headedness and numbness.   Hematological: Negative for adenopathy. Does not  "bruise/bleed easily.   Psychiatric/Behavioral: Negative for depression. The patient is not nervous/anxious.    All other systems reviewed and are negative.       PHYSICAL EXAM    Vitals:    10/17/19 1041   BP: 142/88   Pulse: 84   Resp: 16   Temp: 97.7 °F (36.5 °C)   SpO2: 97%   Weight: 84.4 kg (186 lb)   Height: 162.6 cm (64\")     Constitutional: Appears well-developed and well-nourished. No distress.   ECOG: (0) Fully active, able to carry on all predisease performance without restriction  HENT:   Head: Normocephalic.   Mouth/Throat: Oropharynx is clear and moist.   Eyes: Conjunctivae are normal. Pupils are equal, round, and reactive to light. No scleral icterus.   Neck: Neck supple. No JVD present. No thyromegaly present.   Cardiovascular: Normal rate, regular rhythm and normal heart sounds.    Pulmonary/Chest: Breath sounds normal. No respiratory distress.   Abdominal: Soft. Exhibits no distension and no mass. There is no hepatosplenomegaly. There is no tenderness. There is no rebound and no guarding.   Musculoskeletal:Exhibits no edema, tenderness or deformity.   Neurological: Alert and oriented to person, place, and time. Exhibits normal muscle tone.   Skin: No ecchymosis, no petechiae and no rash noted. Not diaphoretic. No cyanosis. Nails show no clubbing.   Psychiatric: Normal mood and affect.   Vitals reviewed.  No adenopathy in cervical axillary or inguinal regions    Lab Results   Component Value Date    HGB 12.6 (L) 10/17/2019    HCT 38.1 10/17/2019    MCV 91.8 10/17/2019     10/17/2019    WBC 6.90 10/17/2019    NEUTROABS 5.20 10/17/2019    LYMPHSABS 1.10 10/17/2019    MONOSABS 0.60 10/17/2019    EOSABS 0.18 03/18/2019    BASOSABS 0.01 03/18/2019       Lab Results   Component Value Date    GLUCOSE 102 (H) 03/18/2019    BUN 10 03/18/2019    CREATININE 0.84 03/18/2019     03/18/2019    K 3.4 (L) 03/18/2019     03/18/2019    CO2 29.0 03/18/2019    CALCIUM 8.8 03/18/2019    PROTEINTOT 6.2 " 03/18/2019    ALBUMIN 3.68 03/18/2019    BILITOT 0.5 03/18/2019    ALKPHOS 88 03/18/2019    AST 21 03/18/2019    ALT 17 03/18/2019                   ASSESSMENT & PLAN:    1. Follow-up follicular lymphoma: I reviewed his labs which are doing well and he did not do CAT scans on the ninth as ordered.  He is not losing weight or having drenching night sweats and he has no palpable adenopathy on exam and his quality of life is excellent presently.  Hence I am not going to push the issue on getting scans presently but we will do CAT scans prior to return in 6 months which will be about a year since his prior set of scans.  Would not treat unless he had 3 more than 3 cm, one more than 7 cm nodes, or the other GELF criteria such as unexplained bed drenching night sweats/fevers/weight loss/anemia/thrombocytopenia/frequent infections.  He has none of these.  Discussed with patient 27 minutes face-to-face greater than 50% counseling regarding the natural history of follicular lymphoma and the odds that eventually we will need to consider other systemic therapies or perhaps focal radiation has been done in the past to palliate bulky nodes that he otherwise has done well.      Thor Ruggiero MD    10/17/2019

## 2019-10-21 ENCOUNTER — OFFICE VISIT (OUTPATIENT)
Dept: RADIATION ONCOLOGY | Facility: HOSPITAL | Age: 79
End: 2019-10-21

## 2019-10-21 ENCOUNTER — HOSPITAL ENCOUNTER (OUTPATIENT)
Dept: RADIATION ONCOLOGY | Facility: HOSPITAL | Age: 79
Setting detail: RADIATION/ONCOLOGY SERIES
Discharge: HOME | End: 2019-10-21

## 2019-10-21 VITALS
BODY MASS INDEX: 26.4 KG/M2 | HEIGHT: 70 IN | TEMPERATURE: 97 F | OXYGEN SATURATION: 97 % | RESPIRATION RATE: 17 BRPM | SYSTOLIC BLOOD PRESSURE: 124 MMHG | DIASTOLIC BLOOD PRESSURE: 72 MMHG | WEIGHT: 184.4 LBS | HEART RATE: 82 BPM

## 2019-10-21 DIAGNOSIS — C82.55 DIFFUSE FOLLICLE CENTER LYMPHOMA OF LYMPH NODES OF INGUINAL REGION (HCC): Primary | Chronic | ICD-10-CM

## 2019-10-21 PROCEDURE — G0463 HOSPITAL OUTPT CLINIC VISIT: HCPCS

## 2019-10-21 NOTE — PROGRESS NOTES
FOLLOW UP NOTE    PATIENT:                                                      George Lomeli  MEDICAL RECORD #:                        [id number]  :                                                          1940  COMPLETION DATE:   10/17/2018  DIAGNOSIS:     Cancer Staging  Follicular lymphoma (CMS/[facility])  Staging form: Hodgkin And Non-Hodgkin Lymphoma, AJCC 8th Edition  - Clinical: Stage IV - Signed by Thor Ruggiero MD on 3/19/2018    BRIEF HISTORY:  Mr. 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  Although he had more advanced disease by imaging, it was all located within the left cecile-pelvis.  He received involved field radiation to a dose of 30 Gy directed to the left groin and iliac bone completing in 2018 and has been without evidence of recurrent disease since that time.  He states that he has done well over the past several months, without any new symptoms of pain, swelling, night sweats, fatigue, or fevers.  He was recently supposed to have repeat scans but due to a few deaths in the family, he missed these appointments.  Dr. Ruggiero has re-ordered his scans and he will see Dr. Ruggiero again in 2020.    MEDICATIONS: Medication reconciliation for the patient was reviewed and confirmed in the electronic medical record.    Review of Systems   Constitutional: Negative.    HENT:  Negative.    Eyes: Negative.    Respiratory: Negative.    Cardiovascular: Negative.    Gastrointestinal: Negative.    Endocrine: Negative.    Genitourinary: Negative.     Musculoskeletal: Positive for arthralgias, back pain, neck pain and neck stiffness.   Skin: Negative.    Neurological: Negative.    Hematological: Negative.    Psychiatric/Behavioral: Negative.        KPS 80%    Physical Exam   Constitutional: He is oriented to person, place, and time. He appears well-developed and well-nourished. No distress.   HENT:  "  Head: Normocephalic and atraumatic.   Mouth/Throat: Oropharynx is clear and moist.   Eyes: Conjunctivae and EOM are normal. Pupils are equal, round, and reactive to light.   Neck: Normal range of motion. Neck supple.   Cardiovascular: Normal rate and regular rhythm. Exam reveals no friction rub.   No murmur heard.  Pulmonary/Chest: Effort normal and breath sounds normal. He has no wheezes.   No palpable axillary adenopathy   Abdominal: Soft. Bowel sounds are normal. He exhibits no distension and no mass. There is no tenderness.   Genitourinary:   Genitourinary Comments: No palpable inguinal adenopathy   Musculoskeletal: Normal range of motion. He exhibits no edema.   No focal bony tenderness to palpation   Lymphadenopathy:     He has no cervical adenopathy.   Neurological: He is alert and oriented to person, place, and time.   Skin: Skin is warm and dry.   Psychiatric: He has a normal mood and affect. His behavior is normal. Judgment and thought content normal.   Nursing note and vitals reviewed.      VITAL SIGNS:   Vitals:    10/21/19 0954   BP: 124/72   Pulse: 82   Resp: 17   Temp: 97 °F (36.1 °C)   TempSrc: Temporal   SpO2: 97%  Comment: RA   Weight: 83.6 kg (184 lb 6.4 oz)   Height: 177.8 cm (70\")   PainSc: 0-No pain       The following portions of the patient's history were reviewed and updated as appropriate: allergies, current medications, past family history, past medical history, past social history, past surgical history and problem list.         George was seen today for lymphoma.    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 Lomeli is a 79 year old male with a history of recurrent follicular lymphoma who completed radiation treatments 1 year ago for an inguinal recurrence.  He continues to do well and has no clinical evidence of recurrent disease.  As mentioned, he is scheduled for a repeat scan and " follow-up with Dr. Ruggiero in 6 months.  I will see him back in 1 year.    RECOMMENDATIONS:      Return in about 1 year (around 10/21/2020) for Office Visit.    Milo Christianson MD    Errors in dictation may reflect use of voice recognition software and not all errors in transcription may have been detected prior to signing.

## 2019-11-11 RX ORDER — ALBUTEROL SULFATE 2.5 MG/3ML
2.5 SOLUTION RESPIRATORY (INHALATION) 4 TIMES DAILY PRN
Qty: 125 VIAL | Refills: 3 | Status: SHIPPED | OUTPATIENT
Start: 2019-11-11 | End: 2020-06-08 | Stop reason: SDUPTHER

## 2019-11-11 NOTE — TELEPHONE ENCOUNTER
Pt called requesting for Rx Albuterol Neb Sol be sent to [facility]. Reordered Rx Albuterol Neb Sol per chart to [facility] as requested. Pt notified and verbalized understanding.

## 2019-12-10 ENCOUNTER — OFFICE VISIT (OUTPATIENT)
Dept: PULMONOLOGY | Facility: CLINIC | Age: 79
End: 2019-12-10

## 2019-12-10 VITALS
WEIGHT: 189 LBS | DIASTOLIC BLOOD PRESSURE: 80 MMHG | HEART RATE: 73 BPM | BODY MASS INDEX: 27.12 KG/M2 | TEMPERATURE: 97.5 F | OXYGEN SATURATION: 96 % | SYSTOLIC BLOOD PRESSURE: 124 MMHG

## 2019-12-10 DIAGNOSIS — J47.9 BRONCHIECTASIS WITHOUT COMPLICATION (HCC): Primary | ICD-10-CM

## 2019-12-10 PROBLEM — J20.8 ACUTE BRONCHITIS DUE TO OTHER SPECIFIED ORGANISMS: Status: RESOLVED | Noted: 2018-11-23 | Resolved: 2019-12-10

## 2019-12-10 PROCEDURE — 99213 OFFICE O/P EST LOW 20 MIN: CPT | Performed by: NURSE PRACTITIONER

## 2019-12-10 NOTE — PROGRESS NOTES
[facility] Follow up    CHIEF COMPLAINT    Bronchiectasis, chronic cough    Subjective   HISTORY OF PRESENT ILLNESS    George Lomeli is a 79 y.o.male here today for routine follow-up on his bronchiectasis.  Currently he has been using his nebulizers a couple times a day help with secretion clearance.  He has had no acute illnesses or exacerbations.    He has felt like he is done a bit better since we started on this at his last visit.    He has had no other changes in his health history.  He continues to follow with Dr. Ruggiero with his lymphoma.    Some chronic joint pain secondary to some injuries in the past.  Otherwise he gets around pretty well.        Patient Active Problem List   Diagnosis   • Sciatica   • Migraine   • Hypertension   • Gout   • Broken neck (CMS/HCC)   •  Possible bladder mass noted on 8/2017 CT, needs outpatient Urology eval   • Abdominal aortic aneurysm with recent repair at [place]   • Follicular lymphoma (CMS/HCC)   • Bronchiectasis without complication (CMS/HCC)       Allergies   Allergen Reactions   • Contrast Dye Nausea And Vomiting       Current Outpatient Medications:   •  albuterol (PROVENTIL) (2.5 MG/3ML) 0.083% nebulizer solution, Take 2.5 mg by nebulization 4 (Four) Times a Day As Needed for Wheezing., Disp: 125 vial, Rfl: 3  •  allopurinol (ZYLOPRIM) 100 MG tablet, Take 100 mg by mouth Daily., Disp: , Rfl:   •  amLODIPine (NORVASC) 5 MG tablet, Take 5 mg by mouth Daily., Disp: , Rfl:   •  aspirin 81 MG EC tablet, Take 81 mg by mouth Daily., Disp: , Rfl:   •  carvedilol (COREG) 25 MG tablet, Take 25 mg by mouth 2 (Two) Times a Day With Meals., Disp: , Rfl:   •  levothyroxine (SYNTHROID, LEVOTHROID) 25 MCG tablet, Take 25 mcg by mouth Daily., Disp: , Rfl:   •  lisinopril (PRINIVIL,ZESTRIL) 40 MG tablet, Take 40 mg by mouth Daily., Disp: , Rfl:   •  ondansetron (ZOFRAN) 4 MG tablet, Take 1 tablet by mouth Every 8 (Eight) Hours As Needed for Nausea or Vomiting., Disp: 10 tablet,  Rfl: 0  •  oxyCODONE-acetaminophen (PERCOCET)  MG per tablet, Take 1 tablet by mouth Every 6 (Six) Hours As Needed for Moderate Pain ., Disp: , Rfl:   •  potassium chloride (K-DUR) 10 MEQ CR tablet, Take 10 mEq by mouth Take As Directed., Disp: , Rfl:   •  tamsulosin (FLOMAX) 0.4 MG capsule 24 hr capsule, Take 1 capsule by mouth Every Night., Disp: , Rfl:   MEDICATION LIST AND ALLERGIES REVIEWED.    Social History     Tobacco Use   • Smoking status: Former Smoker     Packs/day: 1.00     Years: 15.00     Pack years: 15.00     Types: Cigarettes     Last attempt to quit:      Years since quittin.9   • Smokeless tobacco: Never Used   • Tobacco comment: QUIT 30 YEARS AGO    Substance Use Topics   • Alcohol use: No   • Drug use: No       FAMILY AND SOCIAL HISTORY REVIEWED.    Review of Systems   Constitutional: Negative for chills, fatigue, fever and unexpected weight change.   HENT: Negative for congestion, nosebleeds, postnasal drip, rhinorrhea, sinus pressure and trouble swallowing.    Respiratory: Negative for cough, chest tightness, shortness of breath and wheezing.    Cardiovascular: Negative for chest pain and leg swelling.   Gastrointestinal: Negative for abdominal pain, constipation, diarrhea, nausea and vomiting.   Genitourinary: Negative for dysuria, frequency, hematuria and urgency.   Musculoskeletal: Positive for arthralgias and back pain. Negative for myalgias.   Neurological: Negative for dizziness, weakness, numbness and headaches.   All other systems reviewed and are negative.  .  Objective   /80   Pulse 73   Temp 97.5 °F (36.4 °C)   Wt 85.7 kg (189 lb)   SpO2 96% Comment: resting, room air  BMI 27.12 kg/m²     Immunization History   Administered Date(s) Administered   • Flu Vaccine Quad PF >36MO 2017   • Fluzone High Dose =>65 Years ([employer] ONLY) 10/07/2018, 10/07/2019   • Influenza, Unspecified 2017   • Pneumococcal Polysaccharide (PPSV23) 2016       Physical  Exam   Constitutional: He is oriented to person, place, and time. He appears well-developed and well-nourished.   HENT:   Head: Normocephalic and atraumatic.   Eyes: Pupils are equal, round, and reactive to light. EOM are normal.   Neck: Normal range of motion. Neck supple.   Cardiovascular: Normal rate and regular rhythm.   No murmur heard.  Pulmonary/Chest: Effort normal. No respiratory distress. He has no wheezes. He has rales.   Mild rales bilaterally   Abdominal: Soft. Bowel sounds are normal. He exhibits no distension.   Musculoskeletal: Normal range of motion. He exhibits no edema.   Neurological: He is alert and oriented to person, place, and time.   Skin: Skin is warm and dry. No erythema.   Psychiatric: He has a normal mood and affect. His behavior is normal.   Vitals reviewed.        RESULTS          Assessment/Plan     PROBLEM LIST    Problem List Items Addressed This Visit        Respiratory    Bronchiectasis without complication (CMS/HCC) - Primary            DISCUSSION    He is doing quite well currently on his nebulizers 1-2 times a day help with secretion clearance.  I stressed the importance of continued use of to help avoid any acute illnesses or exacerbations.    Routine follow-up in 6 months or sooner if he has any issues.    I spent 15 minutes with the patient. I spent > 50% percent of this time counseling and discussing evaluation, current status and management.    Bibiana Zavaleta, APRN  12/10/73444:06 PM  Electronically signed     Please note that portions of this note were completed with a voice recognition program. Efforts were made to edit the dictations, but occasionally words are mistranscribed.      CC: Kaycee Douglas MD

## 2020-01-30 ENCOUNTER — APPOINTMENT (OUTPATIENT)
Dept: GENERAL RADIOLOGY | Facility: HOSPITAL | Age: 80
End: 2020-01-30

## 2020-01-30 ENCOUNTER — HOSPITAL ENCOUNTER (EMERGENCY)
Facility: HOSPITAL | Age: 80
Discharge: HOME | End: 2020-01-30
Attending: EMERGENCY MEDICINE | Admitting: EMERGENCY MEDICINE

## 2020-01-30 VITALS
WEIGHT: 185 LBS | HEIGHT: 70 IN | TEMPERATURE: 98.7 F | BODY MASS INDEX: 26.48 KG/M2 | DIASTOLIC BLOOD PRESSURE: 92 MMHG | OXYGEN SATURATION: 96 % | SYSTOLIC BLOOD PRESSURE: 169 MMHG | RESPIRATION RATE: 16 BRPM | HEART RATE: 73 BPM

## 2020-01-30 DIAGNOSIS — J44.1 ACUTE EXACERBATION OF CHRONIC OBSTRUCTIVE PULMONARY DISEASE (COPD) (HCC): Primary | ICD-10-CM

## 2020-01-30 DIAGNOSIS — J40 BRONCHITIS: ICD-10-CM

## 2020-01-30 LAB
ALBUMIN SERPL-MCNC: 3.9 G/DL (ref 3.5–5.2)
ALBUMIN/GLOB SERPL: 1.1 G/DL
ALP SERPL-CCNC: 116 U/L (ref 39–117)
ALT SERPL W P-5'-P-CCNC: 8 U/L (ref 1–41)
ANION GAP SERPL CALCULATED.3IONS-SCNC: 10 MMOL/L (ref 5–15)
AST SERPL-CCNC: 13 U/L (ref 1–40)
BASOPHILS # BLD AUTO: 0.03 10*3/MM3 (ref 0–0.2)
BASOPHILS NFR BLD AUTO: 0.4 % (ref 0–1.5)
BILIRUB SERPL-MCNC: 0.5 MG/DL (ref 0.2–1.2)
BUN BLD-MCNC: 10 MG/DL (ref 8–23)
BUN/CREAT SERPL: 10.5 (ref 7–25)
CALCIUM SPEC-SCNC: 9.1 MG/DL (ref 8.6–10.5)
CHLORIDE SERPL-SCNC: 106 MMOL/L (ref 98–107)
CO2 SERPL-SCNC: 27 MMOL/L (ref 22–29)
CREAT BLD-MCNC: 0.95 MG/DL (ref 0.76–1.27)
DEPRECATED RDW RBC AUTO: 52.1 FL (ref 37–54)
EOSINOPHIL # BLD AUTO: 0.34 10*3/MM3 (ref 0–0.4)
EOSINOPHIL NFR BLD AUTO: 4.1 % (ref 0.3–6.2)
ERYTHROCYTE [DISTWIDTH] IN BLOOD BY AUTOMATED COUNT: 14.9 % (ref 12.3–15.4)
GFR SERPL CREATININE-BSD FRML MDRD: 93 ML/MIN/1.73
GLOBULIN UR ELPH-MCNC: 3.5 GM/DL
GLUCOSE BLD-MCNC: 125 MG/DL (ref 65–99)
HCT VFR BLD AUTO: 37.9 % (ref 37.5–51)
HGB BLD-MCNC: 12.2 G/DL (ref 13–17.7)
HOLD SPECIMEN: NORMAL
HOLD SPECIMEN: NORMAL
IMM GRANULOCYTES # BLD AUTO: 0.02 10*3/MM3 (ref 0–0.05)
IMM GRANULOCYTES NFR BLD AUTO: 0.2 % (ref 0–0.5)
LYMPHOCYTES # BLD AUTO: 1.06 10*3/MM3 (ref 0.7–3.1)
LYMPHOCYTES NFR BLD AUTO: 12.8 % (ref 19.6–45.3)
MCH RBC QN AUTO: 30.5 PG (ref 26.6–33)
MCHC RBC AUTO-ENTMCNC: 32.2 G/DL (ref 31.5–35.7)
MCV RBC AUTO: 94.8 FL (ref 79–97)
MONOCYTES # BLD AUTO: 0.76 10*3/MM3 (ref 0.1–0.9)
MONOCYTES NFR BLD AUTO: 9.2 % (ref 5–12)
NEUTROPHILS # BLD AUTO: 6.06 10*3/MM3 (ref 1.7–7)
NEUTROPHILS NFR BLD AUTO: 73.3 % (ref 42.7–76)
NRBC BLD AUTO-RTO: 0 /100 WBC (ref 0–0.2)
PLATELET # BLD AUTO: 217 10*3/MM3 (ref 140–450)
PMV BLD AUTO: 10.6 FL (ref 6–12)
POTASSIUM BLD-SCNC: 3.3 MMOL/L (ref 3.5–5.2)
PROT SERPL-MCNC: 7.4 G/DL (ref 6–8.5)
RBC # BLD AUTO: 4 10*6/MM3 (ref 4.14–5.8)
SODIUM BLD-SCNC: 143 MMOL/L (ref 136–145)
WBC NRBC COR # BLD: 8.27 10*3/MM3 (ref 3.4–10.8)
WHOLE BLOOD HOLD SPECIMEN: NORMAL
WHOLE BLOOD HOLD SPECIMEN: NORMAL

## 2020-01-30 PROCEDURE — 94640 AIRWAY INHALATION TREATMENT: CPT

## 2020-01-30 PROCEDURE — 99283 EMERGENCY DEPT VISIT LOW MDM: CPT

## 2020-01-30 PROCEDURE — 71046 X-RAY EXAM CHEST 2 VIEWS: CPT

## 2020-01-30 PROCEDURE — 80053 COMPREHEN METABOLIC PANEL: CPT

## 2020-01-30 PROCEDURE — 85025 COMPLETE CBC W/AUTO DIFF WBC: CPT

## 2020-01-30 PROCEDURE — 94799 UNLISTED PULMONARY SVC/PX: CPT

## 2020-01-30 RX ORDER — IPRATROPIUM BROMIDE AND ALBUTEROL SULFATE 2.5; .5 MG/3ML; MG/3ML
3 SOLUTION RESPIRATORY (INHALATION) ONCE
Status: COMPLETED | OUTPATIENT
Start: 2020-01-30 | End: 2020-01-30

## 2020-01-30 RX ORDER — METHYLPREDNISOLONE 4 MG/1
TABLET ORAL
Qty: 21 TABLET | Refills: 0 | Status: SHIPPED | OUTPATIENT
Start: 2020-01-30 | End: 2020-03-12

## 2020-01-30 RX ORDER — DOXYCYCLINE 100 MG/1
100 CAPSULE ORAL 2 TIMES DAILY
Qty: 20 CAPSULE | Refills: 0 | Status: SHIPPED | OUTPATIENT
Start: 2020-01-30 | End: 2020-03-12

## 2020-01-30 RX ADMIN — IPRATROPIUM BROMIDE AND ALBUTEROL SULFATE 3 ML: 2.5; .5 SOLUTION RESPIRATORY (INHALATION) at 19:18

## 2020-03-12 ENCOUNTER — OFFICE VISIT (OUTPATIENT)
Dept: PULMONOLOGY | Facility: CLINIC | Age: 80
End: 2020-03-12

## 2020-03-12 ENCOUNTER — TELEPHONE (OUTPATIENT)
Dept: PULMONOLOGY | Facility: CLINIC | Age: 80
End: 2020-03-12

## 2020-03-12 VITALS
TEMPERATURE: 98.5 F | DIASTOLIC BLOOD PRESSURE: 82 MMHG | BODY MASS INDEX: 27.35 KG/M2 | HEIGHT: 70 IN | SYSTOLIC BLOOD PRESSURE: 146 MMHG | OXYGEN SATURATION: 97 % | HEART RATE: 85 BPM | WEIGHT: 191 LBS

## 2020-03-12 DIAGNOSIS — J47.1 BRONCHIECTASIS WITH ACUTE EXACERBATION (HCC): Primary | ICD-10-CM

## 2020-03-12 PROCEDURE — 99213 OFFICE O/P EST LOW 20 MIN: CPT | Performed by: NURSE PRACTITIONER

## 2020-03-12 RX ORDER — AMOXICILLIN AND CLAVULANATE POTASSIUM 875; 125 MG/1; MG/1
1 TABLET, FILM COATED ORAL 2 TIMES DAILY
Qty: 20 TABLET | Refills: 0 | Status: SHIPPED | OUTPATIENT
Start: 2020-03-12 | End: 2020-05-19

## 2020-03-12 RX ORDER — PREDNISONE 10 MG/1
TABLET ORAL
Qty: 31 TABLET | Refills: 0 | Status: SHIPPED | OUTPATIENT
Start: 2020-03-12 | End: 2020-05-19

## 2020-03-12 NOTE — PROGRESS NOTES
[facility] Follow up    CHIEF COMPLAINT    Cough and congestion     Subjective   HISTORY OF PRESENT ILLNESS    George Lomeli is a 79 y.o.male here today for acute onset of worsening cough and congestion.  He denies any fevers but had a bit of night sweats last night.  He has had a cough for a few days but last night and this morning it was worse.  He complains of a sore throat.  His cough is productive with a yellow-colored sputum.    He does complain of some chest tightness and wheezing.    He completed a course of doxy and prednisone in January.       Patient Active Problem List   Diagnosis   • Sciatica   • Migraine   • Hypertension   • Gout   • Broken neck (CMS/HCC)   •  Possible bladder mass noted on 8/2017 CT, needs outpatient Urology eval   • Abdominal aortic aneurysm with recent repair at [place]   • Follicular lymphoma (CMS/HCC)   • Bronchiectasis with acute exacerbation (CMS/HCC)       Allergies   Allergen Reactions   • Contrast Dye Nausea And Vomiting       Current Outpatient Medications:   •  allopurinol (ZYLOPRIM) 100 MG tablet, Take 100 mg by mouth Daily., Disp: , Rfl:   •  amLODIPine (NORVASC) 5 MG tablet, Take 5 mg by mouth Daily., Disp: , Rfl:   •  aspirin 81 MG EC tablet, Take 81 mg by mouth Daily., Disp: , Rfl:   •  carvedilol (COREG) 25 MG tablet, Take 25 mg by mouth 2 (Two) Times a Day With Meals., Disp: , Rfl:   •  levothyroxine (SYNTHROID, LEVOTHROID) 25 MCG tablet, Take 25 mcg by mouth Daily., Disp: , Rfl:   •  lisinopril (PRINIVIL,ZESTRIL) 40 MG tablet, Take 40 mg by mouth Daily., Disp: , Rfl:   •  ondansetron (ZOFRAN) 4 MG tablet, Take 1 tablet by mouth Every 8 (Eight) Hours As Needed for Nausea or Vomiting., Disp: 10 tablet, Rfl: 0  •  oxyCODONE-acetaminophen (PERCOCET)  MG per tablet, Take 1 tablet by mouth Every 6 (Six) Hours As Needed for Moderate Pain ., Disp: , Rfl:   •  potassium chloride (K-DUR) 10 MEQ CR tablet, Take 10 mEq by mouth Take As Directed., Disp: , Rfl:   •   tamsulosin (FLOMAX) 0.4 MG capsule 24 hr capsule, Take 1 capsule by mouth Every Night., Disp: , Rfl:   •  albuterol (PROVENTIL) (2.5 MG/3ML) 0.083% nebulizer solution, Take 2.5 mg by nebulization 4 (Four) Times a Day As Needed for Wheezing., Disp: 125 vial, Rfl: 3  •  amoxicillin-clavulanate (AUGMENTIN) 875-125 MG per tablet, Take 1 tablet by mouth 2 (Two) Times a Day., Disp: 20 tablet, Rfl: 0  •  HYDROcod Polst-CPM Polst ER (TUSSIONEX PENNKINETIC ER) 10-8 MG/5ML ER suspension, Take 5 mL by mouth Every 12 (Twelve) Hours As Needed for Cough for up to 3 days., Disp: 20 mL, Rfl: 0  •  predniSONE (DELTASONE) 10 MG tablet, Take 4 tabs daily x 3 days, then take 3 tabs daily x 3 days, then take 2 tabs daily x 3 days, then take 1 tab daily x 3 days, Disp: 31 tablet, Rfl: 0  MEDICATION LIST AND ALLERGIES REVIEWED.    Social History     Tobacco Use   • Smoking status: Former Smoker     Packs/day: 1.00     Years: 15.00     Pack years: 15.00     Types: Cigarettes     Last attempt to quit:      Years since quittin.2   • Smokeless tobacco: Never Used   • Tobacco comment: QUIT 30 YEARS AGO    Substance Use Topics   • Alcohol use: No   • Drug use: No       FAMILY AND SOCIAL HISTORY REVIEWED.    Review of Systems   Constitutional: Positive for diaphoresis. Negative for chills, fatigue, fever and unexpected weight change.   HENT: Positive for congestion, postnasal drip and sore throat. Negative for nosebleeds, rhinorrhea, sinus pressure and trouble swallowing.    Respiratory: Positive for cough, shortness of breath and wheezing. Negative for chest tightness.    Cardiovascular: Negative for chest pain and leg swelling.   Gastrointestinal: Negative for abdominal pain, constipation, diarrhea, nausea and vomiting.   Genitourinary: Negative for dysuria, frequency, hematuria and urgency.   Musculoskeletal: Negative for myalgias.   Neurological: Negative for dizziness, weakness, numbness and headaches.   All other systems reviewed  "and are negative.  .  Objective   /82 (BP Location: Left arm, Patient Position: Sitting, Cuff Size: Adult)   Pulse 85   Temp 98.5 °F (36.9 °C) (Temporal)   Ht 177.8 cm (70\")   Wt 86.6 kg (191 lb)   SpO2 97% Comment: room air seated  BMI 27.41 kg/m²     Immunization History   Administered Date(s) Administered   • Flu Vaccine Quad PF >36MO 11/01/2017   • Fluzone High Dose =>65 Years ([employer] ONLY) 01/12/2018, 10/07/2018, 12/10/2018, 10/09/2019   • Influenza, Unspecified 11/01/2017   • Pneumococcal Polysaccharide (PPSV23) 06/30/2005, 12/12/2016   • Td 09/26/2000       Physical Exam   Constitutional: He is oriented to person, place, and time. He appears well-developed and well-nourished.   HENT:   Head: Normocephalic and atraumatic.   Eyes: Pupils are equal, round, and reactive to light. EOM are normal.   Neck: Normal range of motion. Neck supple.   Cardiovascular: Normal rate and regular rhythm.   No murmur heard.  Pulmonary/Chest: Effort normal. No respiratory distress. He has no wheezes. He has rales.   Abdominal: Soft. Bowel sounds are normal. He exhibits no distension.   Musculoskeletal: Normal range of motion. He exhibits no edema.   Neurological: He is alert and oriented to person, place, and time.   Skin: Skin is warm and dry. No erythema.   Psychiatric: He has a normal mood and affect. His behavior is normal.   Vitals reviewed.        RESULTS          Assessment/Plan     PROBLEM LIST    Problem List Items Addressed This Visit        Respiratory    Bronchiectasis with acute exacerbation (CMS/[facility]) - Primary    Relevant Medications    HYDROcod Polst-CPM Polst ER (TUSSIONEX PENNKINETIC ER) 10-8 MG/5ML ER suspension            DISCUSSION    He does appear to have an acute exacerbation of his bronchiectasis.  At this time he is oxygenating well on room air, and he is afebrile.  I am to give him a course of antibiotics and steroids.  I gave him some samples of DuoNeb's in the office today to use couple " times a day for the next few days to help with secretion clearance.  I gave him a small dose of Tussionex to take the next couple nights to help him rest with his cough.    Follow-up first next week if he has not improved.  If he develops worsening fevers, chills, or respiratory distress he is to report to the emergency department.    I spent 15 minutes face to face with the patient. I spent > 50% percent of this time counseling and discussing diagnostic testing, evaluation, current status, treatment options and management.    Bibiana Zavaleta, YOVANI  03/12/202011:51 AM  Electronically signed     Please note that portions of this note were completed with a voice recognition program. Efforts were made to edit the dictations, but occasionally words are mistranscribed.      CC: Kaycee Douglas MD

## 2020-03-12 NOTE — TELEPHONE ENCOUNTER
Patient called has been sick for over a week been using nebulizers no fever spitting up green  And yellow coughing  Gave to April to schedule patient for appt asap

## 2020-04-14 ENCOUNTER — HOSPITAL ENCOUNTER (OUTPATIENT)
Dept: CT IMAGING | Facility: HOSPITAL | Age: 80
Discharge: HOME | End: 2020-04-14
Admitting: INTERNAL MEDICINE

## 2020-04-14 DIAGNOSIS — C82.55 DIFFUSE FOLLICLE CENTER LYMPHOMA OF LYMPH NODES OF INGUINAL REGION (HCC): Chronic | ICD-10-CM

## 2020-04-14 PROCEDURE — A9270 NON-COVERED ITEM OR SERVICE: HCPCS | Performed by: INTERNAL MEDICINE

## 2020-04-14 PROCEDURE — 71250 CT THORAX DX C-: CPT

## 2020-04-14 PROCEDURE — 63710000001 BARIUM 2 % SUSPENSION: Performed by: INTERNAL MEDICINE

## 2020-04-14 PROCEDURE — 74176 CT ABD & PELVIS W/O CONTRAST: CPT

## 2020-04-14 RX ADMIN — BARIUM SULFATE 450 ML: 21 SUSPENSION ORAL at 14:00

## 2020-04-16 ENCOUNTER — TELEPHONE (OUTPATIENT)
Dept: ONCOLOGY | Facility: CLINIC | Age: 80
End: 2020-04-16

## 2020-04-17 ENCOUNTER — OFFICE VISIT (OUTPATIENT)
Dept: ONCOLOGY | Facility: CLINIC | Age: 80
End: 2020-04-17

## 2020-04-17 ENCOUNTER — LAB (OUTPATIENT)
Dept: LAB | Facility: HOSPITAL | Age: 80
End: 2020-04-17

## 2020-04-17 VITALS
WEIGHT: 187.9 LBS | TEMPERATURE: 98.6 F | BODY MASS INDEX: 26.9 KG/M2 | HEIGHT: 70 IN | OXYGEN SATURATION: 97 % | HEART RATE: 82 BPM | SYSTOLIC BLOOD PRESSURE: 134 MMHG | RESPIRATION RATE: 18 BRPM | DIASTOLIC BLOOD PRESSURE: 92 MMHG

## 2020-04-17 DIAGNOSIS — C82.55 DIFFUSE FOLLICLE CENTER LYMPHOMA OF LYMPH NODES OF INGUINAL REGION (HCC): Primary | Chronic | ICD-10-CM

## 2020-04-17 DIAGNOSIS — C82.55 DIFFUSE FOLLICLE CENTER LYMPHOMA OF LYMPH NODES OF INGUINAL REGION (HCC): ICD-10-CM

## 2020-04-17 LAB
ALBUMIN SERPL-MCNC: 3.9 G/DL (ref 3.5–5.2)
ALBUMIN/GLOB SERPL: 1.1 G/DL
ALP SERPL-CCNC: 111 U/L (ref 39–117)
ALT SERPL W P-5'-P-CCNC: 12 U/L (ref 1–41)
ANION GAP SERPL CALCULATED.3IONS-SCNC: 11 MMOL/L (ref 5–15)
AST SERPL-CCNC: 18 U/L (ref 1–40)
BASOPHILS # BLD AUTO: 0.02 10*3/MM3 (ref 0–0.2)
BASOPHILS NFR BLD AUTO: 0.2 % (ref 0–1.5)
BILIRUB SERPL-MCNC: 0.4 MG/DL (ref 0.2–1.2)
BUN BLD-MCNC: 9 MG/DL (ref 8–23)
BUN/CREAT SERPL: 9.5 (ref 7–25)
CALCIUM SPEC-SCNC: 9.4 MG/DL (ref 8.6–10.5)
CHLORIDE SERPL-SCNC: 107 MMOL/L (ref 98–107)
CO2 SERPL-SCNC: 27 MMOL/L (ref 22–29)
CREAT BLD-MCNC: 0.95 MG/DL (ref 0.76–1.27)
DEPRECATED RDW RBC AUTO: 51.2 FL (ref 37–54)
EOSINOPHIL # BLD AUTO: 0.09 10*3/MM3 (ref 0–0.4)
EOSINOPHIL NFR BLD AUTO: 1 % (ref 0.3–6.2)
ERYTHROCYTE [DISTWIDTH] IN BLOOD BY AUTOMATED COUNT: 14.8 % (ref 12.3–15.4)
GFR SERPL CREATININE-BSD FRML MDRD: 93 ML/MIN/1.73
GLOBULIN UR ELPH-MCNC: 3.6 GM/DL
GLUCOSE BLD-MCNC: 99 MG/DL (ref 65–99)
HCT VFR BLD AUTO: 39.1 % (ref 37.5–51)
HGB BLD-MCNC: 12.4 G/DL (ref 13–17.7)
IMM GRANULOCYTES # BLD AUTO: 0.08 10*3/MM3 (ref 0–0.05)
IMM GRANULOCYTES NFR BLD AUTO: 0.9 % (ref 0–0.5)
LYMPHOCYTES # BLD AUTO: 1.06 10*3/MM3 (ref 0.7–3.1)
LYMPHOCYTES NFR BLD AUTO: 11.4 % (ref 19.6–45.3)
MCH RBC QN AUTO: 29.8 PG (ref 26.6–33)
MCHC RBC AUTO-ENTMCNC: 31.7 G/DL (ref 31.5–35.7)
MCV RBC AUTO: 94 FL (ref 79–97)
MONOCYTES # BLD AUTO: 0.82 10*3/MM3 (ref 0.1–0.9)
MONOCYTES NFR BLD AUTO: 8.8 % (ref 5–12)
NEUTROPHILS # BLD AUTO: 7.2 10*3/MM3 (ref 1.7–7)
NEUTROPHILS NFR BLD AUTO: 77.7 % (ref 42.7–76)
NRBC BLD AUTO-RTO: 0 /100 WBC (ref 0–0.2)
PLATELET # BLD AUTO: 296 10*3/MM3 (ref 140–450)
PMV BLD AUTO: 10.1 FL (ref 6–12)
POTASSIUM BLD-SCNC: 3.6 MMOL/L (ref 3.5–5.2)
PROT SERPL-MCNC: 7.5 G/DL (ref 6–8.5)
RBC # BLD AUTO: 4.16 10*6/MM3 (ref 4.14–5.8)
SODIUM BLD-SCNC: 145 MMOL/L (ref 136–145)
WBC NRBC COR # BLD: 9.27 10*3/MM3 (ref 3.4–10.8)

## 2020-04-17 PROCEDURE — 99214 OFFICE O/P EST MOD 30 MIN: CPT | Performed by: NURSE PRACTITIONER

## 2020-04-17 PROCEDURE — 80053 COMPREHEN METABOLIC PANEL: CPT

## 2020-04-17 PROCEDURE — 85025 COMPLETE CBC W/AUTO DIFF WBC: CPT

## 2020-04-17 PROCEDURE — 36415 COLL VENOUS BLD VENIPUNCTURE: CPT

## 2020-04-17 NOTE — PROGRESS NOTES
CHIEF COMPLAINT: Follow-up follicular lymphoma    Problem List:  Oncology/Hematology History    1. Follicular lymphoma:  -  initial consultation 3/12/18: Had been having left inguinal pains and went for scanning that found left groin node.  Needle biopsy of this was called lymphoma CD10 positive and Fish testing positive for translocation (14;18) consistent with follicular lymphoma though could also be seen in diffuse large B cell.  BCL 6 /3q rearranged.  No Myc..  Hence cannot say at this junction the exact subtype of lymphoma.  He has not been having bed drenching night sweats.  No unexplained weight loss.  Has not had any known anemia, thrombocytopenia, leukopenia.  Has not had frequent infections.  CT did not show any splenomegaly or other significant adenopathy.  The left inguinal pain has subsided since the needle biopsy.  He has an abdominal aortic aneurysm that is being watched and had previously been repaired.  Apparently 15+ years ago he was treated at [facility] for lymphoma for which he received right neck radiation and some chemotherapy.  He is not sure of the subtype of lymphoma nor of the type of chemotherapy nor the number of radiation treatments and this was far enough back that our records have been expunged.          Follicular lymphoma (CMS/HCC)    3/12/2018 Initial Diagnosis     Follicular lymphoma      3/15/2018 Imaging     PET     IMPRESSION:     Multiple hypermetabolic abnormalities are identified but the  distribution is unusual. Although there is bulky hypermetabolic rishi  mass in the left inguinal area and in the right internal iliac rishi  chain certainly consistent with active malignant lymphoma, there is an  intense abnormal area of tracer uptake and hypermetabolic activity  throughout the marrow or intramedullary portion of the left ilium and  iliac wing in the absence of cortical destruction. This is an intensely  hypermetabolic abnormal lengthy intraosseous abnormality.      The lower extremity dedicated datasets are negative.     Intermediate lymph nodes are identified in the medial inguinal region  which are not above malignant threshold.     Above the pelvis, there is no evidence of additional hypermetabolic  focus or active neoplastic disease, and the chest, mediastinum and neck  are clear.      6/7/2018 Imaging     MRI lumbar spine IMPRESSION:    Study is limited by susceptibility artifact greatest at L1-L2.       No definite significant central canal stenosis.       Moderate to severe right and moderate left foraminal stenosis at L3-L4.    Moderate to severe bilateral foraminal stenosis at L4-L5.      7/24/2018 Imaging     CT chest IMPRESSION:  There are no acute findings. Specifically, there is no  axillary, mediastinal or hilar lymphadenopathy. There is bronchiectasis  in the medial segment of the left lower lobe. This was also present on  CT/PET examination of 03/15/2018.  CT abdomen and pelvis IMPRESSION:  Left inguinal lymphadenopathy, unchanged when compared to a  whole-body PET CT scan of 03/15/2018.      9/6/2018 Imaging     MRI pelvis  1. Decreasing size of patient's left inguinal mass.  2. Interval development of diffuse marrow edema and enhancement,  practically throughout the left ilium, and adjacent soft tissue disease  deep to the left iliacus muscle, raising suspicion for lymphoma.      9/27/2018 - 10/17/2018 Radiation     Radiation OncologyTreatment Course:  George Lomeli received 3000 cGy in 15 fractions to left groin & iliac via External Beam Radiation - EBRT.      11/27/2018 Imaging     CT chest abdomen and pelvis  IMPRESSION: Bronchiectasis in the lungs but no significant mediastinal adenopathy or axillary adenopathy or cervical adenopathy.  There is a mildly enlarged left inguinal lymph node. The  node has smooth ovoid margins and has central low density consistent  with a necrotic node. Lastly more superiorly and anteriorly in a region  where there are  surgical clips there is a lentiform mass which is much  much smaller than on the previous examination of 07/24/2018.      4/14/2020 Imaging     CT chest, abdomen and pelvis IMPRESSION:  Stable examination with no evidence of active or recurrent  lymphoma. No bulky adenopathy identified. Chronic age-related changes  identified throughout the chest, abdomen and pelvis which are all         HISTORY OF PRESENT ILLNESS:  The patient is a 79 y.o. male, here for follow up on management of follicular lymphoma.  He denies any recurrent infections, fevers, or drenching night sweats.  He is anxious to get scan results.      Past Medical History:   Diagnosis Date   • AAA (abdominal aortic aneurysm) (CMS/HCC)    • Arthritis    • Broken neck (CMS/HCC)    • Gout    • Hypertension    • Lymphoma (CMS/HCC)    • Migraine    • Sciatica    • Thyroid cancer (CMS/HCC)      Past Surgical History:   Procedure Laterality Date   • BACK SURGERY     • EYE SURGERY     • MULTIPLE TOOTH EXTRACTIONS     • NECK SURGERY      PINS/RODS IN NECK   • THYROIDECTOMY / EXCISION CYST THYROID         Allergies   Allergen Reactions   • Contrast Dye Nausea And Vomiting       Family History and Social History reviewed and changed as necessary      REVIEW OF SYSTEM:   Review of Systems   Constitutional: Negative for appetite change, chills, diaphoresis, fatigue, fever and unexpected weight change.   HENT:   Negative for mouth sores, sore throat and trouble swallowing.    Eyes: Negative for icterus.   Respiratory: Negative for cough, hemoptysis and shortness of breath.    Cardiovascular: Negative for chest pain, leg swelling and palpitations.   Gastrointestinal: Negative for abdominal distention, abdominal pain, blood in stool, constipation, diarrhea, nausea and vomiting.   Endocrine: Negative for hot flashes.   Genitourinary: Negative for bladder incontinence, difficulty urinating, dysuria, frequency and hematuria.    Musculoskeletal: Negative for gait problem,  "neck pain and neck stiffness.   Skin: Negative for rash.   Neurological: Negative for dizziness, gait problem, headaches, light-headedness and numbness.   Hematological: Negative for adenopathy. Does not bruise/bleed easily.   Psychiatric/Behavioral: Negative for depression. The patient is not nervous/anxious.    All other systems reviewed and are negative.       PHYSICAL EXAM    Vitals:    04/17/20 1307   BP: 134/92   Pulse: 82   Resp: 18   Temp: 98.6 °F (37 °C)   TempSrc: Temporal   SpO2: 97%   Weight: 85.2 kg (187 lb 14.4 oz)   Height: 177.8 cm (70\")     Pain Score    04/17/20 1307   PainSc: 0-No pain         Constitutional: Appears well-developed and well-nourished. No distress.   ECOG: (0) Fully active, able to carry on all predisease performance without restriction  HENT:   Head: Normocephalic.   Mouth/Throat: Oropharynx is clear and moist.   Eyes: Conjunctivae are normal. Pupils are equal, round, and reactive to light. No scleral icterus.   Neck: Neck supple. No JVD present. No thyromegaly present.   Cardiovascular: Normal rate, regular rhythm and normal heart sounds.    Pulmonary/Chest: Breath sounds normal. No respiratory distress.   Abdominal: Soft. Exhibits no distension and no mass. There is no hepatosplenomegaly. There is no tenderness. There is no rebound and no guarding.   Musculoskeletal:Exhibits no edema, tenderness or deformity.   Neurological: Alert and oriented to person, place, and time. Exhibits normal muscle tone.   Skin: No ecchymosis, no petechiae and no rash noted. Not diaphoretic. No cyanosis. Nails show no clubbing.   Psychiatric: Normal mood and affect.   Vitals reviewed.  No adenopathy in cervical axillary or inguinal regions    Lab Results   Component Value Date    HGB 12.4 (L) 04/17/2020    HCT 39.1 04/17/2020    MCV 94.0 04/17/2020     04/17/2020    WBC 9.27 04/17/2020    NEUTROABS 7.20 (H) 04/17/2020    LYMPHSABS 1.06 04/17/2020    MONOSABS 0.82 04/17/2020    EOSABS 0.09 " 04/17/2020    BASOSABS 0.02 04/17/2020     Lab Results   Component Value Date    GLUCOSE 99 04/17/2020    BUN 9 04/17/2020    CREATININE 0.95 04/17/2020     04/17/2020    K 3.6 04/17/2020     04/17/2020    CO2 27.0 04/17/2020    CALCIUM 9.4 04/17/2020    PROTEINTOT 7.5 04/17/2020    ALBUMIN 3.90 04/17/2020    BILITOT 0.4 04/17/2020    ALKPHOS 111 04/17/2020    AST 18 04/17/2020    ALT 12 04/17/2020             Ct Abdomen Pelvis Without Contrast    Result Date: 4/14/2020  Stable examination with no evidence of active or recurrent lymphoma. No bulky adenopathy identified. Chronic age-related changes identified throughout the chest, abdomen and pelvis which are all stable.  D:  04/14/2020 E:  04/14/2020  This report was finalized on 4/14/2020 4:32 PM by Dr. Kristi Santacruz MD.      Xr Chest 2 View    Result Date: 1/31/2020  1. Chronic changes of the lungs without evidence of active airspace disease.  2. Similar-appearing ectasia of the thoracic aorta.  D:  01/30/2020 E:  01/30/2020  This report was finalized on 1/31/2020 9:13 AM by Dr. Jefry Kumari MD.      Ct Chest Without Contrast    Result Date: 4/14/2020  Stable examination with no evidence of active or recurrent lymphoma. No bulky adenopathy identified. Chronic age-related changes identified throughout the chest, abdomen and pelvis which are all stable.  D:  04/14/2020 E:  04/14/2020  This report was finalized on 4/14/2020 4:32 PM by Dr. Kristi Santacruz MD.            ASSESSMENT & PLAN:    Follow-up follicular lymphoma:  I reviewed his CT scans and discussed results with him which showed no evidence of active or recurrent lymphoma.  Labs pending.  Will review results and notify patient of any significant findings.  We would not treat unless he had 3 more than 3 cm, one more than 7 cm nodes, or the other GELF criteria such as unexplained bed drenching night sweats/fevers/weight loss/anemia/thrombocytopenia/frequent infections. We will follow  up with patient in 6 months with repeat labs.      Discussed with patient 25 minutes face-to-face greater than 50% counseling regarding the natural history of follicular lymphoma and the odds that eventually we will need to consider other systemic therapies or perhaps focal radiation has been done in the past to palliate bulky nodes that he otherwise has done well.      Wendy Escamilla, APRN    10/17/2019

## 2020-05-15 ENCOUNTER — TELEPHONE (OUTPATIENT)
Dept: ONCOLOGY | Facility: CLINIC | Age: 80
End: 2020-05-15

## 2020-05-15 NOTE — TELEPHONE ENCOUNTER
Patient called triage line to report that he was having pain in his left leg at the top of his leg where the pelvis is.  I talked with patient and he has no fever, night sweats, or nodes that are swollen.  I asked if his leg was swollen at the top or bottom and he said no.  He said when he tries to get up from a sitting position, he feels pain and has to use a cane to steady himself.  He saw YOVANI Murillo in April and had scans that were free of disease and is not on any treatment for his lymphoma.  He had had xrt which has been completed as well.  YOVANI Murillo stated that patient should check with his pcp as this is probably not related to his cancer diagnosis.  He said he would call Dr. Douglas and see if he can get an appointment.

## 2020-05-18 ENCOUNTER — TELEPHONE (OUTPATIENT)
Dept: PULMONOLOGY | Facility: CLINIC | Age: 80
End: 2020-05-18

## 2020-05-18 NOTE — TELEPHONE ENCOUNTER
Pt called today c/o sob/cough w/ sputum/congestion/congestion and states that the neb sol is not really helping. Pt denies fever/chest pain/nausea. Pt is finishing up with abx/steroids tomorrow. Pt can be reached @ [phone]. Pt scheduled on 6/8 but wants to be see sooner. Please advise.

## 2020-05-19 ENCOUNTER — OFFICE VISIT (OUTPATIENT)
Dept: PULMONOLOGY | Facility: CLINIC | Age: 80
End: 2020-05-19

## 2020-05-19 VITALS
WEIGHT: 190 LBS | HEART RATE: 76 BPM | OXYGEN SATURATION: 98 % | DIASTOLIC BLOOD PRESSURE: 88 MMHG | HEIGHT: 70 IN | TEMPERATURE: 97.8 F | BODY MASS INDEX: 27.2 KG/M2 | SYSTOLIC BLOOD PRESSURE: 124 MMHG

## 2020-05-19 DIAGNOSIS — R06.02 SOB (SHORTNESS OF BREATH): Primary | ICD-10-CM

## 2020-05-19 DIAGNOSIS — J47.1 BRONCHIECTASIS WITH ACUTE EXACERBATION (HCC): ICD-10-CM

## 2020-05-19 PROCEDURE — 99214 OFFICE O/P EST MOD 30 MIN: CPT | Performed by: NURSE PRACTITIONER

## 2020-05-19 RX ORDER — PREDNISONE 20 MG/1
TABLET ORAL
Qty: 21 TABLET | Refills: 0 | Status: SHIPPED | OUTPATIENT
Start: 2020-05-19 | End: 2020-06-03

## 2020-05-19 RX ORDER — DOXYCYCLINE HYCLATE 100 MG/1
100 CAPSULE ORAL 2 TIMES DAILY
Qty: 20 CAPSULE | Refills: 0 | Status: SHIPPED | OUTPATIENT
Start: 2020-05-19 | End: 2020-07-22

## 2020-05-19 NOTE — PROGRESS NOTES
[facility] Follow up    CHIEF COMPLAINT    Cough and congestion, bronchiectasis    Subjective   HISTORY OF PRESENT ILLNESS    George Lomeli is a 79 y.o.male here today for worsening cough and congestion.  He still having quite a bit of cough with green sputum production.  It is very thick especially in the mornings.  He has been using his nebulizers 1-2 times a day, currently he is on duo nebs.    He denies any fevers or chills but does feel like he gets hot when he goes up the steps.  He has never had any exertional hypoxemia, he has no oxygen at home.    He did complete a course of antibiotics and steroids in March without much improvement of his symptoms.    He has made quite a bit of wheezing and chest tightness especially at night when he tries to lay down.  He is having some difficulty sleeping.    He denies any chest pain chest pressure palpitations.  He denies any lower extremity edema.      Patient Active Problem List   Diagnosis   • Sciatica   • Migraine   • Hypertension   • Gout   • Broken neck (CMS/HCC)   •  Possible bladder mass noted on 8/2017 CT, needs outpatient Urology eval   • Abdominal aortic aneurysm with recent repair at [place]   • Follicular lymphoma (CMS/HCC)   • Bronchiectasis with acute exacerbation (CMS/HCC)       Allergies   Allergen Reactions   • Contrast Dye Nausea And Vomiting       Current Outpatient Medications:   •  albuterol (PROVENTIL) (2.5 MG/3ML) 0.083% nebulizer solution, Take 2.5 mg by nebulization 4 (Four) Times a Day As Needed for Wheezing., Disp: 125 vial, Rfl: 3  •  allopurinol (ZYLOPRIM) 100 MG tablet, Take 100 mg by mouth Daily., Disp: , Rfl:   •  amLODIPine (NORVASC) 5 MG tablet, Take 5 mg by mouth Daily., Disp: , Rfl:   •  aspirin 81 MG EC tablet, Take 81 mg by mouth Daily., Disp: , Rfl:   •  carvedilol (COREG) 25 MG tablet, Take 25 mg by mouth 2 (Two) Times a Day With Meals., Disp: , Rfl:   •  levothyroxine (SYNTHROID, LEVOTHROID) 25 MCG tablet, Take 25 mcg by  mouth Daily., Disp: , Rfl:   •  lisinopril (PRINIVIL,ZESTRIL) 40 MG tablet, Take 40 mg by mouth Daily., Disp: , Rfl:   •  ondansetron (ZOFRAN) 4 MG tablet, Take 1 tablet by mouth Every 8 (Eight) Hours As Needed for Nausea or Vomiting., Disp: 10 tablet, Rfl: 0  •  oxyCODONE-acetaminophen (PERCOCET)  MG per tablet, Take 1 tablet by mouth Every 6 (Six) Hours As Needed for Moderate Pain ., Disp: , Rfl:   •  potassium chloride (K-DUR) 10 MEQ CR tablet, Take 10 mEq by mouth Take As Directed., Disp: , Rfl:   •  tamsulosin (FLOMAX) 0.4 MG capsule 24 hr capsule, Take 1 capsule by mouth Every Night., Disp: , Rfl:   •  doxycycline (VIBRAMYCIN) 100 MG capsule, Take 1 capsule by mouth 2 (Two) Times a Day., Disp: 20 capsule, Rfl: 0  •  predniSONE (DELTASONE) 20 MG tablet, Two pills for 7 days then one pill for 7 days, Disp: 21 tablet, Rfl: 0  MEDICATION LIST AND ALLERGIES REVIEWED.    Social History     Tobacco Use   • Smoking status: Former Smoker     Packs/day: 1.00     Years: 15.00     Pack years: 15.00     Types: Cigarettes     Last attempt to quit:      Years since quittin.3   • Smokeless tobacco: Never Used   • Tobacco comment: QUIT 30 YEARS AGO    Substance Use Topics   • Alcohol use: No   • Drug use: No       FAMILY AND SOCIAL HISTORY REVIEWED.    Review of Systems   Constitutional: Positive for activity change. Negative for chills, fatigue, fever and unexpected weight change.   HENT: Positive for congestion and postnasal drip. Negative for nosebleeds, rhinorrhea, sinus pressure and trouble swallowing.    Respiratory: Positive for cough, chest tightness, shortness of breath and wheezing.    Cardiovascular: Negative for chest pain, palpitations and leg swelling.   Gastrointestinal: Negative for abdominal pain, constipation, diarrhea, nausea and vomiting.   Genitourinary: Negative for dysuria, frequency, hematuria and urgency.   Musculoskeletal: Negative for myalgias.   Neurological: Negative for dizziness,  "weakness, numbness and headaches.   All other systems reviewed and are negative.  .  Objective   /88   Pulse 76   Temp 97.8 °F (36.6 °C)   Ht 177.8 cm (70\")   Wt 86.2 kg (190 lb)   SpO2 98% Comment: resting, room air  BMI 27.26 kg/m²     Immunization History   Administered Date(s) Administered   • Flu Vaccine Quad PF >36MO 12/12/2016, 11/01/2017   • Fluzone High Dose =>65 Years ([employer] ONLY) 01/12/2018, 10/07/2018, 12/10/2018, 10/09/2019   • Influenza, Unspecified 11/01/2017   • Pneumococcal Polysaccharide (PPSV23) 06/30/2005, 12/12/2016   • Td 09/26/2000       Physical Exam   Constitutional: He is oriented to person, place, and time. He appears well-developed and well-nourished.   HENT:   Head: Normocephalic and atraumatic.   Eyes: Pupils are equal, round, and reactive to light. EOM are normal.   Neck: Normal range of motion. Neck supple.   Cardiovascular: Normal rate and regular rhythm.   No murmur heard.  Pulmonary/Chest: Effort normal. No respiratory distress. He has wheezes. He has rales.   Abdominal: Soft. Bowel sounds are normal. He exhibits no distension.   Musculoskeletal: Normal range of motion. He exhibits no edema.   Neurological: He is alert and oriented to person, place, and time.   Skin: Skin is warm and dry. No erythema.   Psychiatric: He has a normal mood and affect. His behavior is normal.   Vitals reviewed.        RESULTS      Results for orders placed during the hospital encounter of 04/14/20   CT Chest Without Contrast    Narrative EXAMINATION: CT CHEST WO CONTRAST-, CT ABDOMEN/PELVIS WO  CONTRAST-04/14/2020:      INDICATION: Followup follicular lymphoma; C82.55-Diffuse follicle center  lymphoma, lymph nodes of inguinal region and lower limb, followup  lymphoma, history of prior radiation treatment.     TECHNIQUE: Multiple axial CT imaging was obtained of the chest, abdomen  and pelvis without the administration of intravenous or oral contrast.     The radiation dose reduction " device was turned on for each scan per the  ALARA (As Low as Reasonably Achievable) protocol.     COMPARISON: 04/08/2019.     FINDINGS:      CHEST: The thyroid is homogeneous in appearance. Calcification seen  throughout scattered lymph nodes in the mediastinum. The cardiac  chambers are within normal limits. No pericardial effusion. Tortuosity  of the thoracic aorta. Calcified granuloma identified anteriorly within  the right upper lobe. Old healed granulomatous disease seen within the  chest. There are emphysematous and chronic changes seen with the lung  fields bilaterally. The ascending thoracic aorta largest AP dimension  measures 3.9 cm with the descending thoracic aorta largest AP dimension  measuring 3.6 cm. Degenerative changes identified throughout the spine.  No pleural effusion or pneumothorax. There is scarring identified at the  lung bases.     ABDOMEN: The liver is heterogeneous in appearance with several small  cystic structures identified. The spleen is unremarkable. There are no  stones seen in the gallbladder. The pancreas is homogeneous. Cysts seen  within the kidneys bilaterally. The abdominal portions of the  gastrointestinal tract are within normal limits. Stool scattered  throughout the colon. Endovascular stent repair of the infrarenal  abdominal aorta.     PELVIS: The pelvic organs are unremarkable. The pelvic portions of the  gastrointestinal tract are within normal limits. No free fluid or free  air. No pelvic adenopathy. No abnormal mass or fluid collection is  identified. The bony structures reveal degenerative changes seen within  the spine and pelvis. Small bilateral inguinal hernias containing  mesenteric fat only. There is no adenopathy identified throughout the  abdomen and pelvis. Calcification seen within the prostate.       Impression Stable examination with no evidence of active or recurrent  lymphoma. No bulky adenopathy identified. Chronic age-related changes  identified  throughout the chest, abdomen and pelvis which are all  stable.     D:  04/14/2020  E:  04/14/2020     This report was finalized on 4/14/2020 4:32 PM by Dr. Kristi Santacruz MD.            Assessment/Plan     PROBLEM LIST    Problem List Items Addressed This Visit        Respiratory    Bronchiectasis with acute exacerbation (CMS/HCC)    Relevant Orders    Respiratory Culture - Sputum, Cough    AFB Culture - Sputum, Cough    Fungus Smear - Sputum, Cough      Other Visit Diagnoses     SOB (shortness of breath)    -  Primary    Relevant Orders    XR Chest PA & Lateral            DISCUSSION    Reviewed his CT scan today in the office with his dynamic bladder as well as bronchiectasis and felt he would be at high risk for any dental infection.  The guidance of him of culture home for AFB and fungal as well as a sputum culture.    He denies anything suspicious or pneumonia on his chest x-ray today.    Since he does he have had a cough and congestion with him on prednisone for 2 weeks take 40 mg for 7 days and 20 mg for 7 days.  Also been on a course of doxycycline  Continue to use his nebulizers at least twice daily to help with secretion clearance more if needed.    I also gave him a sample of Symbicort today in the office with instructions on how to use it with a spacer.  He will use that the next couple weeks to see if it helps his congestion and wheezing.    He will call back and let me know if is not having any improvement in the next 7-10 days.  Otherwise he will follow-up in 3 months.  At that visit I would like for him to get a six minute walk test.    I spent 25 minutes face to face with the patient. I spent > 50% percent of this time counseling and discussing diagnostic testing, evaluation, current status, treatment options and management.    Bibiana Zavaleta, YOVANI  05/19/20202:12 PM  Electronically signed     Please note that portions of this note were completed with a voice recognition program. Efforts were  made to edit the dictations, but occasionally words are mistranscribed.      CC: Kaycee Douglas MD

## 2020-06-08 RX ORDER — ALBUTEROL SULFATE 2.5 MG/3ML
2.5 SOLUTION RESPIRATORY (INHALATION) 4 TIMES DAILY PRN
Qty: 240 ML | Refills: 3 | Status: SHIPPED | OUTPATIENT
Start: 2020-06-08 | End: 2020-11-05 | Stop reason: SDUPTHER

## 2020-06-08 NOTE — TELEPHONE ENCOUNTER
Rx Albuterol Neb Sol reordered per chart via fax to [facility] per pt's request.

## 2020-07-20 DIAGNOSIS — J44.9 OBSTRUCTIVE CHRONIC BRONCHITIS WITHOUT EXACERBATION (HCC): Primary | ICD-10-CM

## 2020-07-20 RX ORDER — BUDESONIDE AND FORMOTEROL FUMARATE DIHYDRATE 80; 4.5 UG/1; UG/1
2 AEROSOL RESPIRATORY (INHALATION) 2 TIMES DAILY
Qty: 1 INHALER | Refills: 5 | Status: SHIPPED | OUTPATIENT
Start: 2020-07-20 | End: 2021-02-01

## 2020-07-20 NOTE — TELEPHONE ENCOUNTER
Pharmacy called in with a request for pt to get RX filled Symbicort 80/4.5mcg ... RX request fulfilled.

## 2020-07-21 ENCOUNTER — TELEPHONE (OUTPATIENT)
Dept: PULMONOLOGY | Facility: CLINIC | Age: 80
End: 2020-07-21

## 2020-07-21 NOTE — TELEPHONE ENCOUNTER
Pt called today c/o productive cough w/ thick sputum and requesting a sooner apt than 8/19. Pt denies fever/chest pain/nausea. Pt is also requesting a call back @ [phone].

## 2020-07-22 ENCOUNTER — OFFICE VISIT (OUTPATIENT)
Dept: PULMONOLOGY | Facility: CLINIC | Age: 80
End: 2020-07-22

## 2020-07-22 VITALS
BODY MASS INDEX: 26.77 KG/M2 | SYSTOLIC BLOOD PRESSURE: 128 MMHG | OXYGEN SATURATION: 95 % | HEART RATE: 80 BPM | DIASTOLIC BLOOD PRESSURE: 82 MMHG | HEIGHT: 70 IN | WEIGHT: 187 LBS | TEMPERATURE: 98.2 F

## 2020-07-22 DIAGNOSIS — J47.9 BRONCHIECTASIS WITHOUT COMPLICATION (HCC): Primary | ICD-10-CM

## 2020-07-22 DIAGNOSIS — R05.9 COUGH: ICD-10-CM

## 2020-07-22 PROBLEM — N32.89 BLADDER MASS: Status: RESOLVED | Noted: 2017-09-22 | Resolved: 2020-07-22

## 2020-07-22 PROCEDURE — 99213 OFFICE O/P EST LOW 20 MIN: CPT | Performed by: NURSE PRACTITIONER

## 2020-07-22 NOTE — PROGRESS NOTES
[facility] Pulmonary Follow up    CHIEF COMPLAINT    Cough and congestion    Subjective   HISTORY OF PRESENT ILLNESS    George Lomeli is a 80 y.o.male here today for bronchiectasis with exacerbation.  He has had several episodes of worsening cough and congestion and has been on several rounds of antibiotics and steroids.    Currently he is using his albuterol nebulizers which have made a bit of a difference in his chronic cough.  I gave him a sample of Symbicort 80 mcg on his last visit and he stated that actually made quite a bit of difference but he ran out.  And his insurance does not cover it.    Today he says his cough does continue to be chronic but he is not bringing up much sputum.  He is resting well at night, the cough does not usually keep him up at night.    He has no postnasal drainage or allergy complaints.  He denies any reflux or dysphasia.      Patient Active Problem List   Diagnosis   • Sciatica   • Migraine   • Hypertension   • Gout   • Broken neck (CMS/HCC)   • Abdominal aortic aneurysm with recent repair at [place]   • Follicular lymphoma (CMS/HCC)   • Bronchiectasis without complication (CMS/HCC)   • Cough       Allergies   Allergen Reactions   • Contrast Dye Nausea And Vomiting       Current Outpatient Medications:   •  albuterol (PROVENTIL) (2.5 MG/3ML) 0.083% nebulizer solution, Take 2.5 mg by nebulization 4 (Four) Times a Day As Needed for Wheezing., Disp: 240 mL, Rfl: 3  •  allopurinol (ZYLOPRIM) 100 MG tablet, Take 100 mg by mouth Daily., Disp: , Rfl:   •  amLODIPine (NORVASC) 5 MG tablet, Take 5 mg by mouth Daily., Disp: , Rfl:   •  aspirin 81 MG EC tablet, Take 81 mg by mouth Daily., Disp: , Rfl:   •  budesonide-formoterol (SYMBICORT) 80-4.5 MCG/ACT inhaler, Inhale 2 puffs 2 (Two) Times a Day., Disp: 1 inhaler, Rfl: 5  •  carvedilol (COREG) 25 MG tablet, Take 25 mg by mouth 2 (Two) Times a Day With Meals., Disp: , Rfl:   •  levothyroxine (SYNTHROID, LEVOTHROID) 25 MCG tablet, Take 25 mcg  "by mouth Daily., Disp: , Rfl:   •  lisinopril (PRINIVIL,ZESTRIL) 40 MG tablet, Take 40 mg by mouth Daily., Disp: , Rfl:   •  ondansetron (ZOFRAN) 4 MG tablet, Take 1 tablet by mouth Every 8 (Eight) Hours As Needed for Nausea or Vomiting., Disp: 10 tablet, Rfl: 0  •  oxyCODONE-acetaminophen (PERCOCET)  MG per tablet, Take 1 tablet by mouth Every 6 (Six) Hours As Needed for Moderate Pain ., Disp: , Rfl:   •  potassium chloride (K-DUR) 10 MEQ CR tablet, Take 10 mEq by mouth Take As Directed., Disp: , Rfl:   •  tamsulosin (FLOMAX) 0.4 MG capsule 24 hr capsule, Take 1 capsule by mouth Every Night., Disp: , Rfl:   MEDICATION LIST AND ALLERGIES REVIEWED.    Social History     Tobacco Use   • Smoking status: Former Smoker     Packs/day: 1.00     Years: 15.00     Pack years: 15.00     Types: Cigarettes     Last attempt to quit:      Years since quittin.5   • Smokeless tobacco: Never Used   • Tobacco comment: QUIT 30 YEARS AGO    Substance Use Topics   • Alcohol use: No   • Drug use: No       FAMILY AND SOCIAL HISTORY REVIEWED.    Review of Systems   Constitutional: Negative for chills, fatigue, fever and unexpected weight change.   HENT: Positive for congestion. Negative for nosebleeds, postnasal drip, rhinorrhea, sinus pressure and trouble swallowing.    Respiratory: Positive for cough. Negative for chest tightness, shortness of breath and wheezing.    Cardiovascular: Negative for chest pain and leg swelling.   Gastrointestinal: Negative for abdominal pain, constipation, diarrhea, nausea and vomiting.   Genitourinary: Negative for dysuria, frequency, hematuria and urgency.   Musculoskeletal: Negative for myalgias.   Neurological: Negative for dizziness, weakness, numbness and headaches.   All other systems reviewed and are negative.  .  Objective   /82 (BP Location: Left arm, Patient Position: Sitting, Cuff Size: Adult)   Pulse 80   Temp 98.2 °F (36.8 °C) (Temporal)   Ht 177.8 cm (70\")   Wt 84.8 kg " (187 lb)   SpO2 95% Comment: room air seated  BMI 26.83 kg/m²     Immunization History   Administered Date(s) Administered   • Flu Vaccine Quad PF >36MO 12/12/2016, 11/01/2017   • Fluzone High Dose =>65 Years ([employer] ONLY) 01/12/2018, 10/07/2018, 12/10/2018, 10/09/2019   • Influenza, Unspecified 11/01/2017   • Pneumococcal Polysaccharide (PPSV23) 06/30/2005, 12/12/2016   • Td 09/26/2000       Physical Exam   Constitutional: He is oriented to person, place, and time. He appears well-developed and well-nourished.   HENT:   Head: Normocephalic and atraumatic.   Eyes: Pupils are equal, round, and reactive to light. EOM are normal.   Neck: Normal range of motion. Neck supple.   Cardiovascular: Normal rate and regular rhythm.   No murmur heard.  Pulmonary/Chest: Effort normal. No respiratory distress. He has no wheezes. He has rales.   Abdominal: Soft. Bowel sounds are normal. He exhibits no distension.   Musculoskeletal: Normal range of motion. He exhibits no edema.   Neurological: He is alert and oriented to person, place, and time.   Skin: Skin is warm and dry. No erythema.   Psychiatric: He has a normal mood and affect. His behavior is normal.   Vitals reviewed.        RESULTS        Assessment/Plan     PROBLEM LIST         ICD-10-CM ICD-9-CM   1. Bronchiectasis without complication (CMS/[facility]) J47.9 494.0   2. Cough R05 786.2       Problem List Items Addressed This Visit        Respiratory    Bronchiectasis without complication (CMS/[facility]) - Primary    Cough            DISCUSSION    He will continue to use his albuterol nebulizers twice daily.  I did give him some more samples of the Symbicort 80 mcg to use.  This has made an overall difference in his cough and congestion.  He does have a spacer at home to use as well.    At this time it does not appear he has any acute infection or acute exacerbation of his bronchiectasis.    We did discuss the importance of reflux precautions and letting me know if he has any  worsening reflux symptoms.    Up as needed.      I spent 15 minutes face to face with the patient. I spent > 50% percent of this time counseling and discussing evaluation, current status, treatment options and management.    Bibiana Zavaleta, YOVANI  07/22/20209:30 AM  Electronically signed     Please note that portions of this note were completed with a voice recognition program. Efforts were made to edit the dictations, but occasionally words are mistranscribed.      CC: Kaycee Douglas MD

## 2020-07-24 ENCOUNTER — TELEPHONE (OUTPATIENT)
Dept: PULMONOLOGY | Facility: CLINIC | Age: 80
End: 2020-07-24

## 2020-08-19 ENCOUNTER — OFFICE VISIT (OUTPATIENT)
Dept: PULMONOLOGY | Facility: CLINIC | Age: 80
End: 2020-08-19

## 2020-08-19 VITALS
SYSTOLIC BLOOD PRESSURE: 148 MMHG | DIASTOLIC BLOOD PRESSURE: 90 MMHG | OXYGEN SATURATION: 97 % | HEART RATE: 80 BPM | WEIGHT: 186.25 LBS | TEMPERATURE: 97.8 F | HEIGHT: 70 IN | RESPIRATION RATE: 18 BRPM | BODY MASS INDEX: 26.66 KG/M2

## 2020-08-19 DIAGNOSIS — R05.9 COUGH: ICD-10-CM

## 2020-08-19 DIAGNOSIS — J47.9 BRONCHIECTASIS WITHOUT COMPLICATION (HCC): Primary | ICD-10-CM

## 2020-08-19 PROCEDURE — 99213 OFFICE O/P EST LOW 20 MIN: CPT | Performed by: NURSE PRACTITIONER

## 2020-08-19 NOTE — PROGRESS NOTES
[facility] [place] [facility] Follow up    CHIEF COMPLAINT    Bronchiectasis    Subjective   HISTORY OF PRESENT ILLNESS    George Lomeli is a 80 y.o.male here today for follow-up on his recent acute exacerbation of his bronchiectasis.  He is actually done extremely well on the Symbicort and the nebulizers as needed.  He has been using the Symbicort daily.  He does say his cough has significantly improved.  He continues to have occasional cough with some yellow sputum but very manageable at this point.    He denies any shortness of breath.  No chest pain chest pressure palpitations.        Patient Active Problem List   Diagnosis   • Sciatica   • Migraine   • Hypertension   • Gout   • Broken neck (CMS/HCC)   • Abdominal aortic aneurysm with recent repair at [place]   • Follicular lymphoma (CMS/HCC)   • Bronchiectasis without complication (CMS/HCC)   • Cough       Allergies   Allergen Reactions   • Contrast Dye Nausea And Vomiting       Current Outpatient Medications:   •  albuterol (PROVENTIL) (2.5 MG/3ML) 0.083% nebulizer solution, Take 2.5 mg by nebulization 4 (Four) Times a Day As Needed for Wheezing., Disp: 240 mL, Rfl: 3  •  allopurinol (ZYLOPRIM) 100 MG tablet, Take 100 mg by mouth Daily., Disp: , Rfl:   •  amLODIPine (NORVASC) 5 MG tablet, Take 5 mg by mouth Daily., Disp: , Rfl:   •  aspirin 81 MG EC tablet, Take 81 mg by mouth Daily., Disp: , Rfl:   •  budesonide-formoterol (SYMBICORT) 80-4.5 MCG/ACT inhaler, Inhale 2 puffs 2 (Two) Times a Day., Disp: 1 inhaler, Rfl: 5  •  carvedilol (COREG) 25 MG tablet, Take 25 mg by mouth 2 (Two) Times a Day With Meals., Disp: , Rfl:   •  fluticasone-salmeterol (ADVAIR) 100-50 MCG/DOSE DISKUS, Inhale 1 puff 2 (Two) Times a Day., Disp: 60 each, Rfl: 5  •  levothyroxine (SYNTHROID, LEVOTHROID) 25 MCG tablet, Take 25 mcg by mouth Daily., Disp: , Rfl:   •  lisinopril (PRINIVIL,ZESTRIL) 40 MG tablet, Take 40 mg by mouth Daily., Disp: , Rfl:   •  ondansetron (ZOFRAN) 4 MG tablet, Take 1  "tablet by mouth Every 8 (Eight) Hours As Needed for Nausea or Vomiting., Disp: 10 tablet, Rfl: 0  •  oxyCODONE-acetaminophen (PERCOCET)  MG per tablet, Take 1 tablet by mouth Every 6 (Six) Hours As Needed for Moderate Pain ., Disp: , Rfl:   •  potassium chloride (K-DUR) 10 MEQ CR tablet, Take 10 mEq by mouth Take As Directed., Disp: , Rfl:   •  tamsulosin (FLOMAX) 0.4 MG capsule 24 hr capsule, Take 1 capsule by mouth Every Night., Disp: , Rfl:   MEDICATION LIST AND ALLERGIES REVIEWED.    Social History     Tobacco Use   • Smoking status: Former Smoker     Packs/day: 1.00     Years: 15.00     Pack years: 15.00     Types: Cigarettes     Last attempt to quit:      Years since quittin.6   • Smokeless tobacco: Never Used   • Tobacco comment: QUIT 30 YEARS AGO    Substance Use Topics   • Alcohol use: No   • Drug use: No       FAMILY AND SOCIAL HISTORY REVIEWED.    Review of Systems   Constitutional: Negative for chills, fatigue, fever and unexpected weight change.   HENT: Negative for congestion, nosebleeds, postnasal drip, rhinorrhea, sinus pressure and trouble swallowing.    Respiratory: Positive for cough. Negative for chest tightness, shortness of breath and wheezing.    Cardiovascular: Negative for chest pain and leg swelling.   Gastrointestinal: Negative for abdominal pain, constipation, diarrhea, nausea and vomiting.   Genitourinary: Negative for dysuria, frequency, hematuria and urgency.   Musculoskeletal: Negative for myalgias.   Neurological: Negative for dizziness, weakness, numbness and headaches.   All other systems reviewed and are negative.  .  Objective   /90 (BP Location: Left arm, Patient Position: Sitting, Cuff Size: Adult)   Pulse 80   Temp 97.8 °F (36.6 °C)   Resp 18   Ht 177.8 cm (70\")   Wt 84.5 kg (186 lb 4 oz)   SpO2 97% Comment: room air at rest  BMI 26.72 kg/m²     Immunization History   Administered Date(s) Administered   • Flu Vaccine Quad PF >36MO 2016, " 11/01/2017   • Fluzone High Dose =>65 Years ([employer] ONLY) 01/12/2018, 10/07/2018, 12/10/2018, 10/09/2019   • Influenza, Unspecified 11/01/2017   • Pneumococcal Polysaccharide (PPSV23) 06/30/2005, 12/12/2016   • Td 09/26/2000       Physical Exam   Constitutional: He is oriented to person, place, and time. He appears well-developed and well-nourished.   HENT:   Head: Normocephalic and atraumatic.   Eyes: Pupils are equal, round, and reactive to light. EOM are normal.   Neck: Normal range of motion. Neck supple.   Cardiovascular: Normal rate and regular rhythm.   No murmur heard.  Pulmonary/Chest: Effort normal and breath sounds normal. No respiratory distress. He has no wheezes. He has no rales.   Abdominal: Soft. Bowel sounds are normal. He exhibits no distension.   Musculoskeletal: Normal range of motion. He exhibits no edema.   Neurological: He is alert and oriented to person, place, and time.   Skin: Skin is warm and dry. No erythema.   Psychiatric: He has a normal mood and affect. His behavior is normal.   Vitals reviewed.        RESULTS          Assessment/Plan     PROBLEM LIST         ICD-10-CM ICD-9-CM   1. Bronchiectasis without complication (CMS/[facility]) J47.9 494.0   2. Cough R05 786.2       Problem List Items Addressed This Visit        Respiratory    Bronchiectasis without complication (CMS/[facility]) - Primary    Cough            DISCUSSION  .    At this time he is doing very well.  He will continue on the Symbicort, I gave him some more samples today in the office.  It does appear his insurance would like to cover Advair discus, at this point he will continue on the Symbicort until follow-up.  He also has his albuterol nebulizers to use as needed.    Follow-up in the office in 3 months or sooner with any issues.       I spent 15 minutes face to face with the patient. I spent > 50% percent of this time counseling and discussing evaluation, current status and management.    Bibiana Zavaleta, APRN  08/19/202011:45  AM  Electronically signed     Please note that portions of this note were completed with a voice recognition program. Efforts were made to edit the dictations, but occasionally words are mistranscribed.      CC: Kaycee Douglas MD

## 2020-09-04 ENCOUNTER — TELEPHONE (OUTPATIENT)
Dept: ONCOLOGY | Facility: CLINIC | Age: 80
End: 2020-09-04

## 2020-09-04 NOTE — TELEPHONE ENCOUNTER
Patient called c/o pain in his groin, bilateral hips, and legs.  Denies night sweats, fever, SOB, or cough.  No current swelling, though he states he does have swelling occasionally.  No palpable lymph nodes.  Discussed with YOVANI Jackson.  Ok to move up office visit for evaluation if patient wants, but pain seems to be chronic.  Patient would like to change appointment.  Message sent to Ken to schedule.  Patient also advised to contact provider who has been managing his chronic joint pain at [facility].  Verbalized understanding.

## 2020-09-18 ENCOUNTER — OFFICE VISIT (OUTPATIENT)
Dept: ONCOLOGY | Facility: CLINIC | Age: 80
End: 2020-09-18

## 2020-09-18 ENCOUNTER — LAB (OUTPATIENT)
Dept: LAB | Facility: HOSPITAL | Age: 80
End: 2020-09-18

## 2020-09-18 VITALS
DIASTOLIC BLOOD PRESSURE: 87 MMHG | SYSTOLIC BLOOD PRESSURE: 116 MMHG | TEMPERATURE: 97.3 F | HEART RATE: 75 BPM | BODY MASS INDEX: 26.2 KG/M2 | RESPIRATION RATE: 16 BRPM | OXYGEN SATURATION: 97 % | WEIGHT: 183 LBS | HEIGHT: 70 IN

## 2020-09-18 DIAGNOSIS — C82.00 FOLLICULAR LYMPHOMA GRADE I, UNSPECIFIED BODY REGION (HCC): Primary | Chronic | ICD-10-CM

## 2020-09-18 DIAGNOSIS — C82.00 FOLLICULAR LYMPHOMA GRADE I, UNSPECIFIED BODY REGION (HCC): ICD-10-CM

## 2020-09-18 LAB
ERYTHROCYTE [DISTWIDTH] IN BLOOD BY AUTOMATED COUNT: 17.3 % (ref 12.3–15.4)
HCT VFR BLD AUTO: 37.1 % (ref 37.5–51)
HGB BLD-MCNC: 11.9 G/DL (ref 13–17.7)
LYMPHOCYTES # BLD AUTO: 1.4 10*3/MM3 (ref 0.7–3.1)
LYMPHOCYTES NFR BLD AUTO: 16.8 % (ref 19.6–45.3)
MCH RBC QN AUTO: 29.2 PG (ref 26.6–33)
MCHC RBC AUTO-ENTMCNC: 32.2 G/DL (ref 31.5–35.7)
MCV RBC AUTO: 90.6 FL (ref 79–97)
MONOCYTES # BLD AUTO: 0.8 10*3/MM3 (ref 0.1–0.9)
MONOCYTES NFR BLD AUTO: 9.7 % (ref 5–12)
NEUTROPHILS NFR BLD AUTO: 6.1 10*3/MM3 (ref 1.7–7)
NEUTROPHILS NFR BLD AUTO: 73.5 % (ref 42.7–76)
PLATELET # BLD AUTO: 236 10*3/MM3 (ref 140–450)
PMV BLD AUTO: 7.1 FL (ref 6–12)
RBC # BLD AUTO: 4.09 10*6/MM3 (ref 4.14–5.8)
WBC # BLD AUTO: 8.3 10*3/MM3 (ref 3.4–10.8)

## 2020-09-18 PROCEDURE — 85025 COMPLETE CBC W/AUTO DIFF WBC: CPT

## 2020-09-18 PROCEDURE — 36415 COLL VENOUS BLD VENIPUNCTURE: CPT

## 2020-09-18 PROCEDURE — 99214 OFFICE O/P EST MOD 30 MIN: CPT | Performed by: INTERNAL MEDICINE

## 2020-09-18 NOTE — PROGRESS NOTES
CHIEF COMPLAINT: Follow-up follicular lymphoma    Problem List:  Oncology/Hematology History Overview Note   1. Follicular lymphoma:  -  initial consultation 3/12/18: Had been having left inguinal pains and went for scanning that found left groin node.  Needle biopsy of this was called lymphoma CD10 positive and Fish testing positive for translocation (14;18) consistent with follicular lymphoma though could also be seen in diffuse large B cell.  BCL 6 /3q rearranged.  No Myc..  Hence cannot say at this junction the exact subtype of lymphoma.  He has not been having bed drenching night sweats.  No unexplained weight loss.  Has not had any known anemia, thrombocytopenia, leukopenia.  Has not had frequent infections.  CT did not show any splenomegaly or other significant adenopathy.  The left inguinal pain has subsided since the needle biopsy.  He has an abdominal aortic aneurysm that is being watched and had previously been repaired.  Apparently 15+ years ago he was treated at [facility] for lymphoma for which he received right neck radiation and some chemotherapy.  He is not sure of the subtype of lymphoma nor of the type of chemotherapy nor the number of radiation treatments and this was far enough back that our records have been expunged.       Follicular lymphoma (CMS/HCC)   3/12/2018 Initial Diagnosis    Follicular lymphoma     3/15/2018 Imaging    PET     IMPRESSION:     Multiple hypermetabolic abnormalities are identified but the  distribution is unusual. Although there is bulky hypermetabolic rishi  mass in the left inguinal area and in the right internal iliac rishi  chain certainly consistent with active malignant lymphoma, there is an  intense abnormal area of tracer uptake and hypermetabolic activity  throughout the marrow or intramedullary portion of the left ilium and  iliac wing in the absence of cortical destruction. This is an intensely  hypermetabolic abnormal lengthy intraosseous  abnormality.     The lower extremity dedicated datasets are negative.     Intermediate lymph nodes are identified in the medial inguinal region  which are not above malignant threshold.     Above the pelvis, there is no evidence of additional hypermetabolic  focus or active neoplastic disease, and the chest, mediastinum and neck  are clear.     6/7/2018 Imaging    MRI lumbar spine IMPRESSION:    Study is limited by susceptibility artifact greatest at L1-L2.       No definite significant central canal stenosis.       Moderate to severe right and moderate left foraminal stenosis at L3-L4.    Moderate to severe bilateral foraminal stenosis at L4-L5.     7/24/2018 Imaging    CT chest IMPRESSION:  There are no acute findings. Specifically, there is no  axillary, mediastinal or hilar lymphadenopathy. There is bronchiectasis  in the medial segment of the left lower lobe. This was also present on  CT/PET examination of 03/15/2018.  CT abdomen and pelvis IMPRESSION:  Left inguinal lymphadenopathy, unchanged when compared to a  whole-body PET CT scan of 03/15/2018.     9/6/2018 Imaging    MRI pelvis  1. Decreasing size of patient's left inguinal mass.  2. Interval development of diffuse marrow edema and enhancement,  practically throughout the left ilium, and adjacent soft tissue disease  deep to the left iliacus muscle, raising suspicion for lymphoma.     9/27/2018 - 10/17/2018 Radiation    Radiation OncologyTreatment Course:  George Lomeli received 3000 cGy in 15 fractions to left groin & iliac via External Beam Radiation - EBRT.     11/27/2018 Imaging    CT chest abdomen and pelvis  IMPRESSION: Bronchiectasis in the lungs but no significant mediastinal adenopathy or axillary adenopathy or cervical adenopathy.  There is a mildly enlarged left inguinal lymph node. The  node has smooth ovoid margins and has central low density consistent  with a necrotic node. Lastly more superiorly and anteriorly in a region  where there are  surgical clips there is a lentiform mass which is much  much smaller than on the previous examination of 07/24/2018.     4/14/2020 Imaging    CT chest, abdomen and pelvis IMPRESSION:  Stable examination with no evidence of active or recurrent  lymphoma. No bulky adenopathy identified. Chronic age-related changes  identified throughout the chest, abdomen and pelvis which are all         HISTORY OF PRESENT ILLNESS:  The patient is a 80 y.o. male, here for follow up on management of follicular lymphoma.  Is having alternating right and left lower quadrant abdominal discomfort over the last month.  No bed drenching night sweats or fevers or chills.  No progressive adenopathy.  No unexplained weight loss.  No change in the color caliber or consistency of his stools.      Past Medical History:   Diagnosis Date   • AAA (abdominal aortic aneurysm) (CMS/HCC)    • Arthritis    • Broken neck (CMS/HCC)    • Gout    • Hypertension    • Lymphoma (CMS/HCC)    • Migraine    • Sciatica    • Thyroid cancer (CMS/HCC)      Past Surgical History:   Procedure Laterality Date   • BACK SURGERY     • EYE SURGERY     • MULTIPLE TOOTH EXTRACTIONS     • NECK SURGERY      PINS/RODS IN NECK   • THYROIDECTOMY / EXCISION CYST THYROID         Allergies   Allergen Reactions   • Contrast Dye Nausea And Vomiting       Family History and Social History reviewed and changed as necessary      REVIEW OF SYSTEM:   Review of Systems   Constitutional: Negative for appetite change, chills, diaphoresis, fatigue, fever and unexpected weight change.   HENT:   Negative for mouth sores, sore throat and trouble swallowing.    Eyes: Negative for icterus.   Respiratory: Negative for cough, hemoptysis and shortness of breath.    Cardiovascular: Negative for chest pain, leg swelling and palpitations.   Gastrointestinal: Negative for abdominal distention, abdominal pain, blood in stool, constipation, diarrhea, nausea and vomiting.   Endocrine: Negative for hot flashes.  "  Genitourinary: Negative for bladder incontinence, difficulty urinating, dysuria, frequency and hematuria.    Musculoskeletal: Negative for gait problem, neck pain and neck stiffness.   Skin: Negative for rash.   Neurological: Negative for dizziness, gait problem, headaches, light-headedness and numbness.   Hematological: Negative for adenopathy. Does not bruise/bleed easily.   Psychiatric/Behavioral: Negative for depression. The patient is not nervous/anxious.    All other systems reviewed and are negative.       PHYSICAL EXAM    Vitals:    09/18/20 1444   BP: 116/87   Pulse: 75   Resp: 16   Temp: 97.3 °F (36.3 °C)   SpO2: 97%   Weight: 83 kg (183 lb)   Height: 177.8 cm (70\")     Vitals:    09/18/20 1444   PainSc: 0-No pain        Constitutional: Appears well-developed and well-nourished. No distress.   ECOG: (0) Fully Active - Able to Carry On All Pre-disease Performance Without Restriction  HENT:   Head: Normocephalic.   Mouth/Throat: Oropharynx is clear and moist.   Eyes: Conjunctivae are normal. Pupils are equal, round, and reactive to light. No scleral icterus.   Neck: Neck supple. No JVD present. No thyromegaly present.   Cardiovascular: Normal rate, regular rhythm and normal heart sounds.    Pulmonary/Chest: Breath sounds normal. No respiratory distress.   Abdominal: Soft. Exhibits no distension and no mass. There is no hepatosplenomegaly. There is no tenderness. There is no rebound and no guarding.   Musculoskeletal:Exhibits no edema, tenderness or deformity.   Neurological: Alert and oriented to person, place, and time. Exhibits normal muscle tone.   Skin: No ecchymosis, no petechiae and no rash noted. Not diaphoretic. No cyanosis. Nails show no clubbing.   Psychiatric: Normal mood and affect.   Vitals reviewed.  No palpable cervical axillary or inguinal adenopathy.    Lab Results   Component Value Date    HGB 12.4 (L) 04/17/2020    HCT 39.1 04/17/2020    MCV 94.0 04/17/2020     04/17/2020    WBC " 9.27 04/17/2020    NEUTROABS 7.20 (H) 04/17/2020    LYMPHSABS 1.06 04/17/2020    MONOSABS 0.82 04/17/2020    EOSABS 0.09 04/17/2020    BASOSABS 0.02 04/17/2020       Lab Results   Component Value Date    GLUCOSE 99 04/17/2020    BUN 9 04/17/2020    CREATININE 0.95 04/17/2020     04/17/2020    K 3.6 04/17/2020     04/17/2020    CO2 27.0 04/17/2020    CALCIUM 9.4 04/17/2020    PROTEINTOT 7.5 04/17/2020    ALBUMIN 3.90 04/17/2020    BILITOT 0.4 04/17/2020    ALKPHOS 111 04/17/2020    AST 18 04/17/2020    ALT 12 04/17/2020                   ASSESSMENT & PLAN:  1. Follicular lymphoma:  2. Abdominal pain    Discussion: No cervical axillary or inguinal adenopathy.  Blood counts have been normal as of April along with CT showing no bulky adenopathy at that point.  We will get his labs today and CTs now in light of his abdominal pain.  I will have him do telephone visit with my nurse practitioner in a couple of weeks.  The pain is not excruciating and is not every day but just been present for the past few weeks.  If the scans are negative then I would ask him to follow-up with gastroenterology on that and we can make referral as he desires.  Would not treat unless he reached our previously mentioned G ELF criteria.  Discussed with patient face-to-face 30 minutes greater than 50% spent counseling.        Thor Ruggiero MD    09/18/2020

## 2020-09-29 ENCOUNTER — TELEPHONE (OUTPATIENT)
Dept: ONCOLOGY | Facility: CLINIC | Age: 80
End: 2020-09-29

## 2020-09-29 ENCOUNTER — HOSPITAL ENCOUNTER (OUTPATIENT)
Dept: CT IMAGING | Facility: HOSPITAL | Age: 80
Discharge: HOME | End: 2020-09-29
Admitting: INTERNAL MEDICINE

## 2020-09-29 DIAGNOSIS — C82.00 FOLLICULAR LYMPHOMA GRADE I, UNSPECIFIED BODY REGION (HCC): Chronic | ICD-10-CM

## 2020-09-29 DIAGNOSIS — C82.95 FOLLICULAR LYMPHOMA OF LYMPH NODES OF INGUINAL REGION, UNSPECIFIED FOLLICULAR LYMPHOMA TYPE (HCC): Primary | ICD-10-CM

## 2020-09-29 DIAGNOSIS — C82.95 FOLLICULAR LYMPHOMA OF LYMPH NODES OF INGUINAL REGION, UNSPECIFIED FOLLICULAR LYMPHOMA TYPE (HCC): ICD-10-CM

## 2020-09-29 PROCEDURE — 74176 CT ABD & PELVIS W/O CONTRAST: CPT

## 2020-09-29 PROCEDURE — 71250 CT THORAX DX C-: CPT

## 2020-09-29 NOTE — TELEPHONE ENCOUNTER
[facility] OFFICE CALLED IN REGARD TO PT'S SCAN. THEY STATE THE PT IS CURRENTLY IN THEIR OFFICE TO RECEIVE SCAN BUT HAS NOT BEEN MEDICATED AND IS ALLERGIC TO CONTRAST. THEY NEED THE SCAN ORDER CHANGED WITH A GOOD AUTHORIZATION NUMBER ASAP    PHONE: [phone]

## 2020-10-02 ENCOUNTER — OFFICE VISIT (OUTPATIENT)
Dept: ONCOLOGY | Facility: CLINIC | Age: 80
End: 2020-10-02

## 2020-10-02 DIAGNOSIS — Z85.72 HISTORY OF FOLLICULAR LYMPHOMA: Primary | ICD-10-CM

## 2020-10-02 PROCEDURE — 99442 PR PHYS/QHP TELEPHONE EVALUATION 11-20 MIN: CPT | Performed by: NURSE PRACTITIONER

## 2020-10-02 NOTE — PROGRESS NOTES
"TELEMEDICINE FOLLOW-UP     In order to limit face-to-face contact and enact \"social distancing\" in light of the COVID-19 outbreaks and in accordance to the recommendations per the CDC, WHO, and our individual department, George Lomeli  was contacted.  Telemedicine was used to screen the patient for needs and conduct his regularly scheduled follow-up.    You have chosen to receive care through a telephone visit. Do you consent to use a telephone visit for your medical care today? Yes       COVID-19 screening: male specifically denies fever, body aches, cough, difficulty breathing, sore throat, runny nose, recent travel, or known sick exposures.      Oncology/Hematology History Overview Note   1. Follicular lymphoma:  -  initial consultation 3/12/18: Had been having left inguinal pains and went for scanning that found left groin node.  Needle biopsy of this was called lymphoma CD10 positive and Fish testing positive for translocation (14;18) consistent with follicular lymphoma though could also be seen in diffuse large B cell.  BCL 6 /3q rearranged.  No Myc..  Hence cannot say at this junction the exact subtype of lymphoma.  He has not been having bed drenching night sweats.  No unexplained weight loss.  Has not had any known anemia, thrombocytopenia, leukopenia.  Has not had frequent infections.  CT did not show any splenomegaly or other significant adenopathy.  The left inguinal pain has subsided since the needle biopsy.  He has an abdominal aortic aneurysm that is being watched and had previously been repaired.  Apparently 15+ years ago he was treated at [facility] for lymphoma for which he received right neck radiation and some chemotherapy.  He is not sure of the subtype of lymphoma nor of the type of chemotherapy nor the number of radiation treatments and this was far enough back that our records have been expunged.       Follicular lymphoma (CMS/Formerly [facility])   3/12/2018 Initial Diagnosis    Follicular lymphoma   "   3/15/2018 Imaging    PET     IMPRESSION:     Multiple hypermetabolic abnormalities are identified but the  distribution is unusual. Although there is bulky hypermetabolic rishi  mass in the left inguinal area and in the right internal iliac rishi  chain certainly consistent with active malignant lymphoma, there is an  intense abnormal area of tracer uptake and hypermetabolic activity  throughout the marrow or intramedullary portion of the left ilium and  iliac wing in the absence of cortical destruction. This is an intensely  hypermetabolic abnormal lengthy intraosseous abnormality.     The lower extremity dedicated datasets are negative.     Intermediate lymph nodes are identified in the medial inguinal region  which are not above malignant threshold.     Above the pelvis, there is no evidence of additional hypermetabolic  focus or active neoplastic disease, and the chest, mediastinum and neck  are clear.     6/7/2018 Imaging    MRI lumbar spine IMPRESSION:    Study is limited by susceptibility artifact greatest at L1-L2.       No definite significant central canal stenosis.       Moderate to severe right and moderate left foraminal stenosis at L3-L4.    Moderate to severe bilateral foraminal stenosis at L4-L5.     7/24/2018 Imaging    CT chest IMPRESSION:  There are no acute findings. Specifically, there is no  axillary, mediastinal or hilar lymphadenopathy. There is bronchiectasis  in the medial segment of the left lower lobe. This was also present on  CT/PET examination of 03/15/2018.  CT abdomen and pelvis IMPRESSION:  Left inguinal lymphadenopathy, unchanged when compared to a  whole-body PET CT scan of 03/15/2018.     9/6/2018 Imaging    MRI pelvis  1. Decreasing size of patient's left inguinal mass.  2. Interval development of diffuse marrow edema and enhancement,  practically throughout the left ilium, and adjacent soft tissue disease  deep to the left iliacus muscle, raising suspicion for lymphoma.      9/27/2018 - 10/17/2018 Radiation    Radiation OncologyTreatment Course:  George Lomeli received 3000 cGy in 15 fractions to left groin & iliac via External Beam Radiation - EBRT.     11/27/2018 Imaging    CT chest abdomen and pelvis  IMPRESSION: Bronchiectasis in the lungs but no significant mediastinal adenopathy or axillary adenopathy or cervical adenopathy.  There is a mildly enlarged left inguinal lymph node. The  node has smooth ovoid margins and has central low density consistent  with a necrotic node. Lastly more superiorly and anteriorly in a region  where there are surgical clips there is a lentiform mass which is much  much smaller than on the previous examination of 07/24/2018.     4/14/2020 Imaging    CT chest, abdomen and pelvis IMPRESSION:  Stable examination with no evidence of active or recurrent  lymphoma. No bulky adenopathy identified. Chronic age-related changes  identified throughout the chest, abdomen and pelvis which are all     9/29/2020 Imaging    CT chest, abdomen and pelvis IMPRESSION:  Stable examination with no evidence of active or recurrent  lymphoma. The findings are similar to that when compared to the prior  examination. No significant changes in the interval.         Brief History: The patient states currently he is feeling well.  Previous concerns over abdominal pain resolved spontaneously without intervention.  He had CT chest, abdomen pelvis earlier this week, visit today to go over the results of the scan.    Review of Systems: a 14 point review of systems was performed and is negative except as noted above.      MEDICATIONS: Medication reconciliation for the patient was reviewed and confirmed in the electronic medical record.    The following portions of the patient's history were reviewed and updated as appropriate: allergies, current medications, past family history, past medical history, past social history, past surgical history and problem list.    Labs:  Lab Results    Component Value Date    WBC 8.30 09/18/2020    HGB 11.9 (L) 09/18/2020    HCT 37.1 (L) 09/18/2020    MCV 90.6 09/18/2020     09/18/2020     Lab Results   Component Value Date    GLUCOSE 99 04/17/2020    BUN 9 04/17/2020    CREATININE 0.95 04/17/2020    EGFRIFAFRI 93 04/17/2020    BCR 9.5 04/17/2020    K 3.6 04/17/2020    CO2 27.0 04/17/2020    CALCIUM 9.4 04/17/2020    ALBUMIN 3.90 04/17/2020    AST 18 04/17/2020    ALT 12 04/17/2020         Imaging:   I have personally reviewed the following studies.    CT Chest Without Contrast  Narrative: EXAMINATION: CT CHEST WO CONTRAST-, CT ABDOMEN/PELVIS WO  CONTRAST-09/29/2020:      INDICATION: Follow-up follicular lymphoma; C82.95-Follicular lymphoma,  unspecified, lymph nodes of inguinal region and lower limb, follow-up  lymphoma.     TECHNIQUE: Multiple axial CT imaging was obtained of the chest, abdomen  and pelvis without the administration of oral or intravenous contrast.     The radiation dose reduction device was turned on for each scan per the  ALARA (As Low as Reasonably Achievable) protocol.     COMPARISON: 04/14/2020.     FINDINGS:      CHEST: The thyroid is homogeneous. No mediastinal mass or adenopathy. No  bulky hilar or axillary lymphadenopathy. Cardiac chambers are within  normal limits with tortuosity of the thoracic aorta. The lung parenchyma  reveals no parenchymal consolidation with emphysematous and chronic  changes seen bilaterally. Degenerative changes seen within the spine. No  pleural effusion or pneumothorax with scarring identified in the medial  aspect of the left lung base. No pleural effusion or pneumothorax.  Degenerative changes seen within the spine.     ABDOMEN: The liver is homogeneous. The spleen is unremarkable. The  abdominal portions of the gastrointestinal tract are within normal  limits. Cysts identified on both kidneys. Adrenal glands are within  normal limits. The abdominal portions of the gastrointestinal tract  are  within normal limits. There is an endovascular stent identified in the  infrarenal abdominal aorta in satisfactory position. The appendix is  normal. The appearance of the abdomen is stable. No abdominal or  retroperitoneal lymphadenopathy. No free fluid or free air.     PELVIS: The pelvic organs are unremarkable. The pelvic portions of the  gastrointestinal tract are within normal limits. No pelvic adenopathy.  No abnormal mass or fluid collection is identified. The bony structures  reveal degenerative changes seen within the spine and pelvis.     Impression: Stable examination with no evidence of active or recurrent  lymphoma. The findings are similar to that when compared to the prior  examination. No significant changes in the interval.     D:  09/29/2020  E:  09/29/2020     This report was finalized on 9/30/2020 9:42 AM by Dr. Kristi Santacruz MD.     CT Abdomen Pelvis Without Contrast  Narrative: EXAMINATION: CT CHEST WO CONTRAST-, CT ABDOMEN/PELVIS WO  CONTRAST-09/29/2020:      INDICATION: Follow-up follicular lymphoma; C82.95-Follicular lymphoma,  unspecified, lymph nodes of inguinal region and lower limb, follow-up  lymphoma.     TECHNIQUE: Multiple axial CT imaging was obtained of the chest, abdomen  and pelvis without the administration of oral or intravenous contrast.     The radiation dose reduction device was turned on for each scan per the  ALARA (As Low as Reasonably Achievable) protocol.     COMPARISON: 04/14/2020.     FINDINGS:      CHEST: The thyroid is homogeneous. No mediastinal mass or adenopathy. No  bulky hilar or axillary lymphadenopathy. Cardiac chambers are within  normal limits with tortuosity of the thoracic aorta. The lung parenchyma  reveals no parenchymal consolidation with emphysematous and chronic  changes seen bilaterally. Degenerative changes seen within the spine. No  pleural effusion or pneumothorax with scarring identified in the medial  aspect of the left lung base. No  pleural effusion or pneumothorax.  Degenerative changes seen within the spine.     ABDOMEN: The liver is homogeneous. The spleen is unremarkable. The  abdominal portions of the gastrointestinal tract are within normal  limits. Cysts identified on both kidneys. Adrenal glands are within  normal limits. The abdominal portions of the gastrointestinal tract are  within normal limits. There is an endovascular stent identified in the  infrarenal abdominal aorta in satisfactory position. The appendix is  normal. The appearance of the abdomen is stable. No abdominal or  retroperitoneal lymphadenopathy. No free fluid or free air.     PELVIS: The pelvic organs are unremarkable. The pelvic portions of the  gastrointestinal tract are within normal limits. No pelvic adenopathy.  No abnormal mass or fluid collection is identified. The bony structures  reveal degenerative changes seen within the spine and pelvis.     Impression: Stable examination with no evidence of active or recurrent  lymphoma. The findings are similar to that when compared to the prior  examination. No significant changes in the interval.     D:  09/29/2020  E:  09/29/2020     This report was finalized on 9/30/2020 9:42 AM by Dr. Kristi Santacruz MD.         Assessment and Plan:     1.  History of follicular lymphoma  2.  Abdominal pain, resolved    Discussion: Patient currently feeling well, previous concerns over abdominal pain resolved spontaneously with no intervention.  CT chest, abdomen and pelvis as shown above are stable with no evidence of disease recurrence or progression.  He has no adenopathy.  CBC on 9/18/2020 was unremarkable.  We will continue to monitor per NCCN guidelines.  He is now 2 years out from treatment, we will go to annual CT scan.  I will see him back in 6 months for follow-up with CBC and CMP on return.  I discussed with the patient to notify us if he has any concerns.  Would not treat unless he had 3 more than 3 cm or 1 more  than 7 cm node, or other golf criteria such as unexplained bed drenching night sweats, fevers, weight loss, anemia, thrombocytopenia, frequent infections.    This visit has been rescheduled as a phone visit to comply with patient safety concerns in accordance with CDC recommendations. Total time of discussion was 11 minutes.    Laxmi Scott, APRN  10/02/2020

## 2020-10-19 ENCOUNTER — HOSPITAL ENCOUNTER (OUTPATIENT)
Dept: RADIATION ONCOLOGY | Facility: HOSPITAL | Age: 80
Setting detail: RADIATION/ONCOLOGY SERIES
Discharge: HOME | End: 2020-10-19

## 2020-10-19 ENCOUNTER — OFFICE VISIT (OUTPATIENT)
Dept: RADIATION ONCOLOGY | Facility: HOSPITAL | Age: 80
End: 2020-10-19

## 2020-10-19 VITALS
SYSTOLIC BLOOD PRESSURE: 136 MMHG | DIASTOLIC BLOOD PRESSURE: 89 MMHG | TEMPERATURE: 96.4 F | RESPIRATION RATE: 16 BRPM | HEART RATE: 87 BPM | BODY MASS INDEX: 26.17 KG/M2 | WEIGHT: 182.8 LBS | HEIGHT: 70 IN | OXYGEN SATURATION: 97 %

## 2020-10-19 DIAGNOSIS — C82.00 FOLLICULAR LYMPHOMA GRADE I, UNSPECIFIED BODY REGION (HCC): Primary | Chronic | ICD-10-CM

## 2020-10-19 PROCEDURE — G0463 HOSPITAL OUTPT CLINIC VISIT: HCPCS

## 2020-10-19 NOTE — PROGRESS NOTES
FOLLOW UP NOTE    PATIENT:                                                      George Lomeli  MEDICAL RECORD #:                        [id number]  :                                                          1940  COMPLETION DATE:   10/17/2018  DIAGNOSIS:     Follicular lymphoma (CMS/HCC)  - Stage IV      BRIEF HISTORY:   Mr. Lomeli is an 80 y.o. gentleman returning for routine follow-up.  He has a history of recurrent follicular lymphoma involving the left groin and left iliac bone, status post involved field radiotherapy in 2018.  He has been without evidence of recurrent disease since that time.  He denies fatigue, swelling or palpable mass, night sweats, fevers, chills, recent infections, or weight loss.  He endorses stable, occasional arthralgias without new or progressive pains.  He has been limiting contacts and self-isolating in response to the COVID-19 pandemic.  Today he feels well and has no acute concerns.      MEDICATIONS: Medication reconciliation for the patient was reviewed and confirmed in the electronic medical record.    Review of Systems   Musculoskeletal: Positive for arthralgias, back pain and gait problem (uses cane).   Neurological: Positive for gait problem (uses cane).   All other systems reviewed and are negative.      KPS 80%    Physical Exam  Vitals signs and nursing note reviewed.   Constitutional:       General: He is not in acute distress.     Appearance: Normal appearance. He is well-developed.   HENT:      Head: Normocephalic and atraumatic.   Neck:      Musculoskeletal: Normal range of motion and neck supple.      Comments: No palpable cervical adenopathy  Cardiovascular:      Rate and Rhythm: Normal rate and regular rhythm.      Heart sounds: No murmur. No friction rub.   Pulmonary:      Effort: Pulmonary effort is normal.      Breath sounds: Normal breath sounds. No wheezing.   Abdominal:      General: Bowel sounds are normal. There is no distension.       "Palpations: Abdomen is soft. There is no mass.      Tenderness: There is no abdominal tenderness.   Musculoskeletal: Normal range of motion.      Comments: No focal bony tenderness to palpation    Lymphadenopathy:      Upper Body:      Right upper body: No supraclavicular adenopathy.      Left upper body: No supraclavicular adenopathy.      Comments: No palpable inguinal adenopathy   Skin:     General: Skin is warm and dry.   Neurological:      Mental Status: He is alert and oriented to person, place, and time.   Psychiatric:         Behavior: Behavior normal.         Thought Content: Thought content normal.         Judgment: Judgment normal.         VITAL SIGNS:   Vitals:    10/19/20 1347   BP: 136/89   Pulse: 87   Resp: 16   Temp: 96.4 °F (35.8 °C)   TempSrc: Temporal   SpO2: 97%  Comment: RA   Weight: 82.9 kg (182 lb 12.8 oz)   Height: 177.8 cm (70\")   PainSc:   7   PainLoc: Knee  Comment: bilateral knee pain       The following portions of the patient's history were reviewed and updated as appropriate: allergies, current medications, past family history, past medical history, past social history, past surgical history and problem list.      IMAGING:  I have personally reviewed the following imaging studies:    CT chest/abdomen/pelvis 9/29/2020:  FINDINGS:      CHEST: The thyroid is homogeneous. No mediastinal mass or adenopathy. No  bulky hilar or axillary lymphadenopathy. Cardiac chambers are within  normal limits with tortuosity of the thoracic aorta. The lung parenchyma  reveals no parenchymal consolidation with emphysematous and chronic  changes seen bilaterally. Degenerative changes seen within the spine. No  pleural effusion or pneumothorax with scarring identified in the medial  aspect of the left lung base. No pleural effusion or pneumothorax.  Degenerative changes seen within the spine.     ABDOMEN: The liver is homogeneous. The spleen is unremarkable. The  abdominal portions of the gastrointestinal " tract are within normal  limits. Cysts identified on both kidneys. Adrenal glands are within  normal limits. The abdominal portions of the gastrointestinal tract are  within normal limits. There is an endovascular stent identified in the  infrarenal abdominal aorta in satisfactory position. The appendix is  normal. The appearance of the abdomen is stable. No abdominal or  retroperitoneal lymphadenopathy. No free fluid or free air.     PELVIS: The pelvic organs are unremarkable. The pelvic portions of the  gastrointestinal tract are within normal limits. No pelvic adenopathy.  No abnormal mass or fluid collection is identified. The bony structures  reveal degenerative changes seen within the spine and pelvis.     IMPRESSION:  Stable examination with no evidence of active or recurrent  lymphoma. The findings are similar to that when compared to the prior  examination. No significant changes in the interval.        This report was finalized on 9/30/2020 9:42 AM by Dr. Kristi Santacruz MD.              Diagnoses and all orders for this visit:    1. Follicular lymphoma grade I, unspecified body region (CMS/HCC) (Primary)         IMPRESSION:  Mr. Lomeli is an 80 y.o. male with a history of recurrent follicular lymphoma who completed radiation treatments 2 years ago for an inguinal recurrence.  Repeat imaging is stable and demonstrates no evidence of new or recurrent disease.  Clinically, he is doing very well.  We discussed continued surveillance.  NCCN guidelines recommend H&P with lab work every 3-6 months for 5 years, then annually or as clinically indicated.  Now that he has reached the 2-year shane, he may go to annual surveillance contrasted CT scans, which will be due ~October 2021.    RECOMMENDATIONS:  Mr. Lomeli continues routine oncologic surveillance under the care of Dr. Ruggiero, with next follow-up in April 2021.    Return in about 1 year (around 10/19/2021) for Office Visit.    YOVANI Jonas

## 2020-11-05 NOTE — TELEPHONE ENCOUNTER
Pt called today requesting refill on Rx Albuterol Neb Sol be sent to [facility].

## 2020-11-06 RX ORDER — ALBUTEROL SULFATE 2.5 MG/3ML
2.5 SOLUTION RESPIRATORY (INHALATION) 4 TIMES DAILY PRN
Qty: 240 ML | Refills: 3 | Status: SHIPPED | OUTPATIENT
Start: 2020-11-06 | End: 2021-11-05 | Stop reason: SDUPTHER

## 2020-11-19 ENCOUNTER — OFFICE VISIT (OUTPATIENT)
Dept: PULMONOLOGY | Facility: CLINIC | Age: 80
End: 2020-11-19

## 2020-11-19 VITALS
DIASTOLIC BLOOD PRESSURE: 86 MMHG | SYSTOLIC BLOOD PRESSURE: 132 MMHG | OXYGEN SATURATION: 97 % | HEART RATE: 80 BPM | TEMPERATURE: 97.9 F | BODY MASS INDEX: 25.77 KG/M2 | WEIGHT: 180 LBS | HEIGHT: 70 IN

## 2020-11-19 DIAGNOSIS — J47.9 BRONCHIECTASIS WITHOUT COMPLICATION (HCC): Primary | ICD-10-CM

## 2020-11-19 PROCEDURE — 99213 OFFICE O/P EST LOW 20 MIN: CPT | Performed by: NURSE PRACTITIONER

## 2020-11-19 NOTE — PROGRESS NOTES
[facility], [place] [facility]monary Follow up    CHIEF COMPLAINT    Bronchiectasis    Subjective   HISTORY OF PRESENT ILLNESS    George Lomeli is a 80 y.o.male here today for 3-month follow-up on his bronchiectasis with acute exacerbation.  I saw him back early fall late summer with continued episodes of exacerbation of his bronchiectasis.  At this time he is doing much better.  He continues on his albuterol nebulizers as needed to help with secretion clearance.    He continues on his Symbicort twice daily.    His cough is back to baseline with an occasional yellow thick sputum production.  He denies any shortness of breath with activity.  He has been quite active recently.    He does primarily stay at home and self-isolation secondary to his risk factors with the pandemic.      Patient Active Problem List   Diagnosis   • Sciatica   • Migraine   • Hypertension   • Gout   • Broken neck (CMS/HCC)   • Abdominal aortic aneurysm with recent repair at [place]   • Follicular lymphoma (CMS/HCC)   • Bronchiectasis without complication (CMS/HCC)   • Cough       Allergies   Allergen Reactions   • Contrast Dye Nausea And Vomiting       Current Outpatient Medications:   •  albuterol (PROVENTIL) (2.5 MG/3ML) 0.083% nebulizer solution, Take 2.5 mg by nebulization 4 (Four) Times a Day As Needed for Wheezing., Disp: 240 mL, Rfl: 3  •  allopurinol (ZYLOPRIM) 100 MG tablet, Take 100 mg by mouth Daily., Disp: , Rfl:   •  amLODIPine (NORVASC) 5 MG tablet, Take 5 mg by mouth Daily., Disp: , Rfl:   •  aspirin 81 MG EC tablet, Take 81 mg by mouth Daily., Disp: , Rfl:   •  budesonide-formoterol (SYMBICORT) 80-4.5 MCG/ACT inhaler, Inhale 2 puffs 2 (Two) Times a Day., Disp: 1 inhaler, Rfl: 5  •  carvedilol (COREG) 25 MG tablet, Take 25 mg by mouth 2 (Two) Times a Day With Meals., Disp: , Rfl:   •  fluticasone-salmeterol (ADVAIR) 100-50 MCG/DOSE DISKUS, Inhale 1 puff 2 (Two) Times a Day., Disp: 60 each, Rfl: 5  •  levothyroxine (SYNTHROID, LEVOTHROID) 25 MCG  "tablet, Take 25 mcg by mouth Daily., Disp: , Rfl:   •  lisinopril (PRINIVIL,ZESTRIL) 40 MG tablet, Take 40 mg by mouth Daily., Disp: , Rfl:   •  ondansetron (ZOFRAN) 4 MG tablet, Take 1 tablet by mouth Every 8 (Eight) Hours As Needed for Nausea or Vomiting., Disp: 10 tablet, Rfl: 0  •  oxyCODONE-acetaminophen (PERCOCET)  MG per tablet, Take 1 tablet by mouth Every 6 (Six) Hours As Needed for Moderate Pain ., Disp: , Rfl:   •  potassium chloride (K-DUR) 10 MEQ CR tablet, Take 10 mEq by mouth Take As Directed., Disp: , Rfl:   •  tamsulosin (FLOMAX) 0.4 MG capsule 24 hr capsule, Take 1 capsule by mouth Every Night., Disp: , Rfl:   MEDICATION LIST AND ALLERGIES REVIEWED.    Social History     Tobacco Use   • Smoking status: Former Smoker     Packs/day: 1.00     Years: 15.00     Pack years: 15.00     Types: Cigarettes     Quit date:      Years since quittin.8   • Smokeless tobacco: Never Used   • Tobacco comment: QUIT 30 YEARS AGO    Substance Use Topics   • Alcohol use: No   • Drug use: No       FAMILY AND SOCIAL HISTORY REVIEWED.    Review of Systems   Constitutional: Negative for chills, fatigue, fever and unexpected weight change.   HENT: Negative for congestion, nosebleeds, postnasal drip, rhinorrhea, sinus pressure and trouble swallowing.    Respiratory: Positive for cough. Negative for chest tightness, shortness of breath and wheezing.    Cardiovascular: Negative for chest pain and leg swelling.   Gastrointestinal: Negative for abdominal pain, constipation, diarrhea, nausea and vomiting.   Genitourinary: Negative for dysuria, frequency, hematuria and urgency.   Musculoskeletal: Negative for myalgias.   Neurological: Negative for dizziness, weakness, numbness and headaches.   All other systems reviewed and are negative.  .  Objective   /86   Pulse 80   Temp 97.9 °F (36.6 °C)   Ht 177.8 cm (70\")   Wt 81.6 kg (180 lb)   SpO2 97% Comment: resting, room air  BMI 25.83 kg/m²     Immunization " History   Administered Date(s) Administered   • FLUAD TRI 65YR+ 10/19/2020   • Flu Vaccine Quad PF >36MO 12/12/2016, 11/01/2017   • Fluzone High Dose =>65 Years ([facility] ONLY) 01/12/2018, 10/07/2018, 12/10/2018, 10/09/2019   • Influenza, Unspecified 11/01/2017   • Pneumococcal Polysaccharide (PPSV23) 06/30/2005, 12/12/2016   • Td 09/26/2000       Physical Exam  Vitals signs reviewed.   Constitutional:       Appearance: He is well-developed.   HENT:      Head: Normocephalic and atraumatic.   Eyes:      Pupils: Pupils are equal, round, and reactive to light.   Neck:      Musculoskeletal: Normal range of motion and neck supple.   Cardiovascular:      Rate and Rhythm: Normal rate and regular rhythm.      Heart sounds: No murmur.   Pulmonary:      Effort: Pulmonary effort is normal. No respiratory distress.      Breath sounds: Rales present. No wheezing.   Abdominal:      General: Bowel sounds are normal. There is no distension.      Palpations: Abdomen is soft.   Musculoskeletal: Normal range of motion.   Skin:     General: Skin is warm and dry.      Findings: No erythema.   Neurological:      Mental Status: He is alert and oriented to person, place, and time.   Psychiatric:         Behavior: Behavior normal.           RESULTS            Assessment/Plan     PROBLEM LIST         ICD-10-CM ICD-9-CM   1. Bronchiectasis without complication (CMS/HCC)  J47.9 494.0       Problem List Items Addressed This Visit        Respiratory    Bronchiectasis without complication (CMS/HCC) - Primary            DISCUSSION    At this time he is doing very well.  He will continue on his nebulizers to help with secretion clearance.  Continue on his Symbicort twice daily.    Follow-up in 6 months with full PFTs.    He continues to get his surveillance CT scans with oncology, his last was in September with no acute findings.        I spent 10-19 minutes face to face with the patient. I spent > 50% percent of this time counseling and discussing  evaluation, current status and management.    Bibiana Zavaleta, APRN  11/19/202011:46 EST  Electronically signed     Please note that portions of this note were completed with a voice recognition program. Efforts were made to edit the dictations, but occasionally words are mistranscribed.      CC: Kaycee Douglas MD

## 2021-02-01 ENCOUNTER — TELEPHONE (OUTPATIENT)
Dept: PULMONOLOGY | Facility: CLINIC | Age: 81
End: 2021-02-01

## 2021-02-01 DIAGNOSIS — J47.9 BRONCHIECTASIS WITHOUT COMPLICATION (HCC): Primary | ICD-10-CM

## 2021-02-02 NOTE — TELEPHONE ENCOUNTER
Pt called back today stating that Rx Advair is also too expensive. Pt has been advised to contact his insurance to see what is equivalent to Advair and affordable. Pt is also c/o of cough w/ thick, colored sputum and sore throat for 3-4 days. Pt denies fever/chest pain/wheezing and requesting something to be sent to his local pharmacy. Pt can be reached @ [phone]. Please advise.

## 2021-02-03 RX ORDER — DOXYCYCLINE HYCLATE 100 MG/1
100 CAPSULE ORAL 2 TIMES DAILY
Qty: 20 CAPSULE | Refills: 0 | OUTPATIENT
Start: 2021-02-03 | End: 2021-03-21

## 2021-02-03 NOTE — TELEPHONE ENCOUNTER
Spoke with pt and he states that Rx Advair is still too expensive for him at this time even though insurance has been approved. Pt also informed of abx being sent to pharmacy for congestion. Pt has been advised again to contact his insurance and let them know that he can not afford this inhaler and see what is affordable for him for an alternate. Pt verbalized understanding and appreciation.

## 2021-02-03 NOTE — TELEPHONE ENCOUNTER
Can you please call and let him know I called in the Advair as well as an antibiotic for his congestion.  Tell him to use his nebulizers to help with secretion clearance.  And to let me know next week if he is not feeling any better.

## 2021-03-21 ENCOUNTER — APPOINTMENT (OUTPATIENT)
Dept: CT IMAGING | Facility: HOSPITAL | Age: 81
End: 2021-03-21

## 2021-03-21 ENCOUNTER — HOSPITAL ENCOUNTER (EMERGENCY)
Facility: HOSPITAL | Age: 81
Discharge: HOME | End: 2021-03-21
Attending: EMERGENCY MEDICINE | Admitting: EMERGENCY MEDICINE

## 2021-03-21 ENCOUNTER — APPOINTMENT (OUTPATIENT)
Dept: GENERAL RADIOLOGY | Facility: HOSPITAL | Age: 81
End: 2021-03-21

## 2021-03-21 VITALS
DIASTOLIC BLOOD PRESSURE: 83 MMHG | SYSTOLIC BLOOD PRESSURE: 130 MMHG | WEIGHT: 180 LBS | HEIGHT: 70 IN | BODY MASS INDEX: 25.77 KG/M2 | OXYGEN SATURATION: 99 % | HEART RATE: 77 BPM | TEMPERATURE: 96.9 F | RESPIRATION RATE: 16 BRPM

## 2021-03-21 DIAGNOSIS — J20.8 ACUTE VIRAL BRONCHITIS: ICD-10-CM

## 2021-03-21 DIAGNOSIS — R51.9 CHRONIC NONINTRACTABLE HEADACHE, UNSPECIFIED HEADACHE TYPE: Primary | ICD-10-CM

## 2021-03-21 DIAGNOSIS — G89.29 CHRONIC NONINTRACTABLE HEADACHE, UNSPECIFIED HEADACHE TYPE: Primary | ICD-10-CM

## 2021-03-21 LAB
ALBUMIN SERPL-MCNC: 3.6 G/DL (ref 3.5–5.2)
ALBUMIN/GLOB SERPL: 1.3 G/DL
ALP SERPL-CCNC: 118 U/L (ref 39–117)
ALT SERPL W P-5'-P-CCNC: 10 U/L (ref 1–41)
ANION GAP SERPL CALCULATED.3IONS-SCNC: 9 MMOL/L (ref 5–15)
AST SERPL-CCNC: 14 U/L (ref 1–40)
BASOPHILS # BLD AUTO: 0.02 10*3/MM3 (ref 0–0.2)
BASOPHILS NFR BLD AUTO: 0.2 % (ref 0–1.5)
BILIRUB SERPL-MCNC: 0.3 MG/DL (ref 0–1.2)
BUN SERPL-MCNC: 8 MG/DL (ref 8–23)
BUN/CREAT SERPL: 7.5 (ref 7–25)
CALCIUM SPEC-SCNC: 8.2 MG/DL (ref 8.6–10.5)
CHLORIDE SERPL-SCNC: 114 MMOL/L (ref 98–107)
CO2 SERPL-SCNC: 22 MMOL/L (ref 22–29)
CREAT SERPL-MCNC: 1.06 MG/DL (ref 0.76–1.27)
DEPRECATED RDW RBC AUTO: 52.3 FL (ref 37–54)
EOSINOPHIL # BLD AUTO: 0.25 10*3/MM3 (ref 0–0.4)
EOSINOPHIL NFR BLD AUTO: 2.7 % (ref 0.3–6.2)
ERYTHROCYTE [DISTWIDTH] IN BLOOD BY AUTOMATED COUNT: 15.6 % (ref 12.3–15.4)
GFR SERPL CREATININE-BSD FRML MDRD: 81 ML/MIN/1.73
GLOBULIN UR ELPH-MCNC: 2.8 GM/DL
GLUCOSE SERPL-MCNC: 114 MG/DL (ref 65–99)
HCT VFR BLD AUTO: 34.8 % (ref 37.5–51)
HGB BLD-MCNC: 11.3 G/DL (ref 13–17.7)
IMM GRANULOCYTES # BLD AUTO: 0.04 10*3/MM3 (ref 0–0.05)
IMM GRANULOCYTES NFR BLD AUTO: 0.4 % (ref 0–0.5)
LYMPHOCYTES # BLD AUTO: 0.88 10*3/MM3 (ref 0.7–3.1)
LYMPHOCYTES NFR BLD AUTO: 9.5 % (ref 19.6–45.3)
MCH RBC QN AUTO: 29.7 PG (ref 26.6–33)
MCHC RBC AUTO-ENTMCNC: 32.5 G/DL (ref 31.5–35.7)
MCV RBC AUTO: 91.6 FL (ref 79–97)
MONOCYTES # BLD AUTO: 0.79 10*3/MM3 (ref 0.1–0.9)
MONOCYTES NFR BLD AUTO: 8.5 % (ref 5–12)
NEUTROPHILS NFR BLD AUTO: 7.31 10*3/MM3 (ref 1.7–7)
NEUTROPHILS NFR BLD AUTO: 78.7 % (ref 42.7–76)
NRBC BLD AUTO-RTO: 0 /100 WBC (ref 0–0.2)
NT-PROBNP SERPL-MCNC: 220.5 PG/ML (ref 0–1800)
PLATELET # BLD AUTO: 221 10*3/MM3 (ref 140–450)
PMV BLD AUTO: 10.8 FL (ref 6–12)
POTASSIUM SERPL-SCNC: 3.3 MMOL/L (ref 3.5–5.2)
PROT SERPL-MCNC: 6.4 G/DL (ref 6–8.5)
RBC # BLD AUTO: 3.8 10*6/MM3 (ref 4.14–5.8)
SARS-COV-2 RNA RESP QL NAA+PROBE: NOT DETECTED
SODIUM SERPL-SCNC: 145 MMOL/L (ref 136–145)
WBC # BLD AUTO: 9.29 10*3/MM3 (ref 3.4–10.8)

## 2021-03-21 PROCEDURE — 83880 ASSAY OF NATRIURETIC PEPTIDE: CPT | Performed by: PHYSICIAN ASSISTANT

## 2021-03-21 PROCEDURE — U0003 INFECTIOUS AGENT DETECTION BY NUCLEIC ACID (DNA OR RNA); SEVERE ACUTE RESPIRATORY SYNDROME CORONAVIRUS 2 (SARS-COV-2) (CORONAVIRUS DISEASE [COVID-19]), AMPLIFIED PROBE TECHNIQUE, MAKING USE OF HIGH THROUGHPUT TECHNOLOGIES AS DESCRIBED BY CMS-2020-01-R: HCPCS | Performed by: PHYSICIAN ASSISTANT

## 2021-03-21 PROCEDURE — 71045 X-RAY EXAM CHEST 1 VIEW: CPT

## 2021-03-21 PROCEDURE — 99284 EMERGENCY DEPT VISIT MOD MDM: CPT

## 2021-03-21 PROCEDURE — 80053 COMPREHEN METABOLIC PANEL: CPT | Performed by: PHYSICIAN ASSISTANT

## 2021-03-21 PROCEDURE — 85025 COMPLETE CBC W/AUTO DIFF WBC: CPT | Performed by: PHYSICIAN ASSISTANT

## 2021-03-21 PROCEDURE — 70450 CT HEAD/BRAIN W/O DYE: CPT

## 2021-03-21 PROCEDURE — 99283 EMERGENCY DEPT VISIT LOW MDM: CPT

## 2021-03-21 PROCEDURE — U0005 INFEC AGEN DETEC AMPLI PROBE: HCPCS | Performed by: PHYSICIAN ASSISTANT

## 2021-03-21 RX ORDER — METHYLPREDNISOLONE 4 MG/1
TABLET ORAL
Qty: 21 TABLET | Refills: 0 | Status: SHIPPED | OUTPATIENT
Start: 2021-03-21 | End: 2021-04-21

## 2021-03-21 RX ORDER — TOPIRAMATE 25 MG/1
25 CAPSULE, COATED PELLETS ORAL 2 TIMES DAILY
COMMUNITY
End: 2021-04-21

## 2021-03-21 RX ORDER — BENZONATATE 100 MG/1
100 CAPSULE ORAL 3 TIMES DAILY PRN
COMMUNITY
End: 2021-04-21

## 2021-03-21 NOTE — ED PROVIDER NOTES
Subjective   Mr. Lomeli is an 80-year-old male that comes to the emergency department today with 2 complaints the first is a headache which he has had for months.  States been off and on has had no imaging.  They have given him different medications for pain does not seem to give him a lot of relief.  The headache is bifrontal.  He reports he has had previous neck fractures he has a lot of hardware back there.  Some of that pain does originate from the occiput.  Said no fevers no chills no visual disturbances.  He denies any incapacitating headaches.  States is mostly just a nuisance that will not go away.  Second complaint is that of a cough and congestion he had this for about a week.  He reports that he is not a smoker.  States that that he has no underlying chronic lung disease.  He states that the cough is causing quite a bit of mucus.  He said no fevers no chills.  No known exposure to the coronavirus.  He is not had any previous swabs.  Denies loss of smell or taste.  Cough is mostly productive just clear to white sputum.  Has had no hemoptysis denies any chest pain associated with this.  Lying flat seems to increase the cough.      History provided by:  Patient   used: No    Cough  Cough characteristics:  Productive and hacking  Sputum characteristics:  Clear and white  Severity:  Moderate  Onset quality:  Gradual  Duration:  7 days  Timing:  Constant  Progression:  Unchanged  Chronicity:  New  Smoker: no    Context: not animal exposure, not exposure to allergens, not fumes, not occupational exposure, not sick contacts, not smoke exposure, not upper respiratory infection, not weather changes and not with activity    Relieved by:  Nothing  Worsened by:  Lying down  Ineffective treatments:  None tried  Associated symptoms: rhinorrhea and sinus congestion    Associated symptoms: no chest pain, no chills, no diaphoresis, no ear fullness, no ear pain, no eye discharge, no fever, no headaches,  no myalgias, no rash, no shortness of breath, no sore throat and no wheezing    Risk factors: no chemical exposure, no recent infection and no recent travel    Headache  Pain location:  Frontal  Quality:  Dull  Radiates to:  Does not radiate  Severity currently:  6/10  Severity at highest:  5/10  Onset quality:  Gradual  Duration:  12 weeks  Timing:  Intermittent  Progression:  Waxing and waning  Chronicity:  Chronic  Similar to prior headaches: yes    Context: not activity, not exposure to bright light, not caffeine, not coughing, not defecating, not eating, not stress, not exposure to cold air, not loud noise and not straining    Relieved by:  Nothing  Worsened by:  Nothing  Ineffective treatments:  Acetaminophen  Associated symptoms: cough    Associated symptoms: no abdominal pain, no blurred vision, no ear pain, no fever, no focal weakness, no myalgias, no neck pain, no neck stiffness, no numbness, no paresthesias, no photophobia, no seizures, no sinus pressure, no sore throat, no swollen glands, no tingling, no visual change and no vomiting    Risk factors: no anger, does not have insomnia and lifestyle not sedentary        Review of Systems   Constitutional: Negative for chills, diaphoresis and fever.   HENT: Positive for rhinorrhea. Negative for ear pain, sinus pressure and sore throat.    Eyes: Negative for blurred vision, photophobia and discharge.   Respiratory: Positive for cough. Negative for shortness of breath and wheezing.    Cardiovascular: Negative for chest pain.   Gastrointestinal: Negative for abdominal pain and vomiting.   Musculoskeletal: Negative for myalgias, neck pain and neck stiffness.   Skin: Negative for rash.   Neurological: Negative for focal weakness, seizures, numbness, headaches and paresthesias.   All other systems reviewed and are negative.      Past Medical History:   Diagnosis Date   • AAA (abdominal aortic aneurysm) (CMS/HCC)    • Arthritis    • Broken neck (CMS/[facility])    • Gout     • Hypertension    • Lymphoma (CMS/HCC)    • Migraine    • Sciatica    • Thyroid cancer (CMS/HCC)        Allergies   Allergen Reactions   • Contrast Dye Nausea And Vomiting       Past Surgical History:   Procedure Laterality Date   • BACK SURGERY     • EYE SURGERY     • MULTIPLE TOOTH EXTRACTIONS     • NECK SURGERY      PINS/RODS IN NECK   • THYROIDECTOMY / EXCISION CYST THYROID         Family History   Problem Relation Age of Onset   • Hypertension Mother    • Hypertension Sister        Social History     Socioeconomic History   • Marital status:      Spouse name: Not on file   • Number of children: Not on file   • Years of education: Not on file   • Highest education level: Not on file   Tobacco Use   • Smoking status: Former Smoker     Packs/day: 1.00     Years: 15.00     Pack years: 15.00     Types: Cigarettes     Quit date:      Years since quittin.2   • Smokeless tobacco: Never Used   • Tobacco comment: QUIT 30 YEARS AGO    Substance and Sexual Activity   • Alcohol use: No   • Drug use: No   • Sexual activity: Defer           Objective   Physical Exam  Vitals and nursing note reviewed.   Constitutional:       Appearance: He is well-developed.      Comments: Warm dry nontoxic smiling laughing does not appear to be acute distress   HENT:      Head: Normocephalic and atraumatic.      Right Ear: External ear normal.      Left Ear: External ear normal.      Nose: Nose normal.      Mouth/Throat:      Comments: Posterior nasal drainage noted.  No erythema the tonsils uvula midline  Eyes:      General: No scleral icterus.     Conjunctiva/sclera: Conjunctivae normal.      Pupils: Pupils are equal, round, and reactive to light.   Neck:      Thyroid: No thyromegaly.   Cardiovascular:      Rate and Rhythm: Normal rate and regular rhythm.      Heart sounds: Normal heart sounds.   Pulmonary:      Effort: Pulmonary effort is normal. No respiratory distress.      Breath sounds: Normal breath sounds. No  wheezing or rales.   Chest:      Chest wall: No tenderness.   Abdominal:      General: Bowel sounds are normal. There is no distension.      Palpations: Abdomen is soft.      Tenderness: There is no abdominal tenderness.   Musculoskeletal:         General: Normal range of motion.      Cervical back: Normal range of motion.   Lymphadenopathy:      Cervical: No cervical adenopathy.   Skin:     General: Skin is warm and dry.      Capillary Refill: Capillary refill takes less than 2 seconds.   Neurological:      Mental Status: He is alert and oriented to person, place, and time.      Cranial Nerves: No cranial nerve deficit.      Coordination: Coordination normal.      Deep Tendon Reflexes: Reflexes are normal and symmetric. Reflexes normal.   Psychiatric:         Behavior: Behavior normal.         Thought Content: Thought content normal.         Judgment: Judgment normal.         Procedures           ED Course  ED Course as of Mar 24 2358   Sun Mar 21, 2021   1448 Discussed the findings of his CT scan chest x-ray and laboratory data.  He is not a diabetic.  Regarding get him a short course of steroids if this not help his headache and the congestion.  We will have him follow-up with his PMD and given referral to neurology for his headaches.    [VITALIY]      ED Course User Index  [VITALIY] George Arzate PA                                   No results found for this or any previous visit (from the past 24 hour(s)).  Note: In addition to lab results from this visit, the labs listed above may include labs taken at another facility or during a different encounter within the last 24 hours. Please correlate lab times with ED admission and discharge times for further clarification of the services performed during this visit.    CT Head Without Contrast   Final Result   Age-related changes of the brain as above, otherwise without   evidence of acute intracranial abnormality.           This report was finalized on 3/21/2021 2:11  "PM by Lenard Peña.          XR Chest 1 View   Final Result   Stable chest with no evidence of acute disease.       This report was finalized on 3/21/2021 1:08 PM by Lenard Peña.            Vitals:    03/21/21 1201 03/21/21 1300 03/21/21 1430 03/21/21 1500   BP: 134/94 116/90 124/80 130/83   Pulse: 86 73 71 77   Resp: 16      Temp: 96.9 °F (36.1 °C)      SpO2: 99% 99% 98% 99%   Weight: 81.6 kg (180 lb)      Height: 177.8 cm (70\")        Medications - No data to display  ECG/EMG Results (last 24 hours)     ** No results found for the last 24 hours. **        No orders to display               MDM  Number of Diagnoses or Management Options  Acute viral bronchitis: new and requires workup  Chronic nonintractable headache, unspecified headache type: new and requires workup     Amount and/or Complexity of Data Reviewed  Clinical lab tests: reviewed and ordered  Tests in the radiology section of CPT®: reviewed and ordered  Tests in the medicine section of CPT®: ordered and reviewed  Discuss the patient with other providers: yes    Patient Progress  Patient progress: stable      Final diagnoses:   Chronic nonintractable headache, unspecified headache type   Acute viral bronchitis       ED Disposition  ED Disposition     ED Disposition Condition Comment    Discharge Stable           Kaycee Douglas MD  [address] [place] [address]  [phone]          Anita Chao MD  [address] [address]  [phone]      Call for appointment         Medication List      New Prescriptions    methylPREDNISolone 4 MG dose pack  Commonly known as: MEDROL  Take as directed on package instructions.        Stop    doxycycline 100 MG capsule  Commonly known as: VIBRAMYCIN     fluticasone-salmeterol 100-50 MCG/DOSE DISKUS  Commonly known as: ADVAIR     ondansetron 4 MG tablet  Commonly known as: ZOFRAN           Where to Get Your Medications      These medications were sent " to [facility] - [place], [place] - [address] [facility] - [phone]  - [phone]   [address] [facility] [address]    Phone: [phone]   · methylPREDNISolone 4 MG dose pack          George Arzate PA  03/24/21 3930

## 2021-04-02 ENCOUNTER — OFFICE VISIT (OUTPATIENT)
Dept: ONCOLOGY | Facility: CLINIC | Age: 81
End: 2021-04-02

## 2021-04-02 ENCOUNTER — LAB (OUTPATIENT)
Dept: LAB | Facility: HOSPITAL | Age: 81
End: 2021-04-02

## 2021-04-02 VITALS
SYSTOLIC BLOOD PRESSURE: 144 MMHG | HEIGHT: 70 IN | RESPIRATION RATE: 18 BRPM | OXYGEN SATURATION: 97 % | HEART RATE: 85 BPM | WEIGHT: 180 LBS | TEMPERATURE: 96.8 F | DIASTOLIC BLOOD PRESSURE: 94 MMHG | BODY MASS INDEX: 25.77 KG/M2

## 2021-04-02 DIAGNOSIS — Z85.72 HISTORY OF FOLLICULAR LYMPHOMA: Primary | ICD-10-CM

## 2021-04-02 DIAGNOSIS — Z85.72 HISTORY OF FOLLICULAR LYMPHOMA: ICD-10-CM

## 2021-04-02 LAB
ALBUMIN SERPL-MCNC: 3.6 G/DL (ref 3.5–5.2)
ALBUMIN/GLOB SERPL: 1.2 G/DL
ALP SERPL-CCNC: 114 U/L (ref 39–117)
ALT SERPL W P-5'-P-CCNC: 11 U/L (ref 1–41)
ANION GAP SERPL CALCULATED.3IONS-SCNC: 7 MMOL/L (ref 5–15)
AST SERPL-CCNC: 14 U/L (ref 1–40)
BILIRUB SERPL-MCNC: 0.4 MG/DL (ref 0–1.2)
BUN SERPL-MCNC: 10 MG/DL (ref 8–23)
BUN/CREAT SERPL: 9.3 (ref 7–25)
CALCIUM SPEC-SCNC: 9.3 MG/DL (ref 8.6–10.5)
CHLORIDE SERPL-SCNC: 108 MMOL/L (ref 98–107)
CO2 SERPL-SCNC: 29 MMOL/L (ref 22–29)
CREAT SERPL-MCNC: 1.07 MG/DL (ref 0.76–1.27)
ERYTHROCYTE [DISTWIDTH] IN BLOOD BY AUTOMATED COUNT: 17.3 % (ref 12.3–15.4)
GFR SERPL CREATININE-BSD FRML MDRD: 81 ML/MIN/1.73
GLOBULIN UR ELPH-MCNC: 2.9 GM/DL
GLUCOSE SERPL-MCNC: 94 MG/DL (ref 65–99)
HCT VFR BLD AUTO: 38.5 % (ref 37.5–51)
HGB BLD-MCNC: 12.4 G/DL (ref 13–17.7)
LYMPHOCYTES # BLD AUTO: 1.2 10*3/MM3 (ref 0.7–3.1)
LYMPHOCYTES NFR BLD AUTO: 12.2 % (ref 19.6–45.3)
MCH RBC QN AUTO: 29.4 PG (ref 26.6–33)
MCHC RBC AUTO-ENTMCNC: 32.3 G/DL (ref 31.5–35.7)
MCV RBC AUTO: 91 FL (ref 79–97)
MONOCYTES # BLD AUTO: 0.5 10*3/MM3 (ref 0.1–0.9)
MONOCYTES NFR BLD AUTO: 5.5 % (ref 5–12)
NEUTROPHILS NFR BLD AUTO: 8.1 10*3/MM3 (ref 1.7–7)
NEUTROPHILS NFR BLD AUTO: 82.3 % (ref 42.7–76)
PLATELET # BLD AUTO: 209 10*3/MM3 (ref 140–450)
PMV BLD AUTO: 7.3 FL (ref 6–12)
POTASSIUM SERPL-SCNC: 3.8 MMOL/L (ref 3.5–5.2)
PROT SERPL-MCNC: 6.5 G/DL (ref 6–8.5)
RBC # BLD AUTO: 4.23 10*6/MM3 (ref 4.14–5.8)
SODIUM SERPL-SCNC: 144 MMOL/L (ref 136–145)
WBC # BLD AUTO: 9.9 10*3/MM3 (ref 3.4–10.8)

## 2021-04-02 PROCEDURE — 85025 COMPLETE CBC W/AUTO DIFF WBC: CPT

## 2021-04-02 PROCEDURE — 80053 COMPREHEN METABOLIC PANEL: CPT

## 2021-04-02 PROCEDURE — 36415 COLL VENOUS BLD VENIPUNCTURE: CPT

## 2021-04-02 PROCEDURE — 99214 OFFICE O/P EST MOD 30 MIN: CPT | Performed by: NURSE PRACTITIONER
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
## 2021-04-21 ENCOUNTER — LAB (OUTPATIENT)
Dept: LAB | Facility: HOSPITAL | Age: 81
End: 2021-04-21

## 2021-04-21 ENCOUNTER — OFFICE VISIT (OUTPATIENT)
Dept: NEUROLOGY | Facility: CLINIC | Age: 81
End: 2021-04-21

## 2021-04-21 VITALS
HEART RATE: 86 BPM | TEMPERATURE: 97.3 F | BODY MASS INDEX: 26.2 KG/M2 | SYSTOLIC BLOOD PRESSURE: 120 MMHG | DIASTOLIC BLOOD PRESSURE: 70 MMHG | OXYGEN SATURATION: 98 % | WEIGHT: 183 LBS | HEIGHT: 70 IN

## 2021-04-21 DIAGNOSIS — Z85.72 HISTORY OF FOLLICULAR LYMPHOMA: ICD-10-CM

## 2021-04-21 DIAGNOSIS — G44.52 HEADACHE, NEW DAILY PERSISTENT (NDPH): ICD-10-CM

## 2021-04-21 DIAGNOSIS — Z51.81 MEDICATION MONITORING ENCOUNTER: ICD-10-CM

## 2021-04-21 DIAGNOSIS — G44.52 HEADACHE, NEW DAILY PERSISTENT (NDPH): Primary | ICD-10-CM

## 2021-04-21 LAB
ALBUMIN SERPL-MCNC: 4 G/DL (ref 3.5–5.2)
ALBUMIN/GLOB SERPL: 1.3 G/DL
ALP SERPL-CCNC: 128 U/L (ref 39–117)
ALT SERPL W P-5'-P-CCNC: 9 U/L (ref 1–41)
ANION GAP SERPL CALCULATED.3IONS-SCNC: 8 MMOL/L (ref 5–15)
AST SERPL-CCNC: 16 U/L (ref 1–40)
BASOPHILS # BLD AUTO: 0.02 10*3/MM3 (ref 0–0.2)
BASOPHILS NFR BLD AUTO: 0.3 % (ref 0–1.5)
BILIRUB SERPL-MCNC: 0.5 MG/DL (ref 0–1.2)
BUN SERPL-MCNC: 7 MG/DL (ref 8–23)
BUN/CREAT SERPL: 7.5 (ref 7–25)
CALCIUM SPEC-SCNC: 8.8 MG/DL (ref 8.6–10.5)
CHLORIDE SERPL-SCNC: 106 MMOL/L (ref 98–107)
CO2 SERPL-SCNC: 29 MMOL/L (ref 22–29)
CREAT SERPL-MCNC: 0.93 MG/DL (ref 0.76–1.27)
CRP SERPL-MCNC: 0.6 MG/DL (ref 0–0.5)
DEPRECATED RDW RBC AUTO: 55.8 FL (ref 37–54)
EOSINOPHIL # BLD AUTO: 0.18 10*3/MM3 (ref 0–0.4)
EOSINOPHIL NFR BLD AUTO: 2.3 % (ref 0.3–6.2)
ERYTHROCYTE [DISTWIDTH] IN BLOOD BY AUTOMATED COUNT: 15.9 % (ref 12.3–15.4)
ERYTHROCYTE [SEDIMENTATION RATE] IN BLOOD: 21 MM/HR (ref 0–20)
GFR SERPL CREATININE-BSD FRML MDRD: 95 ML/MIN/1.73
GLOBULIN UR ELPH-MCNC: 3.1 GM/DL
GLUCOSE SERPL-MCNC: 93 MG/DL (ref 65–99)
HCT VFR BLD AUTO: 41.2 % (ref 37.5–51)
HGB BLD-MCNC: 12.6 G/DL (ref 13–17.7)
IMM GRANULOCYTES # BLD AUTO: 0.06 10*3/MM3 (ref 0–0.05)
IMM GRANULOCYTES NFR BLD AUTO: 0.8 % (ref 0–0.5)
LYMPHOCYTES # BLD AUTO: 0.85 10*3/MM3 (ref 0.7–3.1)
LYMPHOCYTES NFR BLD AUTO: 10.8 % (ref 19.6–45.3)
MCH RBC QN AUTO: 29.3 PG (ref 26.6–33)
MCHC RBC AUTO-ENTMCNC: 30.6 G/DL (ref 31.5–35.7)
MCV RBC AUTO: 95.8 FL (ref 79–97)
MONOCYTES # BLD AUTO: 0.67 10*3/MM3 (ref 0.1–0.9)
MONOCYTES NFR BLD AUTO: 8.5 % (ref 5–12)
NEUTROPHILS NFR BLD AUTO: 6.08 10*3/MM3 (ref 1.7–7)
NEUTROPHILS NFR BLD AUTO: 77.3 % (ref 42.7–76)
NRBC BLD AUTO-RTO: 0 /100 WBC (ref 0–0.2)
PLATELET # BLD AUTO: 244 10*3/MM3 (ref 140–450)
PMV BLD AUTO: 10.8 FL (ref 6–12)
POTASSIUM SERPL-SCNC: 3.6 MMOL/L (ref 3.5–5.2)
PROT SERPL-MCNC: 7.1 G/DL (ref 6–8.5)
RBC # BLD AUTO: 4.3 10*6/MM3 (ref 4.14–5.8)
SODIUM SERPL-SCNC: 143 MMOL/L (ref 136–145)
WBC # BLD AUTO: 7.86 10*3/MM3 (ref 3.4–10.8)

## 2021-04-21 PROCEDURE — 86140 C-REACTIVE PROTEIN: CPT

## 2021-04-21 PROCEDURE — G0480 DRUG TEST DEF 1-7 CLASSES: HCPCS | Performed by: NURSE PRACTITIONER

## 2021-04-21 PROCEDURE — 99213 OFFICE O/P EST LOW 20 MIN: CPT | Performed by: NURSE PRACTITIONER

## 2021-04-21 PROCEDURE — 36415 COLL VENOUS BLD VENIPUNCTURE: CPT | Performed by: NURSE PRACTITIONER

## 2021-04-21 PROCEDURE — 85652 RBC SED RATE AUTOMATED: CPT

## 2021-04-21 PROCEDURE — 80053 COMPREHEN METABOLIC PANEL: CPT

## 2021-04-21 PROCEDURE — 80307 DRUG TEST PRSMV CHEM ANLYZR: CPT | Performed by: NURSE PRACTITIONER

## 2021-04-21 PROCEDURE — 85025 COMPLETE CBC W/AUTO DIFF WBC: CPT

## 2021-04-21 NOTE — PROGRESS NOTES
Subjective:     Patient ID: George Lomeli is a 80 y.o. male.    CC:   Chief Complaint   Patient presents with   • Headache       HPI:   History of Present Illness     Mr. Lomeli is a very pleasant 80-year-old male here today for initial neurological evaluation for headaches.  He tells me he has been having some degree of daily headache now for about 8 or 9 months.  He says some days are worse than others.  Typically his headaches are rated about a 5 out of 10, they are described as a holoacranial and frontal dull ache or pressure.  A couple months ago his primary care started him on Topamax, he took this for 2 to 3 weeks but did not think it helped so he stopped the medication.  He is not really interested in a lot of new medications.  He tells me he has a history of migraines in the past although he does not feel like these are as severe as his previous migraines.  He does not use NSAIDs, he does not drink caffeine.  He says he sleeps well and snores maybe a little.  He does have chronic pain and sees a pain doctor, currently prescribed oxycodone.  He denies any memory issues, he denies vision changes, confusional episodes, dizziness, syncope, weakness or falls.  He has some mild numbness in his fingers, he has a history of neck problems and this is at his baseline.  He tells me he has had a recent eye exam that was stable, he did have lens implants recently but was having the headaches prior to this.    The following portions of the patient's history were reviewed and updated as appropriate: allergies, current medications, past family history, past medical history, past social history, past surgical history and problem list.    Past Medical History:   Diagnosis Date   • AAA (abdominal aortic aneurysm) (CMS/HCC)    • Arthritis    • Broken neck (CMS/HCC)    • Gout    • Hypertension    • Lymphoma (CMS/HCC)    • Lymphoma (CMS/HCC)     had radiation   • Migraine    • Sciatica    • Thyroid cancer (CMS/HCC)        Past  "Surgical History:   Procedure Laterality Date   • BACK SURGERY     • EYE SURGERY     • MULTIPLE TOOTH EXTRACTIONS     • NECK SURGERY      PINS/RODS IN NECK   • THYROIDECTOMY / EXCISION CYST THYROID         Social History     Socioeconomic History   • Marital status:      Spouse name: Not on file   • Number of children: Not on file   • Years of education: Not on file   • Highest education level: Not on file   Tobacco Use   • Smoking status: Former Smoker     Packs/day: 1.00     Years: 15.00     Pack years: 15.00     Types: Cigarettes     Quit date:      Years since quittin.3   • Smokeless tobacco: Never Used   • Tobacco comment: QUIT 30 YEARS AGO    Vaping Use   • Vaping Use: Never used   Substance and Sexual Activity   • Alcohol use: No   • Drug use: No   • Sexual activity: Defer       Family History   Problem Relation Age of Onset   • Hypertension Mother    • Hypertension Sister         Review of Systems   Constitutional: Negative.    HENT: Negative for hearing loss, tinnitus and trouble swallowing.    Eyes: Negative.    Respiratory: Negative.    Cardiovascular: Negative.    Gastrointestinal: Negative.    Endocrine: Negative.    Genitourinary: Negative.    Musculoskeletal: Negative.    Skin: Negative.    Allergic/Immunologic: Negative.    Neurological: Positive for headaches. Negative for dizziness, seizures, syncope, facial asymmetry, speech difficulty, weakness, light-headedness and numbness.   Hematological: Negative.    Psychiatric/Behavioral: Negative.         Objective:  /70   Pulse 86   Temp 97.3 °F (36.3 °C)   Ht 177.8 cm (70\")   Wt 83 kg (183 lb)   SpO2 98%   BMI 26.26 kg/m²     Neurologic Exam     Mental Status   Oriented to person, place, and time.   Attention: normal. Concentration: normal.   Speech: speech is normal   Level of consciousness: alert  Knowledge: consistent with education.   Able to read. Able to write. Normal comprehension.     Cranial Nerves   Cranial nerves " II through XII intact.     CN II   Visual fields full to confrontation.   Right visual field deficit: none  Left visual field deficit: none     CN III, IV, VI   Pupils are equal, round, and reactive to light.  Extraocular motions are normal.   Right pupil: Size: 2 mm. Shape: regular. Reactivity: brisk. Consensual response: intact. Accommodation: intact.   Left pupil: Size: 2 mm. Shape: regular. Reactivity: brisk. Consensual response: intact. Accommodation: intact.   CN III: no CN III palsy  CN VI: no CN VI palsy  Nystagmus: none   Upgaze: normal  Downgaze: normal    CN V   Facial sensation intact.     CN VII   Facial expression full, symmetric.     CN VIII   CN VIII normal.     CN IX, X   CN IX normal.   CN X normal.     CN XI   CN XI normal.     CN XII   CN XII normal.     Motor Exam   Muscle bulk: normal  Overall muscle tone: normal  Right arm tone: normal  Left arm tone: normal  Right arm pronator drift: absent  Left arm pronator drift: absent  Right leg tone: normal  Left leg tone: normal    Strength   Strength 5/5 throughout.     Sensory Exam   Light touch normal.     Gait, Coordination, and Reflexes     Gait  Gait: normal    Coordination   Romberg: negative  Finger to nose coordination: normal  Heel to shin coordination: normal  Tandem walking coordination: normal    Tremor   Resting tremor: absent  Intention tremor: absent  Action tremor: absent    Reflexes   Reflexes 2+ except as noted.   Right plantar: normal  Left plantar: normal  Right Rm: absent  Left Rm: absent      Physical Exam  Vitals reviewed.   Constitutional:       Appearance: He is well-developed.   HENT:      Head: Normocephalic and atraumatic.      Comments: No temporal tenderness  Eyes:      General: No scleral icterus.     Extraocular Movements: EOM normal.      Conjunctiva/sclera: Conjunctivae normal.      Pupils: Pupils are equal, round, and reactive to light.   Neck:      Comments: No carotid bruit  Cardiovascular:      Rate and  Rhythm: Normal rate.   Pulmonary:      Effort: Pulmonary effort is normal. No respiratory distress.   Musculoskeletal:      Cervical back: Normal range of motion and neck supple.   Skin:     General: Skin is warm.      Capillary Refill: Capillary refill takes less than 2 seconds.   Neurological:      General: No focal deficit present.      Mental Status: He is alert and oriented to person, place, and time.      Coordination: Finger-Nose-Finger Test, Heel to Shin Test and Romberg Test normal.      Gait: Gait is intact. Tandem walk normal.      Deep Tendon Reflexes: Strength normal.   Psychiatric:         Speech: Speech normal.         Behavior: Behavior normal.         Thought Content: Thought content normal.         Judgment: Judgment normal.         Assessment/Plan:       Diagnoses and all orders for this visit:    1. Headache, new daily persistent ([facility]) (Primary)  -     MRI Brain Without Contrast  -     C-reactive Protein; Future  -     Sedimentation Rate; Future    2. Medication monitoring encounter  -     Drug Screen 11 w/Conf, Serum    Mr. Lomeli has been experiencing mild headaches for several months now, he reports he has a history of migraines in the past but these are not nearly as severe.  I would like to get an MRI of the brain without contrast to rule out tumor lesion, check CRP and sed rate although diagnosis of temporal arteritis would be unlikely, he is having no temporal tenderness, no temporal artery distention and no vision change.  We did discuss medication options for headache prevention, he previously took Topamax but only for about 2 weeks, he says it was ineffective.  We did discuss gabapentin being a possible option, would need to get a drug screen today, controlled substance agreement has been completed in the event we prescribe this medication.  He is not overly interested in a daily medication.  Headaches have no exertional component he has no associated dizziness or neurological red  flags on exam today  I would like to see him back in about 4 weeks, we will contact him with lab results and MRI reports as they are received  He is instructed to go to the emergency room with worsening headache, worst headache of his life or any mental status changes.  We reviewed possible headache triggers, stress relief techniques, and alternative treatments for headaches. The patient was in understanding and all questions were addressed. We reviewed the diagnosis and treatment plan and medication side effect profile. The patient will follow up as scheduled.           Reviewed medications, potential side effects and signs and symptoms to report. Discussed risk versus benefits of treatment plan with patient and/or family-including medications, labs and radiology that may be ordered. Addressed questions and concerns during visit. Patient and/or family verbalized understanding and agree with plan.    AS THE PROVIDER, I PERSONALLY WORE PPE DURING ENTIRE FACE TO FACE ENCOUNTER IN CLINIC WITH THE PATIENT. PATIENT ALSO WORE PPE DURING ENTIRE FACE TO FACE ENCOUNTER EXCEPT FOR A MAX OF 30 SECONDS DURING NEUROLOGICAL EVALUATION OF CRANIAL NERVES AND THEN MASK WAS PLACED BACK OVER PATIENT FACE FOR REMAINDER OF VISIT. I WASHED MY HANDS BEFORE AND AFTER VISIT.    During this visit the following were done:  Labs Reviewed []    Labs Ordered []    Radiology Reports Reviewed []    Radiology Ordered []    PCP Records Reviewed []    Referring Provider Records Reviewed []    ER Records Reviewed []    Hospital Records Reviewed []    History Obtained From Family []    Radiology Images Reviewed []    Other Reviewed []    Records Requested []      YOVANI Watts  4/29/2021

## 2021-04-27 DIAGNOSIS — G44.52 HEADACHE, NEW DAILY PERSISTENT (NDPH): Primary | ICD-10-CM

## 2021-04-27 NOTE — PROGRESS NOTES
Please let patient know his inflammatory markers was just a tad bit elevated, we can repeat these in a couple weeks and see if they trend upwards or downwards, orders are entered, he can drop by any [facility] and have these done

## 2021-04-29 ENCOUNTER — TELEPHONE (OUTPATIENT)
Dept: NEUROLOGY | Facility: CLINIC | Age: 81
End: 2021-04-29

## 2021-04-29 NOTE — TELEPHONE ENCOUNTER
----- Message from YOVANI Watts sent at 4/27/2021 11:22 AM EDT -----  Please let patient know his inflammatory markers was just a tad bit elevated, we can repeat these in a couple weeks and see if they trend upwards or downwards, orders are entered, he can drop by any [facility] facility and have these done

## 2021-05-06 LAB
AMPHETAMINES SERPL QL SCN: NEGATIVE NG/ML
BARBITURATES SERPL QL SCN: NEGATIVE UG/ML
BENZODIAZ SERPL QL SCN: NEGATIVE NG/ML
CANNABINOIDS SERPL QL SCN: NEGATIVE NG/ML
COCAINE+BZE SERPL QL SCN: NEGATIVE NG/ML
ETHANOL SERPL-MCNC: NEGATIVE GM/DL
METHADONE SERPL QL SCN: NEGATIVE NG/ML
OPIATES SERPL QL SCN: NEGATIVE NG/ML
OXYCODONE SERPLBLD CFM-MCNC: 9.6 NG/ML
OXYCODONE+OXYMORPHONE SERPLBLD CFM-IMP: POSITIVE
OXYCODONE+OXYMORPHONE SERPLBLD QL SCN: ABNORMAL NG/ML
OXYMORPHONE SERPLBLD CFM-MCNC: NEGATIVE NG/ML
PCP SERPL QL SCN: NEGATIVE NG/ML
PROPOXYPH SERPL QL SCN: NEGATIVE NG/ML

## 2021-05-10 ENCOUNTER — HOSPITAL ENCOUNTER (OUTPATIENT)
Dept: MRI IMAGING | Facility: HOSPITAL | Age: 81
End: 2021-05-10

## 2021-05-14 ENCOUNTER — TELEPHONE (OUTPATIENT)
Dept: NEUROLOGY | Facility: CLINIC | Age: 81
End: 2021-05-14

## 2021-05-24 DIAGNOSIS — G44.52 HEADACHE, NEW DAILY PERSISTENT (NDPH): Primary | ICD-10-CM

## 2021-05-24 RX ORDER — GABAPENTIN 100 MG/1
CAPSULE ORAL
Qty: 90 CAPSULE | Refills: 2 | Status: SHIPPED | OUTPATIENT
Start: 2021-05-24 | End: 2021-09-27 | Stop reason: SDUPTHER

## 2021-05-24 NOTE — TELEPHONE ENCOUNTER
Patient informed of message. He thinks he has taken this in the past, but is ok to try low dose. Pharmacy confirmed. He has follow up 6/8/21

## 2021-06-08 ENCOUNTER — OFFICE VISIT (OUTPATIENT)
Dept: NEUROLOGY | Facility: CLINIC | Age: 81
End: 2021-06-08

## 2021-06-08 VITALS
BODY MASS INDEX: 26.2 KG/M2 | TEMPERATURE: 98.3 F | DIASTOLIC BLOOD PRESSURE: 80 MMHG | HEART RATE: 87 BPM | SYSTOLIC BLOOD PRESSURE: 120 MMHG | HEIGHT: 70 IN | WEIGHT: 183 LBS | OXYGEN SATURATION: 100 %

## 2021-06-08 DIAGNOSIS — R51.9 CHRONIC NONINTRACTABLE HEADACHE, UNSPECIFIED HEADACHE TYPE: Primary | ICD-10-CM

## 2021-06-08 DIAGNOSIS — G89.29 CHRONIC NONINTRACTABLE HEADACHE, UNSPECIFIED HEADACHE TYPE: Primary | ICD-10-CM

## 2021-06-08 PROCEDURE — 99213 OFFICE O/P EST LOW 20 MIN: CPT | Performed by: NURSE PRACTITIONER

## 2021-06-08 NOTE — PROGRESS NOTES
Subjective:     Patient ID: George Lomeli is a 80 y.o. male.    CC:   Chief Complaint   Patient presents with   • Headache       HPI:   History of Present Illness     Mr. Lomeli is here today for follow up.  I first saw him a few weeks ago for headaches..  He told me he has been having some degree of daily headache now for about 8 or 9 months.  He says some days are worse than others.  Typically his headaches are rated about a 5 out of 10, they are described as a holoacranial and frontal dull ache or pressure.  A couple months ago his primary care started him on Topamax, he took this for 2 to 3 weeks but did not think it helped so he stopped the medication.  He is not really interested in a lot of new medications.  He told me he has a history of migraines in the past although he does not feel like these are as severe as his previous migraines.  He does not use NSAIDs, he does not drink caffeine.  He said he sleeps well and snores maybe a little.  He does have chronic pain and sees a pain doctor, currently prescribed oxycodone.  He denied any memory issues, he denied vision changes, confusional episodes, dizziness, syncope, weakness or falls.  He has some mild numbness in his fingers, he has a history of neck problems and this is at his baseline.  He had a recent eye exam that was stable, he did have lens implants recently but was having the headaches prior to this.    After first visit I did obtain labs, CRP and sed rate just very mildly elevated, normal sed rate 0-20, levels 21, normal sed rate up to 0.5 his was 0.6.  I did place an order for a redraw which she has not had done yet.  Also ordered an MRI of his brain, it appears this was scheduled back in May but patient canceled this, he is unsure why today.  I did send him a prescription for gabapentin, patient's been on this for a few weeks now, he is taking 100 mg twice a day and says that since starting the medication his headaches have greatly improved.  He  has no new complaints today.       The following portions of the patient's history were reviewed and updated as appropriate: allergies, current medications, past family history, past medical history, past social history, past surgical history and problem list.    Past Medical History:   Diagnosis Date   • AAA (abdominal aortic aneurysm) (CMS/HCC)    • Arthritis    • Broken neck (CMS/HCC)    • Gout    • Hypertension    • Lymphoma (CMS/HCC)    • Lymphoma (CMS/HCC)     had radiation   • Migraine    • Sciatica    • Thyroid cancer (CMS/HCC)        Past Surgical History:   Procedure Laterality Date   • BACK SURGERY     • EYE SURGERY     • MULTIPLE TOOTH EXTRACTIONS     • NECK SURGERY      PINS/RODS IN NECK   • THYROIDECTOMY / EXCISION CYST THYROID         Social History     Socioeconomic History   • Marital status:      Spouse name: Not on file   • Number of children: Not on file   • Years of education: Not on file   • Highest education level: Not on file   Tobacco Use   • Smoking status: Former Smoker     Packs/day: 1.00     Years: 15.00     Pack years: 15.00     Types: Cigarettes     Quit date:      Years since quittin.4   • Smokeless tobacco: Never Used   • Tobacco comment: QUIT 30 YEARS AGO    Vaping Use   • Vaping Use: Never used   Substance and Sexual Activity   • Alcohol use: No   • Drug use: No   • Sexual activity: Defer       Family History   Problem Relation Age of Onset   • Hypertension Mother    • Hypertension Sister         Review of Systems   Constitutional: Negative.    Eyes: Negative.    Respiratory: Negative.    Cardiovascular: Negative.    Gastrointestinal: Negative.    Endocrine: Negative.    Genitourinary: Negative.    Musculoskeletal:        He has chronic back issues followed by pain management   Skin: Negative.    Allergic/Immunologic: Negative.    Neurological: Positive for headaches. Negative for dizziness, tremors, seizures, syncope, facial asymmetry, speech difficulty, weakness,  "light-headedness and numbness.   Hematological: Negative.    Psychiatric/Behavioral: Negative.         Objective:  /80   Pulse 87   Temp 98.3 °F (36.8 °C)   Ht 177.8 cm (70\")   Wt 83 kg (183 lb)   SpO2 100%   BMI 26.26 kg/m²     Neurologic Exam     Mental Status   Oriented to person, place, and time.   Attention: normal. Concentration: normal.   Speech: speech is normal   Level of consciousness: alert  Able to read. Able to write. Normal comprehension.     Cranial Nerves   Cranial nerves II through XII intact.     Motor Exam   Overall muscle tone: normal    Strength   Strength 5/5 throughout.     Gait, Coordination, and Reflexes     Gait  Gait: normal    Coordination   Finger to nose coordination: normal    Tremor   Resting tremor: absent  Intention tremor: absent  Action tremor: absent1+DTR       Physical Exam  Constitutional:       General: He is not in acute distress.     Appearance: He is not ill-appearing or toxic-appearing.   HENT:      Mouth/Throat:      Mouth: Mucous membranes are moist.   Eyes:      Extraocular Movements: Extraocular movements intact.   Neurological:      General: No focal deficit present.      Mental Status: He is alert and oriented to person, place, and time.      Coordination: Finger-Nose-Finger Test normal.      Gait: Gait is intact.      Deep Tendon Reflexes: Strength normal.   Psychiatric:         Mood and Affect: Mood normal.         Speech: Speech normal.         Behavior: Behavior normal.         Thought Content: Thought content normal.         Judgment: Judgment normal.         Assessment/Plan:       Diagnoses and all orders for this visit:    1. Chronic nonintractable headache, unspecified headache type (Primary)  Comments:  greatly improved with gabapentin BID, continue current dose    Mr. Lomeli has not had his MRI, he is quite unsure what happened with the scheduling, I have given him the number to central scheduling to reschedule.  He is doing very well with the " gabapentin low-dose 100 mg twice a day, he states that this is greatly helped his headaches.  He has no new complaints today.  At this point we will see him back in about 3 months or sooner if needed, if he has any questions concerns or problems prior to his follow-up appointment he is encouraged to notify our office ASAP.  If he has any worsening headaches or problems he will notify me or go to the emergency room with any acute mental status changes.           Reviewed medications, potential side effects and signs and symptoms to report. Discussed risk versus benefits of treatment plan with patient and/or family-including medications, labs and radiology that may be ordered. Addressed questions and concerns during visit. Patient and/or family verbalized understanding and agree with plan.    AS THE PROVIDER, I PERSONALLY WORE PPE DURING ENTIRE FACE TO FACE ENCOUNTER IN CLINIC WITH THE PATIENT. PATIENT ALSO WORE PPE DURING ENTIRE FACE TO FACE ENCOUNTER EXCEPT FOR A MAX OF 30 SECONDS DURING NEUROLOGICAL EVALUATION OF CRANIAL NERVES AND THEN MASK WAS PLACED BACK OVER PATIENT FACE FOR REMAINDER OF VISIT. I WASHED MY HANDS BEFORE AND AFTER VISIT.         Karolina Yee, APRN  6/8/2021

## 2021-06-29 ENCOUNTER — HOSPITAL ENCOUNTER (OUTPATIENT)
Dept: MRI IMAGING | Facility: HOSPITAL | Age: 81
Discharge: HOME | End: 2021-06-29
Admitting: NURSE PRACTITIONER

## 2021-06-29 PROCEDURE — 70551 MRI BRAIN STEM W/O DYE: CPT

## 2021-07-01 ENCOUNTER — TELEPHONE (OUTPATIENT)
Dept: NEUROLOGY | Facility: CLINIC | Age: 81
End: 2021-07-01

## 2021-07-01 NOTE — TELEPHONE ENCOUNTER
----- Message from YOVANI Watts sent at 6/30/2021  2:47 PM EDT -----  Please let patient know his spine MRI showed fairly advanced narrowing and typically will recommend repeating the images with contrast and getting vessel imaging of his head and neck, is he interested in me scheduling these?  Scarring can occur with aging, high blood pressure, diabetes, smoking etc.  He does have fairly extensive scarring

## 2021-07-01 NOTE — TELEPHONE ENCOUNTER
Informed George of results. He wanted to know if the narrowing is why he is having the headaches. He was not sure about scheduling the other tests and again wanted to know if they would show why he is having the headaches. He did agree to have the other scans and I told him someone would call him to set those up. I did recommend he keep his follow up in Sept to discuss this with Brandi.

## 2021-07-07 DIAGNOSIS — R90.82 WHITE MATTER ABNORMALITY ON MRI OF BRAIN: ICD-10-CM

## 2021-07-07 DIAGNOSIS — G44.52 HEADACHE, NEW DAILY PERSISTENT (NDPH): Primary | ICD-10-CM

## 2021-07-07 NOTE — TELEPHONE ENCOUNTER
Did you put in order for the other scans? He did agree to have the other scans. He just wants to know why he is having the headaches.

## 2021-07-07 NOTE — TELEPHONE ENCOUNTER
"Previous statement error; there was no \"narrowing\",  this scan did not assess narrowing but it did mention moderate scarring in the brain.  We all have chronic changes and scarring as we age, more so in smokers, patients with HTN, diabetes, hyperlipidemia.  Typically we will repeat the MRI with and without contrast as well as get a scan of the head and neck vessels to assess for narrowing which can overtime cause worsening chronic scarring in the brain.    "

## 2021-07-07 NOTE — TELEPHONE ENCOUNTER
Scans ordered. The MRI finding is non specific and does not explain his headaches.  When I saw him last headaches were improved with gabapentin, have they worsened?

## 2021-07-13 NOTE — TELEPHONE ENCOUNTER
THEY HAVE NOT WORSENED BUT STILL HAS FREQUENTLY. THEY STILL HAVENT CONTACTED HIM TO SET UP THE OTHER TESTS.

## 2021-07-23 ENCOUNTER — TELEPHONE (OUTPATIENT)
Dept: PULMONOLOGY | Facility: CLINIC | Age: 81
End: 2021-07-23

## 2021-07-23 NOTE — TELEPHONE ENCOUNTER
Patient called needing new Nebulizer. [facility] as [facility].

## 2021-09-27 ENCOUNTER — LAB (OUTPATIENT)
Dept: LAB | Facility: HOSPITAL | Age: 81
End: 2021-09-27

## 2021-09-27 ENCOUNTER — OFFICE VISIT (OUTPATIENT)
Dept: NEUROLOGY | Facility: CLINIC | Age: 81
End: 2021-09-27

## 2021-09-27 VITALS
WEIGHT: 179 LBS | BODY MASS INDEX: 25.62 KG/M2 | DIASTOLIC BLOOD PRESSURE: 70 MMHG | HEIGHT: 70 IN | HEART RATE: 80 BPM | SYSTOLIC BLOOD PRESSURE: 120 MMHG | OXYGEN SATURATION: 99 % | TEMPERATURE: 98.1 F

## 2021-09-27 DIAGNOSIS — R90.82 WHITE MATTER ABNORMALITY ON MRI OF BRAIN: ICD-10-CM

## 2021-09-27 DIAGNOSIS — G44.52 HEADACHE, NEW DAILY PERSISTENT (NDPH): ICD-10-CM

## 2021-09-27 DIAGNOSIS — G44.52 HEADACHE, NEW DAILY PERSISTENT (NDPH): Primary | ICD-10-CM

## 2021-09-27 PROCEDURE — 80048 BASIC METABOLIC PNL TOTAL CA: CPT

## 2021-09-27 PROCEDURE — 36415 COLL VENOUS BLD VENIPUNCTURE: CPT

## 2021-09-27 PROCEDURE — 86140 C-REACTIVE PROTEIN: CPT

## 2021-09-27 PROCEDURE — 85652 RBC SED RATE AUTOMATED: CPT

## 2021-09-27 PROCEDURE — 99213 OFFICE O/P EST LOW 20 MIN: CPT | Performed by: NURSE PRACTITIONER

## 2021-09-27 RX ORDER — GABAPENTIN 100 MG/1
CAPSULE ORAL
Qty: 180 CAPSULE | Refills: 5 | Status: SHIPPED | OUTPATIENT
Start: 2021-09-27 | End: 2023-03-31 | Stop reason: SDUPTHER

## 2021-09-28 ENCOUNTER — TELEPHONE (OUTPATIENT)
Dept: NEUROLOGY | Facility: CLINIC | Age: 81
End: 2021-09-28

## 2021-09-28 LAB
ANION GAP SERPL CALCULATED.3IONS-SCNC: 10.3 MMOL/L (ref 5–15)
BUN SERPL-MCNC: 11 MG/DL (ref 8–23)
BUN/CREAT SERPL: 14.1 (ref 7–25)
CALCIUM SPEC-SCNC: 9.5 MG/DL (ref 8.6–10.5)
CHLORIDE SERPL-SCNC: 108 MMOL/L (ref 98–107)
CO2 SERPL-SCNC: 26.7 MMOL/L (ref 22–29)
CREAT SERPL-MCNC: 0.78 MG/DL (ref 0.76–1.27)
CRP SERPL-MCNC: 0.33 MG/DL (ref 0–0.5)
ERYTHROCYTE [SEDIMENTATION RATE] IN BLOOD: 13 MM/HR (ref 0–20)
GFR SERPL CREATININE-BSD FRML MDRD: 116 ML/MIN/1.73
GLUCOSE SERPL-MCNC: 88 MG/DL (ref 65–99)
POTASSIUM SERPL-SCNC: 4.3 MMOL/L (ref 3.5–5.2)
SODIUM SERPL-SCNC: 145 MMOL/L (ref 136–145)

## 2021-09-28 NOTE — PROGRESS NOTES
Please let pt know his kidney function and electrolytes are stable  Inflammatory makers in normal range  Fax copy of labs to PCP

## 2021-09-28 NOTE — TELEPHONE ENCOUNTER
Provider: YENNIFER HUTCHINSON    Caller: [facility]    Relationship to Patient: SELF    Reason for Call: [facility] WAS CALLING TO LET YENNIFER KNOW THAT AFTER HE LEFT THE OFFICE YESTERDAY, HE HAD A NOSE BLEED, [facility] STATES  THAT THIS IS THE THIRD ONE THIS MONTH.    PLEASE ADVISE.      When was the patient last seen: 9-27-21

## 2021-10-05 ENCOUNTER — HOSPITAL ENCOUNTER (OUTPATIENT)
Dept: CT IMAGING | Facility: HOSPITAL | Age: 81
Discharge: HOME | End: 2021-10-05
Admitting: NURSE PRACTITIONER

## 2021-10-05 DIAGNOSIS — Z85.72 HISTORY OF FOLLICULAR LYMPHOMA: ICD-10-CM

## 2021-10-05 PROCEDURE — 71250 CT THORAX DX C-: CPT

## 2021-10-05 PROCEDURE — 74176 CT ABD & PELVIS W/O CONTRAST: CPT

## 2021-10-07 ENCOUNTER — OFFICE VISIT (OUTPATIENT)
Dept: ONCOLOGY | Facility: CLINIC | Age: 81
End: 2021-10-07

## 2021-10-07 ENCOUNTER — LAB (OUTPATIENT)
Dept: LAB | Facility: HOSPITAL | Age: 81
End: 2021-10-07

## 2021-10-07 VITALS
WEIGHT: 176 LBS | OXYGEN SATURATION: 98 % | DIASTOLIC BLOOD PRESSURE: 81 MMHG | HEIGHT: 70 IN | HEART RATE: 77 BPM | SYSTOLIC BLOOD PRESSURE: 137 MMHG | RESPIRATION RATE: 16 BRPM | TEMPERATURE: 97.1 F | BODY MASS INDEX: 25.2 KG/M2

## 2021-10-07 DIAGNOSIS — C82.00 FOLLICULAR LYMPHOMA GRADE I, UNSPECIFIED BODY REGION (HCC): ICD-10-CM

## 2021-10-07 DIAGNOSIS — C82.00 FOLLICULAR LYMPHOMA GRADE I, UNSPECIFIED BODY REGION (HCC): Primary | ICD-10-CM

## 2021-10-07 LAB
ERYTHROCYTE [DISTWIDTH] IN BLOOD BY AUTOMATED COUNT: 16.5 % (ref 12.3–15.4)
HCT VFR BLD AUTO: 35 % (ref 37.5–51)
HGB BLD-MCNC: 11.2 G/DL (ref 13–17.7)
LYMPHOCYTES # BLD AUTO: 0.9 10*3/MM3 (ref 0.7–3.1)
LYMPHOCYTES NFR BLD AUTO: 14.6 % (ref 19.6–45.3)
MCH RBC QN AUTO: 29 PG (ref 26.6–33)
MCHC RBC AUTO-ENTMCNC: 32 G/DL (ref 31.5–35.7)
MCV RBC AUTO: 90.6 FL (ref 79–97)
MONOCYTES # BLD AUTO: 0.4 10*3/MM3 (ref 0.1–0.9)
MONOCYTES NFR BLD AUTO: 6.6 % (ref 5–12)
NEUTROPHILS NFR BLD AUTO: 4.7 10*3/MM3 (ref 1.7–7)
NEUTROPHILS NFR BLD AUTO: 78.8 % (ref 42.7–76)
PLATELET # BLD AUTO: 203 10*3/MM3 (ref 140–450)
PMV BLD AUTO: 7.4 FL (ref 6–12)
RBC # BLD AUTO: 3.86 10*6/MM3 (ref 4.14–5.8)
WBC # BLD AUTO: 6 10*3/MM3 (ref 3.4–10.8)

## 2021-10-07 PROCEDURE — 36415 COLL VENOUS BLD VENIPUNCTURE: CPT

## 2021-10-07 PROCEDURE — 99215 OFFICE O/P EST HI 40 MIN: CPT | Performed by: INTERNAL MEDICINE

## 2021-10-07 PROCEDURE — 85025 COMPLETE CBC W/AUTO DIFF WBC: CPT

## 2021-10-07 NOTE — PROGRESS NOTES
CHIEF COMPLAINT: Follicular lymphoma    Problem List:  Oncology/Hematology History Overview Note   1. Follicular lymphoma:  -  initial consultation 3/12/18: Had been having left inguinal pains and went for scanning that found left groin node.  Needle biopsy of this was called lymphoma CD10 positive and Fish testing positive for translocation (14;18) consistent with follicular lymphoma though could also be seen in diffuse large B cell.  BCL 6 /3q rearranged.  No Myc..  Hence cannot say at this junction the exact subtype of lymphoma.  He has not been having bed drenching night sweats.  No unexplained weight loss.  Has not had any known anemia, thrombocytopenia, leukopenia.  Has not had frequent infections.  CT did not show any splenomegaly or other significant adenopathy.  The left inguinal pain has subsided since the needle biopsy.  He has an abdominal aortic aneurysm that is being watched and had previously been repaired.  Apparently 15+ years ago he was treated at [facility] for lymphoma for which he received right neck radiation and some chemotherapy.  He is not sure of the subtype of lymphoma nor of the type of chemotherapy nor the number of radiation treatments and this was far enough back that our records have been expunged.      -10/17/2019 [facility] hematology oncology APRN follow-up visit: I reviewed his CT scans and discussed results with him which showed no evidence of active or recurrent lymphoma.  Labs pending.  Will review results and notify patient of any significant findings.  We would not treat unless he had 3 more than 3 cm, one more than 7 cm nodes, or the other GELF criteria such as unexplained bed drenching night sweats/fevers/weight loss/anemia/thrombocytopenia/frequent infections. We will follow up with patient in 6 months with repeat labs.      -9/18/2020 [facility] hematology oncology follow-up visit:No cervical axillary or inguinal adenopathy.  Blood counts have been normal as of April  along with CT showing no bulky adenopathy at that point.  We will get his labs today and CTs now in light of his abdominal pain.  I will have him do telephone visit with my nurse practitioner in a couple of weeks.  The pain is not excruciating and is not every day but just been present for the past few weeks.  If the scans are negative then I would ask him to follow-up with gastroenterology on that and we can make referral as he desires.  Would not treat unless he reached our previously mentioned G ELF criteria.     -10/2/2020 [facility] hematology oncology APRN follow-up visit: Patient currently feeling well, previous concerns over abdominal pain resolved spontaneously with no intervention.  CT chest, abdomen and pelvis as shown above are stable with no evidence of disease recurrence or progression.  He has no adenopathy.  CBC on 9/18/2020 was unremarkable.  We will continue to monitor per NCCN guidelines.  He is now 2 years out from treatment, we will go to annual CT scan.  I will see him back in 6 months for follow-up with CBC and CMP on return.  I discussed with the patient to notify us if he has any concerns.  Would not treat unless he had 3 more than 3 cm or 1 more than 7 cm node, or other golf criteria such as unexplained bed drenching night sweats, fevers, weight loss, anemia, thrombocytopenia, frequent infections.    -9/27/2021 sedimentation rate 13 C-reactive protein 0.33.    -10/5/2021 CT chest abdomen pelvis without contrast showed soft tissue prominence anterior to sacral/lumbar region and infra aortic bifurcation lymphadenopathy maximum 4.3 cm.    -10/7/2021 [facility] medical oncology follow-up visit: No new somatic complaints.  Feels great.  I will check a CBC now along with repeat blood work and CT in 3 months but unless he develops hemoglobin less than 10, platelets less than 100,000, unexplained bed drenching night sweats/weight loss/fevers/recurrent infections, 1 more than 7 cm or 3 or more 3 cm or  larger nodes, I would not treat him.  At the point he reaches those criteria I would like to get tissue and a PET to make sure that we have accurate staging before treatment and to be sure that he has not transformed.  He will see my nurse practitioner back in 3 months to go over repeat test.     Follicular lymphoma (HCC)   3/12/2018 Initial Diagnosis    Follicular lymphoma     3/15/2018 Imaging    PET     IMPRESSION:     Multiple hypermetabolic abnormalities are identified but the  distribution is unusual. Although there is bulky hypermetabolic rishi  mass in the left inguinal area and in the right internal iliac rishi  chain certainly consistent with active malignant lymphoma, there is an  intense abnormal area of tracer uptake and hypermetabolic activity  throughout the marrow or intramedullary portion of the left ilium and  iliac wing in the absence of cortical destruction. This is an intensely  hypermetabolic abnormal lengthy intraosseous abnormality.     The lower extremity dedicated datasets are negative.     Intermediate lymph nodes are identified in the medial inguinal region  which are not above malignant threshold.     Above the pelvis, there is no evidence of additional hypermetabolic  focus or active neoplastic disease, and the chest, mediastinum and neck  are clear.     6/7/2018 Imaging    MRI lumbar spine IMPRESSION:    Study is limited by susceptibility artifact greatest at L1-L2.       No definite significant central canal stenosis.       Moderate to severe right and moderate left foraminal stenosis at L3-L4.    Moderate to severe bilateral foraminal stenosis at L4-L5.     7/24/2018 Imaging    CT chest IMPRESSION:  There are no acute findings. Specifically, there is no  axillary, mediastinal or hilar lymphadenopathy. There is bronchiectasis  in the medial segment of the left lower lobe. This was also present on  CT/PET examination of 03/15/2018.  CT abdomen and pelvis IMPRESSION:  Left inguinal  lymphadenopathy, unchanged when compared to a  whole-body PET CT scan of 03/15/2018.     9/6/2018 Imaging    MRI pelvis  1. Decreasing size of patient's left inguinal mass.  2. Interval development of diffuse marrow edema and enhancement,  practically throughout the left ilium, and adjacent soft tissue disease  deep to the left iliacus muscle, raising suspicion for lymphoma.     9/27/2018 - 10/17/2018 Radiation    Radiation OncologyTreatment Course:  George Lomeli received 3000 cGy in 15 fractions to left groin & iliac via External Beam Radiation - EBRT.     11/27/2018 Imaging    CT chest abdomen and pelvis  IMPRESSION: Bronchiectasis in the lungs but no significant mediastinal adenopathy or axillary adenopathy or cervical adenopathy.  There is a mildly enlarged left inguinal lymph node. The  node has smooth ovoid margins and has central low density consistent  with a necrotic node. Lastly more superiorly and anteriorly in a region  where there are surgical clips there is a lentiform mass which is much  much smaller than on the previous examination of 07/24/2018.     4/14/2020 Imaging    CT chest, abdomen and pelvis IMPRESSION:  Stable examination with no evidence of active or recurrent  lymphoma. No bulky adenopathy identified. Chronic age-related changes  identified throughout the chest, abdomen and pelvis which are all     9/29/2020 Imaging    CT chest, abdomen and pelvis IMPRESSION:  Stable examination with no evidence of active or recurrent  lymphoma. The findings are similar to that when compared to the prior  examination. No significant changes in the interval.     10/5/2021 Imaging    CT chest abdomen pelvis without contrast showed soft tissue prominence anterior to sacral/lumbar region and infra aortic bifurcation lymphadenopathy maximum 4.3 cm.         HISTORY OF PRESENT ILLNESS:  The patient is a 81 y.o. male, here for follow up on management of follicular lymphoma.  No signs or symptoms of  "recurrence    Past Medical History:   Diagnosis Date   • AAA (abdominal aortic aneurysm) (HCC)    • Arthritis    • Broken neck (HCC)    • Gout    • Hypertension    • Lymphoma (HCC)    • Lymphoma (HCC)     had radiation   • Migraine    • Sciatica    • Thyroid cancer (HCC)      Past Surgical History:   Procedure Laterality Date   • BACK SURGERY     • EYE SURGERY     • MULTIPLE TOOTH EXTRACTIONS     • NECK SURGERY      PINS/RODS IN NECK   • THYROIDECTOMY / EXCISION CYST THYROID         Allergies   Allergen Reactions   • Contrast Dye Nausea And Vomiting       Family History and Social History reviewed and changed as necessary    REVIEW OF SYSTEM:   No somatic complaints    PHYSICAL EXAM:  Nodes are all nonpalpable.  Lungs clear    Vitals:    10/07/21 0847   BP: 137/81   Pulse: 77   Resp: 16   Temp: 97.1 °F (36.2 °C)   TempSrc: Temporal   SpO2: 98%   Weight: 79.8 kg (176 lb)   Height: 177.8 cm (70\")     Vitals:    10/07/21 0847   PainSc: 0-No pain          ECOG score: 1           Vitals reviewed.        Lab Results   Component Value Date    HGB 12.6 (L) 04/21/2021    HCT 41.2 04/21/2021    MCV 95.8 04/21/2021     04/21/2021    WBC 7.86 04/21/2021    NEUTROABS 6.08 04/21/2021    LYMPHSABS 0.85 04/21/2021    MONOSABS 0.67 04/21/2021    EOSABS 0.18 04/21/2021    BASOSABS 0.02 04/21/2021       Lab Results   Component Value Date    GLUCOSE 88 09/27/2021    BUN 11 09/27/2021    CREATININE 0.78 09/27/2021     09/27/2021    K 4.3 09/27/2021     (H) 09/27/2021    CO2 26.7 09/27/2021    CALCIUM 9.5 09/27/2021    PROTEINTOT 7.1 04/21/2021    ALBUMIN 4.00 04/21/2021    BILITOT 0.5 04/21/2021    ALKPHOS 128 (H) 04/21/2021    AST 16 04/21/2021    ALT 9 04/21/2021             ASSESSMENT & PLAN:  1.  Follicular lymphoma:  -  initial consultation 3/12/18: Had been having left inguinal pains and went for scanning that found left groin node.  Needle biopsy of this was called lymphoma CD10 positive and Fish testing " positive for translocation (14;18) consistent with follicular lymphoma though could also be seen in diffuse large B cell.  BCL 6 /3q rearranged.  No Myc..  Hence cannot say at this junction the exact subtype of lymphoma.  He has not been having bed drenching night sweats.  No unexplained weight loss.  Has not had any known anemia, thrombocytopenia, leukopenia.  Has not had frequent infections.  CT did not show any splenomegaly or other significant adenopathy.  The left inguinal pain has subsided since the needle biopsy.  He has an abdominal aortic aneurysm that is being watched and had previously been repaired.  Apparently 15+ years ago he was treated at [facility] for lymphoma for which he received right neck radiation and some chemotherapy.  He is not sure of the subtype of lymphoma nor of the type of chemotherapy nor the number of radiation treatments and this was far enough back that our records have been expunged.      -10/17/2019 [facility] hematology oncology APRN follow-up visit: I reviewed his CT scans and discussed results with him which showed no evidence of active or recurrent lymphoma.  Labs pending.  Will review results and notify patient of any significant findings.  We would not treat unless he had 3 more than 3 cm, one more than 7 cm nodes, or the other GELF criteria such as unexplained bed drenching night sweats/fevers/weight loss/anemia/thrombocytopenia/frequent infections. We will follow up with patient in 6 months with repeat labs.      -9/18/2020 [facility] hematology oncology follow-up visit:No cervical axillary or inguinal adenopathy.  Blood counts have been normal as of April along with CT showing no bulky adenopathy at that point.  We will get his labs today and CTs now in light of his abdominal pain.  I will have him do telephone visit with my nurse practitioner in a couple of weeks.  The pain is not excruciating and is not every day but just been present for the past few weeks.  If the  scans are negative then I would ask him to follow-up with gastroenterology on that and we can make referral as he desires.  Would not treat unless he reached our previously mentioned G ELF criteria.     -10/2/2020 [facility] hematology oncology APRN follow-up visit: Patient currently feeling well, previous concerns over abdominal pain resolved spontaneously with no intervention.  CT chest, abdomen and pelvis as shown above are stable with no evidence of disease recurrence or progression.  He has no adenopathy.  CBC on 9/18/2020 was unremarkable.  We will continue to monitor per NCCN guidelines.  He is now 2 years out from treatment, we will go to annual CT scan.  I will see him back in 6 months for follow-up with CBC and CMP on return.  I discussed with the patient to notify us if he has any concerns.  Would not treat unless he had 3 more than 3 cm or 1 more than 7 cm node, or other golf criteria such as unexplained bed drenching night sweats, fevers, weight loss, anemia, thrombocytopenia, frequent infections.    -9/27/2021 sedimentation rate 13 C-reactive protein 0.33.    -10/5/2021 CT chest abdomen pelvis without contrast showed soft tissue prominence anterior to sacral/lumbar region and infra aortic bifurcation lymphadenopathy maximum 4.3 cm.    -10/7/2021 [facility] medical oncology follow-up visit: No new somatic complaints.  Feels great.  I will check a CBC now along with repeat blood work and CT in 3 months but unless he develops hemoglobin less than 10, platelets less than 100,000, unexplained bed drenching night sweats/weight loss/fevers/recurrent infections, 1 more than 7 cm or 3 or more 3 cm or larger nodes, I would not treat him.  At the point he reaches those criteria I would like to get tissue and a PET to make sure that we have accurate staging before treatment and to be sure that he has not transformed.  He will see my nurse practitioner back in 3 months to go over repeat test.    Total time of care today  inclusive of time spent today recapping interval visits by my nurse practitioners over the last few years and reviewing current data and images and reports of CT and during visit translating this information to him and the plan as outlined above and after visit instituting this plan took 45 minutes of total patient care time throughout the day today  Thor Ruggiero MD    10/07/2021

## 2021-10-12 DIAGNOSIS — G44.52 HEADACHE, NEW DAILY PERSISTENT (NDPH): ICD-10-CM

## 2021-10-12 NOTE — TELEPHONE ENCOUNTER
Rx Refill Note  Requested Prescriptions     Pending Prescriptions Disp Refills   • gabapentin (NEURONTIN) 100 MG capsule [Pharmacy Med Name: Gabapentin 100 MG Oral Capsule] 90 capsule 0     Sig: TAKE 1 CAPSULE BY MOUTH AT BEDTIME **MAY  TITRATE  UP  TO  100MG  THREE  TIMES  DAILY**      Last office visit with prescribing clinician: 9/27/2021      Next office visit with prescribing clinician: 1/28/2022            Angelita Powell MA  10/12/21, 13:22 EDT

## 2021-10-13 NOTE — TELEPHONE ENCOUNTER
DELETE AFTER REVIEWING: Telephone encounter to be sent to the  pool     Caller: PARTH  Relationship: SELF    Best call back number: [phone]    What is the best time to reach you: ANYTIME AFTER NONN    Who are you requesting to speak with (clinical staff, provider,  specific staff member): M.A    Do you know the name of the person who called: LAUREN    What was the call regarding: MEDICATION    Do you require a callback: YES        DELETE AFTER READING TO PATIENT: “ Thank you for sharing this information with me. I will send a message to the . Please allow up to 48 hours for the  to follow up on this request.”

## 2021-10-14 RX ORDER — GABAPENTIN 100 MG/1
CAPSULE ORAL
Qty: 90 CAPSULE | Refills: 0 | OUTPATIENT
Start: 2021-10-14

## 2021-10-18 ENCOUNTER — OFFICE VISIT (OUTPATIENT)
Dept: RADIATION ONCOLOGY | Facility: HOSPITAL | Age: 81
End: 2021-10-18

## 2021-10-18 ENCOUNTER — HOSPITAL ENCOUNTER (OUTPATIENT)
Dept: RADIATION ONCOLOGY | Facility: HOSPITAL | Age: 81
Setting detail: RADIATION/ONCOLOGY SERIES
Discharge: HOME | End: 2021-10-18

## 2021-10-18 DIAGNOSIS — C82.00 FOLLICULAR LYMPHOMA GRADE I, UNSPECIFIED BODY REGION (HCC): Primary | Chronic | ICD-10-CM

## 2021-10-18 PROCEDURE — G0463 HOSPITAL OUTPT CLINIC VISIT: HCPCS

## 2021-10-19 VITALS
HEIGHT: 70 IN | TEMPERATURE: 97.4 F | BODY MASS INDEX: 25.21 KG/M2 | RESPIRATION RATE: 16 BRPM | SYSTOLIC BLOOD PRESSURE: 138 MMHG | DIASTOLIC BLOOD PRESSURE: 85 MMHG | WEIGHT: 176.1 LBS | OXYGEN SATURATION: 96 % | HEART RATE: 86 BPM

## 2021-10-21 ENCOUNTER — HOSPITAL ENCOUNTER (OUTPATIENT)
Dept: MRI IMAGING | Facility: HOSPITAL | Age: 81
Discharge: HOME | End: 2021-10-21

## 2021-10-21 DIAGNOSIS — R90.82 WHITE MATTER ABNORMALITY ON MRI OF BRAIN: ICD-10-CM

## 2021-10-21 DIAGNOSIS — G44.52 HEADACHE, NEW DAILY PERSISTENT (NDPH): ICD-10-CM

## 2021-10-21 PROCEDURE — 70553 MRI BRAIN STEM W/O & W/DYE: CPT

## 2021-10-21 PROCEDURE — 0 GADOBENATE DIMEGLUMINE 529 MG/ML SOLUTION: Performed by: NURSE PRACTITIONER

## 2021-10-21 PROCEDURE — 70548 MR ANGIOGRAPHY NECK W/DYE: CPT

## 2021-10-21 PROCEDURE — A9577 INJ MULTIHANCE: HCPCS | Performed by: NURSE PRACTITIONER

## 2021-10-21 PROCEDURE — 70544 MR ANGIOGRAPHY HEAD W/O DYE: CPT

## 2021-10-21 RX ADMIN — GADOBENATE DIMEGLUMINE 13 ML: 529 INJECTION, SOLUTION INTRAVENOUS at 16:03

## 2021-10-25 DIAGNOSIS — I65.09 VERTEBRAL ARTERY STENOSIS, UNSPECIFIED LATERALITY: Primary | ICD-10-CM

## 2021-10-25 NOTE — PROGRESS NOTES
Please let pt know his MRA head showed significant narrowing in vertebral artery, I want him to see one of our neurosurgeons for opinion on the imaging.  Referral placed

## 2021-10-27 ENCOUNTER — TELEPHONE (OUTPATIENT)
Dept: NEUROLOGY | Facility: CLINIC | Age: 81
End: 2021-10-27

## 2021-10-27 NOTE — TELEPHONE ENCOUNTER
----- Message from YOVANI Watts sent at 10/25/2021  1:34 PM EDT -----  Please let pt know his MRA head showed significant narrowing in vertebral artery, I want him to see one of our neurosurgeons for opinion on the imaging.  Referral placed

## 2021-11-04 ENCOUNTER — OFFICE VISIT (OUTPATIENT)
Dept: NEUROSURGERY | Facility: CLINIC | Age: 81
End: 2021-11-04

## 2021-11-04 VITALS
WEIGHT: 176.8 LBS | HEIGHT: 70 IN | BODY MASS INDEX: 25.31 KG/M2 | SYSTOLIC BLOOD PRESSURE: 138 MMHG | DIASTOLIC BLOOD PRESSURE: 92 MMHG | TEMPERATURE: 97.1 F

## 2021-11-04 DIAGNOSIS — I65.02 STENOSIS OF LEFT VERTEBRAL ARTERY: Primary | ICD-10-CM

## 2021-11-04 PROCEDURE — 99203 OFFICE O/P NEW LOW 30 MIN: CPT | Performed by: STUDENT IN AN ORGANIZED HEALTH CARE EDUCATION/TRAINING PROGRAM

## 2021-11-04 NOTE — PROGRESS NOTES
Patient: George Lomeli  : 1940    Primary Care Provider: Kaycee Douglas MD    Requesting Provider: As above      Chief Complaint: Headache      History of Present Illness: This is a 81 y.o. 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  The patient is being followed by neurology for chronic headaches.  They are changing his medications and he has noted improvement in his headaches.  Due to these headaches, he underwent imaging of his brain and neck.  This revealed stenosis of the left vertebral artery, in the V2 segment.  He was referred to me for further evaluation.  In talking to the patient, he has no complaints other than his headaches.  He denies issues with dizziness or vertigo.  He denies episodes of loss of consciousness.  He denies episodes of weakness in his arms and legs.  Overall, he feels well.  PMHX  Allergies:  Allergies   Allergen Reactions   • Contrast Dye Nausea And Vomiting     Medications    Current Outpatient Medications:   •  albuterol (PROVENTIL) (2.5 MG/3ML) 0.083% nebulizer solution, Take 2.5 mg by nebulization 4 (Four) Times a Day As Needed for Wheezing., Disp: 240 mL, Rfl: 3  •  allopurinol (ZYLOPRIM) 100 MG tablet, Take 100 mg by mouth Daily., Disp: , Rfl:   •  amLODIPine (NORVASC) 5 MG tablet, Take 5 mg by mouth Daily., Disp: , Rfl:   •  carvedilol (COREG) 25 MG tablet, Take 25 mg by mouth 2 (Two) Times a Day With Meals., Disp: , Rfl:   •  gabapentin (Neurontin) 100 MG capsule, Take 2 PO TID, Disp: 180 capsule, Rfl: 5  •  levothyroxine (SYNTHROID, LEVOTHROID) 25 MCG tablet, Take 25 mcg by mouth Daily., Disp: , Rfl:   •  lisinopril (PRINIVIL,ZESTRIL) 40 MG tablet, Take 40 mg by mouth Daily., Disp: , Rfl:   •  oxyCODONE-acetaminophen (PERCOCET)  MG per tablet, Take 1 tablet by mouth Every 6 (Six) Hours As Needed for Moderate Pain ., Disp: , Rfl:   •  potassium chloride (K-DUR) 10 MEQ CR tablet, Take 10 mEq by mouth Take As Directed., Disp: , Rfl:   •   tamsulosin (FLOMAX) 0.4 MG capsule 24 hr capsule, Take 1 capsule by mouth Every Night., Disp: , Rfl:   •  docusate sodium (COLACE) 50 MG capsule, Take  by mouth 2 (Two) Times a Day., Disp: , Rfl:   Past Medical History:  Past Medical History:   Diagnosis Date   • AAA (abdominal aortic aneurysm) (HCC)    • Arthritis    • Broken neck (HCC)    • Gout    • Hypertension    • Lymphoma (HCC)    • Lymphoma (HCC)     had radiation   • Migraine    • Sciatica    • Thyroid cancer (HCC)      Past Surgical History:  Past Surgical History:   Procedure Laterality Date   • BACK SURGERY     • EYE SURGERY     • MULTIPLE TOOTH EXTRACTIONS     • NECK SURGERY      PINS/RODS IN NECK   • THYROIDECTOMY / EXCISION CYST THYROID       Social Hx:  Social History     Tobacco Use   • Smoking status: Former Smoker     Packs/day: 1.00     Years: 15.00     Pack years: 15.00     Types: Cigarettes     Quit date:      Years since quittin.8   • Smokeless tobacco: Never Used   • Tobacco comment: QUIT 30 YEARS AGO    Vaping Use   • Vaping Use: Never used   Substance Use Topics   • Alcohol use: No   • Drug use: No     Family Hx:  Family History   Problem Relation Age of Onset   • Hypertension Mother    • Hypertension Sister      Review of Systems:        Review of Systems   Constitutional: Negative for activity change, appetite change, chills, diaphoresis, fatigue, fever and unexpected weight change.   HENT: Negative for congestion, dental problem, drooling, ear discharge, ear pain, facial swelling, hearing loss, mouth sores, nosebleeds, postnasal drip, rhinorrhea, sinus pressure, sneezing, sore throat, tinnitus, trouble swallowing and voice change.    Eyes: Negative for photophobia, pain, discharge, redness, itching and visual disturbance.   Respiratory: Negative for apnea, cough, choking, chest tightness, shortness of breath, wheezing and stridor.    Cardiovascular: Negative for chest pain, palpitations and leg swelling.   Gastrointestinal:  "Negative for abdominal distention, abdominal pain, anal bleeding, blood in stool, constipation, diarrhea, nausea, rectal pain and vomiting.   Endocrine: Negative for cold intolerance, heat intolerance, polydipsia, polyphagia and polyuria.   Genitourinary: Negative for decreased urine volume, difficulty urinating, dysuria, enuresis, flank pain, frequency, genital sores, hematuria and urgency.   Musculoskeletal: Positive for back pain, neck pain and neck stiffness. Negative for arthralgias, gait problem, joint swelling and myalgias.   Skin: Negative for color change, pallor, rash and wound.   Allergic/Immunologic: Negative for environmental allergies, food allergies and immunocompromised state.   Neurological: Positive for headaches. Negative for dizziness, tremors, seizures, syncope, facial asymmetry, speech difficulty, weakness, light-headedness and numbness.   Hematological: Negative for adenopathy. Does not bruise/bleed easily.   Psychiatric/Behavioral: Negative for agitation, behavioral problems, confusion, decreased concentration, dysphoric mood, hallucinations, self-injury, sleep disturbance and suicidal ideas. The patient is not nervous/anxious and is not hyperactive.    All other systems reviewed and are negative.       Physical Exam:   /92 (BP Location: Left arm, Patient Position: Sitting, Cuff Size: Adult)   Temp 97.1 °F (36.2 °C) (Infrared)   Ht 177.8 cm (70\")   Wt 80.2 kg (176 lb 12.8 oz)   BMI 25.37 kg/m²   Awake, alert and oriented x 3  Speech f/c  Opens eyes spont  Pupils 3 mm rx bilaterally  EOMI  Normal sensation to light touch in all 3 distributions of CN V bilaterally  FS  TML  5/5 in all 4 ext  Normal sensation to light touch in all 4 ext  2+DTR's  No gregory's or clonus bilaterally  No PD or dysmetria  Gait not assessed    Diagnostic Studies:  All neurological imaging studies were independently reviewed unless stated otherwise    Assessment/Plan:  This is a 81 y.o. male presenting for " evaluation of left vertebral artery stenosis.  In reviewing the patient's imaging, he has stenosis of the mid portion of the V2 segment, with good filling distal to this.  Additionally, he has good filling in the right vertebral artery.  The patient's MRI does not show any prior strokes in the posterior fossa.  Clinically, the patient denies any symptoms that could be associated with his vertebral artery stenosis.  I believe this to be an incidental finding.  The patient has been unable to tolerate aspirin in the past due to nosebleeds.  As such, I think it is okay to hold off on any type of antiplatelet medication for this as I think it is chronic.  At this time, we will not schedule any follow-up with me, I did tell the patient to give us call if he develop new or worsening neurological symptoms.

## 2021-11-05 DIAGNOSIS — J44.9 OBSTRUCTIVE CHRONIC BRONCHITIS WITHOUT EXACERBATION (HCC): ICD-10-CM

## 2021-11-05 DIAGNOSIS — R06.02 SOB (SHORTNESS OF BREATH): ICD-10-CM

## 2021-11-05 DIAGNOSIS — J47.9 BRONCHIECTASIS WITHOUT COMPLICATION (HCC): Primary | ICD-10-CM

## 2021-11-05 RX ORDER — ALBUTEROL SULFATE 2.5 MG/3ML
2.5 SOLUTION RESPIRATORY (INHALATION) 4 TIMES DAILY PRN
Qty: 240 ML | Refills: 3 | Status: SHIPPED | OUTPATIENT
Start: 2021-11-05 | End: 2022-07-11

## 2021-12-17 ENCOUNTER — TELEPHONE (OUTPATIENT)
Dept: ONCOLOGY | Facility: CLINIC | Age: 81
End: 2021-12-17

## 2021-12-17 NOTE — TELEPHONE ENCOUNTER
Patient called he has really bad left hip pain all the time, and it is swollen. He wants to know if he needs to be seen?

## 2021-12-17 NOTE — TELEPHONE ENCOUNTER
Called patient back to discuss. He states the pain and swelling is in his left groin and has been increasing for the last 2 weeks. He rates his pain an 8/10 and states he was trying to wait for his upcoming scans but the pain has become pretty severe. He is using Percocet as prescribed which he does report helps his pain some. The only other complaint he has is minimal night sweats intermittently. HE would like to know if his scans should be moved sooner. Please advise.

## 2021-12-28 ENCOUNTER — HOSPITAL ENCOUNTER (OUTPATIENT)
Dept: CT IMAGING | Facility: HOSPITAL | Age: 81
Discharge: HOME | End: 2021-12-28
Admitting: INTERNAL MEDICINE

## 2021-12-28 DIAGNOSIS — C82.00 FOLLICULAR LYMPHOMA GRADE I, UNSPECIFIED BODY REGION (HCC): ICD-10-CM

## 2021-12-28 PROCEDURE — 74176 CT ABD & PELVIS W/O CONTRAST: CPT

## 2021-12-28 PROCEDURE — 71250 CT THORAX DX C-: CPT

## 2022-01-03 ENCOUNTER — OFFICE VISIT (OUTPATIENT)
Dept: ONCOLOGY | Facility: CLINIC | Age: 82
End: 2022-01-03

## 2022-01-03 ENCOUNTER — LAB (OUTPATIENT)
Dept: LAB | Facility: HOSPITAL | Age: 82
End: 2022-01-03

## 2022-01-03 VITALS
OXYGEN SATURATION: 99 % | BODY MASS INDEX: 25.34 KG/M2 | HEIGHT: 70 IN | TEMPERATURE: 98.1 F | SYSTOLIC BLOOD PRESSURE: 142 MMHG | DIASTOLIC BLOOD PRESSURE: 91 MMHG | RESPIRATION RATE: 18 BRPM | WEIGHT: 177 LBS | HEART RATE: 95 BPM

## 2022-01-03 DIAGNOSIS — C82.00 FOLLICULAR LYMPHOMA GRADE I, UNSPECIFIED BODY REGION: ICD-10-CM

## 2022-01-03 DIAGNOSIS — C82.06 FOLLICULAR LYMPHOMA GRADE I OF INTRAPELVIC LYMPH NODES: Primary | Chronic | ICD-10-CM

## 2022-01-03 LAB
ALBUMIN SERPL-MCNC: 4.1 G/DL (ref 3.5–5.2)
ALBUMIN/GLOB SERPL: 1.5 G/DL
ALP SERPL-CCNC: 111 U/L (ref 39–117)
ALT SERPL W P-5'-P-CCNC: 5 U/L (ref 1–41)
ANION GAP SERPL CALCULATED.3IONS-SCNC: 9 MMOL/L (ref 5–15)
AST SERPL-CCNC: 14 U/L (ref 1–40)
BILIRUB SERPL-MCNC: 0.5 MG/DL (ref 0–1.2)
BUN SERPL-MCNC: 10 MG/DL (ref 8–23)
BUN/CREAT SERPL: 12.2 (ref 7–25)
CALCIUM SPEC-SCNC: 9.3 MG/DL (ref 8.6–10.5)
CHLORIDE SERPL-SCNC: 106 MMOL/L (ref 98–107)
CO2 SERPL-SCNC: 26 MMOL/L (ref 22–29)
CREAT SERPL-MCNC: 0.82 MG/DL (ref 0.76–1.27)
ERYTHROCYTE [DISTWIDTH] IN BLOOD BY AUTOMATED COUNT: 16.2 % (ref 12.3–15.4)
GFR SERPL CREATININE-BSD FRML MDRD: 109 ML/MIN/1.73
GLOBULIN UR ELPH-MCNC: 2.7 GM/DL
GLUCOSE SERPL-MCNC: 111 MG/DL (ref 65–99)
HCT VFR BLD AUTO: 37.5 % (ref 37.5–51)
HGB BLD-MCNC: 12 G/DL (ref 13–17.7)
LYMPHOCYTES # BLD AUTO: 1 10*3/MM3 (ref 0.7–3.1)
LYMPHOCYTES NFR BLD AUTO: 12.7 % (ref 19.6–45.3)
MCH RBC QN AUTO: 28.8 PG (ref 26.6–33)
MCHC RBC AUTO-ENTMCNC: 31.9 G/DL (ref 31.5–35.7)
MCV RBC AUTO: 90.2 FL (ref 79–97)
MONOCYTES # BLD AUTO: 0.2 10*3/MM3 (ref 0.1–0.9)
MONOCYTES NFR BLD AUTO: 2.4 % (ref 5–12)
NEUTROPHILS NFR BLD AUTO: 6.7 10*3/MM3 (ref 1.7–7)
NEUTROPHILS NFR BLD AUTO: 84.9 % (ref 42.7–76)
PLATELET # BLD AUTO: 200 10*3/MM3 (ref 140–450)
PMV BLD AUTO: 7.8 FL (ref 6–12)
POTASSIUM SERPL-SCNC: 3.6 MMOL/L (ref 3.5–5.2)
PROT SERPL-MCNC: 6.8 G/DL (ref 6–8.5)
RBC # BLD AUTO: 4.16 10*6/MM3 (ref 4.14–5.8)
SODIUM SERPL-SCNC: 141 MMOL/L (ref 136–145)
WBC NRBC COR # BLD: 7.9 10*3/MM3 (ref 3.4–10.8)

## 2022-01-03 PROCEDURE — 36415 COLL VENOUS BLD VENIPUNCTURE: CPT

## 2022-01-03 PROCEDURE — 85025 COMPLETE CBC W/AUTO DIFF WBC: CPT

## 2022-01-03 PROCEDURE — 99213 OFFICE O/P EST LOW 20 MIN: CPT | Performed by: NURSE PRACTITIONER

## 2022-01-03 PROCEDURE — 80053 COMPREHEN METABOLIC PANEL: CPT

## 2022-01-03 NOTE — PROGRESS NOTES
CHIEF COMPLAINT: Follicular lymphoma    Problem List:  Oncology/Hematology History Overview Note   1. Follicular lymphoma:  -  initial consultation 3/12/18: Had been having left inguinal pains and went for scanning that found left groin node.  Needle biopsy of this was called lymphoma CD10 positive and Fish testing positive for translocation (14;18) consistent with follicular lymphoma though could also be seen in diffuse large B cell.  BCL 6 /3q rearranged.  No Myc..  Hence cannot say at this junction the exact subtype of lymphoma.  He has not been having bed drenching night sweats.  No unexplained weight loss.  Has not had any known anemia, thrombocytopenia, leukopenia.  Has not had frequent infections.  CT did not show any splenomegaly or other significant adenopathy.  The left inguinal pain has subsided since the needle biopsy.  He has an abdominal aortic aneurysm that is being watched and had previously been repaired.  Apparently 15+ years ago he was treated at [facility] for lymphoma for which he received right neck radiation and some chemotherapy.  He is not sure of the subtype of lymphoma nor of the type of chemotherapy nor the number of radiation treatments and this was far enough back that our records have been expunged.      -10/17/2019 [facility] hematology oncology APRN follow-up visit: I reviewed his CT scans and discussed results with him which showed no evidence of active or recurrent lymphoma.  Labs pending.  Will review results and notify patient of any significant findings.  We would not treat unless he had 3 more than 3 cm, one more than 7 cm nodes, or the other GELF criteria such as unexplained bed drenching night sweats/fevers/weight loss/anemia/thrombocytopenia/frequent infections. We will follow up with patient in 6 months with repeat labs.      -9/18/2020 [facility] hematology oncology follow-up visit:No cervical axillary or inguinal adenopathy.  Blood counts have been normal as of April  along with CT showing no bulky adenopathy at that point.  We will get his labs today and CTs now in light of his abdominal pain.  I will have him do telephone visit with my nurse practitioner in a couple of weeks.  The pain is not excruciating and is not every day but just been present for the past few weeks.  If the scans are negative then I would ask him to follow-up with gastroenterology on that and we can make referral as he desires.  Would not treat unless he reached our previously mentioned G ELF criteria.     -10/2/2020 [facility] hematology oncology APRN follow-up visit: Patient currently feeling well, previous concerns over abdominal pain resolved spontaneously with no intervention.  CT chest, abdomen and pelvis as shown above are stable with no evidence of disease recurrence or progression.  He has no adenopathy.  CBC on 9/18/2020 was unremarkable.  We will continue to monitor per NCCN guidelines.  He is now 2 years out from treatment, we will go to annual CT scan.  I will see him back in 6 months for follow-up with CBC and CMP on return.  I discussed with the patient to notify us if he has any concerns.  Would not treat unless he had 3 more than 3 cm or 1 more than 7 cm node, or other golf criteria such as unexplained bed drenching night sweats, fevers, weight loss, anemia, thrombocytopenia, frequent infections.    -9/27/2021 sedimentation rate 13 C-reactive protein 0.33.    -10/5/2021 CT chest abdomen pelvis without contrast showed soft tissue prominence anterior to sacral/lumbar region and infra aortic bifurcation lymphadenopathy maximum 4.3 cm.    -10/7/2021 [facility] medical oncology follow-up visit: No new somatic complaints.  Feels great.  I will check a CBC now along with repeat blood work and CT in 3 months but unless he develops hemoglobin less than 10, platelets less than 100,000, unexplained bed drenching night sweats/weight loss/fevers/recurrent infections, 1 more than 7 cm or 3 or more 3 cm or  larger nodes, I would not treat him.  At the point he reaches those criteria I would like to get tissue and a PET to make sure that we have accurate staging before treatment and to be sure that he has not transformed.  He will see my nurse practitioner back in 3 months to go over repeat test.     Follicular lymphoma (HCC)   3/12/2018 Initial Diagnosis    Follicular lymphoma     3/15/2018 Imaging    PET     IMPRESSION:     Multiple hypermetabolic abnormalities are identified but the  distribution is unusual. Although there is bulky hypermetabolic rishi  mass in the left inguinal area and in the right internal iliac rishi  chain certainly consistent with active malignant lymphoma, there is an  intense abnormal area of tracer uptake and hypermetabolic activity  throughout the marrow or intramedullary portion of the left ilium and  iliac wing in the absence of cortical destruction. This is an intensely  hypermetabolic abnormal lengthy intraosseous abnormality.     The lower extremity dedicated datasets are negative.     Intermediate lymph nodes are identified in the medial inguinal region  which are not above malignant threshold.     Above the pelvis, there is no evidence of additional hypermetabolic  focus or active neoplastic disease, and the chest, mediastinum and neck  are clear.     6/7/2018 Imaging    MRI lumbar spine IMPRESSION:    Study is limited by susceptibility artifact greatest at L1-L2.       No definite significant central canal stenosis.       Moderate to severe right and moderate left foraminal stenosis at L3-L4.    Moderate to severe bilateral foraminal stenosis at L4-L5.     7/24/2018 Imaging    CT chest IMPRESSION:  There are no acute findings. Specifically, there is no  axillary, mediastinal or hilar lymphadenopathy. There is bronchiectasis  in the medial segment of the left lower lobe. This was also present on  CT/PET examination of 03/15/2018.  CT abdomen and pelvis IMPRESSION:  Left inguinal  lymphadenopathy, unchanged when compared to a  whole-body PET CT scan of 03/15/2018.     9/6/2018 Imaging    MRI pelvis  1. Decreasing size of patient's left inguinal mass.  2. Interval development of diffuse marrow edema and enhancement,  practically throughout the left ilium, and adjacent soft tissue disease  deep to the left iliacus muscle, raising suspicion for lymphoma.     9/27/2018 - 10/17/2018 Radiation    Radiation OncologyTreatment Course:  George Lomeli received 3000 cGy in 15 fractions to left groin & iliac via External Beam Radiation - EBRT.     11/27/2018 Imaging    CT chest abdomen and pelvis  IMPRESSION: Bronchiectasis in the lungs but no significant mediastinal adenopathy or axillary adenopathy or cervical adenopathy.  There is a mildly enlarged left inguinal lymph node. The  node has smooth ovoid margins and has central low density consistent  with a necrotic node. Lastly more superiorly and anteriorly in a region  where there are surgical clips there is a lentiform mass which is much  much smaller than on the previous examination of 07/24/2018.     4/14/2020 Imaging    CT chest, abdomen and pelvis IMPRESSION:  Stable examination with no evidence of active or recurrent  lymphoma. No bulky adenopathy identified. Chronic age-related changes  identified throughout the chest, abdomen and pelvis which are all     9/29/2020 Imaging    CT chest, abdomen and pelvis IMPRESSION:  Stable examination with no evidence of active or recurrent  lymphoma. The findings are similar to that when compared to the prior  examination. No significant changes in the interval.     10/5/2021 Imaging    CT chest abdomen pelvis without contrast showed soft tissue prominence anterior to sacral/lumbar region and infra aortic bifurcation lymphadenopathy maximum 4.3 cm.     12/28/2021 Imaging    CT chest, abdomen and pelvis IMPRESSION:  Chronic interstitial changes seen throughout the lung  fields. Old healed granulomatous disease  "seen within the chest. There is  stable adenopathy identified beneath the aortic bifurcation and anterior  to the sacrum stable and unchanged. There are no signs of progression.         HISTORY OF PRESENT ILLNESS:  The patient is a 81 y.o. male, here for follow up on management of follicular lymphoma.  Mr. Lomeli has been doing well overall since we saw him last.  He has occasional pain in his left groin/pelvic area.  No lymphadenopathy that he is aware of.  No infections or illnesses since we saw him last.  He has received his COVID vaccines.  His appetite is normal, weight is stable.  No change in his bowel or bladder habits.      Past Medical History:   Diagnosis Date   • AAA (abdominal aortic aneurysm) (HCC)    • Arthritis    • Broken neck (HCC)    • Gout    • Hypertension    • Lymphoma (HCC)    • Lymphoma (HCC)     had radiation   • Migraine    • Sciatica    • Thyroid cancer (HCC)      Past Surgical History:   Procedure Laterality Date   • BACK SURGERY     • EYE SURGERY     • MULTIPLE TOOTH EXTRACTIONS     • NECK SURGERY      PINS/RODS IN NECK   • THYROIDECTOMY / EXCISION CYST THYROID         Allergies   Allergen Reactions   • Contrast Dye Nausea And Vomiting       Family History and Social History reviewed and changed as necessary    REVIEW OF SYSTEM:   No somatic complaints    PHYSICAL EXAM:  Nodes are all nonpalpable.  Lungs clear    Vitals:    01/03/22 1243   BP: 142/91   Pulse: 95   Resp: 18   Temp: 98.1 °F (36.7 °C)   SpO2: 99%   Weight: 80.3 kg (177 lb)   Height: 177.8 cm (70\")     Vitals:    01/03/22 1243   PainSc: 0-No pain          ECOG score: 1           Vitals reviewed.  Labs and CT scans reviewed at time of visit      Lab Results   Component Value Date    HGB 12.0 (L) 01/03/2022    HCT 37.5 01/03/2022    MCV 90.2 01/03/2022     01/03/2022    WBC 7.90 01/03/2022    NEUTROABS 6.70 01/03/2022    LYMPHSABS 1.00 01/03/2022    MONOSABS 0.20 01/03/2022    EOSABS 0.18 04/21/2021    BASOSABS 0.02 " 04/21/2021     CT Abdomen Pelvis Without Contrast    Result Date: 12/30/2021  Chronic interstitial changes seen throughout the lung fields. Old healed granulomatous disease seen within the chest. There is stable adenopathy identified beneath the aortic bifurcation and anterior to the sacrum stable and unchanged. There are no signs of progression.  D:  12/29/2021 E:  12/29/2021  This report was finalized on 12/30/2021 4:26 PM by Dr. Kristi Santacruz MD.      CT Chest Without Contrast Diagnostic    Result Date: 12/30/2021  Chronic interstitial changes seen throughout the lung fields. Old healed granulomatous disease seen within the chest. There is stable adenopathy identified beneath the aortic bifurcation and anterior to the sacrum stable and unchanged. There are no signs of progression.  D:  12/29/2021 E:  12/29/2021  This report was finalized on 12/30/2021 4:26 PM by Dr. Kristi Santacruz MD.        ASSESSMENT & PLAN:  1.  Follicular lymphoma:  -  initial consultation 3/12/18: Had been having left inguinal pains and went for scanning that found left groin node.  Needle biopsy of this was called lymphoma CD10 positive and Fish testing positive for translocation (14;18) consistent with follicular lymphoma though could also be seen in diffuse large B cell.  BCL 6 /3q rearranged.  No Myc..  Hence cannot say at this junction the exact subtype of lymphoma.  He has not been having bed drenching night sweats.  No unexplained weight loss.  Has not had any known anemia, thrombocytopenia, leukopenia.  Has not had frequent infections.  CT did not show any splenomegaly or other significant adenopathy.  The left inguinal pain has subsided since the needle biopsy.  He has an abdominal aortic aneurysm that is being watched and had previously been repaired.  Apparently 15+ years ago he was treated at [facility] for lymphoma for which he received right neck radiation and some chemotherapy.  He is not sure of the subtype of  lymphoma nor of the type of chemotherapy nor the number of radiation treatments and this was far enough back that our records have been expunged.      -10/17/2019 [facility] hematology oncology APRN follow-up visit: I reviewed his CT scans and discussed results with him which showed no evidence of active or recurrent lymphoma.  Labs pending.  Will review results and notify patient of any significant findings.  We would not treat unless he had 3 more than 3 cm, one more than 7 cm nodes, or the other GELF criteria such as unexplained bed drenching night sweats/fevers/weight loss/anemia/thrombocytopenia/frequent infections. We will follow up with patient in 6 months with repeat labs.      -9/18/2020 [facility] hematology oncology follow-up visit:No cervical axillary or inguinal adenopathy.  Blood counts have been normal as of April along with CT showing no bulky adenopathy at that point.  We will get his labs today and CTs now in light of his abdominal pain.  I will have him do telephone visit with my nurse practitioner in a couple of weeks.  The pain is not excruciating and is not every day but just been present for the past few weeks.  If the scans are negative then I would ask him to follow-up with gastroenterology on that and we can make referral as he desires.  Would not treat unless he reached our previously mentioned G ELF criteria.     -10/2/2020 [facility] hematology oncology APRN follow-up visit: Patient currently feeling well, previous concerns over abdominal pain resolved spontaneously with no intervention.  CT chest, abdomen and pelvis as shown above are stable with no evidence of disease recurrence or progression.  He has no adenopathy.  CBC on 9/18/2020 was unremarkable.  We will continue to monitor per NCCN guidelines.  He is now 2 years out from treatment, we will go to annual CT scan.  I will see him back in 6 months for follow-up with CBC and CMP on return.  I discussed with the patient to notify us if he has  any concerns.  Would not treat unless he had 3 more than 3 cm or 1 more than 7 cm node, or other golf criteria such as unexplained bed drenching night sweats, fevers, weight loss, anemia, thrombocytopenia, frequent infections.    -9/27/2021 sedimentation rate 13 C-reactive protein 0.33.    -10/5/2021 CT chest abdomen pelvis without contrast showed soft tissue prominence anterior to sacral/lumbar region and infra aortic bifurcation lymphadenopathy maximum 4.3 cm.    -10/7/2021 [facility] medical oncology follow-up visit: No new somatic complaints.  Feels great.  I will check a CBC now along with repeat blood work and CT in 3 months but unless he develops hemoglobin less than 10, platelets less than 100,000, unexplained bed drenching night sweats/weight loss/fevers/recurrent infections, 1 more than 7 cm or 3 or more 3 cm or larger nodes, I would not treat him.  At the point he reaches those criteria I would like to get tissue and a PET to make sure that we have accurate staging before treatment and to be sure that he has not transformed.  He will see my nurse practitioner back in 3 months to go over repeat test.    -1/3/2022 [facility] Oncology clinic follow-up:  Mr. Lomeli overall is doing well, no evidence of disease recurrence on current CT scans from 12/28/2021 which I reviewed with him today.  CT scan showed chronic interstitial changes throughout the lung fields with old granulomatous disease, stable adenopathy beneath the aortic bifurcation and anterior to the sacrum stable and unchanged without evidence of disease progression.  CBC is unremarkable, mild anemia with hemoglobin of 12, hematocrit normal at 37.5%, WBC normal at 7900, platelets 2000.  CMP pending.  We will continue with CT scan again in 6 months for surveillance.    I spent 20 minutes caring for George on this date of service. This time includes time spent by me in the following activities: preparing for the visit, reviewing tests, performing a medically  appropriate examination and/or evaluation, ordering medications, tests, or procedures and documenting information in the medical record.     Laxmi Scott, APRN    01/03/2022

## 2022-01-18 ENCOUNTER — APPOINTMENT (OUTPATIENT)
Dept: CT IMAGING | Facility: HOSPITAL | Age: 82
End: 2022-01-18

## 2022-01-20 ENCOUNTER — HOSPITAL ENCOUNTER (OUTPATIENT)
Dept: RADIATION ONCOLOGY | Facility: HOSPITAL | Age: 82
Setting detail: RADIATION/ONCOLOGY SERIES
Discharge: HOME | End: 2022-01-20

## 2022-01-20 ENCOUNTER — OFFICE VISIT (OUTPATIENT)
Dept: RADIATION ONCOLOGY | Facility: HOSPITAL | Age: 82
End: 2022-01-20

## 2022-01-20 DIAGNOSIS — C82.06 FOLLICULAR LYMPHOMA GRADE I OF INTRAPELVIC LYMPH NODES: Primary | Chronic | ICD-10-CM

## 2022-01-20 NOTE — PROGRESS NOTES
TELEMEDICINE FOLLOW-UP NOTE    01/20/2022    Problem List Items Addressed This Visit        Other    Follicular lymphoma (HCC) - Primary (Chronic)          Time Devoted to Visit: 10 min including time to review his previous imaging and records, with 50% devoted to direct discussion.  The call was conducted via telephone only.    Reason for Visit:  I personally contacted George Lomeli  today in an effort to screen for coronavirus symptoms and also to perform their scheduled follow-up visit remotely in light of the coronavirus pandemic.  With respect to their specific risks for coronavirus, their screen is as follows:      COVID-19 RISK SCREEN     1. Has the patient had close contact or exposure without PPE with a lab confirmed COVID-19 (+) person or a person under investigation (PUI) for COVID-19 infection?  -- No     2. Has the patient had respiratory symptoms, worsened/new cough and/or SOA, unexplained fever, or sudden loss of smell and/or taste in the past week? --  No    3. Has the patient completed COVID vaccination? --  Yes    4. Has the patient completed their Booster dose? --  Yes     I counseled them regarding safe practices for minimizing risk for infection including hand-washing, self-isolation, limiting contacts, and we also discussed what to do should they develop symptoms including fever, chills, new cough, shortness of breath, or new body aches.    SUBJECTIVE:  With respect to their cancer diagnosis, Mr. Lomeli is an 81 year old gentleman with a history of a recurrent follicular lymphoma.  He completed radiatio to the left groin/iliacs/sacrum in October 2018.  He has done very well.  He denies any new or ongoing symptoms, and he specifically denies any B symptoms.  He is very pleased.          EXAM FINDINGS AND/OR PROBLEMS:  No new problems or complaints.    IMAGING  I have personally reviewed the relevant imaging studies, as follows:  CT Abdomen Pelvis Without Contrast    Result Date:  12/30/2021  Chronic interstitial changes seen throughout the lung fields. Old healed granulomatous disease seen within the chest. There is stable adenopathy identified beneath the aortic bifurcation and anterior to the sacrum stable and unchanged. There are no signs of progression.  D:  12/29/2021 E:  12/29/2021  This report was finalized on 12/30/2021 4:26 PM by Dr. Kristi Santacruz MD.      CT Abdomen Pelvis Without Contrast    Result Date: 10/7/2021  Abnormal soft tissue seen anterior to the sacrum and lower lumbar spine within the retroperitoneum beneath the aortic bifurcation suggesting recurrent adenopathy. The remainder of the chest, abdomen and pelvis is stable.  D:  10/05/2021 E:  10/05/2021  This report was finalized on 10/7/2021 3:51 PM by Dr. Kristi Santacruz MD.      CT Chest Without Contrast Diagnostic    Result Date: 12/30/2021  Chronic interstitial changes seen throughout the lung fields. Old healed granulomatous disease seen within the chest. There is stable adenopathy identified beneath the aortic bifurcation and anterior to the sacrum stable and unchanged. There are no signs of progression.  D:  12/29/2021 E:  12/29/2021  This report was finalized on 12/30/2021 4:26 PM by Dr. Kristi Santacruz MD.      CT Chest Without Contrast Diagnostic    Result Date: 10/7/2021  Abnormal soft tissue seen anterior to the sacrum and lower lumbar spine within the retroperitoneum beneath the aortic bifurcation suggesting recurrent adenopathy. The remainder of the chest, abdomen and pelvis is stable.  D:  10/05/2021 E:  10/05/2021  This report was finalized on 10/7/2021 3:51 PM by Dr. Kristi Santacruz MD.      MRI Angiogram Head Without Contrast    Result Date: 10/25/2021  1. 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 Questionable moderate to severe stenoses seen of the distal vertebral arteries bilaterally. There is fetal  origin identified of the left posterior cerebral artery. The remainder the intracranial vessels are unremarkable. The common carotid arteries and internal carotid arteries are unremarkable.  2. Atrophy of the brain with extensive chronic small vessel ischemic changes seen throughout the periventricular and subcortical white matter with no evidence of acute intracranial abnormality.  DICTATED:   10/21/2021 EDITED/lfs:   10/21/2021    This report was finalized on 10/25/2021 11:46 AM by Dr. Kristi Santacruz MD.      MRI Angiogram Neck With Contrast    Result Date: 10/25/2021  1. 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 Questionable moderate to severe stenoses seen of the distal vertebral arteries bilaterally. There is fetal origin identified of the left posterior cerebral artery. The remainder the intracranial vessels are unremarkable. The common carotid arteries and internal carotid arteries are unremarkable.  2. Atrophy of the brain with extensive chronic small vessel ischemic changes seen throughout the periventricular and subcortical white matter with no evidence of acute intracranial abnormality.  DICTATED:   10/21/2021 EDITED/lfs:   10/21/2021    This report was finalized on 10/25/2021 11:46 AM by Dr. Kristi Santacruz MD.      MRI Brain With & Without Contrast    Result Date: 10/25/2021  1. 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 Questionable moderate to severe stenoses seen of the distal vertebral arteries bilaterally. There is fetal origin identified of the left posterior cerebral artery. The remainder the intracranial vessels are unremarkable. The common carotid arteries and internal carotid arteries are unremarkable.  2. Atrophy of the brain with extensive chronic small vessel ischemic changes seen throughout the periventricular and  subcortical white matter with no evidence of acute intracranial abnormality.  DICTATED:   10/21/2021 EDITED/lfs:   10/21/2021    This report was finalized on 10/25/2021 11:46 AM by Dr. Kristi Santacruz MD.      ASSESSMENT/PLAN: George Lomeli is a 81 y.o. year old male with a history of a recurrent follicular lymphoma.  He is now just over 3 years out from completion of therapy and continues to do well without any evidence of recurrent disease.  He is already being followed closely by Dr. Ruggiero with serial scans.  I can see him back for a teleheath visit in 1 year or as needed based on how he is doing.    RECOMMENDATIONS:  Return in about 1 year (around 1/20/2023) for Telehealth visit.    Milo Christianson MD

## 2022-01-21 NOTE — PROGRESS NOTES
RADIATION ONCOLOGY PROGRESS NOTE  01/21/22    Appt. Card mailed to pt for 1/26/23 @ 2:00-Dr. Christianson f/u

## 2022-06-23 NOTE — TELEPHONE ENCOUNTER
----- Message from Lesley Jones sent at 5/15/2018 12:17 PM EDT -----  Regarding: Monik-Call  Contact: [phone]  Pt called and said knot on groin is getting bigger and Dr Ruggiero told him to call if it got bigger.   Hypercholesterolemia Monitoring: I explained this is common when taking isotretinoin. We will monitor closely.

## 2022-07-11 ENCOUNTER — OFFICE VISIT (OUTPATIENT)
Dept: ONCOLOGY | Facility: CLINIC | Age: 82
End: 2022-07-11

## 2022-07-11 ENCOUNTER — LAB (OUTPATIENT)
Dept: LAB | Facility: HOSPITAL | Age: 82
End: 2022-07-11

## 2022-07-11 VITALS
SYSTOLIC BLOOD PRESSURE: 135 MMHG | RESPIRATION RATE: 18 BRPM | TEMPERATURE: 98.7 F | HEART RATE: 74 BPM | BODY MASS INDEX: 24.62 KG/M2 | DIASTOLIC BLOOD PRESSURE: 87 MMHG | OXYGEN SATURATION: 99 % | HEIGHT: 70 IN | WEIGHT: 172 LBS

## 2022-07-11 DIAGNOSIS — C82.06 FOLLICULAR LYMPHOMA GRADE I OF INTRAPELVIC LYMPH NODES: ICD-10-CM

## 2022-07-11 DIAGNOSIS — C82.00 FOLLICULAR LYMPHOMA GRADE I, UNSPECIFIED BODY REGION: Chronic | ICD-10-CM

## 2022-07-11 DIAGNOSIS — C82.00 FOLLICULAR LYMPHOMA GRADE I, UNSPECIFIED BODY REGION: Primary | Chronic | ICD-10-CM

## 2022-07-11 LAB
ALBUMIN SERPL-MCNC: 3.9 G/DL (ref 3.5–5.2)
ALBUMIN/GLOB SERPL: 1.4 G/DL
ALP SERPL-CCNC: 121 U/L (ref 39–117)
ALT SERPL W P-5'-P-CCNC: 9 U/L (ref 1–41)
ANION GAP SERPL CALCULATED.3IONS-SCNC: 8 MMOL/L (ref 5–15)
AST SERPL-CCNC: 19 U/L (ref 1–40)
BILIRUB SERPL-MCNC: 0.4 MG/DL (ref 0–1.2)
BUN SERPL-MCNC: 8 MG/DL (ref 8–23)
BUN/CREAT SERPL: 8.5 (ref 7–25)
CALCIUM SPEC-SCNC: 9.3 MG/DL (ref 8.6–10.5)
CHLORIDE SERPL-SCNC: 108 MMOL/L (ref 98–107)
CO2 SERPL-SCNC: 27 MMOL/L (ref 22–29)
CREAT SERPL-MCNC: 0.94 MG/DL (ref 0.76–1.27)
EGFRCR SERPLBLD CKD-EPI 2021: 80.9 ML/MIN/1.73
ERYTHROCYTE [DISTWIDTH] IN BLOOD BY AUTOMATED COUNT: 16.2 % (ref 12.3–15.4)
GLOBULIN UR ELPH-MCNC: 2.8 GM/DL
GLUCOSE SERPL-MCNC: 92 MG/DL (ref 65–99)
HCT VFR BLD AUTO: 36.9 % (ref 37.5–51)
HGB BLD-MCNC: 11.6 G/DL (ref 13–17.7)
LYMPHOCYTES # BLD AUTO: 1.2 10*3/MM3 (ref 0.7–3.1)
LYMPHOCYTES NFR BLD AUTO: 15.6 % (ref 19.6–45.3)
MCH RBC QN AUTO: 28.2 PG (ref 26.6–33)
MCHC RBC AUTO-ENTMCNC: 31.6 G/DL (ref 31.5–35.7)
MCV RBC AUTO: 89.3 FL (ref 79–97)
MONOCYTES # BLD AUTO: 0.6 10*3/MM3 (ref 0.1–0.9)
MONOCYTES NFR BLD AUTO: 8 % (ref 5–12)
NEUTROPHILS NFR BLD AUTO: 6 10*3/MM3 (ref 1.7–7)
NEUTROPHILS NFR BLD AUTO: 76.4 % (ref 42.7–76)
PLATELET # BLD AUTO: 216 10*3/MM3 (ref 140–450)
PMV BLD AUTO: 7.3 FL (ref 6–12)
POTASSIUM SERPL-SCNC: 3.9 MMOL/L (ref 3.5–5.2)
PROT SERPL-MCNC: 6.7 G/DL (ref 6–8.5)
RBC # BLD AUTO: 4.13 10*6/MM3 (ref 4.14–5.8)
SODIUM SERPL-SCNC: 143 MMOL/L (ref 136–145)
WBC NRBC COR # BLD: 7.8 10*3/MM3 (ref 3.4–10.8)

## 2022-07-11 PROCEDURE — 36415 COLL VENOUS BLD VENIPUNCTURE: CPT

## 2022-07-11 PROCEDURE — 99213 OFFICE O/P EST LOW 20 MIN: CPT | Performed by: INTERNAL MEDICINE

## 2022-07-11 PROCEDURE — 80053 COMPREHEN METABOLIC PANEL: CPT

## 2022-07-11 PROCEDURE — 85025 COMPLETE CBC W/AUTO DIFF WBC: CPT

## 2022-07-11 NOTE — PROGRESS NOTES
CHIEF COMPLAINT: Headache    Problem List:  Oncology/Hematology History Overview Note   1.  Follicular lymphoma:  -  initial consultation 3/12/18: Had been having left inguinal pains and went for scanning that found left groin node.  Needle biopsy of this was called lymphoma CD10 positive and Fish testing positive for translocation (14;18) consistent with follicular lymphoma though could also be seen in diffuse large B cell.  BCL 6 /3q rearranged.  No Myc..  Hence cannot say at this junction the exact subtype of lymphoma.  He has not been having bed drenching night sweats.  No unexplained weight loss.  Has not had any known anemia, thrombocytopenia, leukopenia.  Has not had frequent infections.  CT did not show any splenomegaly or other significant adenopathy.  The left inguinal pain has subsided since the needle biopsy.  He has an abdominal aortic aneurysm that is being watched and had previously been repaired.  Apparently 15+ years ago he was treated at [facility] for lymphoma for which he received right neck radiation and some chemotherapy.  He is not sure of the subtype of lymphoma nor of the type of chemotherapy nor the number of radiation treatments and this was far enough back that our records have been expunged.      -10/17/2019 [facility] hematology oncology APRN follow-up visit: I reviewed his CT scans and discussed results with him which showed no evidence of active or recurrent lymphoma.  Labs pending.  Will review results and notify patient of any significant findings.  We would not treat unless he had 3 more than 3 cm, one more than 7 cm nodes, or the other GELF criteria such as unexplained bed drenching night sweats/fevers/weight loss/anemia/thrombocytopenia/frequent infections. We will follow up with patient in 6 months with repeat labs.      -9/18/2020 [facility] hematology oncology follow-up visit:No cervical axillary or inguinal adenopathy.  Blood counts have been normal as of April along with CT  showing no bulky adenopathy at that point.  We will get his labs today and CTs now in light of his abdominal pain.  I will have him do telephone visit with my nurse practitioner in a couple of weeks.  The pain is not excruciating and is not every day but just been present for the past few weeks.  If the scans are negative then I would ask him to follow-up with gastroenterology on that and we can make referral as he desires.  Would not treat unless he reached our previously mentioned G ELF criteria.     -10/2/2020 [facility], [place] operated by [facility] hematology oncology APRN follow-up visit: Patient currently feeling well, previous concerns over abdominal pain resolved spontaneously with no intervention.  CT chest, abdomen and pelvis as shown above are stable with no evidence of disease recurrence or progression.  He has no adenopathy.  CBC on 9/18/2020 was unremarkable.  We will continue to monitor per NCCN guidelines.  He is now 2 years out from treatment, we will go to annual CT scan.  I will see him back in 6 months for follow-up with CBC and CMP on return.  I discussed with the patient to notify us if he has any concerns.  Would not treat unless he had 3 more than 3 cm or 1 more than 7 cm node, or other golf criteria such as unexplained bed drenching night sweats, fevers, weight loss, anemia, thrombocytopenia, frequent infections.    -9/27/2021 sedimentation rate 13 C-reactive protein 0.33.    -10/5/2021 CT chest abdomen pelvis without contrast showed soft tissue prominence anterior to sacral/lumbar region and infra aortic bifurcation lymphadenopathy maximum 4.3 cm.    -10/7/2021 [facility], [place] by [facility] medical oncology follow-up visit: No new somatic complaints.  Feels great.  I will check a CBC now along with repeat blood work and CT in 3 months but unless he develops hemoglobin less than 10, platelets less than 100,000, unexplained bed drenching night sweats/weight loss/fevers/recurrent infections, 1 more than 7 cm or 3 or more 3 cm or larger nodes, I  would not treat him.  At the point he reaches those criteria I would like to get tissue and a PET to make sure that we have accurate staging before treatment and to be sure that he has not transformed.  He will see my nurse practitioner back in 3 months to go over repeat test.    -1/3/2022 [facility] follow-up:  Mr. Lomeli overall is doing well, no evidence of disease recurrence on current CT scans from 12/28/2021 which I reviewed with him today.  CT scan showed chronic interstitial changes throughout the lung fields with old granulomatous disease, stable adenopathy beneath the aortic bifurcation and anterior to the sacrum stable and unchanged without evidence of disease progression.  CBC is unremarkable, mild anemia with hemoglobin of 12, hematocrit normal at 37.5%, WBC normal at 7900, platelets 2000.  CMP pending. We will continue with CT scan again in 6 months for surveillance.    -7/11/2022 [facility] hematology follow-up: Other than chronic migraine headaches, no other complaints.  Did not get recent CAT scans as ordered but I am okay with just checking noncontrast CTs in January which will be a little over a year from his last scans and getting his blood counts today and only treating if he meets G ELF criteria.     Follicular lymphoma (HCC)   3/12/2018 Initial Diagnosis    Follicular lymphoma     3/15/2018 Imaging    PET     IMPRESSION:     Multiple hypermetabolic abnormalities are identified but the  distribution is unusual. Although there is bulky hypermetabolic rishi  mass in the left inguinal area and in the right internal iliac rishi  chain certainly consistent with active malignant lymphoma, there is an  intense abnormal area of tracer uptake and hypermetabolic activity  throughout the marrow or intramedullary portion of the left ilium and  iliac wing in the absence of cortical destruction. This is an intensely  hypermetabolic abnormal lengthy intraosseous abnormality.     The lower  extremity dedicated datasets are negative.     Intermediate lymph nodes are identified in the medial inguinal region  which are not above malignant threshold.     Above the pelvis, there is no evidence of additional hypermetabolic  focus or active neoplastic disease, and the chest, mediastinum and neck  are clear.     6/7/2018 Imaging    MRI lumbar spine IMPRESSION:    Study is limited by susceptibility artifact greatest at L1-L2.       No definite significant central canal stenosis.       Moderate to severe right and moderate left foraminal stenosis at L3-L4.    Moderate to severe bilateral foraminal stenosis at L4-L5.     7/24/2018 Imaging    CT chest IMPRESSION:  There are no acute findings. Specifically, there is no  axillary, mediastinal or hilar lymphadenopathy. There is bronchiectasis  in the medial segment of the left lower lobe. This was also present on  CT/PET examination of 03/15/2018.  CT abdomen and pelvis IMPRESSION:  Left inguinal lymphadenopathy, unchanged when compared to a  whole-body PET CT scan of 03/15/2018.     9/6/2018 Imaging    MRI pelvis  1. Decreasing size of patient's left inguinal mass.  2. Interval development of diffuse marrow edema and enhancement,  practically throughout the left ilium, and adjacent soft tissue disease  deep to the left iliacus muscle, raising suspicion for lymphoma.     9/27/2018 - 10/17/2018 Radiation    Radiation OncologyTreatment Course:  George Lomeli received 3000 cGy in 15 fractions to left groin & iliac via External Beam Radiation - EBRT.     11/27/2018 Imaging    CT chest abdomen and pelvis  IMPRESSION: Bronchiectasis in the lungs but no significant mediastinal adenopathy or axillary adenopathy or cervical adenopathy.  There is a mildly enlarged left inguinal lymph node. The  node has smooth ovoid margins and has central low density consistent  with a necrotic node. Lastly more superiorly and anteriorly in a region  where there are surgical clips there is a  lentiform mass which is much  much smaller than on the previous examination of 07/24/2018.     4/14/2020 Imaging    CT chest, abdomen and pelvis IMPRESSION:  Stable examination with no evidence of active or recurrent  lymphoma. No bulky adenopathy identified. Chronic age-related changes  identified throughout the chest, abdomen and pelvis which are all     9/29/2020 Imaging    CT chest, abdomen and pelvis IMPRESSION:  Stable examination with no evidence of active or recurrent  lymphoma. The findings are similar to that when compared to the prior  examination. No significant changes in the interval.     10/5/2021 Imaging    CT chest abdomen pelvis without contrast showed soft tissue prominence anterior to sacral/lumbar region and infra aortic bifurcation lymphadenopathy maximum 4.3 cm.     12/28/2021 Imaging    CT chest, abdomen and pelvis IMPRESSION:  Chronic interstitial changes seen throughout the lung  fields. Old healed granulomatous disease seen within the chest. There is  stable adenopathy identified beneath the aortic bifurcation and anterior  to the sacrum stable and unchanged. There are no signs of progression.         HISTORY OF PRESENT ILLNESS:  The patient is a 82 y.o. male, here for follow up on management of follow-up lymphoma.  Periodic headache.  Otherwise no new complaints.    Past Medical History:   Diagnosis Date   • AAA (abdominal aortic aneurysm) (HCC)    • Arthritis    • Broken neck (HCC)    • Gout    • Hypertension    • Lymphoma (HCC)    • Lymphoma (HCC)     had radiation   • Migraine    • Sciatica    • Thyroid cancer (HCC)      Past Surgical History:   Procedure Laterality Date   • BACK SURGERY     • EYE SURGERY     • MULTIPLE TOOTH EXTRACTIONS     • NECK SURGERY      PINS/RODS IN NECK   • THYROIDECTOMY / EXCISION CYST THYROID         Allergies   Allergen Reactions   • Contrast Dye Nausea And Vomiting       Family History and Social History reviewed and changed as necessary    REVIEW OF  "SYSTEM:   Other than chronic migraines, no new complaints.    PHYSICAL EXAM:  Strap muscles are fairly firm but there is no bulky cervical axillary or inguinal adenopathy and no palpable splenomegaly.    Vitals:    07/11/22 1252   BP: 135/87   Pulse: 74   Resp: 18   Temp: 98.7 °F (37.1 °C)   SpO2: 99%   Weight: 78 kg (172 lb)   Height: 177.8 cm (70\")     Vitals:    07/11/22 1252   PainSc: 0-No pain          ECOG score: 1           Vitals reviewed.      Lab Results   Component Value Date    HGB 12.0 (L) 01/03/2022    HCT 37.5 01/03/2022    MCV 90.2 01/03/2022     01/03/2022    WBC 7.90 01/03/2022    NEUTROABS 6.70 01/03/2022    LYMPHSABS 1.00 01/03/2022    MONOSABS 0.20 01/03/2022    EOSABS 0.18 04/21/2021    BASOSABS 0.02 04/21/2021       Lab Results   Component Value Date    GLUCOSE 111 (H) 01/03/2022    BUN 10 01/03/2022    CREATININE 0.82 01/03/2022     01/03/2022    K 3.6 01/03/2022     01/03/2022    CO2 26.0 01/03/2022    CALCIUM 9.3 01/03/2022    PROTEINTOT 6.8 01/03/2022    ALBUMIN 4.10 01/03/2022    BILITOT 0.5 01/03/2022    ALKPHOS 111 01/03/2022    AST 14 01/03/2022    ALT 5 01/03/2022             ASSESSMENT & PLAN:  1.  Follicular lymphoma:  -  initial consultation 3/12/18: Had been having left inguinal pains and went for scanning that found left groin node.  Needle biopsy of this was called lymphoma CD10 positive and Fish testing positive for translocation (14;18) consistent with follicular lymphoma though could also be seen in diffuse large B cell.  BCL 6 /3q rearranged.  No Myc..  Hence cannot say at this junction the exact subtype of lymphoma.  He has not been having bed drenching night sweats.  No unexplained weight loss.  Has not had any known anemia, thrombocytopenia, leukopenia.  Has not had frequent infections.  CT did not show any splenomegaly or other significant adenopathy.  The left inguinal pain has subsided since the needle biopsy.  He has an abdominal aortic aneurysm " that is being watched and had previously been repaired.  Apparently 15+ years ago he was treated at [facility] for lymphoma for which he received right neck radiation and some chemotherapy.  He is not sure of the subtype of lymphoma nor of the type of chemotherapy nor the number of radiation treatments and this was far enough back that our records have been expunged.      -10/17/2019 [facility] hematology oncology APRN follow-up visit: I reviewed his CT scans and discussed results with him which showed no evidence of active or recurrent lymphoma.  Labs pending.  Will review results and notify patient of any significant findings.  We would not treat unless he had 3 more than 3 cm, one more than 7 cm nodes, or the other GELF criteria such as unexplained bed drenching night sweats/fevers/weight loss/anemia/thrombocytopenia/frequent infections. We will follow up with patient in 6 months with repeat labs.      -9/18/2020 [facility] hematology oncology follow-up visit:No cervical axillary or inguinal adenopathy.  Blood counts have been normal as of April along with CT showing no bulky adenopathy at that point.  We will get his labs today and CTs now in light of his abdominal pain.  I will have him do telephone visit with my nurse practitioner in a couple of weeks.  The pain is not excruciating and is not every day but just been present for the past few weeks.  If the scans are negative then I would ask him to follow-up with gastroenterology on that and we can make referral as he desires.  Would not treat unless he reached our previously mentioned G ELF criteria.     -10/2/2020 [facility] hematology oncology APRN follow-up visit: Patient currently feeling well, previous concerns over abdominal pain resolved spontaneously with no intervention.  CT chest, abdomen and pelvis as shown above are stable with no evidence of disease recurrence or progression.  He has no adenopathy.  CBC on 9/18/2020 was unremarkable.  We will  continue to monitor per NCCN guidelines.  He is now 2 years out from treatment, we will go to annual CT scan.  I will see him back in 6 months for follow-up with CBC and CMP on return.  I discussed with the patient to notify us if he has any concerns.  Would not treat unless he had 3 more than 3 cm or 1 more than 7 cm node, or other golf criteria such as unexplained bed drenching night sweats, fevers, weight loss, anemia, thrombocytopenia, frequent infections.    -9/27/2021 sedimentation rate 13 C-reactive protein 0.33.    -10/5/2021 CT chest abdomen pelvis without contrast showed soft tissue prominence anterior to sacral/lumbar region and infra aortic bifurcation lymphadenopathy maximum 4.3 cm.    -10/7/2021 [facility] follow-up visit: No new somatic complaints.  Feels great.  I will check a CBC now along with repeat blood work and CT in 3 months but unless he develops hemoglobin less than 10, platelets less than 100,000, unexplained bed drenching night sweats/weight loss/fevers/recurrent infections, 1 more than 7 cm or 3 or more 3 cm or larger nodes, I would not treat him.  At the point he reaches those criteria I would like to get tissue and a PET to make sure that we have accurate staging before treatment and to be sure that he has not transformed.  He will see my nurse practitioner back in 3 months to go over repeat test.    -1/3/2022 [facility] follow-up:  Mr. Lomeli overall is doing well, no evidence of disease recurrence on current CT scans from 12/28/2021 which I reviewed with him today.  CT scan showed chronic interstitial changes throughout the lung fields with old granulomatous disease, stable adenopathy beneath the aortic bifurcation and anterior to the sacrum stable and unchanged without evidence of disease progression.  CBC is unremarkable, mild anemia with hemoglobin of 12, hematocrit normal at 37.5%, WBC normal at 7900, platelets 2000.  CMP pending. We will continue with  CT scan again in 6 months for surveillance.    -7/11/2022 [facility] oncology hematology follow-up: Other than chronic migraine headaches, no other complaints.  Did not get recent CAT scans as ordered but I am okay with just checking noncontrast CTs in January which will be a little over a year from his last scans and getting his blood counts today and only treating if he meets G ELF criteria.    Total time of care today inclusive of time spent today prior to patient's arrival reviewing interval notes from my nurse practitioner and during visit translating that information to him and plan as outlined above and after visit putting in orders for this plan took 15 minutes of patient care time throughout the day today.  Thor Ruggiero MD    07/11/2022

## 2022-07-21 ENCOUNTER — LAB REQUISITION (OUTPATIENT)
Dept: LAB | Facility: HOSPITAL | Age: 82
End: 2022-07-21

## 2022-07-21 DIAGNOSIS — H18.511 ENDOTHELIAL CORNEAL DYSTROPHY, RIGHT EYE: ICD-10-CM

## 2022-07-21 PROCEDURE — 87102 FUNGUS ISOLATION CULTURE: CPT | Performed by: OPHTHALMOLOGY

## 2022-08-12 ENCOUNTER — HOSPITAL ENCOUNTER (OUTPATIENT)
Dept: CT IMAGING | Facility: HOSPITAL | Age: 82
Discharge: HOME | End: 2022-08-12
Admitting: INTERNAL MEDICINE

## 2022-08-12 DIAGNOSIS — C82.00 FOLLICULAR LYMPHOMA GRADE I, UNSPECIFIED BODY REGION: Chronic | ICD-10-CM

## 2022-08-12 PROCEDURE — 74176 CT ABD & PELVIS W/O CONTRAST: CPT

## 2022-08-12 PROCEDURE — 71250 CT THORAX DX C-: CPT

## 2022-09-01 LAB — FUNGUS WND CULT: NORMAL

## 2022-09-19 ENCOUNTER — HOSPITAL ENCOUNTER (EMERGENCY)
Facility: HOSPITAL | Age: 82
Discharge: HOME | End: 2022-09-19
Attending: EMERGENCY MEDICINE | Admitting: EMERGENCY MEDICINE

## 2022-09-19 ENCOUNTER — APPOINTMENT (OUTPATIENT)
Dept: GENERAL RADIOLOGY | Facility: HOSPITAL | Age: 82
End: 2022-09-19

## 2022-09-19 VITALS
RESPIRATION RATE: 18 BRPM | DIASTOLIC BLOOD PRESSURE: 92 MMHG | SYSTOLIC BLOOD PRESSURE: 136 MMHG | OXYGEN SATURATION: 98 % | BODY MASS INDEX: 25.05 KG/M2 | TEMPERATURE: 98.1 F | HEIGHT: 70 IN | WEIGHT: 175 LBS | HEART RATE: 64 BPM

## 2022-09-19 DIAGNOSIS — R07.9 RIGHT-SIDED CHEST PAIN: Primary | ICD-10-CM

## 2022-09-19 LAB
ALBUMIN SERPL-MCNC: 4 G/DL (ref 3.5–5.2)
ALBUMIN/GLOB SERPL: 1.6 G/DL
ALP SERPL-CCNC: 115 U/L (ref 39–117)
ALT SERPL W P-5'-P-CCNC: 8 U/L (ref 1–41)
ANION GAP SERPL CALCULATED.3IONS-SCNC: 8 MMOL/L (ref 5–15)
AST SERPL-CCNC: 16 U/L (ref 1–40)
BASOPHILS # BLD AUTO: 0.02 10*3/MM3 (ref 0–0.2)
BASOPHILS NFR BLD AUTO: 0.3 % (ref 0–1.5)
BILIRUB SERPL-MCNC: 0.5 MG/DL (ref 0–1.2)
BUN SERPL-MCNC: 9 MG/DL (ref 8–23)
BUN/CREAT SERPL: 10.3 (ref 7–25)
CALCIUM SPEC-SCNC: 9.2 MG/DL (ref 8.6–10.5)
CHLORIDE SERPL-SCNC: 109 MMOL/L (ref 98–107)
CO2 SERPL-SCNC: 26 MMOL/L (ref 22–29)
CREAT SERPL-MCNC: 0.87 MG/DL (ref 0.76–1.27)
DEPRECATED RDW RBC AUTO: 51.2 FL (ref 37–54)
EGFRCR SERPLBLD CKD-EPI 2021: 86.1 ML/MIN/1.73
EOSINOPHIL # BLD AUTO: 0.13 10*3/MM3 (ref 0–0.4)
EOSINOPHIL NFR BLD AUTO: 1.9 % (ref 0.3–6.2)
ERYTHROCYTE [DISTWIDTH] IN BLOOD BY AUTOMATED COUNT: 15.6 % (ref 12.3–15.4)
GLOBULIN UR ELPH-MCNC: 2.5 GM/DL
GLUCOSE SERPL-MCNC: 110 MG/DL (ref 65–99)
HCT VFR BLD AUTO: 34.8 % (ref 37.5–51)
HGB BLD-MCNC: 11.2 G/DL (ref 13–17.7)
HOLD SPECIMEN: NORMAL
IMM GRANULOCYTES # BLD AUTO: 0.02 10*3/MM3 (ref 0–0.05)
IMM GRANULOCYTES NFR BLD AUTO: 0.3 % (ref 0–0.5)
LIPASE SERPL-CCNC: 19 U/L (ref 13–60)
LYMPHOCYTES # BLD AUTO: 0.63 10*3/MM3 (ref 0.7–3.1)
LYMPHOCYTES NFR BLD AUTO: 9.1 % (ref 19.6–45.3)
MCH RBC QN AUTO: 28.9 PG (ref 26.6–33)
MCHC RBC AUTO-ENTMCNC: 32.2 G/DL (ref 31.5–35.7)
MCV RBC AUTO: 89.9 FL (ref 79–97)
MONOCYTES # BLD AUTO: 0.61 10*3/MM3 (ref 0.1–0.9)
MONOCYTES NFR BLD AUTO: 8.8 % (ref 5–12)
NEUTROPHILS NFR BLD AUTO: 5.53 10*3/MM3 (ref 1.7–7)
NEUTROPHILS NFR BLD AUTO: 79.6 % (ref 42.7–76)
NRBC BLD AUTO-RTO: 0 /100 WBC (ref 0–0.2)
NT-PROBNP SERPL-MCNC: 278.7 PG/ML (ref 0–1800)
PLATELET # BLD AUTO: 212 10*3/MM3 (ref 140–450)
PMV BLD AUTO: 11.3 FL (ref 6–12)
POTASSIUM SERPL-SCNC: 3.7 MMOL/L (ref 3.5–5.2)
PROT SERPL-MCNC: 6.5 G/DL (ref 6–8.5)
QT INTERVAL: 406 MS
QT INTERVAL: 430 MS
QT INTERVAL: 434 MS
QTC INTERVAL: 453 MS
QTC INTERVAL: 468 MS
QTC INTERVAL: 470 MS
RBC # BLD AUTO: 3.87 10*6/MM3 (ref 4.14–5.8)
SODIUM SERPL-SCNC: 143 MMOL/L (ref 136–145)
TROPONIN T SERPL-MCNC: <0.01 NG/ML (ref 0–0.03)
TROPONIN T SERPL-MCNC: <0.01 NG/ML (ref 0–0.03)
WBC NRBC COR # BLD: 6.94 10*3/MM3 (ref 3.4–10.8)
WHOLE BLOOD HOLD COAG: NORMAL
WHOLE BLOOD HOLD SPECIMEN: NORMAL

## 2022-09-19 PROCEDURE — 99284 EMERGENCY DEPT VISIT MOD MDM: CPT

## 2022-09-19 PROCEDURE — 84484 ASSAY OF TROPONIN QUANT: CPT | Performed by: EMERGENCY MEDICINE

## 2022-09-19 PROCEDURE — 80053 COMPREHEN METABOLIC PANEL: CPT | Performed by: EMERGENCY MEDICINE

## 2022-09-19 PROCEDURE — 83880 ASSAY OF NATRIURETIC PEPTIDE: CPT | Performed by: EMERGENCY MEDICINE

## 2022-09-19 PROCEDURE — 83690 ASSAY OF LIPASE: CPT | Performed by: EMERGENCY MEDICINE

## 2022-09-19 PROCEDURE — 36415 COLL VENOUS BLD VENIPUNCTURE: CPT

## 2022-09-19 PROCEDURE — 85025 COMPLETE CBC W/AUTO DIFF WBC: CPT | Performed by: EMERGENCY MEDICINE

## 2022-09-19 PROCEDURE — 93005 ELECTROCARDIOGRAM TRACING: CPT | Performed by: EMERGENCY MEDICINE

## 2022-09-19 PROCEDURE — 93005 ELECTROCARDIOGRAM TRACING: CPT

## 2022-09-19 PROCEDURE — 71045 X-RAY EXAM CHEST 1 VIEW: CPT

## 2022-09-19 RX ORDER — OXYCODONE HYDROCHLORIDE AND ACETAMINOPHEN 5; 325 MG/1; MG/1
1 TABLET ORAL ONCE
Status: COMPLETED | OUTPATIENT
Start: 2022-09-19 | End: 2022-09-19

## 2022-09-19 RX ORDER — ASPIRIN 81 MG/1
324 TABLET, CHEWABLE ORAL ONCE
Status: DISCONTINUED | OUTPATIENT
Start: 2022-09-19 | End: 2022-09-19 | Stop reason: HOSPADM

## 2022-09-19 RX ORDER — SODIUM CHLORIDE 0.9 % (FLUSH) 0.9 %
10 SYRINGE (ML) INJECTION AS NEEDED
Status: DISCONTINUED | OUTPATIENT
Start: 2022-09-19 | End: 2022-09-19 | Stop reason: HOSPADM

## 2022-09-19 RX ADMIN — OXYCODONE HYDROCHLORIDE AND ACETAMINOPHEN 1 TABLET: 5; 325 TABLET ORAL at 15:12

## 2022-09-19 NOTE — ED PROVIDER NOTES
[place]    EMERGENCY DEPARTMENT ENCOUNTER      Pt Name: George Lomeli  MRN: [id number]  YOB: 1940  Date of evaluation: 9/19/2022  Provider: Claudio Forte DO    CHIEF COMPLAINT       Chief Complaint   Patient presents with   • Chest Pain         HISTORY OF PRESENT ILLNESS  (Location/Symptom, Timing/Onset, Context/Setting, Quality, Duration, Modifying Factors, Severity.)   George Lomeli is a 82 y.o. male who presents to the emergency department for evaluation of right sided anterior chest pain when he awoke about 2 hours prior to arrival.  He states he is having just sharp irritating pain to right anterior chest which is nonradiating since he awoke.  Denies any nausea vomiting shortness of breath, no fever chills, no fall, injury or trauma prior to this onset.  When he went to sleep he felt well, did not have any unknown history of heart disease, remote stress test many years ago.  Does not have any recent illness, no nausea vomiting diarrhea, denies abdominal pain, no urinary or bowel complaints.  He denies any other acute systemic complaints this time.      Nursing notes were reviewed.    REVIEW OF SYSTEMS    (2-9 systems for level 4, 10 or more for level 5)   ROS:  General:  No fevers, no chills, no weakness  Cardiovascular:  + Right anterior chest pain, no palpitations  Respiratory:  No shortness of breath, no cough, no wheezing  Gastrointestinal:  No pain, no nausea, no vomiting, no diarrhea  Musculoskeletal:  No muscle pain, no joint pain  Skin:  No rash  Neurologic:  No headache  Psychiatric:  No anxiety  Genitourinary:  No dysuria, no hematuria    Except as noted above the remainder of the review of systems was reviewed and negative.       PAST MEDICAL HISTORY     Past Medical History:   Diagnosis Date   • AAA (abdominal aortic aneurysm) (HCC)    • Arthritis    • Broken neck (HCC)    • Gout    • Hypertension    • Lymphoma (HCC)    • Lymphoma (HCC)     had radiation   • Migraine    •  Sciatica    • Thyroid cancer (HCC)          SURGICAL HISTORY       Past Surgical History:   Procedure Laterality Date   • BACK SURGERY     • EYE SURGERY     • MULTIPLE TOOTH EXTRACTIONS     • NECK SURGERY      PINS/RODS IN NECK   • THYROIDECTOMY / EXCISION CYST THYROID           CURRENT MEDICATIONS       Current Facility-Administered Medications:   •  aspirin chewable tablet 324 mg, 324 mg, Oral, Once, Claudio Forte,   •  oxyCODONE-acetaminophen (PERCOCET) 5-325 MG per tablet 1 tablet, 1 tablet, Oral, Once, Claudio Forte DO  •  sodium chloride 0.9 % flush 10 mL, 10 mL, Intravenous, PRN, Claudio Forte, DO    Current Outpatient Medications:   •  allopurinol (ZYLOPRIM) 100 MG tablet, Take 100 mg by mouth Daily., Disp: , Rfl:   •  amLODIPine (NORVASC) 5 MG tablet, Take 5 mg by mouth Daily., Disp: , Rfl:   •  carvedilol (COREG) 25 MG tablet, Take 25 mg by mouth 2 (Two) Times a Day With Meals., Disp: , Rfl:   •  docusate sodium (COLACE) 50 MG capsule, Take  by mouth 2 (Two) Times a Day., Disp: , Rfl:   •  gabapentin (Neurontin) 100 MG capsule, Take 2 PO TID, Disp: 180 capsule, Rfl: 5  •  levothyroxine (SYNTHROID, LEVOTHROID) 25 MCG tablet, Take 25 mcg by mouth Daily., Disp: , Rfl:   •  lisinopril (PRINIVIL,ZESTRIL) 40 MG tablet, Take 40 mg by mouth Daily., Disp: , Rfl:   •  oxyCODONE-acetaminophen (PERCOCET)  MG per tablet, Take 1 tablet by mouth Every 6 (Six) Hours As Needed for Moderate Pain ., Disp: , Rfl:   •  potassium chloride (K-DUR) 10 MEQ CR tablet, Take 10 mEq by mouth Take As Directed., Disp: , Rfl:   •  tamsulosin (FLOMAX) 0.4 MG capsule 24 hr capsule, Take 1 capsule by mouth Every Night., Disp: , Rfl:     ALLERGIES     Contrast dye    FAMILY HISTORY       Family History   Problem Relation Age of Onset   • Hypertension Mother    • Hypertension Sister           SOCIAL HISTORY       Social History     Socioeconomic History   • Marital status:    Tobacco Use   • Smoking  status: Former Smoker     Packs/day: 1.00     Years: 15.00     Pack years: 15.00     Types: Cigarettes     Quit date:      Years since quittin.7   • Smokeless tobacco: Never Used   • Tobacco comment: QUIT 30 YEARS AGO    Vaping Use   • Vaping Use: Never used   Substance and Sexual Activity   • Alcohol use: No   • Drug use: No   • Sexual activity: Defer         PHYSICAL EXAM    (up to 7 for level 4, 8 or more for level 5)     Vitals:    22 1130 22 1200 22 1230 22 1231   BP: 133/94 135/83 136/92    BP Location:   Left arm    Patient Position:   Lying    Pulse: 67 62 64 64   Resp:       Temp:       TempSrc:       SpO2: 98% 98% 98% 98%   Weight:       Height:           Physical Exam  General : Patient is awake, alert, oriented, in no acute distress, nontoxic appearing, GCS 15  HEENT: Pupils are equally round, EOMI, conjunctivae clear, there is no injection no icterus.  Oral mucosa is moist, uvula midline  Neck: Neck is supple, full range of motion, trachea midline  Cardiac: Heart regular rate, rhythm, no murmurs, rubs, or gallops  Lungs: Lungs are clear to auscultation, there is no wheezing, rhonchi, or rales. There is no use of accessory muscles  Chest wall: There is no tenderness to palpation over the chest wall or over ribs  Abdomen: Abdomen is soft, nontender, nondistended. There are no firm or pulsatile masses, no rebound rigidity or guarding  Musculoskeletal: 5 out of 5 strength in all 4 extremities.  No focal muscle deficits are appreciated  Neuro: Motor intact, sensory intact, level of consciousness is normal  Dermatology: Skin is warm and dry  Psych: Mentation is grossly normal, cognition is grossly normal. Affect is appropriate      DIAGNOSTIC RESULTS     EKG: All EKGs are interpreted by the Emergency Department Physician who either signs or Co-signs this chart in the absence of a cardiologist.    ECG 12 Lead   Final Result   Test Reason : 2nd set   Blood Pressure :   */*    mmHG   Vent. Rate :  72 BPM     Atrial Rate :  72 BPM      P-R Int : 196 ms          QRS Dur : 102 ms       QT Int : 430 ms       P-R-T Axes :  81 -27 -13 degrees      QTc Int : 470 ms      Sinus rhythm with premature supraventricular complexes   Incomplete right bundle branch block   Minimal voltage criteria for LVH, may be normal variant   Borderline ECG   When compared with ECG of 19-SEP-2022 11:16,   No significant change was found   Confirmed by SHAMEKA OGDEN MD ([id number]) on 9/19/2022 2:00:02 PM      Referred By: KURT           Confirmed By: SHAMEKA OGDEN MD      ECG 12 Lead   Final Result   Test Reason : chest pain   Blood Pressure :   */*   mmHG   Vent. Rate :  70 BPM     Atrial Rate :  70 BPM      P-R Int : 196 ms          QRS Dur : 106 ms       QT Int : 434 ms       P-R-T Axes :  77 -20 -10 degrees      QTc Int : 468 ms      Sinus rhythm with premature atrial complexes   Incomplete right bundle branch block   Moderate voltage criteria for LVH, may be normal variant   Borderline ECG   When compared with ECG of 19-SEP-2022 10:55, (Unconfirmed)   No significant change was found   Confirmed by SHAMEKA OGDEN MD ([id number]) on 9/19/2022 11:45:07 AM      Referred By: KURT           Confirmed By: SHAMEKA OGDEN MD      ECG 12 Lead   Final Result   Test Reason : chest pain   Blood Pressure :   */*   mmHG   Vent. Rate :  75 BPM     Atrial Rate :  75 BPM      P-R Int : 188 ms          QRS Dur :  98 ms       QT Int : 406 ms       P-R-T Axes :  81 -23 -11 degrees      QTc Int : 453 ms      Sinus rhythm with premature atrial complexes   Incomplete right bundle branch block   Minimal voltage criteria for LVH, may be normal variant ( R in aVL )   Borderline ECG   When compared with ECG of 18-MAR-2019 17:58,   premature atrial complexes are now present   Confirmed by SHAMEKA OGDEN MD ([id number]) on 9/19/2022 11:45:18 AM      Referred By:            Confirmed By: SHAMEKA OGDEN MD          RADIOLOGY:   Non-plain film images such  as CT, Ultrasound and MRI are read by the radiologist. Plain radiographic images are visualized and preliminarily interpreted by the emergency physician with the below findings:      [] Radiologist's Report Reviewed:  XR Chest 1 View   Final Result   Stable cardiomegaly without acute cardiopulmonary findings.       This report was finalized on 9/19/2022 11:32 AM by Christopher Jose MD.                ED BEDSIDE ULTRASOUND:   Performed by ED Physician - none    LABS:    I have reviewed and interpreted all of the currently available lab results from this visit (if applicable):  Results for orders placed or performed during the hospital encounter of 09/19/22   Troponin    Specimen: Blood   Result Value Ref Range    Troponin T <0.010 0.000 - 0.030 ng/mL   Troponin    Specimen: Blood   Result Value Ref Range    Troponin T <0.010 0.000 - 0.030 ng/mL   Comprehensive Metabolic Panel    Specimen: Blood   Result Value Ref Range    Glucose 110 (H) 65 - 99 mg/dL    BUN 9 8 - 23 mg/dL    Creatinine 0.87 0.76 - 1.27 mg/dL    Sodium 143 136 - 145 mmol/L    Potassium 3.7 3.5 - 5.2 mmol/L    Chloride 109 (H) 98 - 107 mmol/L    CO2 26.0 22.0 - 29.0 mmol/L    Calcium 9.2 8.6 - 10.5 mg/dL    Total Protein 6.5 6.0 - 8.5 g/dL    Albumin 4.00 3.50 - 5.20 g/dL    ALT (SGPT) 8 1 - 41 U/L    AST (SGOT) 16 1 - 40 U/L    Alkaline Phosphatase 115 39 - 117 U/L    Total Bilirubin 0.5 0.0 - 1.2 mg/dL    Globulin 2.5 gm/dL    A/G Ratio 1.6 g/dL    BUN/Creatinine Ratio 10.3 7.0 - 25.0    Anion Gap 8.0 5.0 - 15.0 mmol/L    eGFR 86.1 >60.0 mL/min/1.73   Lipase    Specimen: Blood   Result Value Ref Range    Lipase 19 13 - 60 U/L   BNP    Specimen: Blood   Result Value Ref Range    proBNP 278.7 0.0 - 1,800.0 pg/mL   CBC Auto Differential    Specimen: Blood   Result Value Ref Range    WBC 6.94 3.40 - 10.80 10*3/mm3    RBC 3.87 (L) 4.14 - 5.80 10*6/mm3    Hemoglobin 11.2 (L) 13.0 - 17.7 g/dL    Hematocrit 34.8 (L) 37.5 - 51.0 %    MCV 89.9 79.0 - 97.0 fL     MCH 28.9 26.6 - 33.0 pg    MCHC 32.2 31.5 - 35.7 g/dL    RDW 15.6 (H) 12.3 - 15.4 %    RDW-SD 51.2 37.0 - 54.0 fl    MPV 11.3 6.0 - 12.0 fL    Platelets 212 140 - 450 10*3/mm3    Neutrophil % 79.6 (H) 42.7 - 76.0 %    Lymphocyte % 9.1 (L) 19.6 - 45.3 %    Monocyte % 8.8 5.0 - 12.0 %    Eosinophil % 1.9 0.3 - 6.2 %    Basophil % 0.3 0.0 - 1.5 %    Immature Grans % 0.3 0.0 - 0.5 %    Neutrophils, Absolute 5.53 1.70 - 7.00 10*3/mm3    Lymphocytes, Absolute 0.63 (L) 0.70 - 3.10 10*3/mm3    Monocytes, Absolute 0.61 0.10 - 0.90 10*3/mm3    Eosinophils, Absolute 0.13 0.00 - 0.40 10*3/mm3    Basophils, Absolute 0.02 0.00 - 0.20 10*3/mm3    Immature Grans, Absolute 0.02 0.00 - 0.05 10*3/mm3    nRBC 0.0 0.0 - 0.2 /100 WBC   ECG 12 Lead   Result Value Ref Range    QT Interval 406 ms    QTC Interval 453 ms   ECG 12 Lead   Result Value Ref Range    QT Interval 434 ms    QTC Interval 468 ms   ECG 12 Lead   Result Value Ref Range    QT Interval 430 ms    QTC Interval 470 ms   Green Top (Gel)   Result Value Ref Range    Extra Tube Hold for add-ons.    Lavender Top   Result Value Ref Range    Extra Tube hold for add-on    Gold Top - SST   Result Value Ref Range    Extra Tube Hold for add-ons.    Light Blue Top   Result Value Ref Range    Extra Tube Hold for add-ons.         All other labs were within normal range or not returned as of this dictation.      EMERGENCY DEPARTMENT COURSE and DIFFERENTIAL DIAGNOSIS/MDM:   Vitals:    Vitals:    09/19/22 1130 09/19/22 1200 09/19/22 1230 09/19/22 1231   BP: 133/94 135/83 136/92    BP Location:   Left arm    Patient Position:   Lying    Pulse: 67 62 64 64   Resp:       Temp:       TempSrc:       SpO2: 98% 98% 98% 98%   Weight:       Height:           ED Course as of 09/19/22 1453   Mon Sep 19, 2022   1145 HEART Pathway for Early Discharge in Acute Chest Pain from [place]  on 9/19/2022  ** All calculations should be rechecked by clinician prior to use **    RESULT SUMMARY:  3  points  HEART Pathway Score    Low risk  0.9-1.7% 30-day MACE    Repeat troponin at 3 hours and if negative, discharge home with outpatient follow-up.      INPUTS:  History -> 0 = Slightly suspicious  EKG -> 0 = Normal  Age -> 2 = =65  Risk factors -> 1 = 1-2 risk factors  Initial troponin -> 0 = =normal limit   [AP]      ED Course User Index  [AP] Claudio Forte DO       This is a 82-year-old male with chest pain for about 2 hours prior to arrival right-sided chest, does not have any ischemic changes on his EKG, no radiation of his symptoms, does not appear acutely ill or toxic during my examination.  We do obtain IV, labs, imaging, EKG for further evaluation, results reviewed as above.  Repeat labs, imaging is unremarkable, troponins are both normal.  On reevaluation patient resting comfortably.  Discussed does not appear to have any acute cardiac process at this time, he does have pain control at home which she will continue to take, we will have a referral placed to our heart valve chest pain clinic for further evaluation.  Return precautions discussed with the patient.  He feels comfortable going home.    I had a discussion with the patient/family regarding diagnosis, diagnostic results, treatment plan, and medications.  The patient/family indicated understanding of these instructions.  I spent adequate time at the bedside preceding discharge necessary to personally discuss the aftercare instructions, giving patient education, providing explanations of the results of our evaluations/findings, and my decision making to assure that the patient/family understand the plan of care.  Time was allotted to answer questions at that time and throughout the ED course.  Emphasis was placed on timely follow-up after discharge.  I also discussed the potential for the development of an acute emergent condition requiring further evaluation, admission, or even surgical intervention. I discussed that we found nothing  during the visit today indicating the need for further workup, admission, or the presence of an unstable medical condition.  I encouraged the patient to return to the emergency department immediately for ANY concerns, worsening, new complaints, or if symptoms persist and unable to seek follow-up in a timely fashion.  The patient/family expressed understanding and agreement with this plan.  The patient will follow-up with their PCP in 1-2 days for reevaluation.       MEDICATIONS ADMINISTERED IN ED:  Medications   sodium chloride 0.9 % flush 10 mL (has no administration in time range)   aspirin chewable tablet 324 mg (324 mg Oral Not Given 9/19/22 1145)   oxyCODONE-acetaminophen (PERCOCET) 5-325 MG per tablet 1 tablet (has no administration in time range)       PROCEDURES:  Procedures    CRITICAL CARE TIME    Total Critical Care time was 0 minutes, excluding separately reportable procedures.   There was a high probability of clinically significant/life threatening deterioration in the patient's condition which required my urgent intervention.      FINAL IMPRESSION      1. Right-sided chest pain          DISPOSITION/PLAN     ED Disposition     ED Disposition   Discharge    Condition   Stable    Comment   --             PATIENT REFERRED TO:  Kaycee Douglas MD  [address] [place] [address] [address]  [place] [address]  [phone]          [facility]  [address] [facility] [address]  [facility] [id number] [phone]  Schedule an appointment as soon as possible for a visit       [facility] Emergency Department  [address] [facility] [id number] [phone]    If symptoms worsen      DISCHARGE MEDICATIONS:     Medication List      CONTINUE taking these medications    allopurinol 100 MG tablet  Commonly known as: ZYLOPRIM     amLODIPine 5 MG tablet  Commonly known as: NORVASC     carvedilol 25 MG tablet  Commonly known as: COREG     docusate sodium 50  MG capsule  Commonly known as: COLACE     gabapentin 100 MG capsule  Commonly known as: Neurontin  Take 2 PO TID     levothyroxine 25 MCG tablet  Commonly known as: SYNTHROID, LEVOTHROID     lisinopril 40 MG tablet  Commonly known as: PRINIVIL,ZESTRIL     oxyCODONE-acetaminophen  MG per tablet  Commonly known as: PERCOCET     potassium chloride 10 MEQ CR tablet     tamsulosin 0.4 MG capsule 24 hr capsule  Commonly known as: FLOMAX                Comment: Please note this report has been produced using speech recognition software.      Claudio Forte DO  Attending Emergency Physician               Claudio Forte DO  09/19/22 3828

## 2022-09-23 ENCOUNTER — OFFICE VISIT (OUTPATIENT)
Dept: CARDIOLOGY | Facility: HOSPITAL | Age: 82
End: 2022-09-23

## 2022-09-23 VITALS
BODY MASS INDEX: 24.51 KG/M2 | RESPIRATION RATE: 20 BRPM | SYSTOLIC BLOOD PRESSURE: 129 MMHG | HEART RATE: 73 BPM | WEIGHT: 171.2 LBS | HEIGHT: 70 IN | TEMPERATURE: 97 F | OXYGEN SATURATION: 99 % | DIASTOLIC BLOOD PRESSURE: 78 MMHG

## 2022-09-23 DIAGNOSIS — R07.9 RIGHT-SIDED CHEST PAIN: Primary | ICD-10-CM

## 2022-09-23 DIAGNOSIS — I10 PRIMARY HYPERTENSION: ICD-10-CM

## 2022-09-23 PROCEDURE — 99214 OFFICE O/P EST MOD 30 MIN: CPT | Performed by: NURSE PRACTITIONER

## 2022-09-23 NOTE — PROGRESS NOTES
"[facility]    Chief Complaint  Chest Pain    Subjective    History of Present Illness {CC  Problem List  Visit  Diagnosis   Encounters  Notes  Medications  Labs  Result Review Imaging  Media :23}     George Lomeli presents to [facility] CARDIOLOGY for   History of Present Illness     82-year-old male with history of hypertension hypothyroidism, bronchiectasis with cough WITH USE OF NEBS., gout, follicular lymphoma.    Patient presented to [facility] ED on 9/19/2022 with right-sided chest pain (rib pain).  Woke him from sleep.  Duration approximately 2 hours.  Denies associated nausea, vomiting, dyspnea, fall, injury.  Was able to go back to sleep.  Occurs later that day, constant then slowly went resolved.     Denies exertional CP, pressure, dyspnea, dizziness, near syncope, syncope, palpitations. Occasional edema. NO abdominal pain or pressure.  No Heart burn.      Last stress test in 2017, MPS: Normal EF, no ischemia     Cardiac risk factors:  History of hypertension,   Tobacco use (remote), sedentary lifestyle,  gender, age (older than 55 for men, 65 for women)      Objective     Vital Signs:   Vitals:    09/23/22 1015 09/23/22 1017 09/23/22 1018   BP: 129/77 133/81 129/78   BP Location: Right arm Left arm Left arm   Patient Position: Sitting Standing Sitting   Cuff Size: Adult Adult Adult   Pulse: 72 82 73   Resp:   20   Temp: 97 °F (36.1 °C) 97 °F (36.1 °C) 97 °F (36.1 °C)   TempSrc: Temporal Temporal Temporal   SpO2: 99% 98% 99%   Weight:   77.7 kg (171 lb 3.2 oz)   Height:   177.8 cm (70\")     Body mass index is 24.56 kg/m².  Physical Exam  Vitals reviewed.   Constitutional:       General: He is not in acute distress.     Appearance: Normal appearance.   Cardiovascular:      Rate and Rhythm: Normal rate and regular rhythm.      Pulses:           Radial pulses are 2+ on the right side.        Dorsalis pedis " pulses are 2+ on the right side.        Posterior tibial pulses are 2+ on the right side.      Heart sounds: Normal heart sounds.   Pulmonary:      Effort: Pulmonary effort is normal.      Breath sounds: Normal breath sounds.   Musculoskeletal:      Right lower leg: No edema.      Left lower leg: No edema.   Skin:     General: Skin is warm and dry.   Neurological:      Mental Status: He is alert.   Psychiatric:         Mood and Affect: Mood normal.         Behavior: Behavior is cooperative.              Result Review  Data Reviewed:{ Labs  Result Review  Imaging  Med Tab  Media :23}   EKG 9/19/2022: Sinus rhythm with PAC, incomplete right bundle branch block 75 bpm    Chest x-ray 9/19/2022: Stable cardiomegaly without acute cardiopulmonary process.    Admission on 09/19/2022, Discharged on 09/19/2022   Component Date Value Ref Range Status   • QT Interval 09/19/2022 406  ms Final   • QTC Interval 09/19/2022 453  ms Final   • QT Interval 09/19/2022 434  ms Final   • QTC Interval 09/19/2022 468  ms Final   • Troponin T 09/19/2022 <0.010  0.000 - 0.030 ng/mL Final   • Troponin T 09/19/2022 <0.010  0.000 - 0.030 ng/mL Final   • Glucose 09/19/2022 110 (A) 65 - 99 mg/dL Final   • BUN 09/19/2022 9  8 - 23 mg/dL Final   • Creatinine 09/19/2022 0.87  0.76 - 1.27 mg/dL Final   • Sodium 09/19/2022 143  136 - 145 mmol/L Final   • Potassium 09/19/2022 3.7  3.5 - 5.2 mmol/L Final    Slight hemolysis detected by analyzer. Results may be affected.   • Chloride 09/19/2022 109 (A) 98 - 107 mmol/L Final   • CO2 09/19/2022 26.0  22.0 - 29.0 mmol/L Final   • Calcium 09/19/2022 9.2  8.6 - 10.5 mg/dL Final   • Total Protein 09/19/2022 6.5  6.0 - 8.5 g/dL Final   • Albumin 09/19/2022 4.00  3.50 - 5.20 g/dL Final   • ALT (SGPT) 09/19/2022 8  1 - 41 U/L Final   • AST (SGOT) 09/19/2022 16  1 - 40 U/L Final   • Alkaline Phosphatase 09/19/2022 115  39 - 117 U/L Final   • Total Bilirubin 09/19/2022 0.5  0.0 - 1.2 mg/dL Final   • Globulin  09/19/2022 2.5  gm/dL Final    Calculated Result   • A/G Ratio 09/19/2022 1.6  g/dL Final   • BUN/Creatinine Ratio 09/19/2022 10.3  7.0 - 25.0 Final   • Anion Gap 09/19/2022 8.0  5.0 - 15.0 mmol/L Final   • eGFR 09/19/2022 86.1  >60.0 mL/min/1.73 Final    National Kidney Foundation and American Society of Nephrology (ASN) Task Force recommended calculation based on the Chronic Kidney Disease Epidemiology Collaboration (CKD-EPI) equation refit without adjustment for race.   • Lipase 09/19/2022 19  13 - 60 U/L Final   • proBNP 09/19/2022 278.7  0.0 - 1,800.0 pg/mL Final   • Extra Tube 09/19/2022 Hold for add-ons.   Final    Auto resulted.   • Extra Tube 09/19/2022 hold for add-on   Final    Auto resulted   • Extra Tube 09/19/2022 Hold for add-ons.   Final    Auto resulted.   • Extra Tube 09/19/2022 Hold for add-ons.   Final    Auto resulted.   • Extra Tube 09/19/2022 Hold for add-ons.   Final    Auto resulted   • WBC 09/19/2022 6.94  3.40 - 10.80 10*3/mm3 Final   • RBC 09/19/2022 3.87 (A) 4.14 - 5.80 10*6/mm3 Final   • Hemoglobin 09/19/2022 11.2 (A) 13.0 - 17.7 g/dL Final   • Hematocrit 09/19/2022 34.8 (A) 37.5 - 51.0 % Final   • MCV 09/19/2022 89.9  79.0 - 97.0 fL Final   • MCH 09/19/2022 28.9  26.6 - 33.0 pg Final   • MCHC 09/19/2022 32.2  31.5 - 35.7 g/dL Final   • RDW 09/19/2022 15.6 (A) 12.3 - 15.4 % Final   • RDW-SD 09/19/2022 51.2  37.0 - 54.0 fl Final   • MPV 09/19/2022 11.3  6.0 - 12.0 fL Final   • Platelets 09/19/2022 212  140 - 450 10*3/mm3 Final   • Neutrophil % 09/19/2022 79.6 (A) 42.7 - 76.0 % Final   • Lymphocyte % 09/19/2022 9.1 (A) 19.6 - 45.3 % Final   • Monocyte % 09/19/2022 8.8  5.0 - 12.0 % Final   • Eosinophil % 09/19/2022 1.9  0.3 - 6.2 % Final   • Basophil % 09/19/2022 0.3  0.0 - 1.5 % Final   • Immature Grans % 09/19/2022 0.3  0.0 - 0.5 % Final   • Neutrophils, Absolute 09/19/2022 5.53  1.70 - 7.00 10*3/mm3 Final   • Lymphocytes, Absolute 09/19/2022 0.63 (A) 0.70 - 3.10 10*3/mm3 Final   •  Monocytes, Absolute 09/19/2022 0.61  0.10 - 0.90 10*3/mm3 Final   • Eosinophils, Absolute 09/19/2022 0.13  0.00 - 0.40 10*3/mm3 Final   • Basophils, Absolute 09/19/2022 0.02  0.00 - 0.20 10*3/mm3 Final   • Immature Grans, Absolute 09/19/2022 0.02  0.00 - 0.05 10*3/mm3 Final   • nRBC 09/19/2022 0.0  0.0 - 0.2 /100 WBC Final   • QT Interval 09/19/2022 430  ms Final   • QTC Interval 09/19/2022 470  ms Final                   Assessment and Plan {CC Problem List  Visit Diagnosis  ROS  Review (Popup)  Health Maintenance  Quality  BestPractice  Medications  SmartSets  SnapShot Encounters  Media :23}   1. Right-sided chest pain  Discussed atypical s/s   Discussed differentials including musculoskeletal, pulmonary, GI.      Patient denies exertional chest pain, pressure, dyspnea.  Change in activity tolerance, although he is fairly due to knee pain and arthritis.    Discussed indication for stress test.  Discussed risk factor stratification.    Patient understands that we cannot completely rule out underlying heart disease.  patient defers stress testing at this time or further testing.      2. Primary hypertension  Blood pressure fairly well controlled today.  Continue antihypertensive medication, coreg, lisinopril.       Patient encouraged to call sooner with any questions or concerns.  If he develops new or worsening chest pain go to the ER for evaluation.      I spent 22 minutes caring for George on this date of service. This time includes time spent by me in the following activities:preparing for the visit, reviewing tests, performing a medically appropriate examination and/or evaluation , counseling and educating the patient/family/caregiver and documenting information in the medical record    Follow Up {Instructions Charge Capture  Follow-up Communications :23}   Return in about 4 weeks (around 10/21/2022) for Telephone visit, CP, HTN.    Patient was given instructions and counseling regarding his  condition or for health maintenance advice. Please see specific information pulled into the AVS if appropriate.  Patient was instructed to call the [facility] with any questions, concerns, or worsening symptoms.

## 2022-11-02 RX ORDER — ALBUTEROL SULFATE 2.5 MG/3ML
2.5 SOLUTION RESPIRATORY (INHALATION) 4 TIMES DAILY PRN
Qty: 90 ML | Refills: 0 | Status: SHIPPED | OUTPATIENT
Start: 2022-11-02 | End: 2022-12-15

## 2022-11-02 NOTE — TELEPHONE ENCOUNTER
Fax refill request for Rx Albuterol Neb Sol approved for 1 month supply. Pt will need to schedule an apt before any further refills.

## 2022-11-25 ENCOUNTER — APPOINTMENT (OUTPATIENT)
Dept: GENERAL RADIOLOGY | Facility: HOSPITAL | Age: 82
End: 2022-11-25

## 2022-11-25 ENCOUNTER — HOSPITAL ENCOUNTER (EMERGENCY)
Facility: HOSPITAL | Age: 82
Discharge: HOME | End: 2022-11-25
Attending: EMERGENCY MEDICINE | Admitting: EMERGENCY MEDICINE

## 2022-11-25 VITALS
DIASTOLIC BLOOD PRESSURE: 93 MMHG | SYSTOLIC BLOOD PRESSURE: 142 MMHG | BODY MASS INDEX: 24.34 KG/M2 | WEIGHT: 170 LBS | RESPIRATION RATE: 16 BRPM | OXYGEN SATURATION: 98 % | TEMPERATURE: 97.6 F | HEART RATE: 68 BPM | HEIGHT: 70 IN

## 2022-11-25 DIAGNOSIS — N39.0 URINARY TRACT INFECTION WITHOUT HEMATURIA, SITE UNSPECIFIED: Primary | ICD-10-CM

## 2022-11-25 DIAGNOSIS — R11.0 NAUSEA: ICD-10-CM

## 2022-11-25 LAB
ALBUMIN SERPL-MCNC: 4.1 G/DL (ref 3.5–5.2)
ALBUMIN/GLOB SERPL: 1.2 G/DL
ALP SERPL-CCNC: 157 U/L (ref 39–117)
ALT SERPL W P-5'-P-CCNC: 10 U/L (ref 1–41)
ANION GAP SERPL CALCULATED.3IONS-SCNC: 11 MMOL/L (ref 5–15)
AST SERPL-CCNC: 27 U/L (ref 1–40)
BACTERIA UR QL AUTO: ABNORMAL /HPF
BASOPHILS # BLD AUTO: 0.02 10*3/MM3 (ref 0–0.2)
BASOPHILS NFR BLD AUTO: 0.3 % (ref 0–1.5)
BILIRUB SERPL-MCNC: 0.5 MG/DL (ref 0–1.2)
BILIRUB UR QL STRIP: NEGATIVE
BUN SERPL-MCNC: 11 MG/DL (ref 8–23)
BUN/CREAT SERPL: 12.8 (ref 7–25)
CALCIUM SPEC-SCNC: 9.5 MG/DL (ref 8.6–10.5)
CHLORIDE SERPL-SCNC: 105 MMOL/L (ref 98–107)
CLARITY UR: ABNORMAL
CO2 SERPL-SCNC: 26 MMOL/L (ref 22–29)
COLOR UR: YELLOW
CREAT SERPL-MCNC: 0.86 MG/DL (ref 0.76–1.27)
DEPRECATED RDW RBC AUTO: 47.6 FL (ref 37–54)
EGFRCR SERPLBLD CKD-EPI 2021: 86.5 ML/MIN/1.73
EOSINOPHIL # BLD AUTO: 0.08 10*3/MM3 (ref 0–0.4)
EOSINOPHIL NFR BLD AUTO: 1 % (ref 0.3–6.2)
ERYTHROCYTE [DISTWIDTH] IN BLOOD BY AUTOMATED COUNT: 14.8 % (ref 12.3–15.4)
FLUAV SUBTYP SPEC NAA+PROBE: NOT DETECTED
FLUBV RNA ISLT QL NAA+PROBE: NOT DETECTED
GLOBULIN UR ELPH-MCNC: 3.3 GM/DL
GLUCOSE SERPL-MCNC: 112 MG/DL (ref 65–99)
GLUCOSE UR STRIP-MCNC: NEGATIVE MG/DL
HCT VFR BLD AUTO: 37.6 % (ref 37.5–51)
HGB BLD-MCNC: 12.2 G/DL (ref 13–17.7)
HGB UR QL STRIP.AUTO: NEGATIVE
HOLD SPECIMEN: NORMAL
IMM GRANULOCYTES # BLD AUTO: 0.03 10*3/MM3 (ref 0–0.05)
IMM GRANULOCYTES NFR BLD AUTO: 0.4 % (ref 0–0.5)
KETONES UR QL STRIP: NEGATIVE
LEUKOCYTE ESTERASE UR QL STRIP.AUTO: ABNORMAL
LYMPHOCYTES # BLD AUTO: 0.87 10*3/MM3 (ref 0.7–3.1)
LYMPHOCYTES NFR BLD AUTO: 11.1 % (ref 19.6–45.3)
MCH RBC QN AUTO: 29 PG (ref 26.6–33)
MCHC RBC AUTO-ENTMCNC: 32.4 G/DL (ref 31.5–35.7)
MCV RBC AUTO: 89.5 FL (ref 79–97)
MONOCYTES # BLD AUTO: 0.54 10*3/MM3 (ref 0.1–0.9)
MONOCYTES NFR BLD AUTO: 6.9 % (ref 5–12)
NEUTROPHILS NFR BLD AUTO: 6.27 10*3/MM3 (ref 1.7–7)
NEUTROPHILS NFR BLD AUTO: 80.3 % (ref 42.7–76)
NITRITE UR QL STRIP: NEGATIVE
NRBC BLD AUTO-RTO: 0 /100 WBC (ref 0–0.2)
PH UR STRIP.AUTO: 6.5 [PH] (ref 5–8)
PLATELET # BLD AUTO: 243 10*3/MM3 (ref 140–450)
PMV BLD AUTO: 10.3 FL (ref 6–12)
POTASSIUM SERPL-SCNC: 4 MMOL/L (ref 3.5–5.2)
PROT SERPL-MCNC: 7.4 G/DL (ref 6–8.5)
PROT UR QL STRIP: NEGATIVE
RBC # BLD AUTO: 4.2 10*6/MM3 (ref 4.14–5.8)
RBC # UR STRIP: ABNORMAL /HPF
REF LAB TEST METHOD: ABNORMAL
SARS-COV-2 RNA PNL SPEC NAA+PROBE: NOT DETECTED
SODIUM SERPL-SCNC: 142 MMOL/L (ref 136–145)
SP GR UR STRIP: 1.01 (ref 1–1.03)
SQUAMOUS #/AREA URNS HPF: ABNORMAL /HPF
URATE CRY URNS QL MICRO: ABNORMAL /HPF
UROBILINOGEN UR QL STRIP: ABNORMAL
WBC # UR STRIP: ABNORMAL /HPF
WBC NRBC COR # BLD: 7.81 10*3/MM3 (ref 3.4–10.8)
WHOLE BLOOD HOLD COAG: NORMAL
WHOLE BLOOD HOLD SPECIMEN: NORMAL

## 2022-11-25 PROCEDURE — 85025 COMPLETE CBC W/AUTO DIFF WBC: CPT | Performed by: PHYSICIAN ASSISTANT

## 2022-11-25 PROCEDURE — 99283 EMERGENCY DEPT VISIT LOW MDM: CPT

## 2022-11-25 PROCEDURE — 80053 COMPREHEN METABOLIC PANEL: CPT | Performed by: PHYSICIAN ASSISTANT

## 2022-11-25 PROCEDURE — 81001 URINALYSIS AUTO W/SCOPE: CPT | Performed by: PHYSICIAN ASSISTANT

## 2022-11-25 PROCEDURE — 87086 URINE CULTURE/COLONY COUNT: CPT | Performed by: PHYSICIAN ASSISTANT

## 2022-11-25 PROCEDURE — 87636 SARSCOV2 & INF A&B AMP PRB: CPT | Performed by: EMERGENCY MEDICINE

## 2022-11-25 PROCEDURE — 71045 X-RAY EXAM CHEST 1 VIEW: CPT

## 2022-11-25 RX ORDER — SODIUM CHLORIDE 0.9 % (FLUSH) 0.9 %
10 SYRINGE (ML) INJECTION AS NEEDED
Status: DISCONTINUED | OUTPATIENT
Start: 2022-11-25 | End: 2022-11-25 | Stop reason: HOSPADM

## 2022-11-25 RX ORDER — CEFDINIR 300 MG/1
300 CAPSULE ORAL 2 TIMES DAILY
Qty: 20 CAPSULE | Refills: 0 | Status: SHIPPED | OUTPATIENT
Start: 2022-11-25 | End: 2022-12-03

## 2022-11-25 RX ORDER — ONDANSETRON 4 MG/1
4 TABLET, ORALLY DISINTEGRATING ORAL EVERY 6 HOURS PRN
Qty: 15 TABLET | Refills: 0 | Status: SHIPPED | OUTPATIENT
Start: 2022-11-25

## 2022-11-25 RX ORDER — ACETAMINOPHEN 500 MG
1000 TABLET ORAL ONCE
Status: COMPLETED | OUTPATIENT
Start: 2022-11-25 | End: 2022-11-25

## 2022-11-25 RX ADMIN — ACETAMINOPHEN 1000 MG: 500 TABLET ORAL at 14:31

## 2022-11-26 LAB — BACTERIA SPEC AEROBE CULT: NO GROWTH

## 2022-12-02 ENCOUNTER — HOSPITAL ENCOUNTER (EMERGENCY)
Facility: HOSPITAL | Age: 82
Discharge: HOME | End: 2022-12-03
Attending: EMERGENCY MEDICINE | Admitting: EMERGENCY MEDICINE

## 2022-12-02 DIAGNOSIS — K57.92 DIVERTICULITIS: Primary | ICD-10-CM

## 2022-12-02 LAB
BACTERIA UR QL AUTO: ABNORMAL /HPF
BASOPHILS # BLD AUTO: 0.02 10*3/MM3 (ref 0–0.2)
BASOPHILS NFR BLD AUTO: 0.1 % (ref 0–1.5)
BILIRUB UR QL STRIP: NEGATIVE
CLARITY UR: CLEAR
COLOR UR: YELLOW
D-LACTATE SERPL-SCNC: 1 MMOL/L (ref 0.5–2)
DEPRECATED RDW RBC AUTO: 48.4 FL (ref 37–54)
EOSINOPHIL # BLD AUTO: 0.03 10*3/MM3 (ref 0–0.4)
EOSINOPHIL NFR BLD AUTO: 0.2 % (ref 0.3–6.2)
ERYTHROCYTE [DISTWIDTH] IN BLOOD BY AUTOMATED COUNT: 15.1 % (ref 12.3–15.4)
GLUCOSE UR STRIP-MCNC: NEGATIVE MG/DL
HCT VFR BLD AUTO: 37.7 % (ref 37.5–51)
HGB BLD-MCNC: 12.4 G/DL (ref 13–17.7)
HGB UR QL STRIP.AUTO: NEGATIVE
HYALINE CASTS UR QL AUTO: ABNORMAL /LPF
IMM GRANULOCYTES # BLD AUTO: 0.11 10*3/MM3 (ref 0–0.05)
IMM GRANULOCYTES NFR BLD AUTO: 0.7 % (ref 0–0.5)
KETONES UR QL STRIP: NEGATIVE
LEUKOCYTE ESTERASE UR QL STRIP.AUTO: ABNORMAL
LYMPHOCYTES # BLD AUTO: 0.93 10*3/MM3 (ref 0.7–3.1)
LYMPHOCYTES NFR BLD AUTO: 5.6 % (ref 19.6–45.3)
MCH RBC QN AUTO: 28.8 PG (ref 26.6–33)
MCHC RBC AUTO-ENTMCNC: 32.9 G/DL (ref 31.5–35.7)
MCV RBC AUTO: 87.5 FL (ref 79–97)
MONOCYTES # BLD AUTO: 1.85 10*3/MM3 (ref 0.1–0.9)
MONOCYTES NFR BLD AUTO: 11.1 % (ref 5–12)
NEUTROPHILS NFR BLD AUTO: 13.66 10*3/MM3 (ref 1.7–7)
NEUTROPHILS NFR BLD AUTO: 82.3 % (ref 42.7–76)
NITRITE UR QL STRIP: NEGATIVE
NRBC BLD AUTO-RTO: 0 /100 WBC (ref 0–0.2)
PH UR STRIP.AUTO: 6 [PH] (ref 5–8)
PLATELET # BLD AUTO: 272 10*3/MM3 (ref 140–450)
PMV BLD AUTO: 10.7 FL (ref 6–12)
PROT UR QL STRIP: NEGATIVE
RBC # BLD AUTO: 4.31 10*6/MM3 (ref 4.14–5.8)
RBC # UR STRIP: ABNORMAL /HPF
REF LAB TEST METHOD: ABNORMAL
SP GR UR STRIP: 1.01 (ref 1–1.03)
SQUAMOUS #/AREA URNS HPF: ABNORMAL /HPF
UROBILINOGEN UR QL STRIP: ABNORMAL
WBC # UR STRIP: ABNORMAL /HPF
WBC NRBC COR # BLD: 16.6 10*3/MM3 (ref 3.4–10.8)

## 2022-12-02 PROCEDURE — 80053 COMPREHEN METABOLIC PANEL: CPT | Performed by: EMERGENCY MEDICINE

## 2022-12-02 PROCEDURE — 81001 URINALYSIS AUTO W/SCOPE: CPT

## 2022-12-02 PROCEDURE — 36415 COLL VENOUS BLD VENIPUNCTURE: CPT

## 2022-12-02 PROCEDURE — 83605 ASSAY OF LACTIC ACID: CPT | Performed by: EMERGENCY MEDICINE

## 2022-12-02 PROCEDURE — 84484 ASSAY OF TROPONIN QUANT: CPT | Performed by: EMERGENCY MEDICINE

## 2022-12-02 PROCEDURE — 99284 EMERGENCY DEPT VISIT MOD MDM: CPT

## 2022-12-02 PROCEDURE — 85025 COMPLETE CBC W/AUTO DIFF WBC: CPT

## 2022-12-02 PROCEDURE — 83690 ASSAY OF LIPASE: CPT | Performed by: EMERGENCY MEDICINE

## 2022-12-02 RX ORDER — SODIUM CHLORIDE 9 MG/ML
10 INJECTION INTRAVENOUS AS NEEDED
Status: DISCONTINUED | OUTPATIENT
Start: 2022-12-02 | End: 2022-12-03

## 2022-12-03 ENCOUNTER — APPOINTMENT (OUTPATIENT)
Dept: CT IMAGING | Facility: HOSPITAL | Age: 82
End: 2022-12-03

## 2022-12-03 VITALS
TEMPERATURE: 98.7 F | SYSTOLIC BLOOD PRESSURE: 118 MMHG | HEIGHT: 70 IN | BODY MASS INDEX: 25.34 KG/M2 | DIASTOLIC BLOOD PRESSURE: 78 MMHG | WEIGHT: 177 LBS | RESPIRATION RATE: 16 BRPM | HEART RATE: 101 BPM | OXYGEN SATURATION: 95 %

## 2022-12-03 LAB
ALBUMIN SERPL-MCNC: 3.8 G/DL (ref 3.5–5.2)
ALBUMIN/GLOB SERPL: 1.3 G/DL
ALP SERPL-CCNC: 136 U/L (ref 39–117)
ALT SERPL W P-5'-P-CCNC: 25 U/L (ref 1–41)
ANION GAP SERPL CALCULATED.3IONS-SCNC: 12 MMOL/L (ref 5–15)
AST SERPL-CCNC: 27 U/L (ref 1–40)
BILIRUB SERPL-MCNC: 0.4 MG/DL (ref 0–1.2)
BUN SERPL-MCNC: 12 MG/DL (ref 8–23)
BUN/CREAT SERPL: 12.8 (ref 7–25)
CALCIUM SPEC-SCNC: 8.8 MG/DL (ref 8.6–10.5)
CHLORIDE SERPL-SCNC: 106 MMOL/L (ref 98–107)
CO2 SERPL-SCNC: 23 MMOL/L (ref 22–29)
CREAT SERPL-MCNC: 0.94 MG/DL (ref 0.76–1.27)
EGFRCR SERPLBLD CKD-EPI 2021: 80.9 ML/MIN/1.73
GLOBULIN UR ELPH-MCNC: 3 GM/DL
GLUCOSE SERPL-MCNC: 99 MG/DL (ref 65–99)
HOLD SPECIMEN: NORMAL
LIPASE SERPL-CCNC: 38 U/L (ref 13–60)
POTASSIUM SERPL-SCNC: 3.8 MMOL/L (ref 3.5–5.2)
PROT SERPL-MCNC: 6.8 G/DL (ref 6–8.5)
QT INTERVAL: 396 MS
QTC INTERVAL: 460 MS
SODIUM SERPL-SCNC: 141 MMOL/L (ref 136–145)
TROPONIN T SERPL-MCNC: <0.01 NG/ML (ref 0–0.03)
WHOLE BLOOD HOLD COAG: NORMAL
WHOLE BLOOD HOLD SPECIMEN: NORMAL

## 2022-12-03 PROCEDURE — 74176 CT ABD & PELVIS W/O CONTRAST: CPT

## 2022-12-03 PROCEDURE — 93005 ELECTROCARDIOGRAM TRACING: CPT | Performed by: EMERGENCY MEDICINE

## 2022-12-03 RX ORDER — POLYETHYLENE GLYCOL 3350 17 G/17G
17 POWDER, FOR SOLUTION ORAL DAILY
Qty: 30 EACH | Refills: 0 | Status: SHIPPED | OUTPATIENT
Start: 2022-12-03 | End: 2023-02-03

## 2022-12-03 RX ORDER — HYDROCODONE BITARTRATE AND ACETAMINOPHEN 5; 325 MG/1; MG/1
1-2 TABLET ORAL EVERY 8 HOURS PRN
Qty: 12 TABLET | Refills: 0 | Status: SHIPPED | OUTPATIENT
Start: 2022-12-03 | End: 2022-12-05

## 2022-12-03 RX ORDER — AMOXICILLIN AND CLAVULANATE POTASSIUM 875; 125 MG/1; MG/1
1 TABLET, FILM COATED ORAL ONCE
Status: COMPLETED | OUTPATIENT
Start: 2022-12-03 | End: 2022-12-03

## 2022-12-03 RX ORDER — METRONIDAZOLE 500 MG/1
500 TABLET ORAL 2 TIMES DAILY
Qty: 14 TABLET | Refills: 0 | Status: SHIPPED | OUTPATIENT
Start: 2022-12-03 | End: 2023-01-20

## 2022-12-03 RX ORDER — AMOXICILLIN AND CLAVULANATE POTASSIUM 875; 125 MG/1; MG/1
1 TABLET, FILM COATED ORAL EVERY 12 HOURS SCHEDULED
Qty: 14 TABLET | Refills: 0 | Status: SHIPPED | OUTPATIENT
Start: 2022-12-03 | End: 2023-01-20

## 2022-12-03 RX ORDER — ACETAMINOPHEN 500 MG
1000 TABLET ORAL ONCE
Status: COMPLETED | OUTPATIENT
Start: 2022-12-03 | End: 2022-12-03

## 2022-12-03 RX ADMIN — AMOXICILLIN AND CLAVULANATE POTASSIUM 1 TABLET: 875; 125 TABLET, FILM COATED ORAL at 02:37

## 2022-12-03 RX ADMIN — ACETAMINOPHEN 1000 MG: 500 TABLET ORAL at 00:39

## 2022-12-03 NOTE — ED PROVIDER NOTES
EMERGENCY DEPARTMENT ENCOUNTER    Pt Name: George Lomeli  MRN: [id number]  Pt :   1940  Room Number:    Date of encounter:  2022  PCP: Kaycee Douglas MD  ED Provider: Hernesto Leon MD    Historian: Patient      HPI:  Chief Complaint: Abdominal pain      Context: George Lomeli is a 82 y.o. male who presents to the ED c/o abdominal pain, lower, for 2 days, pain is described as constant, associated with symptoms of diarrhea, no vomiting.  Abdominal pain quantified as minor, almost no pain now.  Does have increased waves of pain over the past 1 week.    PAST MEDICAL HISTORY  Past Medical History:   Diagnosis Date   • AAA (abdominal aortic aneurysm)    • Arthritis    • Broken neck (HCC)    • Gout    • Hypertension    • Lymphoma (HCC)    • Lymphoma (HCC)     had radiation   • Migraine    • Sciatica    • Thyroid cancer (HCC)          PAST SURGICAL HISTORY  Past Surgical History:   Procedure Laterality Date   • BACK SURGERY      lumbar   • CATARACT EXTRACTION EXTRACAPSULAR W/ INTRAOCULAR LENS IMPLANTATION     • EYE SURGERY     • MULTIPLE TOOTH EXTRACTIONS     • NECK SURGERY      PINS/RODS IN NECK         FAMILY HISTORY  Family History   Problem Relation Age of Onset   • Hypertension Mother    • Hypertension Father    • Hypertension Sister          SOCIAL HISTORY  Social History     Socioeconomic History   • Marital status:    Tobacco Use   • Smoking status: Former     Packs/day: 1.00     Years: 15.00     Pack years: 15.00     Types: Cigarettes     Quit date:      Years since quittin.9   • Smokeless tobacco: Never   • Tobacco comments:     QUIT 30 YEARS AGO    Vaping Use   • Vaping Use: Never used   Substance and Sexual Activity   • Alcohol use: No   • Drug use: No   • Sexual activity: Defer         ALLERGIES  Contrast dye        REVIEW OF SYSTEMS  Review of Systems       Constitutional: Negative. No fever, no weakness.   HENT: Negative for sneezing and sore throat.     Respiratory: Negative for cough. Negative for shortness of breath.    Cardiovascular: Negative.  Negative for chest pain.   Gastrointestinal: Negative.  Negative for abdominal pain.   Genitourinary: Negative.  Negative for difficulty urinating.     All systems reviewwed and negative except as noted in HPI.  PHYSICAL EXAM    I have reviewed the triage vital signs and nursing notes.    ED Triage Vitals [12/02/22 2235]   Temp Heart Rate Resp BP SpO2   98.7 °F (37.1 °C) 101 18 (!) 140/107 98 %      Temp src Heart Rate Source Patient Position BP Location FiO2 (%)   Oral Monitor Sitting Right arm --       Physical Exam  GENERAL:   Appears alert, oriented, not in distress  HENT: Nares patent.  EYES: No scleral icterus.  CV: Regular rhythm, regular rate.  No murmur  RESPIRATORY: Normal effort.  No audible wheezes, rales or rhonchi.  ABDOMEN: Soft, nontender nondistended, bowel sounds are present, no right lower or right upper quadrant tenderness on examination  MUSCULOSKELETAL: No deformities.   NEURO: Alert, moves all extremities, follows commands.  SKIN: Warm, dry, no rash visualized.        LAB RESULTS  Recent Results (from the past 24 hour(s))   Urinalysis With Microscopic If Indicated (No Culture) - Urine, Clean Catch    Collection Time: 12/02/22 10:46 PM    Specimen: Urine, Clean Catch   Result Value Ref Range    Color, UA Yellow Yellow, Straw    Appearance, UA Clear Clear    pH, UA 6.0 5.0 - 8.0    Specific Gravity, UA 1.015 1.001 - 1.030    Glucose, UA Negative Negative    Ketones, UA Negative Negative    Bilirubin, UA Negative Negative    Blood, UA Negative Negative    Protein, UA Negative Negative    Leuk Esterase, UA Trace (A) Negative    Nitrite, UA Negative Negative    Urobilinogen, UA 0.2 E.U./dL 0.2 - 1.0 E.U./dL   Urinalysis, Microscopic Only - Urine, Clean Catch    Collection Time: 12/02/22 10:46 PM    Specimen: Urine, Clean Catch   Result Value Ref Range    RBC, UA 0-2 None Seen, 0-2 /HPF    WBC, UA 3-5  (A) None Seen, 0-2 /HPF    Bacteria, UA None Seen None Seen, Trace /HPF    Squamous Epithelial Cells, UA None Seen None Seen, 0-2 /HPF    Hyaline Casts, UA None Seen 0 - 6 /LPF    Methodology Automated Microscopy    Comprehensive Metabolic Panel    Collection Time: 12/02/22 10:52 PM    Specimen: Blood   Result Value Ref Range    Glucose 99 65 - 99 mg/dL    BUN 12 8 - 23 mg/dL    Creatinine 0.94 0.76 - 1.27 mg/dL    Sodium 141 136 - 145 mmol/L    Potassium 3.8 3.5 - 5.2 mmol/L    Chloride 106 98 - 107 mmol/L    CO2 23.0 22.0 - 29.0 mmol/L    Calcium 8.8 8.6 - 10.5 mg/dL    Total Protein 6.8 6.0 - 8.5 g/dL    Albumin 3.80 3.50 - 5.20 g/dL    ALT (SGPT) 25 1 - 41 U/L    AST (SGOT) 27 1 - 40 U/L    Alkaline Phosphatase 136 (H) 39 - 117 U/L    Total Bilirubin 0.4 0.0 - 1.2 mg/dL    Globulin 3.0 gm/dL    A/G Ratio 1.3 g/dL    BUN/Creatinine Ratio 12.8 7.0 - 25.0    Anion Gap 12.0 5.0 - 15.0 mmol/L    eGFR 80.9 >60.0 mL/min/1.73   Lipase    Collection Time: 12/02/22 10:52 PM    Specimen: Blood   Result Value Ref Range    Lipase 38 13 - 60 U/L   Lactic Acid, Plasma    Collection Time: 12/02/22 10:52 PM    Specimen: Blood   Result Value Ref Range    Lactate 1.0 0.5 - 2.0 mmol/L   Green Top (Gel)    Collection Time: 12/02/22 10:52 PM   Result Value Ref Range    Extra Tube Hold for add-ons.    Lavender Top    Collection Time: 12/02/22 10:52 PM   Result Value Ref Range    Extra Tube hold for add-on    Gold Top - SST    Collection Time: 12/02/22 10:52 PM   Result Value Ref Range    Extra Tube Hold for add-ons.    Gray Top    Collection Time: 12/02/22 10:52 PM   Result Value Ref Range    Extra Tube Hold for add-ons.    Light Blue Top    Collection Time: 12/02/22 10:52 PM   Result Value Ref Range    Extra Tube Hold for add-ons.    CBC Auto Differential    Collection Time: 12/02/22 10:52 PM    Specimen: Blood   Result Value Ref Range    WBC 16.60 (H) 3.40 - 10.80 10*3/mm3    RBC 4.31 4.14 - 5.80 10*6/mm3    Hemoglobin 12.4 (L) 13.0  - 17.7 g/dL    Hematocrit 37.7 37.5 - 51.0 %    MCV 87.5 79.0 - 97.0 fL    MCH 28.8 26.6 - 33.0 pg    MCHC 32.9 31.5 - 35.7 g/dL    RDW 15.1 12.3 - 15.4 %    RDW-SD 48.4 37.0 - 54.0 fl    MPV 10.7 6.0 - 12.0 fL    Platelets 272 140 - 450 10*3/mm3    Neutrophil % 82.3 (H) 42.7 - 76.0 %    Lymphocyte % 5.6 (L) 19.6 - 45.3 %    Monocyte % 11.1 5.0 - 12.0 %    Eosinophil % 0.2 (L) 0.3 - 6.2 %    Basophil % 0.1 0.0 - 1.5 %    Immature Grans % 0.7 (H) 0.0 - 0.5 %    Neutrophils, Absolute 13.66 (H) 1.70 - 7.00 10*3/mm3    Lymphocytes, Absolute 0.93 0.70 - 3.10 10*3/mm3    Monocytes, Absolute 1.85 (H) 0.10 - 0.90 10*3/mm3    Eosinophils, Absolute 0.03 0.00 - 0.40 10*3/mm3    Basophils, Absolute 0.02 0.00 - 0.20 10*3/mm3    Immature Grans, Absolute 0.11 (H) 0.00 - 0.05 10*3/mm3    nRBC 0.0 0.0 - 0.2 /100 WBC   Troponin    Collection Time: 12/02/22 10:52 PM    Specimen: Blood   Result Value Ref Range    Troponin T <0.010 0.000 - 0.030 ng/mL   ECG 12 Lead Electrolyte Imbalance    Collection Time: 12/03/22 12:21 AM   Result Value Ref Range    QT Interval 396 ms    QTC Interval 460 ms       If labs were ordered, I independently reviewed the results.        RADIOLOGY  CT Abdomen Pelvis Without Contrast    Result Date: 12/3/2022  EXAMINATION:  CT SCAN OF THE ABDOMEN AND PELVIS WITHOUT INTRAVENOUS CONTRAST DATE OF EXAM: 12/3/2022 12:15 AM HISTORY: colitis/diverticulitis COMPARISON: 8/12/2022 TECHNIQUE: CT examination of the abdomen and pelvis with sagittal and coronal reformations was performed without intravenous contrast.  CT dose lowering techniques were used, to include: automated exposure control, adjustment for patient size, and/or use  of iterative reconstruction. Note: The exam is limited because some types of pathology may not be adequately demonstrated due to lack of contrast enhancement.    FINDINGS: ABDOMEN/PELVIS: Lower Chest:  No acute abnormality. Chronic left basal bronchiolectasis. Liver: No evidence of acute  abnormality. Chronic changes. Gallbladder/Biliary: Cholelithiasis without acute inflammation. Pancreas: No evidence of acute abnormality. Spleen: Chronic changes. Adrenal Glands: Normal. Kidneys: No hydronephrosis or nephrolithiasis. Stable right renal cysts.. GI Tract: No bowel obstruction. Poor definition of the cecum but the ileocecal valve is noted. Nonspecific bulky appearance of the cecum. Unremarkable appendix. No evidence of acute diverticulitis. Mesentery/Peritoneum: No evidence of free air or free fluid.. Vasculature: Stable aortobiiliac stent and presacral mass. Lymph Nodes: No abnormally enlarged lymph nodes are identified. Abdominal Wall: No evidence of acute abnormality. Bladder: No evidence of acute abnormality. Reproductive: No evidence of acute abnormality. Similar prostatic calcification. Musculoskeletal: No evidence of acute abnormality.     1.  Stable presacral mass. 2.  Nonspecific bulky appearance of the cecum is probably due to decompression, cannot exclude cecitis/diverticulitis. 3.  Unremarkable appendix. Electronically signed by:  Moise Watts D.O.  12/2/2022 11:58 PM Mountain Time      I ordered and reviewed the above noted radiographic studies.          PROCEDURES    Procedures    ECG 12 Lead Electrolyte Imbalance   Final Result   Test Reason : Electrolyte Imbalance   Blood Pressure :   */*   mmHG   Vent. Rate :  81 BPM     Atrial Rate :  81 BPM      P-R Int : 132 ms          QRS Dur : 110 ms       QT Int : 396 ms       P-R-T Axes :  82  -9  40 degrees      QTc Int : 460 ms      Sinus rhythm with premature supraventricular complexes and with occasional    premature ventricular complexes   Incomplete right bundle branch block   Minimal voltage criteria for LVH, may be normal variant   Borderline ECG   When compared with ECG of 19-SEP-2022 13:38,   premature ventricular complexes are now present   Nonspecific T wave abnormality has replaced inverted T waves in Inferior    leads    Confirmed by DEANN YOUNG ([id number]) on 12/3/2022 12:47:25 AM      Referred By: ED MD           Confirmed By: DEANN YOUNG          MEDICATIONS GIVEN IN ER    Medications   acetaminophen (TYLENOL) tablet 1,000 mg (1,000 mg Oral Given 12/3/22 0039)   amoxicillin-clavulanate (AUGMENTIN) 875-125 MG per tablet 1 tablet (1 tablet Oral Given 12/3/22 1687)         PROGRESS, DATA ANALYSIS, CONSULTS, AND MEDICAL DECISION MAKING    All labs have been independently reviewed by me.  All radiology studies have been reviewed by me and the radiologist dictating the report.   EKG's have been independently viewed and interpreted by me.    I did discuss with Mr. Lomeli, admission for further IV antibiotics and observation, versus home treatment, he does say he would rather be treated at home, and says he can tolerate tablets, medications orally.    Differential diagnoses: Abdominal pain, gastritis/           [phone]        AS OF 08:30 EST VITALS:    BP - 118/78  HR - 101  TEMP - 98.7 °F (37.1 °C) (Oral)  O2 SATS - 95%                  DIAGNOSIS  Final diagnoses:   Diverticulitis         DISPOSITION  DISCHARGE    Patient discharged in stable condition.    Reviewed implications of results, diagnosis, meds, responsibility to follow up, warning signs and symptoms of possible worsening, potential complications and reasons to return to ER.    Patient/Family voiced understanding of above instructions.    Discussed plan for discharge, as there is no emergent indication for admission.  Pt/family is agreeable and understands need for follow up and possible repeat testing.  Pt/family is aware that discharge does not mean that nothing is wrong but that it indicates no emergency is currently present that requires admission and they must continue care with follow-up as given below or with a physician of their choice.     FOLLOW-UP  Kaycee Douglas MD  [address]  [address]  [place] [address]  [phone]      Call for follow-up,  and recheck next week, and to review test results, I do recommend follow-up imaging or colonoscopy after diverticulitis.    [facility] Emergency Department  [address] [address] [phone]    Return to the ED if any new or worsening symptoms of concern.         Medication List      New Prescriptions    amoxicillin-clavulanate 875-125 MG per tablet  Commonly known as: AUGMENTIN  Take 1 tablet by mouth Every 12 (Twelve) Hours.     HYDROcodone-acetaminophen 5-325 MG per tablet  Commonly known as: NORCO  Take 1-2 tablets by mouth Every 8 (Eight) Hours As Needed for Severe Pain for up to 2 days.     metroNIDAZOLE 500 MG tablet  Commonly known as: FLAGYL  Take 1 tablet by mouth 2 (Two) Times a Day.     polyethylene glycol 17 g packet  Commonly known as: MIRALAX  Take 17 g by mouth Daily.           Where to Get Your Medications      These medications were sent to [facility] [address] [place] [address] - [address] [facility] - [phone]  - [phone]   [address] [facility] [address]    Phone: [phone]   · amoxicillin-clavulanate 875-125 MG per tablet  · HYDROcodone-acetaminophen 5-325 MG per tablet  · metroNIDAZOLE 500 MG tablet  · polyethylene glycol 17 g packet                  Hernesto Leon MD  12/03/22 0636

## 2022-12-15 RX ORDER — ALBUTEROL SULFATE 2.5 MG/3ML
SOLUTION RESPIRATORY (INHALATION)
Qty: 90 ML | Refills: 0 | Status: SHIPPED | OUTPATIENT
Start: 2022-12-15 | End: 2023-01-31

## 2023-01-17 ENCOUNTER — TELEPHONE (OUTPATIENT)
Dept: ONCOLOGY | Facility: CLINIC | Age: 83
End: 2023-01-17

## 2023-01-17 NOTE — TELEPHONE ENCOUNTER
Caller: George Lomeli    Relationship: Self    Best call back number:[phone]    Who are you requesting to speak with (clinical staff, provider,  specific staff member): CLINICAL     Do you know the name of the person who called: DOES NOT KNOW, STATES THAT HE HAS MISSED SEVERAL CALLS, NO MESSAGES LEFT     What was the call regarding: DOES NOT KNOW     Do you require a callback: YES

## 2023-01-20 ENCOUNTER — OFFICE VISIT (OUTPATIENT)
Dept: ONCOLOGY | Facility: CLINIC | Age: 83
End: 2023-01-20
Payer: MEDICARE

## 2023-01-20 ENCOUNTER — LAB (OUTPATIENT)
Dept: LAB | Facility: HOSPITAL | Age: 83
End: 2023-01-20
Payer: MEDICARE

## 2023-01-20 VITALS
BODY MASS INDEX: 24.34 KG/M2 | TEMPERATURE: 98.5 F | DIASTOLIC BLOOD PRESSURE: 86 MMHG | OXYGEN SATURATION: 98 % | RESPIRATION RATE: 18 BRPM | WEIGHT: 170 LBS | SYSTOLIC BLOOD PRESSURE: 139 MMHG | HEIGHT: 70 IN | HEART RATE: 85 BPM

## 2023-01-20 DIAGNOSIS — C82.00 FOLLICULAR LYMPHOMA GRADE I, UNSPECIFIED BODY REGION: ICD-10-CM

## 2023-01-20 DIAGNOSIS — C82.06 FOLLICULAR LYMPHOMA GRADE I OF INTRAPELVIC LYMPH NODES: Primary | Chronic | ICD-10-CM

## 2023-01-20 DIAGNOSIS — R19.09 PRESACRAL MASS: ICD-10-CM

## 2023-01-20 DIAGNOSIS — R93.5 ABNORMAL CT SCAN, PELVIS: ICD-10-CM

## 2023-01-20 LAB
BASOPHILS # BLD AUTO: 0.01 10*3/MM3 (ref 0–0.2)
BASOPHILS NFR BLD AUTO: 0.1 % (ref 0–1.5)
DEPRECATED RDW RBC AUTO: 52.7 FL (ref 37–54)
EOSINOPHIL # BLD AUTO: 0.17 10*3/MM3 (ref 0–0.4)
EOSINOPHIL NFR BLD AUTO: 1.8 % (ref 0.3–6.2)
ERYTHROCYTE [DISTWIDTH] IN BLOOD BY AUTOMATED COUNT: 15.7 % (ref 12.3–15.4)
HCT VFR BLD AUTO: 38.9 % (ref 37.5–51)
HGB BLD-MCNC: 12.1 G/DL (ref 13–17.7)
IMM GRANULOCYTES # BLD AUTO: 0.02 10*3/MM3 (ref 0–0.05)
IMM GRANULOCYTES NFR BLD AUTO: 0.2 % (ref 0–0.5)
LYMPHOCYTES # BLD AUTO: 1.3 10*3/MM3 (ref 0.7–3.1)
LYMPHOCYTES NFR BLD AUTO: 13.5 % (ref 19.6–45.3)
MCH RBC QN AUTO: 28.5 PG (ref 26.6–33)
MCHC RBC AUTO-ENTMCNC: 31.1 G/DL (ref 31.5–35.7)
MCV RBC AUTO: 91.7 FL (ref 79–97)
MONOCYTES # BLD AUTO: 0.65 10*3/MM3 (ref 0.1–0.9)
MONOCYTES NFR BLD AUTO: 6.7 % (ref 5–12)
NEUTROPHILS NFR BLD AUTO: 7.48 10*3/MM3 (ref 1.7–7)
NEUTROPHILS NFR BLD AUTO: 77.7 % (ref 42.7–76)
PLATELET # BLD AUTO: 234 10*3/MM3 (ref 140–450)
PMV BLD AUTO: 10.4 FL (ref 6–12)
RBC # BLD AUTO: 4.24 10*6/MM3 (ref 4.14–5.8)
WBC NRBC COR # BLD: 9.63 10*3/MM3 (ref 3.4–10.8)

## 2023-01-20 PROCEDURE — 85025 COMPLETE CBC W/AUTO DIFF WBC: CPT

## 2023-01-20 PROCEDURE — 99215 OFFICE O/P EST HI 40 MIN: CPT | Performed by: NURSE PRACTITIONER

## 2023-01-20 PROCEDURE — 36415 COLL VENOUS BLD VENIPUNCTURE: CPT

## 2023-01-20 NOTE — PROGRESS NOTES
CHIEF COMPLAINT: 1.  Flatus   2.  Right-sided sciatica   3.  Decreased appetite    Problem List:  Oncology/Hematology History Overview Note   1.  Follicular lymphoma:  -  initial consultation 3/12/18: Had been having left inguinal pains and went for scanning that found left groin node.  Needle biopsy of this was called lymphoma CD10 positive and Fish testing positive for translocation (14;18) consistent with follicular lymphoma though could also be seen in diffuse large B cell.  BCL 6 /3q rearranged.  No Myc..  Hence cannot say at this junction the exact subtype of lymphoma.  He has not been having bed drenching night sweats.  No unexplained weight loss.  Has not had any known anemia, thrombocytopenia, leukopenia.  Has not had frequent infections.  CT did not show any splenomegaly or other significant adenopathy.  The left inguinal pain has subsided since the needle biopsy.  He has an abdominal aortic aneurysm that is being watched and had previously been repaired.  Apparently 15+ years ago he was treated at [facility] for lymphoma for which he received right neck radiation and some chemotherapy.  He is not sure of the subtype of lymphoma nor of the type of chemotherapy nor the number of radiation treatments and this was far enough back that our records have been expunged.      -10/17/2019 [facility] hematology oncology APRN follow-up visit: I reviewed his CT scans and discussed results with him which showed no evidence of active or recurrent lymphoma.  Labs pending.  Will review results and notify patient of any significant findings.  We would not treat unless he had 3 more than 3 cm, one more than 7 cm nodes, or the other GELF criteria such as unexplained bed drenching night sweats/fevers/weight loss/anemia/thrombocytopenia/frequent infections. We will follow up with patient in 6 months with repeat labs.      -9/18/2020 [facility] hematology oncology follow-up visit:No cervical axillary or inguinal adenopathy.   Blood counts have been normal as of April along with CT showing no bulky adenopathy at that point.  We will get his labs today and CTs now in light of his abdominal pain.  I will have him do telephone visit with my nurse practitioner in a couple of weeks.  The pain is not excruciating and is not every day but just been present for the past few weeks.  If the scans are negative then I would ask him to follow-up with gastroenterology on that and we can make referral as he desires.  Would not treat unless he reached our previously mentioned G ELF criteria.     -10/2/2020 [facility] [place] hematology oncology APRN follow-up visit: Patient currently feeling well, previous concerns over abdominal pain resolved spontaneously with no intervention.  CT chest, abdomen and pelvis as shown above are stable with no evidence of disease recurrence or progression.  He has no adenopathy.  CBC on 9/18/2020 was unremarkable.  We will continue to monitor per NCCN guidelines.  He is now 2 years out from treatment, we will go to annual CT scan.  I will see him back in 6 months for follow-up with CBC and CMP on return.  I discussed with the patient to notify us if he has any concerns.  Would not treat unless he had 3 more than 3 cm or 1 more than 7 cm node, or other golf criteria such as unexplained bed drenching night sweats, fevers, weight loss, anemia, thrombocytopenia, frequent infections.    -9/27/2021 sedimentation rate 13 C-reactive protein 0.33.    -10/5/2021 CT chest abdomen pelvis without contrast showed soft tissue prominence anterior to sacral/lumbar region and infra aortic bifurcation lymphadenopathy maximum 4.3 cm.    -10/7/2021 [facility], [place] medical oncology follow-up visit: No new somatic complaints.  Feels great.  I will check a CBC now along with repeat blood work and CT in 3 months but unless he develops hemoglobin less than 10, platelets less than 100,000, unexplained bed drenching night sweats/weight loss/fevers/recurrent infections,  1 more than 7 cm or 3 or more 3 cm or larger nodes, I would not treat him.  At the point he reaches those criteria I would like to get tissue and a PET to make sure that we have accurate staging before treatment and to be sure that he has not transformed.  He will see my nurse practitioner back in 3 months to go over repeat test.    -1/3/2022 [facility] Oncology clinic follow-up:  Mr. Lomeli overall is doing well, no evidence of disease recurrence on current CT scans from 12/28/2021 which I reviewed with him today.  CT scan showed chronic interstitial changes throughout the lung fields with old granulomatous disease, stable adenopathy beneath the aortic bifurcation and anterior to the sacrum stable and unchanged without evidence of disease progression.  CBC is unremarkable, mild anemia with hemoglobin of 12, hematocrit normal at 37.5%, WBC normal at 7900, platelets 2000.  CMP pending. We will continue with CT scan again in 6 months for surveillance.    -7/11/2022 [facility] oncology hematology follow-up: Other than chronic migraine headaches, no other complaints.  Did not get recent CAT scans as ordered but I am okay with just checking noncontrast CTs in January which will be a little over a year from his last scans and getting his blood counts today and only treating if he meets G ELF criteria.    -8/12/2022 CT chest, abdomen and pelvis, no evidence of lymphoma or other active disease in the chest, the previously described residual presacral mass has had interval enlargement from prior scan 12/28/2021 with interval development of mildly enlarged superiorly adjacent retroperitoneal lymph nodes.  No evidence of new adenopathy or mass elsewhere.  No evidence of resulting obstructive uropathy.  Nonspecific gaseous distention of the transverse colon, no evidence of high-grade ileus or obstruction.    -12/3/2022 presented to the ER with abdominal pain and diarrhea, CT abdomen and pelvis shows stable presacral mass.   Nonspecific bulky appearance of the cecum probably due to decompression, cannot exclude cicadas/diverticulitis.  Patient treated with Augmentin and Flagyl by ER physician.     Follicular lymphoma (HCC) (Resolved)    Initial Diagnosis    Follicular lymphoma (HCC) (Resolved)     Follicular lymphoma (HCC)   3/12/2018 Initial Diagnosis    Follicular lymphoma     3/15/2018 Imaging    PET     IMPRESSION:     Multiple hypermetabolic abnormalities are identified but the  distribution is unusual. Although there is bulky hypermetabolic rishi  mass in the left inguinal area and in the right internal iliac rishi  chain certainly consistent with active malignant lymphoma, there is an  intense abnormal area of tracer uptake and hypermetabolic activity  throughout the marrow or intramedullary portion of the left ilium and  iliac wing in the absence of cortical destruction. This is an intensely  hypermetabolic abnormal lengthy intraosseous abnormality.     The lower extremity dedicated datasets are negative.     Intermediate lymph nodes are identified in the medial inguinal region  which are not above malignant threshold.     Above the pelvis, there is no evidence of additional hypermetabolic  focus or active neoplastic disease, and the chest, mediastinum and neck  are clear.     6/7/2018 Imaging    MRI lumbar spine IMPRESSION:    Study is limited by susceptibility artifact greatest at L1-L2.       No definite significant central canal stenosis.       Moderate to severe right and moderate left foraminal stenosis at L3-L4.    Moderate to severe bilateral foraminal stenosis at L4-L5.     7/24/2018 Imaging    CT chest IMPRESSION:  There are no acute findings. Specifically, there is no  axillary, mediastinal or hilar lymphadenopathy. There is bronchiectasis  in the medial segment of the left lower lobe. This was also present on  CT/PET examination of 03/15/2018.  CT abdomen and pelvis IMPRESSION:  Left inguinal lymphadenopathy,  unchanged when compared to a  whole-body PET CT scan of 03/15/2018.     9/6/2018 Imaging    MRI pelvis  1. Decreasing size of patient's left inguinal mass.  2. Interval development of diffuse marrow edema and enhancement,  practically throughout the left ilium, and adjacent soft tissue disease  deep to the left iliacus muscle, raising suspicion for lymphoma.     9/27/2018 - 10/17/2018 Radiation    Radiation OncologyTreatment Course:  George Lomeli received 3000 cGy in 15 fractions to left groin & iliac via External Beam Radiation - EBRT.     11/27/2018 Imaging    CT chest abdomen and pelvis  IMPRESSION: Bronchiectasis in the lungs but no significant mediastinal adenopathy or axillary adenopathy or cervical adenopathy.  There is a mildly enlarged left inguinal lymph node. The  node has smooth ovoid margins and has central low density consistent  with a necrotic node. Lastly more superiorly and anteriorly in a region  where there are surgical clips there is a lentiform mass which is much  much smaller than on the previous examination of 07/24/2018.     4/14/2020 Imaging    CT chest, abdomen and pelvis IMPRESSION:  Stable examination with no evidence of active or recurrent  lymphoma. No bulky adenopathy identified. Chronic age-related changes  identified throughout the chest, abdomen and pelvis which are all     9/29/2020 Imaging    CT chest, abdomen and pelvis IMPRESSION:  Stable examination with no evidence of active or recurrent  lymphoma. The findings are similar to that when compared to the prior  examination. No significant changes in the interval.     10/5/2021 Imaging    CT chest abdomen pelvis without contrast showed soft tissue prominence anterior to sacral/lumbar region and infra aortic bifurcation lymphadenopathy maximum 4.3 cm.     12/28/2021 Imaging    CT chest, abdomen and pelvis IMPRESSION:  Chronic interstitial changes seen throughout the lung  fields. Old healed granulomatous disease seen within the  chest. There is  stable adenopathy identified beneath the aortic bifurcation and anterior  to the sacrum stable and unchanged. There are no signs of progression.         HISTORY OF PRESENT ILLNESS:  The patient is a 82 y.o. male, here for follow up on management of follow-up follicular lymphoma.  Since we saw Mr. Lomeli last he did end up having CT scans in August which was after his previous visit in July therefore we did not have those results at his office visit, the CT showed interval increase in the known presacral mass.  He states that overall he had been feeling fairly well until about 1 month ago when he began having abdominal pain, excessive flatus and some sciatica pain in his lower back down his right hip and buttocks.  He states his appetite has decreased somewhat.  He reports that his bowel movements are loose, he denies any blood in his stool or dark tarry stools, he reports that his stool seems orange at times.  He reports that his last colonoscopy was probably around 10 years ago, he reports that the health department is working on getting him in for a colonoscopy.  He did present to the ER on 12/3/2022 with abdominal pain and diarrhea, CT scan at that time showed stable presacral mass compared to CT in August.  He denies any fevers or chills although he reports that he wakes up hot in the mornings, he denies any bed drenching night sweats however.  His weight overall is stable at 170 pounds, in July his weight was 172 pounds.    Past Medical History:   Diagnosis Date   • AAA (abdominal aortic aneurysm)    • Arthritis    • Broken neck (HCC)    • Gout    • Hypertension    • Lymphoma (HCC)    • Lymphoma (HCC)     had radiation   • Migraine    • Sciatica    • Thyroid cancer (HCC)      Past Surgical History:   Procedure Laterality Date   • BACK SURGERY      lumbar   • CATARACT EXTRACTION EXTRACAPSULAR W/ INTRAOCULAR LENS IMPLANTATION     • EYE SURGERY     • MULTIPLE TOOTH EXTRACTIONS     • NECK SURGERY    "   PINS/RODS IN NECK       Allergies   Allergen Reactions   • Contrast Dye (Echo Or Unknown Ct/Mr) Nausea And Vomiting       Family History and Social History reviewed and changed as necessary    REVIEW OF SYSTEM:   Excessive flatus  Lower back pain and sciatica down the right buttocks and right hip    PHYSICAL EXAM:  Well-developed, well-nourished appearing male in no distress  No cervical, supraclavicular, axillary or inguinal adenopathy palpable on exam  Heart regular rate and rhythm  Lungs clear, respirations regular and unlabored  Abdomen soft and nontender, no masses or organomegaly    Vitals:    01/20/23 1412   BP: 139/86   Pulse: 85   Resp: 18   Temp: 98.5 °F (36.9 °C)   SpO2: 98%   Weight: 77.1 kg (170 lb)   Height: 177.8 cm (70\")     Vitals:    01/20/23 1412   PainSc: 0-No pain  Comment: +Orange stools x2 weeks. No appetite.          ECOG score: 2           Vitals reviewed.  I reviewed records available in [facility] regarding ER visit 12/3/2022 along with radiology reports from CT scan of the abdomen and pelvis on 12/3/2022, also reviewed results of CT scans from 8/12/2022.  I also reviewed laboratory data available in epic including CBC from today as shown below and CMP from ER visit 12/2/2022.      Lab Results   Component Value Date    HGB 12.1 (L) 01/20/2023    HCT 38.9 01/20/2023    MCV 91.7 01/20/2023     01/20/2023    WBC 9.63 01/20/2023    NEUTROABS 7.48 (H) 01/20/2023    LYMPHSABS 1.30 01/20/2023    MONOSABS 0.65 01/20/2023    EOSABS 0.17 01/20/2023    BASOSABS 0.01 01/20/2023       Lab Results   Component Value Date    GLUCOSE 99 12/02/2022    BUN 12 12/02/2022    CREATININE 0.94 12/02/2022     12/02/2022    K 3.8 12/02/2022     12/02/2022    CO2 23.0 12/02/2022    CALCIUM 8.8 12/02/2022    PROTEINTOT 6.8 12/02/2022    ALBUMIN 3.80 12/02/2022    BILITOT 0.4 12/02/2022    ALKPHOS 136 (H) 12/02/2022    AST 27 12/02/2022    ALT 25 12/02/2022             ASSESSMENT & PLAN:  1.  " Follicular lymphoma:  -  initial consultation 3/12/18: Had been having left inguinal pains and went for scanning that found left groin node.  Needle biopsy of this was called lymphoma CD10 positive and Fish testing positive for translocation (14;18) consistent with follicular lymphoma though could also be seen in diffuse large B cell.  BCL 6 /3q rearranged.  No Myc..  Hence cannot say at this junction the exact subtype of lymphoma.  He has not been having bed drenching night sweats.  No unexplained weight loss.  Has not had any known anemia, thrombocytopenia, leukopenia.  Has not had frequent infections.  CT did not show any splenomegaly or other significant adenopathy.  The left inguinal pain has subsided since the needle biopsy.  He has an abdominal aortic aneurysm that is being watched and had previously been repaired.  Apparently 15+ years ago he was treated at [facility] for lymphoma for which he received right neck radiation and some chemotherapy.  He is not sure of the subtype of lymphoma nor of the type of chemotherapy nor the number of radiation treatments and this was far enough back that our records have been expunged.      -9/27/2018 - 10/17/2018 received radiation to the left groin and iliac    -10/17/2019 [facility] hematology oncology APRN follow-up visit: I reviewed his CT scans and discussed results with him which showed no evidence of active or recurrent lymphoma.  Labs pending.  Will review results and notify patient of any significant findings.  We would not treat unless he had 3 more than 3 cm, one more than 7 cm nodes, or the other GELF criteria such as unexplained bed drenching night sweats/fevers/weight loss/anemia/thrombocytopenia/frequent infections. We will follow up with patient in 6 months with repeat labs.      -9/18/2020 [facility] hematology oncology follow-up visit:No cervical axillary or inguinal adenopathy.  Blood counts have been normal as of April along with CT showing no bulky  adenopathy at that point.  We will get his labs today and CTs now in light of his abdominal pain.  I will have him do telephone visit with my nurse practitioner in a couple of weeks.  The pain is not excruciating and is not every day but just been present for the past few weeks.  If the scans are negative then I would ask him to follow-up with gastroenterology on that and we can make referral as he desires.  Would not treat unless he reached our previously mentioned GELF criteria.     -10/2/2020 [facility], [place], operated by [facility] hematology oncology APRN follow-up visit: Patient currently feeling well, previous concerns over abdominal pain resolved spontaneously with no intervention.  CT chest, abdomen and pelvis as shown above are stable with no evidence of disease recurrence or progression.  He has no adenopathy.  CBC on 9/18/2020 was unremarkable.  We will continue to monitor per NCCN guidelines.  He is now 2 years out from treatment, we will go to annual CT scan.  I will see him back in 6 months for follow-up with CBC and CMP on return.  I discussed with the patient to notify us if he has any concerns.  Would not treat unless he had 3 more than 3 cm or 1 more than 7 cm node, or other golf criteria such as unexplained bed drenching night sweats, fevers, weight loss, anemia, thrombocytopenia, frequent infections.    -9/27/2021 sedimentation rate 13 C-reactive protein 0.33.    -10/5/2021 CT chest abdomen pelvis without contrast showed soft tissue prominence anterior to sacral/lumbar region and infra aortic bifurcation lymphadenopathy maximum 4.3 cm.    -10/7/2021 [facility], [place], operated [facility] medical oncology follow-up visit: No new somatic complaints.  Feels great.  I will check a CBC now along with repeat blood work and CT in 3 months but unless he develops hemoglobin less than 10, platelets less than 100,000, unexplained bed drenching night sweats/weight loss/fevers/recurrent infections, 1 more than 7 cm or 3 or more 3 cm or larger nodes, I would not treat  him.  At the point he reaches those criteria I would like to get tissue and a PET to make sure that we have accurate staging before treatment and to be sure that he has not transformed.  He will see my nurse practitioner back in 3 months to go over repeat test.    -1/3/2022 [facility] clinic follow-up:  Mr. Lomeli overall is doing well, no evidence of disease recurrence on current CT scans from 12/28/2021 which I reviewed with him today.  CT scan showed chronic interstitial changes throughout the lung fields with old granulomatous disease, stable adenopathy beneath the aortic bifurcation and anterior to the sacrum stable and unchanged without evidence of disease progression.  CBC is unremarkable, mild anemia with hemoglobin of 12, hematocrit normal at 37.5%, WBC normal at 7900, platelets 2000.  CMP pending. We will continue with CT scan again in 6 months for surveillance.    -7/11/2022 [facility]ology hematology follow-up: Other than chronic migraine headaches, no other complaints.  Did not get recent CAT scans as ordered but I am okay with just checking noncontrast CTs in January which will be a little over a year from his last scans and getting his blood counts today and only treating if he meets GELF criteria.    -8/12/2022 CT chest, abdomen and pelvis, no evidence of lymphoma or other active disease in the chest, the previously described residual presacral mass has had interval enlargement from prior scan 12/28/2021 with interval development of mildly enlarged superiorly adjacent retroperitoneal lymph nodes.  No evidence of new adenopathy or mass elsewhere.  No evidence of resulting obstructive uropathy.  Nonspecific gaseous distention of the transverse colon, no evidence of high-grade ileus or obstruction.    -12/3/2022 presented to the ER with abdominal pain and diarrhea, CT abdomen and pelvis shows stable presacral mass.  Nonspecific bulky appearance of the cecum probably due to decompression, cannot  exclude cicadas/diverticulitis.  Patient treated with Augmentin and Flagyl by ER physician.    -1/20/2023 [facility] Oncology clinic follow-up: Mr. Lomeli is having symptoms with excessive flatus, lower back pain and sciatica down his right buttocks and right hip.  I am concerned with with changes that were noted on his CT scans of the summer that were done after his visit with us therefore we were not made aware of these changes nor any symptoms that he has been having until today.  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  He was treated with Augmentin and Flagyl for possible diverticulitis.  He has decreased appetite although no significant weight loss, no bed drenching night sweats but he does report feeling hot when he wakes up in the mornings.  We will get a PET scan for further evaluation and also get a biopsy of the presacral mass.  We will see him back in a few weeks for follow-up to go over the results and further plan of care. His CMP from 12/2/2022 was unremarkable, CBC from today shows mild anemia with hemoglobin of 12.1, hematocrit 38.9%, absolute neutrophils 7.48 otherwise normal CBC.  Plan of care was discussed with Dr. Thor Ruggiero at time of visit.    I spent 45 minutes caring for George on this date of service. This time includes time spent by me in the following activities: preparing for the visit, reviewing tests, obtaining and/or reviewing a separately obtained history, performing a medically appropriate examination and/or evaluation, counseling and educating the patient/family/caregiver, ordering medications, tests, or procedures, documenting information in the medical record and independently interpreting results and communicating that information with the patient/family/caregiver.     Laxmi Scott, APRN    01/20/2023

## 2023-01-26 ENCOUNTER — HOSPITAL ENCOUNTER (OUTPATIENT)
Dept: RADIATION ONCOLOGY | Facility: HOSPITAL | Age: 83
Setting detail: RADIATION/ONCOLOGY SERIES
Discharge: HOME | End: 2023-01-26
Payer: MEDICARE

## 2023-01-26 ENCOUNTER — OFFICE VISIT (OUTPATIENT)
Dept: RADIATION ONCOLOGY | Facility: HOSPITAL | Age: 83
End: 2023-01-26
Payer: MEDICARE

## 2023-01-26 VITALS
BODY MASS INDEX: 23.68 KG/M2 | RESPIRATION RATE: 18 BRPM | SYSTOLIC BLOOD PRESSURE: 134 MMHG | TEMPERATURE: 97.7 F | WEIGHT: 165 LBS | HEART RATE: 83 BPM | OXYGEN SATURATION: 96 % | DIASTOLIC BLOOD PRESSURE: 91 MMHG

## 2023-01-26 DIAGNOSIS — C82.06 FOLLICULAR LYMPHOMA GRADE I OF INTRAPELVIC LYMPH NODES: Chronic | ICD-10-CM

## 2023-01-26 PROCEDURE — G0463 HOSPITAL OUTPT CLINIC VISIT: HCPCS | Performed by: RADIOLOGY
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
## 2023-01-31 RX ORDER — ALBUTEROL SULFATE 2.5 MG/3ML
SOLUTION RESPIRATORY (INHALATION)
Qty: 90 ML | Refills: 0 | Status: SHIPPED | OUTPATIENT
Start: 2023-01-31 | End: 2023-03-16

## 2023-02-02 ENCOUNTER — HOSPITAL ENCOUNTER (OUTPATIENT)
Dept: PET IMAGING | Facility: HOSPITAL | Age: 83
Discharge: HOME | End: 2023-02-02
Payer: MEDICARE

## 2023-02-02 DIAGNOSIS — R93.5 ABNORMAL CT SCAN, PELVIS: ICD-10-CM

## 2023-02-02 DIAGNOSIS — C82.06 FOLLICULAR LYMPHOMA GRADE I OF INTRAPELVIC LYMPH NODES: Chronic | ICD-10-CM

## 2023-02-02 LAB — GLUCOSE BLDC GLUCOMTR-MCNC: 93 MG/DL (ref 70–130)

## 2023-02-02 PROCEDURE — A9552 F18 FDG: HCPCS | Performed by: NURSE PRACTITIONER

## 2023-02-02 PROCEDURE — 78815 PET IMAGE W/CT SKULL-THIGH: CPT

## 2023-02-02 PROCEDURE — 82962 GLUCOSE BLOOD TEST: CPT

## 2023-02-02 PROCEDURE — 0 FLUDEOXYGLUCOSE F18 SOLUTION: Performed by: NURSE PRACTITIONER

## 2023-02-02 RX ADMIN — FLUDEOXYGLUCOSE F18 1 DOSE: 300 INJECTION INTRAVENOUS at 10:00

## 2023-02-03 ENCOUNTER — HOSPITAL ENCOUNTER (OUTPATIENT)
Dept: CT IMAGING | Facility: HOSPITAL | Age: 83
Discharge: HOME | End: 2023-02-03
Admitting: INTERNAL MEDICINE
Payer: MEDICARE

## 2023-02-03 VITALS
RESPIRATION RATE: 20 BRPM | BODY MASS INDEX: 24.79 KG/M2 | DIASTOLIC BLOOD PRESSURE: 91 MMHG | HEIGHT: 70 IN | TEMPERATURE: 97 F | SYSTOLIC BLOOD PRESSURE: 119 MMHG | OXYGEN SATURATION: 97 % | WEIGHT: 173.2 LBS | HEART RATE: 65 BPM

## 2023-02-03 DIAGNOSIS — C82.06 FOLLICULAR LYMPHOMA GRADE I OF INTRAPELVIC LYMPH NODES: Chronic | ICD-10-CM

## 2023-02-03 DIAGNOSIS — R19.09 PRESACRAL MASS: ICD-10-CM

## 2023-02-03 LAB
ANION GAP SERPL CALCULATED.3IONS-SCNC: 10 MMOL/L (ref 5–15)
BASOPHILS # BLD AUTO: 0.02 10*3/MM3 (ref 0–0.2)
BASOPHILS NFR BLD AUTO: 0.3 % (ref 0–1.5)
BUN SERPL-MCNC: 10 MG/DL (ref 8–23)
BUN/CREAT SERPL: 12.2 (ref 7–25)
CALCIUM SPEC-SCNC: 9.1 MG/DL (ref 8.6–10.5)
CHLORIDE SERPL-SCNC: 109 MMOL/L (ref 98–107)
CO2 SERPL-SCNC: 25 MMOL/L (ref 22–29)
CREAT SERPL-MCNC: 0.82 MG/DL (ref 0.76–1.27)
DEPRECATED RDW RBC AUTO: 51.4 FL (ref 37–54)
EGFRCR SERPLBLD CKD-EPI 2021: 87.7 ML/MIN/1.73
EOSINOPHIL # BLD AUTO: 0.21 10*3/MM3 (ref 0–0.4)
EOSINOPHIL NFR BLD AUTO: 3.2 % (ref 0.3–6.2)
ERYTHROCYTE [DISTWIDTH] IN BLOOD BY AUTOMATED COUNT: 15.7 % (ref 12.3–15.4)
GLUCOSE SERPL-MCNC: 95 MG/DL (ref 65–99)
HCT VFR BLD AUTO: 36 % (ref 37.5–51)
HGB BLD-MCNC: 11.7 G/DL (ref 13–17.7)
IMM GRANULOCYTES # BLD AUTO: 0.02 10*3/MM3 (ref 0–0.05)
IMM GRANULOCYTES NFR BLD AUTO: 0.3 % (ref 0–0.5)
INR PPP: 1.12 (ref 0.84–1.13)
LYMPHOCYTES # BLD AUTO: 0.78 10*3/MM3 (ref 0.7–3.1)
LYMPHOCYTES NFR BLD AUTO: 11.9 % (ref 19.6–45.3)
MCH RBC QN AUTO: 29 PG (ref 26.6–33)
MCHC RBC AUTO-ENTMCNC: 32.5 G/DL (ref 31.5–35.7)
MCV RBC AUTO: 89.1 FL (ref 79–97)
MONOCYTES # BLD AUTO: 0.86 10*3/MM3 (ref 0.1–0.9)
MONOCYTES NFR BLD AUTO: 13.1 % (ref 5–12)
NEUTROPHILS NFR BLD AUTO: 4.69 10*3/MM3 (ref 1.7–7)
NEUTROPHILS NFR BLD AUTO: 71.2 % (ref 42.7–76)
NRBC BLD AUTO-RTO: 0 /100 WBC (ref 0–0.2)
PLATELET # BLD AUTO: 179 10*3/MM3 (ref 140–450)
PMV BLD AUTO: 9.9 FL (ref 6–12)
POTASSIUM SERPL-SCNC: 3.5 MMOL/L (ref 3.5–5.2)
PROTHROMBIN TIME: 14.4 SECONDS (ref 11.4–14.4)
RBC # BLD AUTO: 4.04 10*6/MM3 (ref 4.14–5.8)
SODIUM SERPL-SCNC: 144 MMOL/L (ref 136–145)
WBC NRBC COR # BLD: 6.58 10*3/MM3 (ref 3.4–10.8)

## 2023-02-03 PROCEDURE — 0 LIDOCAINE 1 % SOLUTION: Performed by: RADIOLOGY

## 2023-02-03 PROCEDURE — 77012 CT SCAN FOR NEEDLE BIOPSY: CPT

## 2023-02-03 PROCEDURE — 88305 TISSUE EXAM BY PATHOLOGIST: CPT | Performed by: INTERNAL MEDICINE

## 2023-02-03 PROCEDURE — 85025 COMPLETE CBC W/AUTO DIFF WBC: CPT | Performed by: RADIOLOGY

## 2023-02-03 PROCEDURE — 88342 IMHCHEM/IMCYTCHM 1ST ANTB: CPT | Performed by: INTERNAL MEDICINE

## 2023-02-03 PROCEDURE — 63710000001 OXYCODONE-ACETAMINOPHEN 10-325 MG TABLET: Performed by: RADIOLOGY

## 2023-02-03 PROCEDURE — 88360 TUMOR IMMUNOHISTOCHEM/MANUAL: CPT | Performed by: INTERNAL MEDICINE

## 2023-02-03 PROCEDURE — 99152 MOD SED SAME PHYS/QHP 5/>YRS: CPT

## 2023-02-03 PROCEDURE — A9270 NON-COVERED ITEM OR SERVICE: HCPCS | Performed by: RADIOLOGY

## 2023-02-03 PROCEDURE — 88341 IMHCHEM/IMCYTCHM EA ADD ANTB: CPT | Performed by: INTERNAL MEDICINE

## 2023-02-03 PROCEDURE — 25010000002 FENTANYL CITRATE (PF) 50 MCG/ML SOLUTION: Performed by: RADIOLOGY

## 2023-02-03 PROCEDURE — 80048 BASIC METABOLIC PNL TOTAL CA: CPT | Performed by: RADIOLOGY

## 2023-02-03 PROCEDURE — 25010000002 MIDAZOLAM PER 1 MG: Performed by: RADIOLOGY

## 2023-02-03 PROCEDURE — 85610 PROTHROMBIN TIME: CPT | Performed by: RADIOLOGY

## 2023-02-03 RX ORDER — FENTANYL CITRATE 50 UG/ML
INJECTION, SOLUTION INTRAMUSCULAR; INTRAVENOUS
Status: DISCONTINUED
Start: 2023-02-03 | End: 2023-02-04 | Stop reason: HOSPADM

## 2023-02-03 RX ORDER — SODIUM CHLORIDE 0.9 % (FLUSH) 0.9 %
3 SYRINGE (ML) INJECTION EVERY 12 HOURS SCHEDULED
Status: DISCONTINUED | OUTPATIENT
Start: 2023-02-03 | End: 2023-02-04 | Stop reason: HOSPADM

## 2023-02-03 RX ORDER — SODIUM CHLORIDE 0.9 % (FLUSH) 0.9 %
10 SYRINGE (ML) INJECTION AS NEEDED
Status: DISCONTINUED | OUTPATIENT
Start: 2023-02-03 | End: 2023-02-04 | Stop reason: HOSPADM

## 2023-02-03 RX ORDER — FENTANYL CITRATE 50 UG/ML
INJECTION, SOLUTION INTRAMUSCULAR; INTRAVENOUS AS NEEDED
Status: COMPLETED | OUTPATIENT
Start: 2023-02-03 | End: 2023-02-03

## 2023-02-03 RX ORDER — MIDAZOLAM HYDROCHLORIDE 1 MG/ML
INJECTION INTRAMUSCULAR; INTRAVENOUS AS NEEDED
Status: COMPLETED | OUTPATIENT
Start: 2023-02-03 | End: 2023-02-03

## 2023-02-03 RX ORDER — OXYCODONE AND ACETAMINOPHEN 10; 325 MG/1; MG/1
1 TABLET ORAL ONCE AS NEEDED
Status: COMPLETED | OUTPATIENT
Start: 2023-02-03 | End: 2023-02-03

## 2023-02-03 RX ORDER — MIDAZOLAM HYDROCHLORIDE 1 MG/ML
INJECTION INTRAMUSCULAR; INTRAVENOUS
Status: DISCONTINUED
Start: 2023-02-03 | End: 2023-02-04 | Stop reason: HOSPADM

## 2023-02-03 RX ORDER — LIDOCAINE HYDROCHLORIDE 10 MG/ML
20 INJECTION, SOLUTION INFILTRATION; PERINEURAL ONCE
Status: COMPLETED | OUTPATIENT
Start: 2023-02-03 | End: 2023-02-03

## 2023-02-03 RX ADMIN — FENTANYL CITRATE 50 MCG: 50 INJECTION, SOLUTION INTRAMUSCULAR; INTRAVENOUS at 13:52

## 2023-02-03 RX ADMIN — MIDAZOLAM HYDROCHLORIDE 1 MG: 1 INJECTION, SOLUTION INTRAMUSCULAR; INTRAVENOUS at 13:56

## 2023-02-03 RX ADMIN — MIDAZOLAM HYDROCHLORIDE 0.5 MG: 1 INJECTION, SOLUTION INTRAMUSCULAR; INTRAVENOUS at 14:02

## 2023-02-03 RX ADMIN — OXYCODONE HYDROCHLORIDE AND ACETAMINOPHEN 1 TABLET: 10; 325 TABLET ORAL at 11:06

## 2023-02-03 RX ADMIN — FENTANYL CITRATE 25 MCG: 50 INJECTION, SOLUTION INTRAMUSCULAR; INTRAVENOUS at 14:02

## 2023-02-03 RX ADMIN — FENTANYL CITRATE 50 MCG: 50 INJECTION, SOLUTION INTRAMUSCULAR; INTRAVENOUS at 13:56

## 2023-02-03 RX ADMIN — LIDOCAINE HYDROCHLORIDE 10 ML: 10 INJECTION, SOLUTION INFILTRATION; PERINEURAL at 13:51

## 2023-02-03 RX ADMIN — MIDAZOLAM HYDROCHLORIDE 1 MG: 1 INJECTION, SOLUTION INTRAMUSCULAR; INTRAVENOUS at 13:52

## 2023-02-03 NOTE — NURSING NOTE
CT guided presacral soft tissue biopsy performed by Dr Juárez. 4 core biopsies obtained, labeled, and taken to lab by tech. Patient given 125 mcg Fentanyl  and 2.5 mg Versed with sedation time of 13 minutes. Patient tolerated well. VSS. Report called to nurse.

## 2023-02-06 ENCOUNTER — TELEPHONE (OUTPATIENT)
Dept: INFUSION THERAPY | Facility: HOSPITAL | Age: 83
End: 2023-02-06
Payer: MEDICARE

## 2023-02-07 ENCOUNTER — HOSPITAL ENCOUNTER (OUTPATIENT)
Dept: RADIATION ONCOLOGY | Facility: HOSPITAL | Age: 83
Discharge: HOME | End: 2023-02-07

## 2023-02-07 ENCOUNTER — HOSPITAL ENCOUNTER (OUTPATIENT)
Dept: RADIATION ONCOLOGY | Facility: HOSPITAL | Age: 83
Setting detail: RADIATION/ONCOLOGY SERIES
Discharge: HOME | End: 2023-02-07
Payer: MEDICARE

## 2023-02-07 ENCOUNTER — OFFICE VISIT (OUTPATIENT)
Dept: RADIATION ONCOLOGY | Facility: HOSPITAL | Age: 83
End: 2023-02-07
Payer: MEDICARE

## 2023-02-07 VITALS
WEIGHT: 170.8 LBS | HEART RATE: 82 BPM | RESPIRATION RATE: 18 BRPM | OXYGEN SATURATION: 96 % | TEMPERATURE: 96.6 F | BODY MASS INDEX: 24.51 KG/M2 | DIASTOLIC BLOOD PRESSURE: 87 MMHG | SYSTOLIC BLOOD PRESSURE: 157 MMHG

## 2023-02-07 DIAGNOSIS — C82.00 FOLLICULAR LYMPHOMA GRADE I, UNSPECIFIED BODY REGION: Chronic | ICD-10-CM

## 2023-02-07 LAB
CYTO UR: NORMAL
LAB AP CASE REPORT: NORMAL
LAB AP CLINICAL INFORMATION: NORMAL
LAB AP DIAGNOSIS COMMENT: NORMAL
PATH REPORT.FINAL DX SPEC: NORMAL
PATH REPORT.GROSS SPEC: NORMAL

## 2023-02-07 PROCEDURE — 77290 THER RAD SIMULAJ FIELD CPLX: CPT | Performed by: RADIOLOGY

## 2023-02-07 PROCEDURE — 77470 SPECIAL RADIATION TREATMENT: CPT | Performed by: RADIOLOGY

## 2023-02-07 PROCEDURE — G0463 HOSPITAL OUTPT CLINIC VISIT: HCPCS | Performed by: RADIOLOGY

## 2023-02-07 NOTE — PROGRESS NOTES
RE-EVALUATION    PATIENT:                                                      George Lomeli  :                                                          1940  DATE:                          2023   DIAGNOSIS:     Follicular lymphoma (HCC)  - Stage IV     BRIEF HISTORY:  The patient is a very pleasant 82 y.o. male  with a known diagnosis of a recurrent follicular lymphoma.  He has previously received external beam radiation therapy to the left groin and iliac lymph nodes.  Over the past 2 years he has demonstrated progressive enlargement of the presacral mass that is most consistent with recurrent disease.  He underwent a PET scan as well as interventional guided biopsy in the past week.  Biopsy results are currently pending, and his PET scan appears to show isolated disease in the pelvis.  He reports that his pain is actually progressively getting worse and that he has bilateral sciatic pain and he feels like it is also starting to affect his right knee.  He denies fevers, chills, night sweats, or other systemic symptoms.    Allergies   Allergen Reactions   • Contrast Dye (Echo Or Unknown Ct/Mr) Nausea And Vomiting       Review of Systems   Musculoskeletal: Positive for back pain (radiates down both legs).   All other systems reviewed and are negative.          Objective   VITAL SIGNS:   Vitals:    23 0957   BP: 157/87   Pulse: 82   Resp: 18   Temp: 96.6 °F (35.9 °C)   TempSrc: Temporal   SpO2: 96%   Weight: 77.5 kg (170 lb 12.8 oz)   PainSc:   4   PainLoc: Leg  Comment: bilateral        Karnofsky score: 80       Physical Exam  Vitals and nursing note reviewed.   Constitutional:       General: He is not in acute distress.     Appearance: He is well-developed.   HENT:      Head: Normocephalic and atraumatic.   Eyes:      Conjunctiva/sclera: Conjunctivae normal.      Pupils: Pupils are equal, round, and reactive to light.   Cardiovascular:      Rate and Rhythm: Normal rate and regular rhythm.       Heart sounds: No murmur heard.    No friction rub.   Pulmonary:      Effort: Pulmonary effort is normal.      Breath sounds: Normal breath sounds. No wheezing.   Abdominal:      General: Bowel sounds are normal. There is no distension.      Palpations: Abdomen is soft. There is no mass.      Tenderness: There is no abdominal tenderness.   Musculoskeletal:         General: Normal range of motion.      Cervical back: Normal range of motion and neck supple.      Comments: Describes deep-seated low back pain that is not readily reproducible with palpation of the sacrum and iliac wings.   Lymphadenopathy:      Cervical: No cervical adenopathy.   Skin:     General: Skin is warm and dry.   Neurological:      Mental Status: He is alert and oriented to person, place, and time.   Psychiatric:         Behavior: Behavior normal.         Thought Content: Thought content normal.         Judgment: Judgment normal.     IMAGING  I have personally reviewed the relevant imaging studies, as follows:  CT Abdomen Pelvis Without Contrast    Result Date: 12/3/2022  1.  Stable presacral mass. 2.  Nonspecific bulky appearance of the cecum is probably due to decompression, cannot exclude cecitis/diverticulitis. 3.  Unremarkable appendix. Electronically signed by:  Moise Watts D.O.  12/2/2022 11:58 PM Mountain Time    XR Chest 1 View    Result Date: 11/25/2022  No acute chest finding.  This report was finalized on 11/25/2022 1:40 PM by Nirali Clark MD.      CT Needle Biopsy Soft Tissue    Result Date: 2/3/2023  Impression: Technically successful CT-guided biopsy of the patient's presacral soft tissue Electronically Signed: George Juárez MD  2/3/2023 4:08 PM EST  Workstation ID: [id number]    NM PET/CT Skull Base to Mid Thigh    Result Date: 2/2/2023   1. Strongly hypermetabolic activity throughout the patient's irregularly lobular pelvic mass. 2. Separate small hypermetabolic lower presacral lymph node. 3. Probable normal variant activity  in a normal-appearing rectum. No evidence of distant lymphoma/metastatic disease elsewhere. 4. Incidentally noted nonhypermetabolic right lobe liver cyst and incidentally noted small gallstone.  This report was finalized on 2/2/2023 1:52 PM by Dr. Ty Jones MD.         The following portions of the patient's history were reviewed and updated as appropriate: allergies, current medications, past family history, past medical history, past social history, past surgical history and problem list.    Diagnoses and all orders for this visit:    Follicular lymphoma grade I, unspecified body region (HCC)      IMPRESSION: Mr. Lomeli is a an 82-year-old gentleman with a recurrent follicular lymphoma.  He has progressive disease and probably subsequent benefit from involved field radiation therapy.  This would tentatively consist of a dose of 30 Gray in 15 fractions, which seem to work very well for him the last time we did radiation to proceed with the same intentions.  After a full explanation, he was agreeable and signed informed consent.  I also discussed this case with Dr. Ruggiero personally.  Should his biopsy returned with more advanced disease potentially suggestive of a transformation, then we can certainly regroup if treatments need to be modified.  We completed a radiation setup and simulation session today and I will aim to have his treatment ready to begin in the next week to 10 days.    RECOMMENDATIONS:    Plan to treat the pelvis mass to 30 Gy in 15 fractions    I spent approximately 40 minutes with the patient today, with more than 20 minutes spent in direct person to person communication, and the remainder in communication with Dr. Ruggiero and for documentation.         Milo Christianson MD

## 2023-02-08 PROCEDURE — 77334 RADIATION TREATMENT AID(S): CPT | Performed by: RADIOLOGY

## 2023-02-08 PROCEDURE — 77300 RADIATION THERAPY DOSE PLAN: CPT | Performed by: RADIOLOGY

## 2023-02-08 PROCEDURE — 77295 3-D RADIOTHERAPY PLAN: CPT | Performed by: RADIOLOGY

## 2023-02-09 ENCOUNTER — HOSPITAL ENCOUNTER (OUTPATIENT)
Dept: RADIATION ONCOLOGY | Facility: HOSPITAL | Age: 83
Discharge: HOME | End: 2023-02-09

## 2023-02-09 PROCEDURE — 77412 RADIATION TX DELIVERY LVL 3: CPT | Performed by: RADIOLOGY

## 2023-02-09 PROCEDURE — 77387 GUIDANCE FOR RADJ TX DLVR: CPT | Performed by: RADIOLOGY

## 2023-02-09 PROCEDURE — 77280 THER RAD SIMULAJ FIELD SMPL: CPT | Performed by: RADIOLOGY

## 2023-02-10 ENCOUNTER — HOSPITAL ENCOUNTER (OUTPATIENT)
Dept: RADIATION ONCOLOGY | Facility: HOSPITAL | Age: 83
Discharge: HOME | End: 2023-02-10

## 2023-02-10 ENCOUNTER — OFFICE VISIT (OUTPATIENT)
Dept: ONCOLOGY | Facility: CLINIC | Age: 83
End: 2023-02-10
Payer: MEDICARE

## 2023-02-10 VITALS
BODY MASS INDEX: 23.91 KG/M2 | TEMPERATURE: 97.9 F | DIASTOLIC BLOOD PRESSURE: 84 MMHG | HEIGHT: 70 IN | WEIGHT: 167 LBS | HEART RATE: 88 BPM | OXYGEN SATURATION: 98 % | SYSTOLIC BLOOD PRESSURE: 140 MMHG | RESPIRATION RATE: 18 BRPM

## 2023-02-10 DIAGNOSIS — C82.59 DIFFUSE FOLLICLE CENTER LYMPHOMA OF EXTRANODAL SITE: Primary | ICD-10-CM

## 2023-02-10 PROCEDURE — 77412 RADIATION TX DELIVERY LVL 3: CPT | Performed by: RADIOLOGY

## 2023-02-10 PROCEDURE — 99215 OFFICE O/P EST HI 40 MIN: CPT | Performed by: INTERNAL MEDICINE

## 2023-02-10 PROCEDURE — 77387 GUIDANCE FOR RADJ TX DLVR: CPT | Performed by: RADIOLOGY

## 2023-02-10 PROCEDURE — 77336 RADIATION PHYSICS CONSULT: CPT | Performed by: RADIOLOGY

## 2023-02-10 PROCEDURE — 77370 RADIATION PHYSICS CONSULT: CPT | Performed by: RADIOLOGY

## 2023-02-10 NOTE — PROGRESS NOTES
CHIEF COMPLAINT: Sacral pain    Problem List:  Oncology/Hematology History Overview Note   1.  Follicular lymphoma:  -  initial consultation 3/12/18: Had been having left inguinal pains and went for scanning that found left groin node.  Needle biopsy of this was called lymphoma CD10 positive and Fish testing positive for translocation (14;18) consistent with follicular lymphoma though could also be seen in diffuse large B cell.  BCL 6 /3q rearranged.  No Myc..  Hence cannot say at this junction the exact subtype of lymphoma.  He has not been having bed drenching night sweats.  No unexplained weight loss.  Has not had any known anemia, thrombocytopenia, leukopenia.  Has not had frequent infections.  CT did not show any splenomegaly or other significant adenopathy.  The left inguinal pain has subsided since the needle biopsy.  He has an abdominal aortic aneurysm that is being watched and had previously been repaired.  Apparently 15+ years ago he was treated at [facility] for lymphoma for which he received right neck radiation and some chemotherapy.  He is not sure of the subtype of lymphoma nor of the type of chemotherapy nor the number of radiation treatments and this was far enough back that our records have been expunged.      -9/27/2018 - 10/17/2018 received radiation to the left groin and iliac    -10/17/2019 [facility] hematology oncology APRN follow-up visit: I reviewed his CT scans and discussed results with him which showed no evidence of active or recurrent lymphoma.  Labs pending.  Will review results and notify patient of any significant findings.  We would not treat unless he had 3 more than 3 cm, one more than 7 cm nodes, or the other GELF criteria such as unexplained bed drenching night sweats/fevers/weight loss/anemia/thrombocytopenia/frequent infections. We will follow up with patient in 6 months with repeat labs.      -9/18/2020 [facility] hematology oncology follow-up visit:No cervical axillary or  inguinal adenopathy.  Blood counts have been normal as of April along with CT showing no bulky adenopathy at that point.  We will get his labs today and CTs now in light of his abdominal pain.  I will have him do telephone visit with my nurse practitioner in a couple of weeks.  The pain is not excruciating and is not every day but just been present for the past few weeks.  If the scans are negative then I would ask him to follow-up with gastroenterology on that and we can make referral as he desires.  Would not treat unless he reached our previously mentioned G ELF criteria.     -10/2/2020 [facility] hematology oncology APRN follow-up visit: Patient currently feeling well, previous concerns over abdominal pain resolved spontaneously with no intervention.  CT chest, abdomen and pelvis as shown above are stable with no evidence of disease recurrence or progression.  He has no adenopathy.  CBC on 9/18/2020 was unremarkable.  We will continue to monitor per NCCN guidelines.  He is now 2 years out from treatment, we will go to annual CT scan.  I will see him back in 6 months for follow-up with CBC and CMP on return.  I discussed with the patient to notify us if he has any concerns.  Would not treat unless he had 3 more than 3 cm or 1 more than 7 cm node, or other golf criteria such as unexplained bed drenching night sweats, fevers, weight loss, anemia, thrombocytopenia, frequent infections.    -9/27/2021 sedimentation rate 13 C-reactive protein 0.33.    -10/5/2021 CT chest abdomen pelvis without contrast showed soft tissue prominence anterior to sacral/lumbar region and infra aortic bifurcation lymphadenopathy maximum 4.3 cm.    -10/7/2021 [facility] medical oncology follow-up visit: No new somatic complaints.  Feels great.  I will check a CBC now along with repeat blood work and CT in 3 months but unless he develops hemoglobin less than 10, platelets less than 100,000, unexplained bed drenching night sweats/weight  loss/fevers/recurrent infections, 1 more than 7 cm or 3 or more 3 cm or larger nodes, I would not treat him.  At the point he reaches those criteria I would like to get tissue and a PET to make sure that we have accurate staging before treatment and to be sure that he has not transformed.  He will see my nurse practitioner back in 3 months to go over repeat test.    -1/3/2022 [facility] follow-up:  Mr. Lomeli overall is doing well, no evidence of disease recurrence on current CT scans from 12/28/2021 which I reviewed with him today.  CT scan showed chronic interstitial changes throughout the lung fields with old granulomatous disease, stable adenopathy beneath the aortic bifurcation and anterior to the sacrum stable and unchanged without evidence of disease progression.  CBC is unremarkable, mild anemia with hemoglobin of 12, hematocrit normal at 37.5%, WBC normal at 7900, platelets 2000.  CMP pending. We will continue with CT scan again in 6 months for surveillance.    -7/11/2022 [facility] hematology follow-up: Other than chronic migraine headaches, no other complaints.  Did not get recent CAT scans as ordered but I am okay with just checking noncontrast CTs in January which will be a little over a year from his last scans and getting his blood counts today and only treating if he meets GELF criteria possible changes seen on his CT scans .    -8/12/2022 CT chest, abdomen and pelvis, no evidence of lymphoma or other active disease in the chest, the previously described residual presacral mass has had interval enlargement from prior scan 12/28/2021 with interval development of mildly enlarged superiorly adjacent retroperitoneal lymph nodes.  No evidence of new adenopathy or mass elsewhere.  No evidence of resulting obstructive uropathy.  Nonspecific gaseous distention of the transverse colon, no evidence of high-grade ileus or obstruction.    -12/3/2022 presented to the ER with abdominal pain and  diarrhea, CT abdomen and pelvis shows stable presacral mass.  Nonspecific bulky appearance of the cecum probably due to decompression, cannot exclude cicadas/diverticulitis.  Patient treated with Augmentin and Flagyl by ER physician.    -1/20/2023 [facility] follow-up: Mr. Lomeli is having symptoms with excessive flatus, lower back pain and sciatica down his right buttocks and right hip.  I am concerned with with changes that were noted on his CT scans of the summer that were done after his visit with us therefore we were not made aware of these changes nor any symptoms that he has been having until today.  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  He was treated with Augmentin and Flagyl for possible diverticulitis.  He has decreased appetite although no significant weight loss, no bed drenching night sweats but he does report feeling hot when he wakes up in the mornings.  We will get a PET scan for further evaluation and also get a biopsy of the presacral mass.  We will see him back in a few weeks for follow-up to go over the results and further plan of care. His CMP from 12/2/2022 was unremarkable, CBC from today shows mild anemia with hemoglobin of 12.1, hematocrit 38.9%, absolute neutrophils 7.48 otherwise normal CBC.  Plan of care was discussed with Dr. Thor Ruggiero at time of visit.    - 2/7/2023 follow-up with Dr. 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    -2/10/2023 [facility] follow-up: His pathology and his clinical scenario suggest this to be a low-grade follicular lymphoma.  The presacral mass had not changed much from August and December but the PET also in the legs at this area for which Dr. Christianson can treat him quite effectively as outlined  above.  This has not transformed to high-grade lymphoma and his labs are for the most part unremarkable as outlined above by my nurse practitioner.  I will repeat his PET in 3 months.     Follicular lymphoma (HCC) (Resolved)    Initial Diagnosis    Follicular lymphoma (HCC) (Resolved)     Follicular lymphoma (HCC)   3/12/2018 Initial Diagnosis    Follicular lymphoma     3/15/2018 Imaging    PET     IMPRESSION:     Multiple hypermetabolic abnormalities are identified but the  distribution is unusual. Although there is bulky hypermetabolic rishi  mass in the left inguinal area and in the right internal iliac rishi  chain certainly consistent with active malignant lymphoma, there is an  intense abnormal area of tracer uptake and hypermetabolic activity  throughout the marrow or intramedullary portion of the left ilium and  iliac wing in the absence of cortical destruction. This is an intensely  hypermetabolic abnormal lengthy intraosseous abnormality.     The lower extremity dedicated datasets are negative.     Intermediate lymph nodes are identified in the medial inguinal region  which are not above malignant threshold.     Above the pelvis, there is no evidence of additional hypermetabolic  focus or active neoplastic disease, and the chest, mediastinum and neck  are clear.     6/7/2018 Imaging    MRI lumbar spine IMPRESSION:    Study is limited by susceptibility artifact greatest at L1-L2.       No definite significant central canal stenosis.       Moderate to severe right and moderate left foraminal stenosis at L3-L4.    Moderate to severe bilateral foraminal stenosis at L4-L5.     7/24/2018 Imaging    CT chest IMPRESSION:  There are no acute findings. Specifically, there is no  axillary, mediastinal or hilar lymphadenopathy. There is bronchiectasis  in the medial segment of the left lower lobe. This was also present on  CT/PET examination of 03/15/2018.  CT abdomen and pelvis IMPRESSION:  Left inguinal  lymphadenopathy, unchanged when compared to a  whole-body PET CT scan of 03/15/2018.     9/6/2018 Imaging    MRI pelvis  1. Decreasing size of patient's left inguinal mass.  2. Interval development of diffuse marrow edema and enhancement,  practically throughout the left ilium, and adjacent soft tissue disease  deep to the left iliacus muscle, raising suspicion for lymphoma.     9/27/2018 - 10/17/2018 Radiation    Radiation OncologyTreatment Course:  George Lomeli received 3000 cGy in 15 fractions to left groin & iliac via External Beam Radiation - EBRT.     11/27/2018 Imaging    CT chest abdomen and pelvis  IMPRESSION: Bronchiectasis in the lungs but no significant mediastinal adenopathy or axillary adenopathy or cervical adenopathy.  There is a mildly enlarged left inguinal lymph node. The  node has smooth ovoid margins and has central low density consistent  with a necrotic node. Lastly more superiorly and anteriorly in a region  where there are surgical clips there is a lentiform mass which is much  much smaller than on the previous examination of 07/24/2018.     4/14/2020 Imaging    CT chest, abdomen and pelvis IMPRESSION:  Stable examination with no evidence of active or recurrent  lymphoma. No bulky adenopathy identified. Chronic age-related changes  identified throughout the chest, abdomen and pelvis which are all     9/29/2020 Imaging    CT chest, abdomen and pelvis IMPRESSION:  Stable examination with no evidence of active or recurrent  lymphoma. The findings are similar to that when compared to the prior  examination. No significant changes in the interval.     10/5/2021 Imaging    CT chest abdomen pelvis without contrast showed soft tissue prominence anterior to sacral/lumbar region and infra aortic bifurcation lymphadenopathy maximum 4.3 cm.     12/28/2021 Imaging    CT chest, abdomen and pelvis IMPRESSION:  Chronic interstitial changes seen throughout the lung  fields. Old healed granulomatous disease  "seen within the chest. There is  stable adenopathy identified beneath the aortic bifurcation and anterior  to the sacrum stable and unchanged. There are no signs of progression.     2/2/2023 Progression    PET shows strongly hypermetabolic lobular pelvic mass with separate small lower presacral nodes and no other hypermetabolism.         HISTORY OF PRESENT ILLNESS:  The patient is a 82 y.o. male, here for follow up on management of recurrent follicular lymphoma with sacral mass with no distant or diffuse PET involvement    Past Medical History:   Diagnosis Date   • AAA (abdominal aortic aneurysm)    • Arthritis    • Broken neck (HCC)    • Gout    • Hypertension    • Lymphoma (HCC)    • Lymphoma (HCC)     had radiation   • Migraine    • Sciatica    • Thyroid cancer (HCC)      Past Surgical History:   Procedure Laterality Date   • BACK SURGERY      lumbar   • CATARACT EXTRACTION EXTRACAPSULAR W/ INTRAOCULAR LENS IMPLANTATION     • EYE SURGERY     • MULTIPLE TOOTH EXTRACTIONS     • NECK SURGERY      PINS/RODS IN NECK       Allergies   Allergen Reactions   • Contrast Dye (Echo Or Unknown Ct/Mr) Nausea And Vomiting       Family History and Social History reviewed and changed as necessary    REVIEW OF SYSTEM:   Pelvic/sacral pain    PHYSICAL EXAM:  No bulky diffuse adenopathy on exam    Vitals:    02/10/23 1239   BP: 140/84   Pulse: 88   Resp: 18   Temp: 97.9 °F (36.6 °C)   SpO2: 98%   Weight: 75.8 kg (167 lb)   Height: 177.8 cm (70\")     Vitals:    02/10/23 1239   PainSc: 0-No pain          ECOG score: 2           Vitals reviewed.        Lab Results   Component Value Date    HGB 11.7 (L) 02/03/2023    HCT 36.0 (L) 02/03/2023    MCV 89.1 02/03/2023     02/03/2023    WBC 6.58 02/03/2023    NEUTROABS 4.69 02/03/2023    LYMPHSABS 0.78 02/03/2023    MONOSABS 0.86 02/03/2023    EOSABS 0.21 02/03/2023    BASOSABS 0.02 02/03/2023       Lab Results   Component Value Date    GLUCOSE 95 02/03/2023    BUN 10 02/03/2023    " CREATININE 0.82 02/03/2023     02/03/2023    K 3.5 02/03/2023     (H) 02/03/2023    CO2 25.0 02/03/2023    CALCIUM 9.1 02/03/2023    PROTEINTOT 6.8 12/02/2022    ALBUMIN 3.80 12/02/2022    BILITOT 0.4 12/02/2022    ALKPHOS 136 (H) 12/02/2022    AST 27 12/02/2022    ALT 25 12/02/2022             ASSESSMENT & PLAN:  1.  Follicular lymphoma:  -  initial consultation 3/12/18: Had been having left inguinal pains and went for scanning that found left groin node.  Needle biopsy of this was called lymphoma CD10 positive and Fish testing positive for translocation (14;18) consistent with follicular lymphoma though could also be seen in diffuse large B cell.  BCL 6 /3q rearranged.  No Myc..  Hence cannot say at this junction the exact subtype of lymphoma.  He has not been having bed drenching night sweats.  No unexplained weight loss.  Has not had any known anemia, thrombocytopenia, leukopenia.  Has not had frequent infections.  CT did not show any splenomegaly or other significant adenopathy.  The left inguinal pain has subsided since the needle biopsy.  He has an abdominal aortic aneurysm that is being watched and had previously been repaired.  Apparently 15+ years ago he was treated at [facility] for lymphoma for which he received right neck radiation and some chemotherapy.  He is not sure of the subtype of lymphoma nor of the type of chemotherapy nor the number of radiation treatments and this was far enough back that our records have been expunged.      -9/27/2018 - 10/17/2018 received radiation to the left groin and iliac    -10/17/2019 [facility] hematology oncology APRN follow-up visit: I reviewed his CT scans and discussed results with him which showed no evidence of active or recurrent lymphoma.  Labs pending.  Will review results and notify patient of any significant findings.  We would not treat unless he had 3 more than 3 cm, one more than 7 cm nodes, or the other GELF criteria such as unexplained  bed drenching night sweats/fevers/weight loss/anemia/thrombocytopenia/frequent infections. We will follow up with patient in 6 months with repeat labs.      -9/18/2020 [facility] hematology oncology follow-up visit:No cervical axillary or inguinal adenopathy.  Blood counts have been normal as of April along with CT showing no bulky adenopathy at that point.  We will get his labs today and CTs now in light of his abdominal pain.  I will have him do telephone visit with my nurse practitioner in a couple of weeks.  The pain is not excruciating and is not every day but just been present for the past few weeks.  If the scans are negative then I would ask him to follow-up with gastroenterology on that and we can make referral as he desires.  Would not treat unless he reached our previously mentioned G ELF criteria.     -10/2/2020 [facility] hematology oncology APRN follow-up visit: Patient currently feeling well, previous concerns over abdominal pain resolved spontaneously with no intervention.  CT chest, abdomen and pelvis as shown above are stable with no evidence of disease recurrence or progression.  He has no adenopathy.  CBC on 9/18/2020 was unremarkable.  We will continue to monitor per NCCN guidelines.  He is now 2 years out from treatment, we will go to annual CT scan.  I will see him back in 6 months for follow-up with CBC and CMP on return.  I discussed with the patient to notify us if he has any concerns.  Would not treat unless he had 3 more than 3 cm or 1 more than 7 cm node, or other golf criteria such as unexplained bed drenching night sweats, fevers, weight loss, anemia, thrombocytopenia, frequent infections.    -9/27/2021 sedimentation rate 13 C-reactive protein 0.33.    -10/5/2021 CT chest abdomen pelvis without contrast showed soft tissue prominence anterior to sacral/lumbar region and infra aortic bifurcation lymphadenopathy maximum 4.3 cm.    -10/7/2021 [facility] medical oncology follow-up visit: No new  somatic complaints.  Feels great.  I will check a CBC now along with repeat blood work and CT in 3 months but unless he develops hemoglobin less than 10, platelets less than 100,000, unexplained bed drenching night sweats/weight loss/fevers/recurrent infections, 1 more than 7 cm or 3 or more 3 cm or larger nodes, I would not treat him.  At the point he reaches those criteria I would like to get tissue and a PET to make sure that we have accurate staging before treatment and to be sure that he has not transformed.  He will see my nurse practitioner back in 3 months to go over repeat test.    -1/3/2022 [facility] follow-up:  Mr. Lomeli overall is doing well, no evidence of disease recurrence on current CT scans from 12/28/2021 which I reviewed with him today.  CT scan showed chronic interstitial changes throughout the lung fields with old granulomatous disease, stable adenopathy beneath the aortic bifurcation and anterior to the sacrum stable and unchanged without evidence of disease progression.  CBC is unremarkable, mild anemia with hemoglobin of 12, hematocrit normal at 37.5%, WBC normal at 7900, platelets 2000.  CMP pending. We will continue with CT scan again in 6 months for surveillance.    -7/11/2022 [facility]ology follow-up: Other than chronic migraine headaches, no other complaints.  Did not get recent CAT scans as ordered but I am okay with just checking noncontrast CTs in January which will be a little over a year from his last scans and getting his blood counts today and only treating if he meets GELF criteria possible changes seen on his CT scans .    -8/12/2022 CT chest, abdomen and pelvis, no evidence of lymphoma or other active disease in the chest, the previously described residual presacral mass has had interval enlargement from prior scan 12/28/2021 with interval development of mildly enlarged superiorly adjacent retroperitoneal lymph nodes.  No evidence of new adenopathy  or mass elsewhere.  No evidence of resulting obstructive uropathy.  Nonspecific gaseous distention of the transverse colon, no evidence of high-grade ileus or obstruction.    -12/3/2022 presented to the ER with abdominal pain and diarrhea, CT abdomen and pelvis shows stable presacral mass.  Nonspecific bulky appearance of the cecum probably due to decompression, cannot exclude cicadas/diverticulitis.  Patient treated with Augmentin and Flagyl by ER physician.    -1/20/2023 [facility] follow-up: Mr. Lomeli is having symptoms with excessive flatus, lower back pain and sciatica down his right buttocks and right hip.  I am concerned with changes that were noted on his CT scans of the summer that were done after his visit with us therefore we were not made aware of these changes nor any symptoms that he has been having until today.  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  He was treated with Augmentin and Flagyl for possible diverticulitis.  He has decreased appetite although no significant weight loss, no bed drenching night sweats but he does report feeling hot when he wakes up in the mornings.  We will get a PET scan for further evaluation and also get a biopsy of the presacral mass.  We will see him back in a few weeks for follow-up to go over the results and further plan of care. His CMP from 12/2/2022 was unremarkable, CBC from today shows mild anemia with hemoglobin of 12.1, hematocrit 38.9%, absolute neutrophils 7.48 otherwise normal CBC.  Plan of care was discussed with Dr. Thor Ruggiero at time of visit.    - 2/7/2023 follow-up with Dr. 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    -2/10/2023 [facility] follow-up: His pathology and his clinical scenario  suggest this to be a low-grade follicular lymphoma.  The presacral mass had not changed much from August and December but the PET also in the legs at this area for which Dr. Christianson can treat him quite effectively as outlined above.  This has not transformed to high-grade lymphoma and his labs are for the most part unremarkable as outlined above by my nurse practitioner.  I will repeat his PET in 3 months.    Total time of care today inclusive of time spent today prior to patient's arrival reviewing interval images and reports thereof and discussions with Dr. Christianson and after visit instituting this plan took 50 minutes of patient care time throughout the day today  Thor Ruggiero MD    02/10/2023

## 2023-02-13 ENCOUNTER — HOSPITAL ENCOUNTER (OUTPATIENT)
Dept: RADIATION ONCOLOGY | Facility: HOSPITAL | Age: 83
Discharge: HOME | End: 2023-02-13

## 2023-02-13 PROCEDURE — 77412 RADIATION TX DELIVERY LVL 3: CPT | Performed by: RADIOLOGY

## 2023-02-13 PROCEDURE — 77387 GUIDANCE FOR RADJ TX DLVR: CPT | Performed by: RADIOLOGY

## 2023-02-14 ENCOUNTER — HOSPITAL ENCOUNTER (OUTPATIENT)
Dept: RADIATION ONCOLOGY | Facility: HOSPITAL | Age: 83
Discharge: HOME | End: 2023-02-14

## 2023-02-14 ENCOUNTER — DOCUMENTATION (OUTPATIENT)
Dept: NUTRITION | Facility: HOSPITAL | Age: 83
End: 2023-02-14
Payer: MEDICARE

## 2023-02-14 VITALS — WEIGHT: 169.5 LBS | BODY MASS INDEX: 24.32 KG/M2

## 2023-02-14 PROCEDURE — 77412 RADIATION TX DELIVERY LVL 3: CPT | Performed by: RADIOLOGY

## 2023-02-14 PROCEDURE — 77387 GUIDANCE FOR RADJ TX DLVR: CPT | Performed by: RADIOLOGY

## 2023-02-14 NOTE — PROGRESS NOTES
"Outpatient Oncology Nutrition     Reason for Visit:     Oncology Nutrition Screening and Patient Education    Patient Name:  George Lomeli    :  1940    MRN:  [id number]    Date of Encounter: 2023    Nutrition Assessment     Diagnosis: Recurrent follicular lymphoma / over the past 2 years he has demonstrated progressive enlargement of the presacral mass that is most consistent with recurrent disease / PET shows strongly hypermetabolic lobular pelvic mass with separate small lower presacral nodes and no other hypermetabolism    Radiation:  30 Gray in 15 fractions    Patient Active Problem List   Diagnosis   • Sciatica   • Migraine   • Hypertension   • Gout   • Broken neck (HCC)   • Abdominal aortic aneurysm with recent repair at [place]   • 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)   • Bronchiectasis without complication (HCC)   • Cough       Food / Nutrition Related History       Hydration Status     Goal: 85 ounces    Enteral Feeding     NA  Anthropometric Measurements     Height:    Ht Readings from Last 1 Encounters:   02/10/23 177.8 cm (70\")       Weight:    Wt Readings from Last 1 Encounters:   23 76.9 kg (169 lb 8 oz)       BMI: 24.32    Weight Change: 10 lbs weight loss from  lbs (6% weight loss)    Review of Lab Data (Time Frame - 1 month / 2 month)     Component  Ref Range & Units 11 d ago  (2/3/23) 12 d ago  (23) 2 mo ago  (22) 2 mo ago  (22) 4 mo ago  (22) 7 mo ago  (22) 1 yr ago  (1/3/22)   Glucose  65 - 99 mg/dL 95  93 R, CM  99  112 High   110 High   92  111 High     BUN  8 - 23 mg/dL 10   12  11  9  8  10    Creatinine  0.76 - 1.27 mg/dL 0.82   0.94  0.86  0.87  0.94  0.82    Sodium  136 - 145 mmol/L 144   141  142  143  143  141    Potassium  3.5 - 5.2 mmol/L 3.5   3.8 CM  4.0 CM  3.7 CM  3.9 CM  3.6    Comment: Slight hemolysis detected by analyzer. Results may be affected.   Chloride  98 - 107 mmol/L 109 High    106  105  109 High  "  108 High   106    CO2  22.0 - 29.0 mmol/L 25.0   23.0  26.0  26.0  27.0  26.0    Calcium  8.6 - 10.5 mg/dL 9.1   8.8  9.5  9.2  9.3  9.3    BUN/Creatinine Ratio  7.0 - 25.0 12.2   12.8  12.8  10.3  8.5  12.2    Anion Gap  5.0 - 15.0 mmol/L 10.0   12.0  11.0  8.0  8.0  9.0    eGFR  >60.0 mL/min/1.73 87.7   80.9 CM  86.5 CM  86.1 CM  80.9 CM     Resulting Agency  [facility]            Medication Review   Reviewed 2/14/23    Nutrition Focused Physical Findings       Nutrition Impact Symptoms     Loss of appetite  Weight loss  Fatigue    Physical Activity      Not my normal self, but able to be up and about with fairly normal activities    Current Nutritional Intake     Oral diet:  Regular    Oral nutritional supplements: Occasional Ensure supplement    Malnutrition Risk Assessment     Recent weight loss over the past 6 months:  Yes    How much weight loss:  1 = 2-13 lbs    Eating poorly because of a decreased appetite:  1 = Yes    Malnutrition Screening Score:     MST = 2 more Patient at risk for malnutrition     Nutrition Diagnosis     Problem Unintentional weight loss   Etiology Diagnosis related   Signs / Symptoms Decreased appetite and oral intake  10+ lbs weight loss     Nutrition Intervention   Consultation with patient during [facility] ONC status checks.  Patient with waning appetite over the past year.  Reviewed typical nutritional intake which indicates that patient will typically eat cereal for breakfast, snack some during the day on such items as apples, nuts and similar foods and light dinner meal.  He occasionally will drink original Ensure to supplement oral intake, but wife does not feel that he is drinking enough to support nutritional status.    Emphasized the importance of nutrition with diagnosis and treatment, focusing on increasing the amount and variety of foods in his diet to prevent additional weight loss and increase stamina, role of protein and high protein foods,  encouragement to graze throughout the day, eating every 1-2 hours.      Provided samples of Ensure Complete and coupons, with tips for flavor and nutrient enhancement.    Goal       Monitoring / Evaluation     Will follow.

## 2023-02-15 ENCOUNTER — HOSPITAL ENCOUNTER (OUTPATIENT)
Dept: RADIATION ONCOLOGY | Facility: HOSPITAL | Age: 83
Discharge: HOME | End: 2023-02-15

## 2023-02-15 PROCEDURE — 77387 GUIDANCE FOR RADJ TX DLVR: CPT | Performed by: RADIOLOGY

## 2023-02-15 PROCEDURE — 77412 RADIATION TX DELIVERY LVL 3: CPT | Performed by: RADIOLOGY

## 2023-02-16 ENCOUNTER — HOSPITAL ENCOUNTER (OUTPATIENT)
Dept: RADIATION ONCOLOGY | Facility: HOSPITAL | Age: 83
Discharge: HOME | End: 2023-02-16

## 2023-02-16 PROCEDURE — 77336 RADIATION PHYSICS CONSULT: CPT | Performed by: RADIOLOGY

## 2023-02-16 PROCEDURE — 77412 RADIATION TX DELIVERY LVL 3: CPT | Performed by: RADIOLOGY

## 2023-02-16 PROCEDURE — 77387 GUIDANCE FOR RADJ TX DLVR: CPT | Performed by: RADIOLOGY

## 2023-02-17 ENCOUNTER — HOSPITAL ENCOUNTER (OUTPATIENT)
Dept: RADIATION ONCOLOGY | Facility: HOSPITAL | Age: 83
Discharge: HOME | End: 2023-02-17

## 2023-02-17 PROCEDURE — 77387 GUIDANCE FOR RADJ TX DLVR: CPT | Performed by: RADIOLOGY

## 2023-02-17 PROCEDURE — 77412 RADIATION TX DELIVERY LVL 3: CPT | Performed by: RADIOLOGY

## 2023-02-20 ENCOUNTER — HOSPITAL ENCOUNTER (OUTPATIENT)
Dept: RADIATION ONCOLOGY | Facility: HOSPITAL | Age: 83
Discharge: HOME | End: 2023-02-20

## 2023-02-20 PROCEDURE — 77387 GUIDANCE FOR RADJ TX DLVR: CPT | Performed by: RADIOLOGY

## 2023-02-20 PROCEDURE — 77412 RADIATION TX DELIVERY LVL 3: CPT | Performed by: RADIOLOGY

## 2023-02-21 ENCOUNTER — HOSPITAL ENCOUNTER (OUTPATIENT)
Dept: RADIATION ONCOLOGY | Facility: HOSPITAL | Age: 83
Discharge: HOME | End: 2023-02-21

## 2023-02-21 VITALS — BODY MASS INDEX: 24.08 KG/M2 | WEIGHT: 167.8 LBS

## 2023-02-21 PROCEDURE — 77412 RADIATION TX DELIVERY LVL 3: CPT | Performed by: RADIOLOGY

## 2023-02-21 PROCEDURE — 77387 GUIDANCE FOR RADJ TX DLVR: CPT | Performed by: RADIOLOGY

## 2023-02-22 ENCOUNTER — HOSPITAL ENCOUNTER (OUTPATIENT)
Dept: RADIATION ONCOLOGY | Facility: HOSPITAL | Age: 83
Discharge: HOME | End: 2023-02-22
Payer: MEDICARE

## 2023-02-22 DIAGNOSIS — C82.59 DIFFUSE FOLLICLE CENTER LYMPHOMA OF EXTRANODAL SITE: Primary | Chronic | ICD-10-CM

## 2023-02-22 PROCEDURE — 77387 GUIDANCE FOR RADJ TX DLVR: CPT | Performed by: RADIOLOGY

## 2023-02-22 PROCEDURE — 77412 RADIATION TX DELIVERY LVL 3: CPT | Performed by: RADIOLOGY

## 2023-02-23 ENCOUNTER — HOSPITAL ENCOUNTER (OUTPATIENT)
Dept: RADIATION ONCOLOGY | Facility: HOSPITAL | Age: 83
Discharge: HOME | End: 2023-02-23

## 2023-02-23 PROCEDURE — 77412 RADIATION TX DELIVERY LVL 3: CPT | Performed by: RADIOLOGY

## 2023-02-23 PROCEDURE — 77387 GUIDANCE FOR RADJ TX DLVR: CPT | Performed by: RADIOLOGY

## 2023-02-24 ENCOUNTER — HOSPITAL ENCOUNTER (OUTPATIENT)
Dept: RADIATION ONCOLOGY | Facility: HOSPITAL | Age: 83
Discharge: HOME | End: 2023-02-24

## 2023-02-24 PROCEDURE — 77336 RADIATION PHYSICS CONSULT: CPT | Performed by: RADIOLOGY

## 2023-02-24 PROCEDURE — 77412 RADIATION TX DELIVERY LVL 3: CPT | Performed by: RADIOLOGY

## 2023-02-24 PROCEDURE — 77387 GUIDANCE FOR RADJ TX DLVR: CPT | Performed by: RADIOLOGY

## 2023-02-25 ENCOUNTER — HOSPITAL ENCOUNTER (EMERGENCY)
Facility: HOSPITAL | Age: 83
Discharge: HOME | End: 2023-02-25
Attending: EMERGENCY MEDICINE | Admitting: EMERGENCY MEDICINE
Payer: MEDICARE

## 2023-02-25 ENCOUNTER — APPOINTMENT (OUTPATIENT)
Dept: CT IMAGING | Facility: HOSPITAL | Age: 83
End: 2023-02-25
Payer: MEDICARE

## 2023-02-25 VITALS
TEMPERATURE: 98.5 F | HEART RATE: 80 BPM | BODY MASS INDEX: 24.34 KG/M2 | RESPIRATION RATE: 16 BRPM | OXYGEN SATURATION: 97 % | DIASTOLIC BLOOD PRESSURE: 90 MMHG | WEIGHT: 170 LBS | HEIGHT: 70 IN | SYSTOLIC BLOOD PRESSURE: 117 MMHG

## 2023-02-25 DIAGNOSIS — K59.00 CONSTIPATION, UNSPECIFIED CONSTIPATION TYPE: ICD-10-CM

## 2023-02-25 DIAGNOSIS — D64.9 ANEMIA, UNSPECIFIED TYPE: ICD-10-CM

## 2023-02-25 DIAGNOSIS — Z85.79 HISTORY OF LYMPHOMA: ICD-10-CM

## 2023-02-25 DIAGNOSIS — R10.30 LOWER ABDOMINAL PAIN: Primary | ICD-10-CM

## 2023-02-25 LAB
ALBUMIN SERPL-MCNC: 4.2 G/DL (ref 3.5–5.2)
ALBUMIN/GLOB SERPL: 1.6 G/DL
ALP SERPL-CCNC: 110 U/L (ref 39–117)
ALT SERPL W P-5'-P-CCNC: 7 U/L (ref 1–41)
ANION GAP SERPL CALCULATED.3IONS-SCNC: 9 MMOL/L (ref 5–15)
AST SERPL-CCNC: 14 U/L (ref 1–40)
BASOPHILS # BLD AUTO: 0.01 10*3/MM3 (ref 0–0.2)
BASOPHILS NFR BLD AUTO: 0.1 % (ref 0–1.5)
BILIRUB SERPL-MCNC: 0.5 MG/DL (ref 0–1.2)
BILIRUB UR QL STRIP: NEGATIVE
BUN SERPL-MCNC: 10 MG/DL (ref 8–23)
BUN/CREAT SERPL: 12.7 (ref 7–25)
CALCIUM SPEC-SCNC: 9.2 MG/DL (ref 8.6–10.5)
CHLORIDE SERPL-SCNC: 106 MMOL/L (ref 98–107)
CLARITY UR: CLEAR
CO2 SERPL-SCNC: 27 MMOL/L (ref 22–29)
COLOR UR: YELLOW
CREAT BLDA-MCNC: 0.8 MG/DL
CREAT BLDA-MCNC: 0.8 MG/DL (ref 0.6–1.3)
CREAT SERPL-MCNC: 0.79 MG/DL (ref 0.76–1.27)
D-LACTATE SERPL-SCNC: 0.7 MMOL/L (ref 0.5–2)
DEPRECATED RDW RBC AUTO: 51.4 FL (ref 37–54)
EGFRCR SERPLBLD CKD-EPI 2021: 88.7 ML/MIN/1.73
EOSINOPHIL # BLD AUTO: 0.25 10*3/MM3 (ref 0–0.4)
EOSINOPHIL NFR BLD AUTO: 3.4 % (ref 0.3–6.2)
ERYTHROCYTE [DISTWIDTH] IN BLOOD BY AUTOMATED COUNT: 15.7 % (ref 12.3–15.4)
GLOBULIN UR ELPH-MCNC: 2.6 GM/DL
GLUCOSE SERPL-MCNC: 95 MG/DL (ref 65–99)
GLUCOSE UR STRIP-MCNC: NEGATIVE MG/DL
HCT VFR BLD AUTO: 36.4 % (ref 37.5–51)
HGB BLD-MCNC: 11.9 G/DL (ref 13–17.7)
HGB UR QL STRIP.AUTO: NEGATIVE
HOLD SPECIMEN: NORMAL
IMM GRANULOCYTES # BLD AUTO: 0.02 10*3/MM3 (ref 0–0.05)
IMM GRANULOCYTES NFR BLD AUTO: 0.3 % (ref 0–0.5)
KETONES UR QL STRIP: NEGATIVE
LEUKOCYTE ESTERASE UR QL STRIP.AUTO: NEGATIVE
LIPASE SERPL-CCNC: 19 U/L (ref 13–60)
LYMPHOCYTES # BLD AUTO: 0.49 10*3/MM3 (ref 0.7–3.1)
LYMPHOCYTES NFR BLD AUTO: 6.8 % (ref 19.6–45.3)
MCH RBC QN AUTO: 29.8 PG (ref 26.6–33)
MCHC RBC AUTO-ENTMCNC: 32.7 G/DL (ref 31.5–35.7)
MCV RBC AUTO: 91 FL (ref 79–97)
MONOCYTES # BLD AUTO: 0.64 10*3/MM3 (ref 0.1–0.9)
MONOCYTES NFR BLD AUTO: 8.8 % (ref 5–12)
NEUTROPHILS NFR BLD AUTO: 5.84 10*3/MM3 (ref 1.7–7)
NEUTROPHILS NFR BLD AUTO: 80.6 % (ref 42.7–76)
NITRITE UR QL STRIP: NEGATIVE
NRBC BLD AUTO-RTO: 0 /100 WBC (ref 0–0.2)
PH UR STRIP.AUTO: 7.5 [PH] (ref 5–8)
PLATELET # BLD AUTO: 178 10*3/MM3 (ref 140–450)
PMV BLD AUTO: 10.9 FL (ref 6–12)
POTASSIUM SERPL-SCNC: 3.5 MMOL/L (ref 3.5–5.2)
PROT SERPL-MCNC: 6.8 G/DL (ref 6–8.5)
PROT UR QL STRIP: NEGATIVE
RBC # BLD AUTO: 4 10*6/MM3 (ref 4.14–5.8)
SODIUM SERPL-SCNC: 142 MMOL/L (ref 136–145)
SP GR UR STRIP: 1.01 (ref 1–1.03)
UROBILINOGEN UR QL STRIP: NORMAL
WBC NRBC COR # BLD: 7.25 10*3/MM3 (ref 3.4–10.8)
WHOLE BLOOD HOLD COAG: NORMAL
WHOLE BLOOD HOLD SPECIMEN: NORMAL

## 2023-02-25 PROCEDURE — 25010000002 HYDROMORPHONE PER 4 MG: Performed by: EMERGENCY MEDICINE

## 2023-02-25 PROCEDURE — 85025 COMPLETE CBC W/AUTO DIFF WBC: CPT | Performed by: EMERGENCY MEDICINE

## 2023-02-25 PROCEDURE — 25010000002 DIPHENHYDRAMINE PER 50 MG: Performed by: EMERGENCY MEDICINE

## 2023-02-25 PROCEDURE — 82565 ASSAY OF CREATININE: CPT

## 2023-02-25 PROCEDURE — 99283 EMERGENCY DEPT VISIT LOW MDM: CPT

## 2023-02-25 PROCEDURE — 25010000002 ONDANSETRON PER 1 MG: Performed by: EMERGENCY MEDICINE

## 2023-02-25 PROCEDURE — 96375 TX/PRO/DX INJ NEW DRUG ADDON: CPT

## 2023-02-25 PROCEDURE — 80053 COMPREHEN METABOLIC PANEL: CPT | Performed by: EMERGENCY MEDICINE

## 2023-02-25 PROCEDURE — 36415 COLL VENOUS BLD VENIPUNCTURE: CPT

## 2023-02-25 PROCEDURE — 83690 ASSAY OF LIPASE: CPT | Performed by: EMERGENCY MEDICINE

## 2023-02-25 PROCEDURE — 74177 CT ABD & PELVIS W/CONTRAST: CPT

## 2023-02-25 PROCEDURE — 83605 ASSAY OF LACTIC ACID: CPT | Performed by: EMERGENCY MEDICINE

## 2023-02-25 PROCEDURE — 81003 URINALYSIS AUTO W/O SCOPE: CPT | Performed by: EMERGENCY MEDICINE

## 2023-02-25 PROCEDURE — 25510000001 IOPAMIDOL 61 % SOLUTION: Performed by: EMERGENCY MEDICINE

## 2023-02-25 PROCEDURE — 96374 THER/PROPH/DIAG INJ IV PUSH: CPT

## 2023-02-25 PROCEDURE — 25010000002 METHYLPREDNISOLONE PER 40 MG: Performed by: EMERGENCY MEDICINE

## 2023-02-25 RX ORDER — SODIUM CHLORIDE 9 MG/ML
10 INJECTION INTRAVENOUS AS NEEDED
Status: DISCONTINUED | OUTPATIENT
Start: 2023-02-25 | End: 2023-02-25 | Stop reason: HOSPADM

## 2023-02-25 RX ORDER — METHYLPREDNISOLONE SODIUM SUCCINATE 40 MG/ML
40 INJECTION, POWDER, LYOPHILIZED, FOR SOLUTION INTRAMUSCULAR; INTRAVENOUS EVERY 4 HOURS
Status: DISCONTINUED | OUTPATIENT
Start: 2023-02-25 | End: 2023-02-25 | Stop reason: HOSPADM

## 2023-02-25 RX ORDER — DIPHENHYDRAMINE HYDROCHLORIDE 50 MG/ML
50 INJECTION INTRAMUSCULAR; INTRAVENOUS
Status: COMPLETED | OUTPATIENT
Start: 2023-02-25 | End: 2023-02-25

## 2023-02-25 RX ORDER — ONDANSETRON 2 MG/ML
4 INJECTION INTRAMUSCULAR; INTRAVENOUS ONCE
Status: COMPLETED | OUTPATIENT
Start: 2023-02-25 | End: 2023-02-25

## 2023-02-25 RX ORDER — HYDROMORPHONE HYDROCHLORIDE 1 MG/ML
0.5 INJECTION, SOLUTION INTRAMUSCULAR; INTRAVENOUS; SUBCUTANEOUS ONCE
Status: COMPLETED | OUTPATIENT
Start: 2023-02-25 | End: 2023-02-25

## 2023-02-25 RX ADMIN — DIPHENHYDRAMINE HYDROCHLORIDE 50 MG: 50 INJECTION INTRAMUSCULAR; INTRAVENOUS at 16:35

## 2023-02-25 RX ADMIN — ONDANSETRON 4 MG: 2 INJECTION INTRAMUSCULAR; INTRAVENOUS at 16:35

## 2023-02-25 RX ADMIN — HYDROMORPHONE HYDROCHLORIDE 0.5 MG: 1 INJECTION, SOLUTION INTRAMUSCULAR; INTRAVENOUS; SUBCUTANEOUS at 16:36

## 2023-02-25 RX ADMIN — IOPAMIDOL 85 ML: 612 INJECTION, SOLUTION INTRAVENOUS at 18:14

## 2023-02-25 RX ADMIN — SODIUM CHLORIDE 1000 ML: 9 INJECTION, SOLUTION INTRAVENOUS at 16:34

## 2023-02-25 RX ADMIN — METHYLPREDNISOLONE SODIUM SUCCINATE 40 MG: 40 INJECTION, POWDER, FOR SOLUTION INTRAMUSCULAR; INTRAVENOUS at 16:35

## 2023-02-27 ENCOUNTER — HOSPITAL ENCOUNTER (OUTPATIENT)
Dept: RADIATION ONCOLOGY | Facility: HOSPITAL | Age: 83
Discharge: HOME | End: 2023-02-27

## 2023-02-27 PROCEDURE — 77387 GUIDANCE FOR RADJ TX DLVR: CPT | Performed by: RADIOLOGY

## 2023-02-27 PROCEDURE — 77412 RADIATION TX DELIVERY LVL 3: CPT | Performed by: RADIOLOGY

## 2023-02-28 ENCOUNTER — HOSPITAL ENCOUNTER (OUTPATIENT)
Dept: RADIATION ONCOLOGY | Facility: HOSPITAL | Age: 83
Discharge: HOME | End: 2023-02-28
Payer: MEDICARE

## 2023-02-28 ENCOUNTER — DOCUMENTATION (OUTPATIENT)
Dept: NUTRITION | Facility: HOSPITAL | Age: 83
End: 2023-02-28
Payer: MEDICARE

## 2023-02-28 VITALS — BODY MASS INDEX: 24.26 KG/M2 | WEIGHT: 169.1 LBS

## 2023-02-28 DIAGNOSIS — C82.06 FOLLICULAR LYMPHOMA GRADE I OF INTRAPELVIC LYMPH NODES: Primary | ICD-10-CM

## 2023-02-28 PROCEDURE — 77387 GUIDANCE FOR RADJ TX DLVR: CPT | Performed by: RADIOLOGY

## 2023-02-28 PROCEDURE — 77412 RADIATION TX DELIVERY LVL 3: CPT | Performed by: RADIOLOGY

## 2023-03-01 ENCOUNTER — TELEPHONE (OUTPATIENT)
Dept: RADIATION ONCOLOGY | Facility: HOSPITAL | Age: 83
End: 2023-03-01
Payer: MEDICARE

## 2023-03-01 ENCOUNTER — HOSPITAL ENCOUNTER (OUTPATIENT)
Dept: RADIATION ONCOLOGY | Facility: HOSPITAL | Age: 83
Discharge: HOME | End: 2023-03-01
Payer: MEDICARE

## 2023-03-01 ENCOUNTER — HOSPITAL ENCOUNTER (OUTPATIENT)
Dept: RADIATION ONCOLOGY | Facility: HOSPITAL | Age: 83
Setting detail: RADIATION/ONCOLOGY SERIES
Discharge: HOME | End: 2023-03-01
Payer: MEDICARE

## 2023-03-01 DIAGNOSIS — C82.59 DIFFUSE FOLLICLE CENTER LYMPHOMA OF EXTRANODAL SITE: Primary | Chronic | ICD-10-CM

## 2023-03-01 PROCEDURE — 77387 GUIDANCE FOR RADJ TX DLVR: CPT | Performed by: RADIOLOGY

## 2023-03-01 PROCEDURE — 77412 RADIATION TX DELIVERY LVL 3: CPT | Performed by: RADIOLOGY

## 2023-03-01 NOTE — TELEPHONE ENCOUNTER
Left VM for pt that f/u moved to 3/30/23 @ 3:00 with Dr. Christianson with scan prior-asked to call to co nfirm

## 2023-03-13 RX ORDER — ALBUTEROL SULFATE 2.5 MG/3ML
SOLUTION RESPIRATORY (INHALATION)
Qty: 90 ML | Refills: 0 | OUTPATIENT
Start: 2023-03-13

## 2023-03-14 NOTE — RADIATION COMPLETION NOTES
DATE OF COMPLETION: 3/1/2023  DIAGNOSIS: Follicular lymphoma    REFERRING: Thor Ruggiero MD        George Pacheco completed radiation therapy today.      BACKGROUND: George Lomeli is an 82-year-old gentleman with a known history of recurrent follicular lymphoma.  He has a large complex presacral mass in his pelvis causing discomfort and pain.  He underwent a course of palliative radiation therapy as detailed below:    Treatment Summary     Dates of Therapy: 2/9/2023-3/1/2023  Treatment Site: Presacral mass  Dose: 30 Gy in 15 fractions of 2 Gy each  Technique: A 4 field, 3-dimensional plan using 15 MV photons was utilized to treat the primary mass in the patient's pelvis with a margin.    Treatment Course and Tolerance: He tolerated radiation treatments very well.  He had 1 bout of diverticulitis that overlapped with his treatment that required antibiotics.  Otherwise he did not develop any acute radiation related toxicities.    The initial follow up visit will be in 1 month with repeat scans.    Georeg Lomeli knows to call if any problems or concerns develop in the meantime.     Electronically signed by: Milo Christianson MD                    Cc: Kaycee Douglas MD

## 2023-03-16 DIAGNOSIS — J47.9 BRONCHIECTASIS WITHOUT COMPLICATION: Primary | ICD-10-CM

## 2023-03-16 RX ORDER — ALBUTEROL SULFATE 2.5 MG/3ML
SOLUTION RESPIRATORY (INHALATION)
Qty: 90 ML | Refills: 0 | Status: SHIPPED | OUTPATIENT
Start: 2023-03-16

## 2023-03-30 ENCOUNTER — HOSPITAL ENCOUNTER (OUTPATIENT)
Dept: CT IMAGING | Facility: HOSPITAL | Age: 83
Discharge: HOME | End: 2023-03-30
Admitting: RADIOLOGY
Payer: MEDICARE

## 2023-03-30 ENCOUNTER — OFFICE VISIT (OUTPATIENT)
Dept: RADIATION ONCOLOGY | Facility: HOSPITAL | Age: 83
End: 2023-03-30
Payer: MEDICARE

## 2023-03-30 VITALS
HEART RATE: 85 BPM | TEMPERATURE: 96.1 F | SYSTOLIC BLOOD PRESSURE: 131 MMHG | DIASTOLIC BLOOD PRESSURE: 83 MMHG | OXYGEN SATURATION: 98 % | BODY MASS INDEX: 24.02 KG/M2 | RESPIRATION RATE: 18 BRPM | WEIGHT: 167.4 LBS

## 2023-03-30 DIAGNOSIS — C82.06 FOLLICULAR LYMPHOMA GRADE I OF INTRAPELVIC LYMPH NODES: ICD-10-CM

## 2023-03-30 DIAGNOSIS — C82.59 DIFFUSE FOLLICLE CENTER LYMPHOMA OF EXTRANODAL SITE: Chronic | ICD-10-CM

## 2023-03-30 DIAGNOSIS — G44.52 HEADACHE, NEW DAILY PERSISTENT (NDPH): ICD-10-CM

## 2023-03-30 LAB — CREAT BLDA-MCNC: 0.9 MG/DL (ref 0.6–1.3)

## 2023-03-30 PROCEDURE — 25510000001 IOPAMIDOL 61 % SOLUTION: Performed by: RADIOLOGY

## 2023-03-30 PROCEDURE — 74177 CT ABD & PELVIS W/CONTRAST: CPT

## 2023-03-30 PROCEDURE — G0463 HOSPITAL OUTPT CLINIC VISIT: HCPCS | Performed by: RADIOLOGY

## 2023-03-30 PROCEDURE — 82565 ASSAY OF CREATININE: CPT

## 2023-03-30 RX ORDER — TIMOLOL MALEATE 5 MG/ML
SOLUTION/ DROPS OPHTHALMIC
COMMUNITY

## 2023-03-30 RX ADMIN — IOPAMIDOL 85 ML: 612 INJECTION, SOLUTION INTRAVENOUS at 13:23

## 2023-03-30 NOTE — PROGRESS NOTES
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 Lomeli returns to clinic today just short of 4 weeks after completing external beam radiation therapy to the pelvis for a large pre-sacral recurrence of his follicular lymphoma.  He completed to a dose of 30 Gray.  During treatment, he did have some relapsing abdominal symptoms of crampy pain that was attributed to the main site of disease.  He states that following treatment, he continues to have some occasional pain, but as a whole he feels like his symptoms are much better than they were a month ago.  He denies any new B symptoms.    MEDICATIONS: Medication reconciliation for the patient was reviewed and confirmed in the electronic medical record.    Review of Systems   Musculoskeletal: Positive for arthralgias (intermittent left hip pain- right hip pain radiates down the thigh) and back pain (down right leg).   All other systems reviewed and are negative.      Karnofsky score: 80     Physical Exam  Vitals and nursing note reviewed.   Constitutional:       General: He is not in acute distress.     Appearance: He is well-developed.   HENT:      Head: Normocephalic and atraumatic.   Eyes:      Conjunctiva/sclera: Conjunctivae normal.      Pupils: Pupils are equal, round, and reactive to light.   Cardiovascular:      Rate and Rhythm: Normal rate and regular rhythm.      Heart sounds: No murmur heard.    No friction rub.   Pulmonary:      Effort: Pulmonary effort is normal.      Breath sounds: Normal breath sounds. No wheezing.   Abdominal:      General: Bowel sounds are normal. There is no distension.      Palpations: Abdomen is soft. There is no mass.       Tenderness: There is no abdominal tenderness.   Musculoskeletal:         General: Normal range of motion.      Cervical back: Normal range of motion and neck supple.   Lymphadenopathy:      Cervical: No cervical adenopathy.   Skin:     General: Skin is warm and dry.   Neurological:      Mental Status: He is alert and oriented to person, place, and time.   Psychiatric:         Behavior: Behavior normal.         Thought Content: Thought content normal.         Judgment: Judgment normal.         VITAL SIGNS:   Vitals:    03/30/23 1509   BP: 131/83   Pulse: 85   Resp: 18   Temp: 96.1 °F (35.6 °C)   TempSrc: Temporal   SpO2: 98%   Weight: 75.9 kg (167 lb 6.4 oz)   PainSc:   7   PainLoc: Leg  Comment: right             Karnofsky score: 80     IMAGING  I have personally reviewed the relevant imaging studies, as follows:  CT Abdomen Pelvis Without Contrast    Result Date: 12/3/2022  1.  Stable presacral mass. 2.  Nonspecific bulky appearance of the cecum is probably due to decompression, cannot exclude cecitis/diverticulitis. 3.  Unremarkable appendix. Electronically signed by:  Moise Watts D.O.  12/2/2022 11:58 PM Mountain Time    CT Abdomen Pelvis With Contrast    Result Date: 3/30/2023  Impression: 1. Resolving type II endoleak, now minimal in size. 2. Further decrease in size of the patient's presacral mass, compared to 2/25/2023 exam. 3. Small gallstone. 4. No new intra-abdominal or intrapelvic pathology is seen elsewhere. Electronically Signed: Ty Jones  3/30/2023 2:12 PM EDT  Workstation ID: [id number]    CT Abdomen Pelvis With Contrast    Result Date: 2/25/2023  Impression: No acute abdominopelvic findings. Redemonstration of infrarenal abdominal aortic aneurysm with aortobiiliac stent graft with evidence of endoleak. The excluded aneurysm appears stable in size comparing to most recent prior exam. Decreased size of ill-defined and irregular presacral mass, presumably reflecting treatment-related changes of  known lymphoma. Additional chronic and incidental findings as detailed above. Electronically Signed: Christopher Damon  2/25/2023 6:40 PM EST  Workstation ID: [id number]    CT Needle Biopsy Soft Tissue    Result Date: 2/3/2023  Impression: Technically successful CT-guided biopsy of the patient's presacral soft tissue Electronically Signed: George Juárez MD  2/3/2023 4:08 PM EST  Workstation ID: [id number]    NM PET/CT Skull Base to Mid Thigh    Result Date: 2/2/2023   1. Strongly hypermetabolic activity throughout the patient's irregularly lobular pelvic mass. 2. Separate small hypermetabolic lower presacral lymph node. 3. Probable normal variant activity in a normal-appearing rectum. No evidence of distant lymphoma/metastatic disease elsewhere. 4. Incidentally noted nonhypermetabolic right lobe liver cyst and incidentally noted small gallstone.  This report was finalized on 2/2/2023 1:52 PM by Dr. Ty Jones MD.        The following portions of the patient's history were reviewed and updated as appropriate: allergies, current medications, past family history, past medical history, past social history, past surgical history and problem list.         Diagnoses and all orders for this visit:    1. 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 Lomeli is an 82-year-old gentleman with a known diagnosis of a relapsed stage IV follicular lymphoma.  He is now approaching 4 weeks out from completion of involved field radiation therapy to the low risk, and has had excellent months with significant downsizing of disease within the pelvis.  I suspect that most of his abdominal pains will continue to improve, although he also reports some pain that sounds radicular in nature to the right thigh.  I encouraged him to take the gabapentin that he has been prescribed.  I will plan to see him back in 3 months and we can repeat imaging at that time.    RECOMMENDATIONS: Return to clinic in 3-month    I spent  a total of 30 minutes on todays visit, with more than 20 minutes in direct face to face communication, and the remainder of the time spent in reviewing the relevant history, records, available imaging, and for documentation.    Return in about 3 months (around 6/30/2023) for Office Visit.    Milo Christianson MD

## 2023-03-31 RX ORDER — GABAPENTIN 100 MG/1
CAPSULE ORAL
Qty: 180 CAPSULE | Refills: 5 | Status: SHIPPED | OUTPATIENT
Start: 2023-03-31

## 2023-04-11 ENCOUNTER — OFFICE VISIT (OUTPATIENT)
Dept: PULMONOLOGY | Facility: CLINIC | Age: 83
End: 2023-04-11
Payer: MEDICARE

## 2023-04-11 VITALS
DIASTOLIC BLOOD PRESSURE: 84 MMHG | BODY MASS INDEX: 24.32 KG/M2 | HEART RATE: 75 BPM | SYSTOLIC BLOOD PRESSURE: 132 MMHG | WEIGHT: 169.9 LBS | TEMPERATURE: 96.9 F | HEIGHT: 70 IN | OXYGEN SATURATION: 98 %

## 2023-04-11 DIAGNOSIS — J47.9 BRONCHIECTASIS WITHOUT COMPLICATION: ICD-10-CM

## 2023-04-11 PROCEDURE — 1159F MED LIST DOCD IN RCRD: CPT | Performed by: NURSE PRACTITIONER

## 2023-04-11 PROCEDURE — 3075F SYST BP GE 130 - 139MM HG: CPT | Performed by: NURSE PRACTITIONER

## 2023-04-11 PROCEDURE — 99213 OFFICE O/P EST LOW 20 MIN: CPT | Performed by: NURSE PRACTITIONER

## 2023-04-11 PROCEDURE — 1160F RVW MEDS BY RX/DR IN RCRD: CPT | Performed by: NURSE PRACTITIONER

## 2023-04-11 PROCEDURE — 3079F DIAST BP 80-89 MM HG: CPT | Performed by: NURSE PRACTITIONER

## 2023-04-11 RX ORDER — ALBUTEROL SULFATE 2.5 MG/3ML
2.5 SOLUTION RESPIRATORY (INHALATION) EVERY 6 HOURS PRN
Qty: 120 EACH | Refills: 5 | Status: SHIPPED | OUTPATIENT
Start: 2023-04-11

## 2023-04-11 NOTE — PROGRESS NOTES
"[facility] Pulmonary Follow up      Chief Complaint  Bronchiectasis without complication (F/U/)    Subjective          George Lomeli presents to [facility] for   A follow him here in the office for bronchiectasis, have not seen him since November 2020.  During that time he was having a bit of an exacerbation with his bronchiectasis.    He says he has done well since then.  He did have a flareup as well as lymphoma and completed treatment.  He is a has not had any acute exacerbations or respiratory illnesses.    He stopped using his Advair sometime ago.  He does continue to use his nebulizers about once a day.  He denies a cough or any sputum production currently.          Objective     Vital Signs:   /84   Pulse 75   Temp 96.9 °F (36.1 °C) (Temporal)   Ht 177.8 cm (70\")   Wt 77.1 kg (169 lb 14.4 oz)   SpO2 98% Comment: Resting at room air  BMI 24.38 kg/m²       Immunization History   Administered Date(s) Administered   • COVID-19 (PFIZER) PURPLE CAP 02/23/2021, 03/16/2021, 10/22/2021   • FLUAD TRI 65YR+ 10/19/2020   • Flu Vaccine Quad PF >36MO 12/12/2016, 11/01/2017   • Fluzone High Dose =>65 Years ([employer] ONLY) 01/12/2018, 10/07/2018, 12/10/2018, 10/09/2019   • Influenza, Unspecified 11/01/2017   • Pneumococcal Polysaccharide (PPSV23) 06/30/2005, 12/12/2016   • Td 09/26/2000       Physical Exam  Vitals reviewed.   Constitutional:       Appearance: He is well-developed.   HENT:      Head: Normocephalic and atraumatic.   Eyes:      Pupils: Pupils are equal, round, and reactive to light.   Cardiovascular:      Rate and Rhythm: Normal rate and regular rhythm.      Heart sounds: No murmur heard.  Pulmonary:      Effort: Pulmonary effort is normal. No respiratory distress.      Breath sounds: Normal breath sounds. No wheezing or rales.   Abdominal:      General: Bowel sounds are normal. There is no distension.      Palpations: Abdomen is soft. "   Musculoskeletal:         General: Normal range of motion.      Cervical back: Normal range of motion and neck supple.   Skin:     General: Skin is warm and dry.      Findings: No erythema.   Neurological:      Mental Status: He is alert and oriented to person, place, and time.   Psychiatric:         Behavior: Behavior normal.          Result Review :                         Assessment and Plan    Problem List Items Addressed This Visit        Pulmonary and Pneumonias    Bronchiectasis without complication    Relevant Medications    albuterol (PROVENTIL) (2.5 MG/3ML) 0.083% nebulizer solution       We discussed his nebulizers, he will ontinue those as needed with any cough or secretions.  If he is doing well, he can skip his nebulizers.  We did discuss using those if any of his symptoms return.  We can go ahead and hold his Advair since he is done well without it.  He will follow back up in 6 months to get back in routine, sooner with any issues.    Follow Up     No follow-ups on file.  Patient was given instructions and counseling regarding his condition or for health maintenance advice. Please see specific information pulled into the AVS if appropriate.       .    YOVANI Neil, ACNP  [facility]  Electronically signed

## 2023-05-30 ENCOUNTER — TELEPHONE (OUTPATIENT)
Dept: ONCOLOGY | Facility: CLINIC | Age: 83
End: 2023-05-30

## 2023-05-30 NOTE — TELEPHONE ENCOUNTER
Caller: George Lomeli    Relationship: Self    Best call back number: [phone]      What was the call regarding: PATIENT MISSED CALL FROM THE OFFICE

## 2023-06-12 ENCOUNTER — OFFICE VISIT (OUTPATIENT)
Dept: RADIATION ONCOLOGY | Facility: HOSPITAL | Age: 83
End: 2023-06-12
Payer: MEDICARE

## 2023-06-12 VITALS
OXYGEN SATURATION: 98 % | SYSTOLIC BLOOD PRESSURE: 125 MMHG | TEMPERATURE: 96.8 F | RESPIRATION RATE: 18 BRPM | WEIGHT: 167.6 LBS | DIASTOLIC BLOOD PRESSURE: 85 MMHG | HEART RATE: 72 BPM | BODY MASS INDEX: 24.05 KG/M2

## 2023-06-12 DIAGNOSIS — C82.59 DIFFUSE FOLLICLE CENTER LYMPHOMA OF EXTRANODAL SITE: Primary | ICD-10-CM

## 2023-06-12 PROCEDURE — G0463 HOSPITAL OUTPT CLINIC VISIT: HCPCS | Performed by: RADIOLOGY

## 2023-06-12 NOTE — PROGRESS NOTES
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 Lomeli returns to clinic for a scheduled follow-up visit.  He completed involved field radiation therapy to the pelvis 3 months ago for a large recurrence of his lymphoma.  Overall, he states he has done well.  His pain continues to improve and he denies any new symptoms and specifically denies any B symptoms.    MEDICATIONS: Medication reconciliation for the patient was reviewed and confirmed in the electronic medical record.    Review of Systems   Musculoskeletal:  Positive for arthralgias and back pain (radiates down right leg> than left).   Neurological:  Positive for numbness (hands).   All other systems reviewed and are negative.    Karnofsky score: 80     Physical Exam  Vitals and nursing note reviewed.   Constitutional:       General: He is not in acute distress.     Appearance: He is well-developed.   HENT:      Head: Normocephalic and atraumatic.   Eyes:      Conjunctiva/sclera: Conjunctivae normal.      Pupils: Pupils are equal, round, and reactive to light.   Cardiovascular:      Rate and Rhythm: Normal rate and regular rhythm.      Heart sounds: No murmur heard.    No friction rub.   Pulmonary:      Effort: Pulmonary effort is normal.      Breath sounds: Normal breath sounds. No wheezing.   Abdominal:      General: Bowel sounds are normal. There is no distension.      Palpations: Abdomen is soft. There is no mass.      Tenderness: There is no abdominal tenderness.   Musculoskeletal:         General: Normal range of motion.      Cervical back: Normal range of motion and neck supple.      Comments: Mild lower back pain elicited with percussion    Lymphadenopathy:      Cervical: No cervical adenopathy.   Skin:     General: Skin is warm and dry.   Neurological:      Mental Status: He is alert and oriented to person, place, and time.   Psychiatric:         Behavior: Behavior normal.         Thought Content: Thought content normal.         Judgment: Judgment normal.         VITAL SIGNS:   Vitals:    06/12/23 1438   BP: 125/85   Pulse: 72   Resp: 18   Temp: 96.8 °F (36 °C)   TempSrc: Temporal   SpO2: 98%   Weight: 76 kg (167 lb 9.6 oz)   PainSc:   7   PainLoc: Back  Comment: down right leg more than left             Karnofsky score: 80     IMAGING  I have personally reviewed the relevant imaging studies, as follows:  CT Abdomen Pelvis With Contrast    Result Date: 3/30/2023  Impression: 1. Resolving type II endoleak, now minimal in size. 2. Further decrease in size of the patient's presacral mass, compared to 2/25/2023 exam. 3. Small gallstone. 4. No new intra-abdominal or intrapelvic pathology is seen elsewhere. Electronically Signed: Ty Jones  3/30/2023 2:12 PM EDT  Workstation ID: [id number]    CT Abdomen Pelvis With Contrast    Result Date: 2/25/2023  Impression: No acute abdominopelvic findings. Redemonstration of infrarenal abdominal aortic aneurysm with aortobiiliac stent graft with evidence of endoleak. The excluded aneurysm appears stable in size comparing to most recent prior exam. Decreased size of ill-defined and irregular presacral mass, presumably reflecting treatment-related changes of known lymphoma. Additional chronic and incidental findings as detailed above. Electronically Signed: Christopher Jose  2/25/2023 6:40 PM EST  Workstation ID: [id number]        The following portions of the patient's history were reviewed and updated as appropriate: allergies, current medications, past family history, past medical history, past social history, past surgical history and problem list.         Diagnoses and all orders for this visit:    1. Diffuse follicle  [place] lymphoma of extranodal site (Primary)         IMPRESSION: Mr. Lomeli is an 82-year-old gentleman with known history of recurrent follicular lymphoma.  He completed involved field radiation therapy to the pelvis for a bulky recurrence 3 months ago, and he appears to be doing very well with improvement of his symptoms and no signs of new disease.  Radiographically, he has also had an excellent response.  I will plan to see him back in 6 months    RECOMMENDATIONS: Return to clinic in 6 months    I spent a total of 30 minutes on todays visit, with more than 20 minutes in direct face to face communication, and the remainder of the time spent in reviewing the relevant history, records, available imaging, and for documentation.    Return in about 6 months (around 12/12/2023) for Office Visit.    Milo Christianson MD

## 2023-06-26 ENCOUNTER — TELEPHONE (OUTPATIENT)
Dept: ONCOLOGY | Facility: OTHER | Age: 83
End: 2023-06-26

## 2023-06-26 NOTE — TELEPHONE ENCOUNTER
RAMÍREZ HORN CALLING FROM [facility] PCP FOR PATIENT SHE WILL BE FAXING OVER THE LABS FROM 5/17/2023 [phone].    ANY QUESTIONS CALL HER BACK [phone] TO KIAH CASPER [facility] NURSE

## 2023-07-21 ENCOUNTER — APPOINTMENT (OUTPATIENT)
Dept: CT IMAGING | Facility: HOSPITAL | Age: 83
End: 2023-07-21
Payer: MEDICARE

## 2023-07-21 ENCOUNTER — APPOINTMENT (OUTPATIENT)
Dept: GENERAL RADIOLOGY | Facility: HOSPITAL | Age: 83
End: 2023-07-21
Payer: MEDICARE

## 2023-07-21 ENCOUNTER — HOSPITAL ENCOUNTER (EMERGENCY)
Facility: HOSPITAL | Age: 83
Discharge: HOME | End: 2023-07-21
Attending: EMERGENCY MEDICINE
Payer: MEDICARE

## 2023-07-21 VITALS
OXYGEN SATURATION: 99 % | HEART RATE: 66 BPM | WEIGHT: 160 LBS | TEMPERATURE: 98 F | DIASTOLIC BLOOD PRESSURE: 87 MMHG | RESPIRATION RATE: 18 BRPM | SYSTOLIC BLOOD PRESSURE: 129 MMHG | BODY MASS INDEX: 22.9 KG/M2 | HEIGHT: 70 IN

## 2023-07-21 DIAGNOSIS — R10.30 LOWER ABDOMINAL PAIN: Primary | ICD-10-CM

## 2023-07-21 DIAGNOSIS — J40 BRONCHITIS: ICD-10-CM

## 2023-07-21 DIAGNOSIS — R68.89 FLU-LIKE SYMPTOMS: ICD-10-CM

## 2023-07-21 DIAGNOSIS — I48.91 ATRIAL FIBRILLATION, UNSPECIFIED TYPE: ICD-10-CM

## 2023-07-21 LAB
ALBUMIN SERPL-MCNC: 3.8 G/DL (ref 3.5–5.2)
ALBUMIN/GLOB SERPL: 1.7 G/DL
ALP SERPL-CCNC: 110 U/L (ref 39–117)
ALT SERPL W P-5'-P-CCNC: 17 U/L (ref 1–41)
ANION GAP SERPL CALCULATED.3IONS-SCNC: 12 MMOL/L (ref 5–15)
AST SERPL-CCNC: 19 U/L (ref 1–40)
B PARAPERT DNA SPEC QL NAA+PROBE: NOT DETECTED
B PERT DNA SPEC QL NAA+PROBE: NOT DETECTED
BASOPHILS # BLD AUTO: 0.01 10*3/MM3 (ref 0–0.2)
BASOPHILS NFR BLD AUTO: 0.1 % (ref 0–1.5)
BILIRUB SERPL-MCNC: 0.3 MG/DL (ref 0–1.2)
BILIRUB UR QL STRIP: NEGATIVE
BUN SERPL-MCNC: 12 MG/DL (ref 8–23)
BUN/CREAT SERPL: 15 (ref 7–25)
C PNEUM DNA NPH QL NAA+NON-PROBE: NOT DETECTED
CALCIUM SPEC-SCNC: 8.7 MG/DL (ref 8.6–10.5)
CHLORIDE SERPL-SCNC: 107 MMOL/L (ref 98–107)
CLARITY UR: CLEAR
CO2 SERPL-SCNC: 24 MMOL/L (ref 22–29)
COLOR UR: YELLOW
CREAT SERPL-MCNC: 0.8 MG/DL (ref 0.76–1.27)
D-LACTATE SERPL-SCNC: 0.9 MMOL/L (ref 0.5–2)
DEPRECATED RDW RBC AUTO: 53.9 FL (ref 37–54)
EGFRCR SERPLBLD CKD-EPI 2021: 87.8 ML/MIN/1.73
EOSINOPHIL # BLD AUTO: 0.16 10*3/MM3 (ref 0–0.4)
EOSINOPHIL NFR BLD AUTO: 1.8 % (ref 0.3–6.2)
ERYTHROCYTE [DISTWIDTH] IN BLOOD BY AUTOMATED COUNT: 15.9 % (ref 12.3–15.4)
FLUAV SUBTYP SPEC NAA+PROBE: NOT DETECTED
FLUBV RNA ISLT QL NAA+PROBE: NOT DETECTED
GEN 5 2HR TROPONIN T REFLEX: 16 NG/L
GLOBULIN UR ELPH-MCNC: 2.2 GM/DL
GLUCOSE SERPL-MCNC: 101 MG/DL (ref 65–99)
GLUCOSE UR STRIP-MCNC: NEGATIVE MG/DL
HADV DNA SPEC NAA+PROBE: NOT DETECTED
HCOV 229E RNA SPEC QL NAA+PROBE: NOT DETECTED
HCOV HKU1 RNA SPEC QL NAA+PROBE: NOT DETECTED
HCOV NL63 RNA SPEC QL NAA+PROBE: NOT DETECTED
HCOV OC43 RNA SPEC QL NAA+PROBE: NOT DETECTED
HCT VFR BLD AUTO: 33.6 % (ref 37.5–51)
HGB BLD-MCNC: 10.8 G/DL (ref 13–17.7)
HGB UR QL STRIP.AUTO: NEGATIVE
HMPV RNA NPH QL NAA+NON-PROBE: NOT DETECTED
HOLD SPECIMEN: NORMAL
HPIV1 RNA ISLT QL NAA+PROBE: NOT DETECTED
HPIV2 RNA SPEC QL NAA+PROBE: NOT DETECTED
HPIV3 RNA NPH QL NAA+PROBE: NOT DETECTED
HPIV4 P GENE NPH QL NAA+PROBE: NOT DETECTED
IMM GRANULOCYTES # BLD AUTO: 0.06 10*3/MM3 (ref 0–0.05)
IMM GRANULOCYTES NFR BLD AUTO: 0.7 % (ref 0–0.5)
KETONES UR QL STRIP: NEGATIVE
LEUKOCYTE ESTERASE UR QL STRIP.AUTO: NEGATIVE
LIPASE SERPL-CCNC: 29 U/L (ref 13–60)
LYMPHOCYTES # BLD AUTO: 0.43 10*3/MM3 (ref 0.7–3.1)
LYMPHOCYTES NFR BLD AUTO: 4.9 % (ref 19.6–45.3)
M PNEUMO IGG SER IA-ACNC: NOT DETECTED
MAGNESIUM SERPL-MCNC: 1.9 MG/DL (ref 1.6–2.4)
MCH RBC QN AUTO: 29.5 PG (ref 26.6–33)
MCHC RBC AUTO-ENTMCNC: 32.1 G/DL (ref 31.5–35.7)
MCV RBC AUTO: 91.8 FL (ref 79–97)
MONOCYTES # BLD AUTO: 0.71 10*3/MM3 (ref 0.1–0.9)
MONOCYTES NFR BLD AUTO: 8.1 % (ref 5–12)
NEUTROPHILS NFR BLD AUTO: 7.44 10*3/MM3 (ref 1.7–7)
NEUTROPHILS NFR BLD AUTO: 84.4 % (ref 42.7–76)
NITRITE UR QL STRIP: NEGATIVE
NRBC BLD AUTO-RTO: 0 /100 WBC (ref 0–0.2)
PH UR STRIP.AUTO: 7 [PH] (ref 5–8)
PLATELET # BLD AUTO: 164 10*3/MM3 (ref 140–450)
PMV BLD AUTO: 9.5 FL (ref 6–12)
POTASSIUM SERPL-SCNC: 3.8 MMOL/L (ref 3.5–5.2)
PROT SERPL-MCNC: 6 G/DL (ref 6–8.5)
PROT UR QL STRIP: NEGATIVE
RBC # BLD AUTO: 3.66 10*6/MM3 (ref 4.14–5.8)
RHINOVIRUS RNA SPEC NAA+PROBE: NOT DETECTED
RSV RNA NPH QL NAA+NON-PROBE: NOT DETECTED
SARS-COV-2 RNA NPH QL NAA+NON-PROBE: NOT DETECTED
SODIUM SERPL-SCNC: 143 MMOL/L (ref 136–145)
SP GR UR STRIP: 1.01 (ref 1–1.03)
TROPONIN T DELTA: -1 NG/L
TROPONIN T SERPL HS-MCNC: 17 NG/L
UROBILINOGEN UR QL STRIP: NORMAL
WBC NRBC COR # BLD: 8.81 10*3/MM3 (ref 3.4–10.8)
WHOLE BLOOD HOLD COAG: NORMAL
WHOLE BLOOD HOLD SPECIMEN: NORMAL

## 2023-07-21 PROCEDURE — 83605 ASSAY OF LACTIC ACID: CPT | Performed by: EMERGENCY MEDICINE

## 2023-07-21 PROCEDURE — 85025 COMPLETE CBC W/AUTO DIFF WBC: CPT | Performed by: EMERGENCY MEDICINE

## 2023-07-21 PROCEDURE — 36415 COLL VENOUS BLD VENIPUNCTURE: CPT

## 2023-07-21 PROCEDURE — 80053 COMPREHEN METABOLIC PANEL: CPT | Performed by: EMERGENCY MEDICINE

## 2023-07-21 PROCEDURE — 0202U NFCT DS 22 TRGT SARS-COV-2: CPT | Performed by: EMERGENCY MEDICINE

## 2023-07-21 PROCEDURE — 83690 ASSAY OF LIPASE: CPT | Performed by: EMERGENCY MEDICINE

## 2023-07-21 PROCEDURE — 93005 ELECTROCARDIOGRAM TRACING: CPT | Performed by: EMERGENCY MEDICINE

## 2023-07-21 PROCEDURE — 74176 CT ABD & PELVIS W/O CONTRAST: CPT

## 2023-07-21 PROCEDURE — 71046 X-RAY EXAM CHEST 2 VIEWS: CPT

## 2023-07-21 PROCEDURE — 84484 ASSAY OF TROPONIN QUANT: CPT | Performed by: EMERGENCY MEDICINE

## 2023-07-21 PROCEDURE — 81003 URINALYSIS AUTO W/O SCOPE: CPT | Performed by: EMERGENCY MEDICINE

## 2023-07-21 PROCEDURE — 83735 ASSAY OF MAGNESIUM: CPT | Performed by: EMERGENCY MEDICINE

## 2023-07-21 PROCEDURE — 99284 EMERGENCY DEPT VISIT MOD MDM: CPT

## 2023-07-21 RX ORDER — AZITHROMYCIN 250 MG/1
TABLET, FILM COATED ORAL
Qty: 6 TABLET | Refills: 0 | Status: SHIPPED | OUTPATIENT
Start: 2023-07-21 | End: 2023-08-18

## 2023-07-21 RX ORDER — SODIUM CHLORIDE 9 MG/ML
10 INJECTION INTRAVENOUS AS NEEDED
Status: DISCONTINUED | OUTPATIENT
Start: 2023-07-21 | End: 2023-07-21 | Stop reason: HOSPADM

## 2023-07-21 NOTE — ED PROVIDER NOTES
Subjective   History of Present Illness  Pt is a pleasant 83 year old male who presents with multiple complaints.  His primary complaint is his lower abdominal bloating, discomfort, increased belching and gas.  This been present for several weeks    Review of Systems    Past Medical History:   Diagnosis Date   • AAA (abdominal aortic aneurysm)    • Arthritis    • Broken neck    • Gout    • Hypertension    • Lymphoma    • Lymphoma     had radiation   • Migraine    • Sciatica    • Thyroid cancer        Allergies   Allergen Reactions   • Contrast Dye (Echo Or Unknown Ct/Mr) Nausea And Vomiting       Past Surgical History:   Procedure Laterality Date   • BACK SURGERY      lumbar   • CATARACT EXTRACTION EXTRACAPSULAR W/ INTRAOCULAR LENS IMPLANTATION     • EYE SURGERY     • MULTIPLE TOOTH EXTRACTIONS     • NECK SURGERY      PINS/RODS IN NECK       Family History   Problem Relation Age of Onset   • Hypertension Mother    • Hypertension Father    • Hypertension Sister        Social History     Socioeconomic History   • Marital status:    Tobacco Use   • Smoking status: Former     Packs/day: 1.00     Years: 15.00     Pack years: 15.00     Types: Cigarettes     Quit date:      Years since quittin.5   • Smokeless tobacco: Never   • Tobacco comments:     QUIT 30 YEARS AGO    Vaping Use   • Vaping Use: Never used   Substance and Sexual Activity   • Alcohol use: No   • Drug use: No   • Sexual activity: Defer           Objective   Physical Exam    Procedures           ED Course  ED Course as of 23   1619 Patient remained stable throughout his ED stay.  He is very comfortable appearing.  He is requesting a turkey box as he says he is very hungry at this time.  Still denies pain.  When discussed further he notes that his family had been encouraging him to come for several days but there was not an acute change today or new symptoms that prompted the visit.  I have encouraged that he  follow-up with his primary care provider along with gastroenterology.  He understands have a low threshold to return to the emergency department symptoms persist, worsen, or other concerns arise. [CP]   1809 Case discussed with Dr. Suarez, cardiology.  Patient does not require rate control at his visits rates currently in the 60s.  [id number] is 3 and anticoagulation is recommended.  I will start the patient on Eliquis.  Have instructed him as to the risks of blood thinners.  Who was recommended that he follow-up at the arrhythmia/A-fib clinic this coming week patient is comfortable with this and anxious for discharge.  He understands have a low threshold to return to the emergency department if symptoms persist, worsen, or other concerns arise.     [CP]      ED Course User Index  [CP] Wallace Wilson DO      [id number] Score for Atrial Fibrillation Stroke Risk - MDCalc  Calculated on Jul 21 2023 6:08 PM  3 points -> Stroke risk was 3.2% per year in >90,000 patients (the [place] Atrial Fibrillation Cohort Study) and 4.6% risk of stroke/TIA/systemic embolism. One recommendation suggests a 0 score for men or 1 score for women (no clinical risk factors) is “low” risk and may not require anticoagulation; a 1 score for men or 2 score for women is “low-moderate” risk and should consider antiplatelet or anticoagulation; and a score =2 for men or =3 for women is “moderate-high” risk and should otherwise be an anticoagulation candidate.                                       Medical Decision Making  Problems Addressed:  Atrial fibrillation, unspecified type: complicated acute illness or injury  Bronchitis: complicated acute illness or injury  Flu-like symptoms: complicated acute illness or injury  Lower abdominal pain: complicated acute illness or injury    Amount and/or Complexity of Data Reviewed  Labs: ordered.  Radiology: ordered.  ECG/medicine tests: ordered.    Risk  Prescription drug management.        Final  diagnoses:   Lower abdominal pain   Flu-like symptoms   Bronchitis   Atrial fibrillation, unspecified type       ED Disposition  ED Disposition       ED Disposition   Discharge    Condition   Stable    Comment   --               Kaycee Douglas MD  [address]  [address]  [place] [address]  [phone]    Schedule an appointment as soon as possible for a visit            Medication List        New Prescriptions      azithromycin 250 MG tablet  Commonly known as: ZITHROMAX  Take 2 tablets the first day, then 1 tablet daily for 4 days.               Where to Get Your Medications        These medications were sent to [facility] - [place], [place] - [address] - [phone]  - [phone] [place] [id number]      Phone: [phone]   azithromycin 250 MG tablet          Atrial Rate :  69 BPM      P-R Int :   * ms          QRS Dur : 100 ms       QT Int : 432 ms       P-R-T Axes :   * -15 -20 degrees      QTc Int : 432 ms      Atrial fibrillation with premature ventricular or aberrantly conducted    complexes   Incomplete right bundle branch block   Minimal voltage criteria for LVH, may be normal variant   Abnormal ECG   When compared with ECG of 03-DEC-2022 00:21,   Atrial fibrillation has replaced Sinus rhythm   Inverted T waves have replaced nonspecific T wave abnormality in Inferior    leads   Nonspecific T wave abnormality no longer evident in Lateral leads   Confirmed by MD HOGAN CORY ([id number]) on 7/24/2023 2:46:18 PM      Referred By: SAMIRA           Confirmed By: SKYLA HOGAN MD           Latest Reference Range & Units 07/21/23 12:12 07/21/23 13:31   HS Troponin T <15 ng/L 17 (H)    Sodium 136 - 145 mmol/L 143    Potassium 3.5 - 5.2 mmol/L 3.8    Chloride 98 - 107 mmol/L 107    CO2 22.0 - 29.0 mmol/L 24.0    Anion Gap 5.0 - 15.0 mmol/L 12.0    BUN 8 - 23 mg/dL 12    Creatinine 0.76 - 1.27 mg/dL 0.80    BUN/Creatinine Ratio 7.0 - 25.0  15.0    eGFR >60.0 mL/min/1.73 87.8    Glucose 65 - 99 mg/dL 101 (H)    Calcium 8.6 - 10.5 mg/dL 8.7    Magnesium 1.6 - 2.4 mg/dL 1.9    Alkaline Phosphatase 39 - 117 U/L 110    Total Protein 6.0 - 8.5 g/dL 6.0    Albumin 3.5 - 5.2 g/dL 3.8    Globulin gm/dL 2.2    A/G Ratio g/dL 1.7    AST (SGOT) 1 - 40 U/L 19    ALT (SGPT) 1 - 41 U/L 17    Total Bilirubin 0.0 - 1.2 mg/dL 0.3    Lactate 0.5 - 2.0 mmol/L 0.9    Lipase 13 - 60 U/L 29    WBC 3.40 - 10.80 10*3/mm3 8.81    RBC 4.14 - 5.80 10*6/mm3 3.66 (L)    Hemoglobin 13.0 - 17.7 g/dL 10.8 (L)    Hematocrit 37.5 - 51.0 % 33.6 (L)    Platelets 140 - 450 10*3/mm3 164    RDW 12.3 - 15.4 % 15.9 (H)    MCV 79.0 - 97.0 fL 91.8    MCH 26.6 - 33.0 pg 29.5    MCHC 31.5 - 35.7 g/dL 32.1    MPV 6.0 - 12.0 fL 9.5    RDW-SD 37.0 - 54.0 fl 53.9    Neutrophil Rel % 42.7 - 76.0 % 84.4 (H)    Lymphocyte Rel % 19.6 -  45.3 % 4.9 (L)    Monocyte Rel % 5.0 - 12.0 % 8.1    Eosinophil Rel % 0.3 - 6.2 % 1.8    Basophil Rel % 0.0 - 1.5 % 0.1    Immature Granulocyte Rel % 0.0 - 0.5 % 0.7 (H)    Neutrophils Absolute 1.70 - 7.00 10*3/mm3 7.44 (H)    Lymphocytes Absolute 0.70 - 3.10 10*3/mm3 0.43 (L)    Monocytes Absolute 0.10 - 0.90 10*3/mm3 0.71    Eosinophils Absolute 0.00 - 0.40 10*3/mm3 0.16    Basophils Absolute 0.00 - 0.20 10*3/mm3 0.01    Immature Grans, Absolute 0.00 - 0.05 10*3/mm3 0.06 (H)    nRBC 0.0 - 0.2 /100 WBC 0.0    Color, UA Yellow, Straw   Yellow   Appearance, UA Clear   Clear   Specific Gravity, UA 1.001 - 1.030   1.011   pH, UA 5.0 - 8.0   7.0   Glucose Negative   Negative   Ketones, UA Negative   Negative   Blood, UA Negative   Negative   Nitrite, UA Negative   Negative   Leukocytes, UA Negative   Negative   Protein, UA Negative   Negative   Bilirubin, UA Negative   Negative   Urobilinogen, UA 0.2 - 1.0 E.U./dL   0.2 E.U./dL   (H): Data is abnormally high  (L): Data is abnormally low                                  Medical Decision Making  Problems Addressed:  Atrial fibrillation, unspecified type: complicated acute illness or injury  Bronchitis: complicated acute illness or injury  Flu-like symptoms: complicated acute illness or injury  Lower abdominal pain: complicated acute illness or injury    Amount and/or Complexity of Data Reviewed  External Data Reviewed: notes.  Labs: ordered. Decision-making details documented in ED Course.  Radiology: ordered and independent interpretation performed. Decision-making details documented in ED Course.  ECG/medicine tests: ordered and independent interpretation performed. Decision-making details documented in ED Course.    Risk  Prescription drug management.        Final diagnoses:   Lower abdominal pain   Flu-like symptoms   Bronchitis   Atrial fibrillation, unspecified type       ED Disposition  ED Disposition       ED Disposition   Discharge    Condition   Stable     Comment   --             DISCHARGE    Patient discharged in stable condition.    Reviewed implications of results, diagnosis, meds, responsibility to follow up, warning signs and symptoms of possible worsening, potential complications and reasons to return to ER.    Patient/Family voiced understanding of above instructions.    Discussed plan for discharge, as there is no emergent indication for admission.  Pt/family is agreeable and understands need for follow up and possible repeat testing.  Pt/family is aware that discharge does not mean that nothing is wrong but that it indicates no emergency is currently present that requires admission and they must continue care with follow-up as given below or with a physician of their choice.     FOLLOW-UP  Kaycee Douglas MD  [address]  [address]  [place] [address]  [phone]    Schedule an appointment as soon as possible for a visit       [facility] CARDIOLOGY  [address]  [address]  formerly [facility] [id number] [phone]  Schedule an appointment as soon as possible for a visit            Medication List        Changed      gabapentin 100 MG capsule  Commonly known as: Neurontin  Take 2 PO TID  What changed:   how much to take  how to take this  when to take this  additional instructions                 Wallace Wilson,   08/23/23 1425

## 2023-07-24 LAB
QT INTERVAL: 432 MS
QTC INTERVAL: 432 MS

## 2023-08-18 ENCOUNTER — OFFICE VISIT (OUTPATIENT)
Dept: CARDIOLOGY | Facility: HOSPITAL | Age: 83
End: 2023-08-18
Payer: MEDICARE

## 2023-08-18 VITALS
HEIGHT: 70 IN | SYSTOLIC BLOOD PRESSURE: 126 MMHG | OXYGEN SATURATION: 99 % | BODY MASS INDEX: 23.99 KG/M2 | WEIGHT: 167.6 LBS | RESPIRATION RATE: 18 BRPM | TEMPERATURE: 97.8 F | DIASTOLIC BLOOD PRESSURE: 88 MMHG | HEART RATE: 76 BPM

## 2023-08-18 DIAGNOSIS — R06.02 SHORTNESS OF BREATH: ICD-10-CM

## 2023-08-18 DIAGNOSIS — I48.91 ATRIAL FIBRILLATION, UNSPECIFIED TYPE: Primary | ICD-10-CM

## 2023-08-18 DIAGNOSIS — I10 PRIMARY HYPERTENSION: ICD-10-CM

## 2023-08-18 NOTE — PROGRESS NOTES
"[facility], [facility] V[facility]    Chief Complaint  Atrial Fibrillation    Subjective    History of Present Illness {CC  Problem List  Visit  Diagnosis   Encounters  Notes  Medications  Labs  Result Review Imaging  Media :23}     George Lomeli presents to [facility] CARDIOLOGY for   History of Present Illness     83-year-old -American male with hypertension, hypothyroidism, bronchiectasis with cough, use of home nebs, gout, follicular lymphoma.      Pt found to be in afib, rate controlled.  Eliquis was started.     No CP or pressure, dizziness, near syncope, syncope.  Mild swellings in knee.  No palpitations.  Mild dyspnea with activity    No known hx of Afib, CVA/TIA, known CAD/MI, GI bleed or ICH.     Objective     Vital Signs:   Vitals:    08/18/23 1459   BP: 126/88   BP Location: Left arm   Patient Position: Sitting   Cuff Size: Adult   Pulse: 76   Resp: 18   Temp: 97.8 øF (36.6 øC)   TempSrc: Temporal   SpO2: 99%   Weight: 76 kg (167 lb 9.6 oz)   Height: 177.8 cm (70\")     Body mass index is 24.05 kg/mý.  Physical Exam  Vitals reviewed.   Constitutional:       General: He is not in acute distress.     Appearance: Normal appearance.   Cardiovascular:      Rate and Rhythm: Normal rate and regular rhythm. Rhythm irregular.      Pulses:           Radial pulses are 2+ on the right side and 2+ on the left side.        Dorsalis pedis pulses are 2+ on the right side and 2+ on the left side.        Posterior tibial pulses are 2+ on the right side and 2+ on the left side.      Heart sounds: Normal heart sounds.   Pulmonary:      Effort: Pulmonary effort is normal.      Breath sounds: Normal breath sounds.   Musculoskeletal:      Right lower leg: No edema.      Left lower leg: No edema.   Skin:     General: Skin is warm and dry.   Neurological:      Mental Status: He is alert.   Psychiatric:         Mood and Affect: Mood normal.         Behavior: " Behavior is cooperative.            Result Review  Data Reviewed:{ Labs  Result Review  Imaging  Med Tab  Media :23}   Stress PET 2017:  EF 54%.     EKG 7/24/2023: Atrial fibrillation with PVCs, 60 bpm, incomplete right bundle branch block.    Admission on 07/21/2023, Discharged on 07/21/2023   Component Date Value Ref Range Status    Glucose 07/21/2023 101 (H)  65 - 99 mg/dL Final    BUN 07/21/2023 12  8 - 23 mg/dL Final    Creatinine 07/21/2023 0.80  0.76 - 1.27 mg/dL Final    Sodium 07/21/2023 143  136 - 145 mmol/L Final    Potassium 07/21/2023 3.8  3.5 - 5.2 mmol/L Final    Chloride 07/21/2023 107  98 - 107 mmol/L Final    CO2 07/21/2023 24.0  22.0 - 29.0 mmol/L Final    Calcium 07/21/2023 8.7  8.6 - 10.5 mg/dL Final    Total Protein 07/21/2023 6.0  6.0 - 8.5 g/dL Final    Albumin 07/21/2023 3.8  3.5 - 5.2 g/dL Final    ALT (SGPT) 07/21/2023 17  1 - 41 U/L Final    AST (SGOT) 07/21/2023 19  1 - 40 U/L Final    Alkaline Phosphatase 07/21/2023 110  39 - 117 U/L Final    Total Bilirubin 07/21/2023 0.3  0.0 - 1.2 mg/dL Final    Globulin 07/21/2023 2.2  gm/dL Final    Calculated Result    A/G Ratio 07/21/2023 1.7  g/dL Final    BUN/Creatinine Ratio 07/21/2023 15.0  7.0 - 25.0 Final    Anion Gap 07/21/2023 12.0  5.0 - 15.0 mmol/L Final    eGFR 07/21/2023 87.8  >60.0 mL/min/1.73 Final    Lipase 07/21/2023 29  13 - 60 U/L Final    Color, UA 07/21/2023 Yellow  Yellow, Straw Final    Appearance, UA 07/21/2023 Clear  Clear Final    pH, UA 07/21/2023 7.0  5.0 - 8.0 Final    Specific Gravity, UA 07/21/2023 1.011  1.001 - 1.030 Final    Glucose, UA 07/21/2023 Negative  Negative Final    Ketones, UA 07/21/2023 Negative  Negative Final    Bilirubin, UA 07/21/2023 Negative  Negative Final    Blood, UA 07/21/2023 Negative  Negative Final    Protein, UA 07/21/2023 Negative  Negative Final    Leuk Esterase, UA 07/21/2023 Negative  Negative Final    Nitrite, UA 07/21/2023 Negative  Negative Final    Urobilinogen, UA 07/21/2023  0.2 E.U./dL  0.2 - 1.0 E.U./dL Final    Lactate 07/21/2023 0.9  0.5 - 2.0 mmol/L Final    Falsely depressed results may occur on samples drawn from patients receiving N-Acetylcysteine (NAC) or Metamizole.    Extra Tube 07/21/2023 Hold for add-ons.   Final    Auto resulted.    Extra Tube 07/21/2023 hold for add-on   Final    Auto resulted    Extra Tube 07/21/2023 Hold for add-ons.   Final    Auto resulted.    Extra Tube 07/21/2023 Hold for add-ons.   Final    Auto resulted.    Extra Tube 07/21/2023 Hold for add-ons.   Final    Auto resulted    WBC 07/21/2023 8.81  3.40 - 10.80 10*3/mm3 Final    RBC 07/21/2023 3.66 (L)  4.14 - 5.80 10*6/mm3 Final    Hemoglobin 07/21/2023 10.8 (L)  13.0 - 17.7 g/dL Final    Hematocrit 07/21/2023 33.6 (L)  37.5 - 51.0 % Final    MCV 07/21/2023 91.8  79.0 - 97.0 fL Final    MCH 07/21/2023 29.5  26.6 - 33.0 pg Final    MCHC 07/21/2023 32.1  31.5 - 35.7 g/dL Final    RDW 07/21/2023 15.9 (H)  12.3 - 15.4 % Final    RDW-SD 07/21/2023 53.9  37.0 - 54.0 fl Final    MPV 07/21/2023 9.5  6.0 - 12.0 fL Final    Platelets 07/21/2023 164  140 - 450 10*3/mm3 Final    Neutrophil % 07/21/2023 84.4 (H)  42.7 - 76.0 % Final    Lymphocyte % 07/21/2023 4.9 (L)  19.6 - 45.3 % Final    Monocyte % 07/21/2023 8.1  5.0 - 12.0 % Final    Eosinophil % 07/21/2023 1.8  0.3 - 6.2 % Final    Basophil % 07/21/2023 0.1  0.0 - 1.5 % Final    Immature Grans % 07/21/2023 0.7 (H)  0.0 - 0.5 % Final    Neutrophils, Absolute 07/21/2023 7.44 (H)  1.70 - 7.00 10*3/mm3 Final    Lymphocytes, Absolute 07/21/2023 0.43 (L)  0.70 - 3.10 10*3/mm3 Final    Monocytes, Absolute 07/21/2023 0.71  0.10 - 0.90 10*3/mm3 Final    Eosinophils, Absolute 07/21/2023 0.16  0.00 - 0.40 10*3/mm3 Final    Basophils, Absolute 07/21/2023 0.01  0.00 - 0.20 10*3/mm3 Final    Immature Grans, Absolute 07/21/2023 0.06 (H)  0.00 - 0.05 10*3/mm3 Final    nRBC 07/21/2023 0.0  0.0 - 0.2 /100 WBC Final    ADENOVIRUS, PCR 07/21/2023 Not Detected  Not Detected  Final    Coronavirus 229E 07/21/2023 Not Detected  Not Detected Final    Coronavirus HKU1 07/21/2023 Not Detected  Not Detected Final    Coronavirus NL63 07/21/2023 Not Detected  Not Detected Final    Coronavirus OC43 07/21/2023 Not Detected  Not Detected Final    COVID19 07/21/2023 Not Detected  Not Detected - Ref. Range Final    Human Metapneumovirus 07/21/2023 Not Detected  Not Detected Final    Human Rhinovirus/Enterovirus 07/21/2023 Not Detected  Not Detected Final    Influenza A PCR 07/21/2023 Not Detected  Not Detected Final    Influenza B PCR 07/21/2023 Not Detected  Not Detected Final    Parainfluenza Virus 1 07/21/2023 Not Detected  Not Detected Final    Parainfluenza Virus 2 07/21/2023 Not Detected  Not Detected Final    Parainfluenza Virus 3 07/21/2023 Not Detected  Not Detected Final    Parainfluenza Virus 4 07/21/2023 Not Detected  Not Detected Final    RSV, PCR 07/21/2023 Not Detected  Not Detected Final    Bordetella pertussis pcr 07/21/2023 Not Detected  Not Detected Final    Bordetella parapertussis PCR 07/21/2023 Not Detected  Not Detected Final    Chlamydophila pneumoniae PCR 07/21/2023 Not Detected  Not Detected Final    Mycoplasma pneumo by PCR 07/21/2023 Not Detected  Not Detected Final    QT Interval 07/21/2023 432  ms Final    QTC Interval 07/21/2023 432  ms Final    HS Troponin T 07/21/2023 17 (H)  <15 ng/L Final    Magnesium 07/21/2023 1.9  1.6 - 2.4 mg/dL Final    HS Troponin T 07/21/2023 16 (H)  <15 ng/L Final    Troponin T Delta 07/21/2023 -1  >=-4 - <+4 ng/L Final                   Assessment and Plan {CC Problem List  Visit Diagnosis  ROS  Review (Popup)  Health Maintenance  Quality  BestPractice  Medications  SmartSets  SnapShot Encounters  Media :23}   1. new atrial fib  Duration unknown.  Fairly asymptomatic.  Mild dyspnea.    Patient meets criteria for 5 mg Eliquis  - apixaban (ELIQUIS) 5 MG tablet tablet; Take 1 tablet by mouth 2 (Two) Times a Day.  Dispense: 60  tablet; Refill: 3    Sample given:  Eliquis 5 mg, quantity 2 boxes  Lot: [id number], Exp 04/25      - Adult Transthoracic Echo Complete W/ Cont if Necessary Per Protocol; Future  - Ambulatory Referral to Cardiology    Heart rate controlled today    A. fib education completed: What is atrial fibrillation, causes, triggers, signs and symptoms, medication management (rate control versus rhythm control) and stroke prevention, procedural management and indications, and the role of the atrial fibrillation center and when to call.  2. Primary hypertension    - Adult Transthoracic Echo Complete W/ Cont if Necessary Per Protocol; Future    3. Shortness of breath    - Adult Transthoracic Echo Complete W/ Cont if Necessary Per Protocol; Future    Follow-up with [facility] to be scheduled.  Follow-up in the heart valve center as needed or as determined by cardiology.      Follow Up {Instructions Charge Capture  Follow-up Communications :23}   Return if symptoms worsen or fail to improve.    Patient was given instructions and counseling regarding his condition or for health maintenance advice. Please see specific information pulled into the AVS if appropriate.  Patient was instructed to call the Heart and Valve Center with any questions, concerns, or worsening symptoms.

## 2023-10-05 PROBLEM — I48.0 PAF (PAROXYSMAL ATRIAL FIBRILLATION): Status: ACTIVE | Noted: 2023-10-05

## 2023-10-11 ENCOUNTER — OFFICE VISIT (OUTPATIENT)
Dept: PULMONOLOGY | Facility: CLINIC | Age: 83
End: 2023-10-11
Payer: MEDICARE

## 2023-10-11 VITALS
SYSTOLIC BLOOD PRESSURE: 114 MMHG | OXYGEN SATURATION: 98 % | TEMPERATURE: 97.1 F | BODY MASS INDEX: 24.2 KG/M2 | WEIGHT: 169 LBS | HEART RATE: 83 BPM | DIASTOLIC BLOOD PRESSURE: 72 MMHG | HEIGHT: 70 IN

## 2023-10-11 DIAGNOSIS — J47.9 BRONCHIECTASIS WITHOUT COMPLICATION: Primary | ICD-10-CM

## 2023-10-11 DIAGNOSIS — R05.3 CHRONIC COUGH: ICD-10-CM

## 2023-10-11 PROCEDURE — 3074F SYST BP LT 130 MM HG: CPT | Performed by: NURSE PRACTITIONER

## 2023-10-11 PROCEDURE — 3078F DIAST BP <80 MM HG: CPT | Performed by: NURSE PRACTITIONER

## 2023-10-11 PROCEDURE — 1160F RVW MEDS BY RX/DR IN RCRD: CPT | Performed by: NURSE PRACTITIONER

## 2023-10-11 PROCEDURE — 99213 OFFICE O/P EST LOW 20 MIN: CPT | Performed by: NURSE PRACTITIONER

## 2023-10-11 PROCEDURE — 1159F MED LIST DOCD IN RCRD: CPT | Performed by: NURSE PRACTITIONER

## 2023-10-11 RX ORDER — ALBUTEROL SULFATE 2.5 MG/3ML
2.5 SOLUTION RESPIRATORY (INHALATION) EVERY 6 HOURS PRN
Qty: 120 EACH | Refills: 5 | Status: SHIPPED | OUTPATIENT
Start: 2023-10-11

## 2023-10-11 NOTE — PROGRESS NOTES
"[facility] Pulmonary Follow up      Chief Complaint  Bronchiectasis without complication (Follow up)    Subjective          George Lomeli presents to [facility] PULMON[facility] CR[facility] MEDICINE for routine follow-up on his bronchiectasis.    He is actually doing really well lately.  He uses his nebulizers about once a day.  He denies any significant cough or congestion.  No acute respiratory illnesses.    He has had his flu and pneumonia vaccine as well as his COVID-19 vaccine in the last month.        Objective     Vital Signs:   /72 (BP Location: Right arm, Patient Position: Sitting, Cuff Size: Adult)   Pulse 83   Temp 97.1 øF (36.2 øC)   Ht 177.8 cm (70\")   Wt 76.7 kg (169 lb)   SpO2 98% Comment: Room air at rest  BMI 24.25 kg/mý       Immunization History   Administered Date(s) Administered    ABRYSVO 09/22/2023    COVID-19 F23 (MODERNA) 12YRS+ (SPIKEVAX) 09/26/2023    FLUAD TRI 65YR+ 10/19/2020    Flu Vaccine Quad PF >36MO 12/12/2016, 11/01/2017    Fluzone High Dose =>65 Years ([employer] ONLY) 01/12/2018, 10/07/2018, 12/10/2018, 10/09/2019    Fluzone High-Dose 65+yrs 09/18/2023    Influenza, Unspecified 11/01/2017    Pneumococcal Conjugate 20-Valent (PCV20) 09/22/2023    Pneumococcal Polysaccharide (PPSV23) 06/30/2005, 12/12/2016    Shingrix 09/26/2023    Td (TDVAX) 09/26/2000       Physical Exam  Vitals reviewed.   Constitutional:       Appearance: He is well-developed.   HENT:      Head: Normocephalic and atraumatic.   Eyes:      Pupils: Pupils are equal, round, and reactive to light.   Cardiovascular:      Rate and Rhythm: Normal rate and regular rhythm.      Heart sounds: No murmur heard.  Pulmonary:      Effort: Pulmonary effort is normal. No respiratory distress.      Breath sounds: Rales present. No wheezing.   Abdominal:      General: Bowel sounds are normal. There is no distension.      Palpations: Abdomen is soft.   Musculoskeletal:         General: Normal range of motion. "      Cervical back: Normal range of motion and neck supple.   Skin:     General: Skin is warm and dry.      Findings: No erythema.   Neurological:      Mental Status: He is alert and oriented to person, place, and time.   Psychiatric:         Behavior: Behavior normal.          Result Review :              PA and lateral chest x-ray done the office today reviewed by me  Awaiting final MD interpretation                 Assessment and Plan    Problem List Items Addressed This Visit          Pulmonary and Pneumonias    Bronchiectasis without complication - Primary    Relevant Medications    albuterol (PROVENTIL) (2.5 MG/3ML) 0.083% nebulizer solution    Other Relevant Orders    XR Chest PA & Lateral    Cough       At this time he is doing very well with his bronchiectasis.  Has very little cough or congestion.  He has not had any recent acute exacerbations or illnesses.  We did review his chest x-ray today in the office.    He is up-to-date on his vaccines.        Follow Up     Return in about 6 months (around 4/11/2024).  Patient was given instructions and counseling regarding his condition or for health maintenance advice. Please see specific information pulled into the AVS if appropriate.       YOVANI Neil, ACNP  [facility]  Electronically signed

## 2023-11-21 ENCOUNTER — HOSPITAL ENCOUNTER (OUTPATIENT)
Dept: CARDIOLOGY | Facility: HOSPITAL | Age: 83
Discharge: HOME | End: 2023-11-21
Admitting: NURSE PRACTITIONER
Payer: MEDICARE

## 2023-11-21 VITALS — WEIGHT: 169 LBS | HEIGHT: 70 IN | BODY MASS INDEX: 24.2 KG/M2

## 2023-11-21 DIAGNOSIS — R06.02 SHORTNESS OF BREATH: ICD-10-CM

## 2023-11-21 DIAGNOSIS — I48.91 ATRIAL FIBRILLATION, UNSPECIFIED TYPE: ICD-10-CM

## 2023-11-21 DIAGNOSIS — I10 PRIMARY HYPERTENSION: ICD-10-CM

## 2023-11-21 LAB
ASCENDING AORTA: 3.8 CM
BH CV ECHO MEAS - AO MAX PG: 7.5 MMHG
BH CV ECHO MEAS - AO MEAN PG: 5 MMHG
BH CV ECHO MEAS - AO ROOT AREA (BSA CORRECTED): 1.9 CM2
BH CV ECHO MEAS - AO ROOT DIAM: 3.7 CM
BH CV ECHO MEAS - AO V2 MAX: 137 CM/SEC
BH CV ECHO MEAS - AO V2 VTI: 31.5 CM
BH CV ECHO MEAS - AVA(I,D): 2.15 CM2
BH CV ECHO MEAS - EDV(CUBED): 117.6 ML
BH CV ECHO MEAS - EDV(MOD-SP2): 138 ML
BH CV ECHO MEAS - EDV(MOD-SP4): 121 ML
BH CV ECHO MEAS - EF(MOD-BP): 53 %
BH CV ECHO MEAS - EF(MOD-SP2): 50 %
BH CV ECHO MEAS - EF(MOD-SP4): 53.7 %
BH CV ECHO MEAS - ESV(CUBED): 45.1 ML
BH CV ECHO MEAS - ESV(MOD-SP2): 69 ML
BH CV ECHO MEAS - ESV(MOD-SP4): 56 ML
BH CV ECHO MEAS - FS: 27.3 %
BH CV ECHO MEAS - IVS/LVPW: 1.08 CM
BH CV ECHO MEAS - IVSD: 1.03 CM
BH CV ECHO MEAS - LA DIMENSION: 3.9 CM
BH CV ECHO MEAS - LV DIASTOLIC VOL/BSA (35-75): 62.3 CM2
BH CV ECHO MEAS - LV MASS(C)D: 174.3 GRAMS
BH CV ECHO MEAS - LV MAX PG: 4.8 MMHG
BH CV ECHO MEAS - LV MEAN PG: 3 MMHG
BH CV ECHO MEAS - LV SYSTOLIC VOL/BSA (12-30): 28.8 CM2
BH CV ECHO MEAS - LV V1 MAX: 109 CM/SEC
BH CV ECHO MEAS - LV V1 VTI: 21.6 CM
BH CV ECHO MEAS - LVIDD: 4.9 CM
BH CV ECHO MEAS - LVIDS: 3.6 CM
BH CV ECHO MEAS - LVOT AREA: 3.1 CM2
BH CV ECHO MEAS - LVOT DIAM: 2 CM
BH CV ECHO MEAS - LVPWD: 0.96 CM
BH CV ECHO MEAS - MV A MAX VEL: 100 CM/SEC
BH CV ECHO MEAS - MV DEC TIME: 0.18 SEC
BH CV ECHO MEAS - MV E MAX VEL: 60.3 CM/SEC
BH CV ECHO MEAS - MV E/A: 0.6
BH CV ECHO MEAS - PA ACC SLOPE: 905 CM/SEC2
BH CV ECHO MEAS - PA ACC TIME: 0.09 SEC
BH CV ECHO MEAS - PI END-D VEL: 132 CM/SEC
BH CV ECHO MEAS - RAP SYSTOLE: 3 MMHG
BH CV ECHO MEAS - RVSP: 33 MMHG
BH CV ECHO MEAS - SI(MOD-SP2): 35.5 ML/M2
BH CV ECHO MEAS - SI(MOD-SP4): 33.5 ML/M2
BH CV ECHO MEAS - SV(LVOT): 67.9 ML
BH CV ECHO MEAS - SV(MOD-SP2): 69 ML
BH CV ECHO MEAS - SV(MOD-SP4): 65 ML
BH CV ECHO MEAS - TAPSE (>1.6): 2.1 CM
BH CV ECHO MEAS - TR MAX PG: 30 MMHG
BH CV ECHO MEAS - TR MAX VEL: 247.3 CM/SEC
BH CV VAS BP LEFT ARM: NORMAL MMHG
BH CV XLRA - RV BASE: 5 CM
BH CV XLRA - RV LENGTH: 8.7 CM
BH CV XLRA - RV MID: 3.6 CM
BH CV XLRA - TDI S': 10.2 CM/SEC
LEFT ATRIUM VOLUME INDEX: 33.5 ML/M2

## 2023-11-21 PROCEDURE — 93306 TTE W/DOPPLER COMPLETE: CPT

## 2023-11-27 ENCOUNTER — TELEPHONE (OUTPATIENT)
Dept: CARDIOLOGY | Facility: HOSPITAL | Age: 83
End: 2023-11-27
Payer: MEDICARE

## 2023-11-27 NOTE — TELEPHONE ENCOUNTER
Patient notified of results and transferred to [facility] to reschedule appointment.

## 2023-11-27 NOTE — TELEPHONE ENCOUNTER
----- Message from YOVANI Griffith sent at 11/22/2023  1:14 PM EST -----  Please call patient with the following results:     Your echocardiogram results have been reviewed.  Your results are considered acceptable.  Your heart muscle strength is normal.  No concerning valvular disease was seen.    I see that you are unable to keep your follow-up with [facility] cardiology.  I do recommend that she call back to reschedule for continued monitoring and management of your atrial fibrillation.

## 2023-11-30 NOTE — PROGRESS NOTES
Subjective:     Encounter Date:12/01/2023      Patient ID: George Lomeli is a 83 y.o.  -American male from [place], [place], retired from [employer].    PCP: Kaycee Douglas MD  REFERRING PROVIDER: YOVANI Griffith  RADIATION ONCOLOGIST: Milo Christianson MD  PULMONOLOGIST: YOVANI Neil  ONCOLOGIST: Thor Ruggiero MD    Chief Complaint:   Chief Complaint   Patient presents with    new atrial fib     Problem List:  Paroxysmal atrial fibrillation  Onset August 2023   CHADsVasc 3, on Eliquis  Echocardiogram 11/21/2023: LVEF 53%, LV diastolic function consistent with grade 1 impaired relaxation, RV cavity mildly dilated, RA cavity dilated, RVSP less than 35 mmHg  Asymptomatic November 2023  Hypertension  Hypothyroidism  Shortness of breath/bronchiectasis, cardiac PET September 2017 showing rest EF 50%, stress EF 54%, no evidence of ischemia  Follicular lymphoma  Osteoarthritis  Sciatica  AAA, status post prior stenting-data deficit, CT abdomen/pelvis July 2023 showed re-demonstrated aortoiliac aneurysm present, status post prior stenting, without definite stent component migration, interval enlargement or acute hemorrhage.   Former tobacco abuse; 1 pack/day x 15 years, quit 1997  Surgical history:  Lumbar back surgery  Cataract extraction with lens implant  Cervical neck surgery  AAA with stent    Allergies   Allergen Reactions    Contrast Dye (Echo Or Unknown Ct/Mr) Nausea And Vomiting       Current Outpatient Medications   Medication Instructions    albuterol (PROVENTIL) 2.5 mg, Nebulization, Every 6 Hours PRN    allopurinol (ZYLOPRIM) 100 mg, Oral, Daily    amLODIPine (NORVASC) 5 mg, Oral, Daily    apixaban (ELIQUIS) 5 mg, Oral, 2 Times Daily    carvedilol (COREG) 25 mg, Oral, 2 Times Daily With Meals    docusate sodium (COLACE) 50 MG capsule Oral, 2 Times Daily    gabapentin (Neurontin) 100 MG capsule Take 2 PO TID    levothyroxine (SYNTHROID, LEVOTHROID) 25 mcg, Oral, Daily    lisinopril  (PRINIVIL,ZESTRIL) 40 mg, Oral, Daily    oxyCODONE-acetaminophen (PERCOCET)  MG per tablet 1 tablet, Oral, Every 6 Hours PRN    potassium chloride (K-DUR) 10 MEQ CR tablet 10 mEq, Oral, 2 Times Daily    tamsulosin (FLOMAX) 0.4 MG capsule 24 hr capsule 1 capsule, Oral, Nightly    timolol (TIMOPTIC) 0.5 % ophthalmic solution Administer 1 drop to the right eye 2 (Two) Times a Day.         HISTORY OF PRESENT ILLNESS:  This is an 83-year-old -American male who presents to establish care for PAF.  He was seen in heart/valve clinic August 2023 with new onset atrial fibrillation and was started on Eliquis.  He was a no-show to his October 2023 appointment.  The patient denies any chest pain, shortness of breath, palpitations, dizziness, presyncope, syncope, or edema.  On CT scan in July 2023 he was noted to have a AAA status post stent placement but the patient denies ever having AAA repair.  He also denies having a stress test in the past but there are was one in our Epic records from 2017.  The patient denies any heart catheterizations, CVAs, TIAs, diabetes, hyperlipidemia, seizures, DVTs, PEs, sleep apnea, rheumatic fever, or family history of CAD.  His blood pressures are always WNL whenever he has them checked.  He is not overly active at home but does walk his dog for exercise.  He is followed by oncology for his follicular lymphoma.  Patient says he rarely snores.  Patient is a poor historian.      Review of Systems   Constitutional: Negative.   HENT: Negative.     Eyes:         Cataracts   Cardiovascular:  Negative for chest pain, dyspnea on exertion, irregular heartbeat, leg swelling, near-syncope, orthopnea, palpitations, paroxysmal nocturnal dyspnea and syncope.   Respiratory:  Positive for snoring. Negative for shortness of breath.    Skin: Negative.    Musculoskeletal:  Positive for back pain and stiffness.   Gastrointestinal: Negative.    Genitourinary: Negative.    Neurological:  Negative for  "dizziness, focal weakness, seizures and weakness.   Psychiatric/Behavioral: Negative.     Allergic/Immunologic:        Follicular lymphoma      All other systems reviewed and otherwise negative.    Procedures       Objective:       Vitals:    12/01/23 1144 12/01/23 1148   BP: 124/68 120/70   BP Location: Right arm Right arm   Patient Position: Sitting Standing   Cuff Size: Adult Adult   Pulse: 78    SpO2: 97%    Weight: 76.9 kg (169 lb 9.6 oz)    Height: 177.8 cm (70\")      Body mass index is 24.34 kg/m².    Constitutional:       Appearance: Healthy appearance. Not in distress.   Neck:      Vascular: No JVR. JVD normal.   Pulmonary:      Effort: Pulmonary effort is normal.      Breath sounds: Normal breath sounds. No wheezing. No rhonchi. No rales.   Chest:      Chest wall: Not tender to palpatation.   Cardiovascular:      PMI at left midclavicular line. Normal rate. Irregularly irregular rhythm. Normal S1. Normal S2.       Murmurs: There is no murmur.      No gallop.  No click. No rub.   Pulses:     Intact distal pulses.   Edema:     Peripheral edema absent.   Abdominal:      General: Bowel sounds are normal.      Palpations: Abdomen is soft.      Tenderness: There is no abdominal tenderness.   Musculoskeletal: Normal range of motion.         General: No tenderness. Skin:     General: Skin is warm and dry.   Neurological:      General: No focal deficit present.      Mental Status: Alert and oriented to person, place and time.           Lab Review:   Lab Results   Component Value Date    GLUCOSE 101 (H) 07/21/2023    BUN 12 07/21/2023    CREATININE 0.80 07/21/2023    EGFRIFAFRI 109 01/03/2022    BCR 15.0 07/21/2023    CO2 24.0 07/21/2023    CALCIUM 8.7 07/21/2023    ALBUMIN 3.8 07/21/2023    AST 19 07/21/2023    ALT 17 07/21/2023       Lab Results   Component Value Date    WBC 8.81 07/21/2023    HGB 10.8 (L) 07/21/2023    HCT 33.6 (L) 07/21/2023    MCV 91.8 07/21/2023     07/21/2023       Lab Results "   Component Value Date    HGBA1C 5.60 09/22/2017       Lab Results   Component Value Date    TSH 4.594 09/22/2017       Lab Results   Component Value Date    CHOL 143 09/22/2017     Lab Results   Component Value Date    TRIG 66 09/22/2017     Lab Results   Component Value Date    HDL 37 (L) 09/22/2017     Lab Results   Component Value Date    LDL 98 09/22/2017           Lab Results   Component Value Date    BNP 54.0 09/21/2017                 Assessment:    Patient with asymptomatic atrial fibrillation; will plan on rate control with carvedilol and patient will stay on Eliquis.  Echocardiogram November 2023 was acceptable.     Diagnosis Plan   1. PAF (paroxysmal atrial fibrillation)  Asymptomatic, continue carvedilol, Eliquis      2. Primary hypertension  Controlled, continue current cardiac medications   3.  AAA with repair at [facility], CT scan in July 2023 he was noted to have a AAA status post stent placement          Plan:         Patient to continue current medications and close follow up with the above providers.  Tentative cardiology follow up in June 2024 or patient may return sooner PRN.  Patient provided with atrial fibrillation booklet in office today      Electronically signed by YOVANI Beltrán, 12/01/23, 12:23 PM EST.

## 2023-12-01 ENCOUNTER — OFFICE VISIT (OUTPATIENT)
Dept: CARDIOLOGY | Facility: CLINIC | Age: 83
End: 2023-12-01
Payer: MEDICARE

## 2023-12-01 VITALS
SYSTOLIC BLOOD PRESSURE: 120 MMHG | DIASTOLIC BLOOD PRESSURE: 70 MMHG | WEIGHT: 169.6 LBS | BODY MASS INDEX: 24.28 KG/M2 | HEIGHT: 70 IN | OXYGEN SATURATION: 97 % | HEART RATE: 78 BPM

## 2023-12-01 DIAGNOSIS — I48.0 PAF (PAROXYSMAL ATRIAL FIBRILLATION): Primary | ICD-10-CM

## 2023-12-01 DIAGNOSIS — I10 PRIMARY HYPERTENSION: ICD-10-CM

## 2023-12-01 DIAGNOSIS — I71.43 INFRARENAL ABDOMINAL AORTIC ANEURYSM (AAA) WITHOUT RUPTURE: ICD-10-CM

## 2023-12-01 PROCEDURE — 3078F DIAST BP <80 MM HG: CPT | Performed by: NURSE PRACTITIONER

## 2023-12-01 PROCEDURE — 1160F RVW MEDS BY RX/DR IN RCRD: CPT | Performed by: NURSE PRACTITIONER

## 2023-12-01 PROCEDURE — 3074F SYST BP LT 130 MM HG: CPT | Performed by: NURSE PRACTITIONER

## 2023-12-01 PROCEDURE — 99214 OFFICE O/P EST MOD 30 MIN: CPT | Performed by: NURSE PRACTITIONER

## 2023-12-01 PROCEDURE — 1159F MED LIST DOCD IN RCRD: CPT | Performed by: NURSE PRACTITIONER

## 2023-12-01 NOTE — PROGRESS NOTES
"Primary Care Physician: Chava Fair MD    Date of Visit: 12/01/2023     Chief Complaint:   Chief Complaint   Patient presents with    Follow-up     [facility] Notes in Chart        Subjective:   Bianka Ng is a 53 y.o. female presents with headache    HPI:  HPI  Headache for the past 2 weeks.  Wanes then intensifies, but has not gone away.  States that she had an 'aura' while in the shower.  Soon after, the headache.    +photophobia and noise sensitivity.    Has tried otc medications.  No help  Benadryl helps with sleep  To the ed for eval.  Was given a cocktail that helped, but did not last.  Ct scan of head neg per pt..  No prior hx of migraine ha.    Past Medical History:  No past medical history on file.     Social History:        Family History:  family history is not on file.      Allergies:  No Known Allergies    Outpatient Medications:  Current Outpatient Medications   Medication Instructions    citalopram (CELEXA) 20 mg, oral, Daily    simvastatin (ZOCOR) 40 mg, oral, Daily         ROS:   Review of Systems   Neurological:  Positive for headaches.    Per hpi    Vitals:  Vitals:    12/01/23 1122   BP: 124/72   BP Location: Right arm   Patient Position: Sitting   BP Cuff Size: Adult   Pulse: 74   Resp: 16   Temp: 36.6 °C (97.8 °F)   TempSrc: Temporal   SpO2: 98%   Weight: 84.8 kg (187 lb)   Height: 1.651 m (5' 5\")     Wt Readings from Last 1 Encounters:   12/01/23 84.8 kg (187 lb)     Body mass index is 31.12 kg/m².       Physical Exam:  Physical Exam  Constitutional:       Appearance: Normal appearance.   HENT:      Head: Normocephalic and atraumatic.      Right Ear: Tympanic membrane and ear canal normal.      Left Ear: Tympanic membrane and ear canal normal.      Nose: Nose normal.      Mouth/Throat:      Mouth: Mucous membranes are moist.      Pharynx: Oropharynx is clear.   Eyes:      Conjunctiva/sclera: Conjunctivae normal.      Pupils: Pupils are equal, round, and " Subjective:     Patient ID: George Lomeli is a 81 y.o. male.    CC:   Chief Complaint   Patient presents with   • Headache       HPI:   History of Present Illness   Mr. Lomeli is here today for follow up.  I first saw several weeks ago for complaints of headaches..  He told me he has been having some degree of daily headache now for about 8 or 9 months prior to her first visit.  He said some days are worse than others.  Typically his headaches are rated about a 5 out of 10, they are described as a holoacranial and frontal dull ache or pressure.  A couple months ago prior his primary care started him on Topamax, he took this for 2 to 3 weeks but did not think it helped so he stopped the medication.  He was not really interested in a lot of new medications initially.  He told me he has a history of migraines in the past although he does not feel like these are as severe as his previous migraines.  He does not use NSAIDs, he does not drink caffeine.  He said he sleeps well and snores maybe a little.  He does have chronic pain and sees a pain doctor, currently prescribed oxycodone.  He denied any memory issues, he denied vision changes, confusional episodes, dizziness, syncope, weakness or falls.  He has some mild numbness in his fingers, he has a history of neck problems and this is at his baseline.  He had a recent eye exam that was stable, he did have lens implants recently but was having the headaches prior to this.     After first visit I did obtain labs, CRP and sed rate just very mildly elevated, normal sed rate 0-20, levels 21, normal sed rate up to 0.5 his was 0.6.  I did place an order for a redraw which she has not had done yet.    He had a noncontrasted MRI which showed significantly pronounced white matter changes with no other evidence of tumor etc.  At that time I did suggest repeating MRI of the brain with and without contrast due to his history of lymphoma and thyroid cancer as well as MRI of the head  neck to rule out vascular blockage and/or aneurysm contributing to headaches and white matter changes.  Unfortunately appears patient missed his appointment for the scans in August, he would like me to reschedule these.    I did send him a prescription for gabapentin, patient's been on this for a few weeks now, he is taking 100 mg twice a day when I saw him last he felt like the gabapentin had significantly improved his headaches.  Over the last several months however he feels like the headaches have picked back up, they come and go and are not severe but he has some degree of mild headache daily.  No new complaints and no neurological deficits.  He is having no side effects from the gabapentin    The following portions of the patient's history were reviewed and updated as appropriate: allergies, current medications, past family history, past medical history, past social history, past surgical history and problem list.    Past Medical History:   Diagnosis Date   • AAA (abdominal aortic aneurysm) (CMS/HCC)    • Arthritis    • Broken neck (CMS/HCC)    • Gout    • Hypertension    • Lymphoma (CMS/HCC)    • Lymphoma (CMS/HCC)     had radiation   • Migraine    • Sciatica    • Thyroid cancer (CMS/HCC)        Past Surgical History:   Procedure Laterality Date   • BACK SURGERY     • EYE SURGERY     • MULTIPLE TOOTH EXTRACTIONS     • NECK SURGERY      PINS/RODS IN NECK   • THYROIDECTOMY / EXCISION CYST THYROID         Social History     Socioeconomic History   • Marital status:      Spouse name: Not on file   • Number of children: Not on file   • Years of education: Not on file   • Highest education level: Not on file   Tobacco Use   • Smoking status: Former Smoker     Packs/day: 1.00     Years: 15.00     Pack years: 15.00     Types: Cigarettes     Quit date:      Years since quittin.7   • Smokeless tobacco: Never Used   • Tobacco comment: QUIT 30 YEARS AGO    Vaping Use   • Vaping Use: Never used   Substance  reactive to light.   Cardiovascular:      Rate and Rhythm: Normal rate and regular rhythm.      Heart sounds: Normal heart sounds. No murmur heard.  Pulmonary:      Effort: Pulmonary effort is normal.      Breath sounds: Normal breath sounds.   Musculoskeletal:      Cervical back: Normal range of motion and neck supple.   Neurological:      Mental Status: She is alert.               Assessment/Plan   Diagnoses and all orders for this visit:  Intractable migraine with aura with status migrainosus        Orders:  No orders of the defined types were placed in this encounter.  Trial:  Zavzpret ns 10mg lot#[id number] exp 20/2025   Kqupdz32io lot#[id number] exp 9/2025    Followup Appts:  No future appointments.        ____________________________________________________________  Chava Fair MD   "and Sexual Activity   • Alcohol use: No   • Drug use: No   • Sexual activity: Defer       Family History   Problem Relation Age of Onset   • Hypertension Mother    • Hypertension Sister         Review of Systems   Constitutional: Negative.    Eyes: Negative.    Respiratory: Negative.    Cardiovascular: Negative.    Gastrointestinal: Negative.    Endocrine: Negative.    Genitourinary: Negative.    Musculoskeletal: Negative.    Skin: Negative.    Allergic/Immunologic: Negative.    Neurological: Positive for headaches. Negative for dizziness, tremors, seizures, syncope, facial asymmetry, speech difficulty, weakness, light-headedness and numbness.   Hematological: Negative.    Psychiatric/Behavioral: Negative.         Objective:  /70   Pulse 80   Temp 98.1 °F (36.7 °C)   Ht 177.8 cm (70\")   Wt 81.2 kg (179 lb)   SpO2 99%   BMI 25.68 kg/m²     Neurologic Exam     Mental Status   Oriented to person, place, and time.   Patient is alert and oriented, speech clear and articulate     Cranial Nerves   Cranial nerves II through XII intact.     CN III, IV, VI   Pupils are equal, round, and reactive to light.    Motor Exam   Muscle bulk: normal  Right arm pronator drift: absent  Left arm pronator drift: absent    Strength   Strength 5/5 throughout.     Gait, Coordination, and Reflexes     Gait  Gait: (Antalgic gait, patient has chronic back pain and chronic knee issues, and pain management receiving injections)    Coordination   Romberg: negative  Finger to nose coordination: normal    Tremor   Resting tremor: absent  Intention tremor: absent  Action tremor: absentDecreased DTRs       Physical Exam  Vitals reviewed.   Constitutional:       General: He is not in acute distress.     Appearance: He is well-developed. He is not ill-appearing or toxic-appearing.   HENT:      Head: Normocephalic and atraumatic.      Mouth/Throat:      Mouth: Mucous membranes are moist.   Eyes:      General: No scleral icterus.     " Extraocular Movements: Extraocular movements intact.      Conjunctiva/sclera: Conjunctivae normal.      Pupils: Pupils are equal, round, and reactive to light.   Pulmonary:      Effort: Pulmonary effort is normal. No respiratory distress.   Musculoskeletal:      Cervical back: Normal range of motion and neck supple.   Skin:     General: Skin is warm.      Capillary Refill: Capillary refill takes less than 2 seconds.   Neurological:      General: No focal deficit present.      Mental Status: He is alert and oriented to person, place, and time.      Coordination: Finger-Nose-Finger Test and Romberg Test normal.      Deep Tendon Reflexes: Strength normal.   Psychiatric:         Mood and Affect: Mood normal.         Behavior: Behavior normal.         Thought Content: Thought content normal.         Judgment: Judgment normal.         Assessment/Plan:       Diagnoses and all orders for this visit:    1. Headache, new daily persistent (NDPH) (Primary)  -     MRI Brain With & Without Contrast; Future  -     MRI Angiogram Head Without Contrast; Future  -     MRI Angiogram Neck With Contrast; Future  -     Basic Metabolic Panel; Future  -     C-reactive Protein; Future  -     Sedimentation Rate; Future  -     gabapentin (Neurontin) 100 MG capsule; Take 2 PO TID  Dispense: 180 capsule; Refill: 5    2. White matter abnormality on MRI of brain  -     MRI Brain With & Without Contrast; Future  -     MRI Angiogram Head Without Contrast; Future  -     MRI Angiogram Neck With Contrast; Future    We discussed medication options, he initially had a significant improvement in his headaches with starting gabapentin, he has had no side effects and is only taking 100 mg twice a day.  For now he is going to increase his gabapentin to 100 mg 3 times a day and after couple weeks he can gradually increase up to 2 pills 3 times a day, counseled on risk of sedation and other side effects.  I have reordered labs and MRI and MRA of the head neck,  patient is encouraged to keep appointment for imaging.  We will see him back in 3 months or sooner if needed, he is encouraged to notify us with any questions concerns or problems prior to his follow-up appointment  As part of this patient's treatment plan I am prescribing controlled substances. The patient has been made aware of appropriate use of such medications, including potential risk of somnolence, limited ability to drive and/or work safely, and potential for dependence or overdose. It has also been made clear that these medications are for use by the patient only, without concomitant use of alcohol or other substances unless prescribed. Keep out of reach of children.  [facility] report has been reviewed. If this is going to be prescribed long term, [facility] Controlled Substance Agreement Contract has also been read and signed by patient and myself.           Reviewed medications, potential side effects and signs and symptoms to report. Discussed risk versus benefits of treatment plan with patient and/or family-including medications, labs and radiology that may be ordered. Addressed questions and concerns during visit. Patient and/or family verbalized understanding and agree with plan.    AS THE PROVIDER, I PERSONALLY WORE PPE DURING ENTIRE FACE TO FACE ENCOUNTER IN CLINIC WITH THE PATIENT. PATIENT ALSO WORE PPE DURING ENTIRE FACE TO FACE ENCOUNTER EXCEPT FOR A MAX OF 30 SECONDS DURING NEUROLOGICAL EVALUATION OF CRANIAL NERVES AND THEN MASK WAS PLACED BACK OVER PATIENT FACE FOR REMAINDER OF VISIT. I WASHED MY HANDS BEFORE AND AFTER VISIT.    During this visit the following were done:  Labs Reviewed []    Labs Ordered []    Radiology Reports Reviewed []    Radiology Ordered []    PCP Records Reviewed []    Referring Provider Records Reviewed []    ER Records Reviewed []    Hospital Records Reviewed []    History Obtained From Family []    Radiology Images Reviewed []    Other Reviewed []    Records Requested []       Karolina Yee, APRN  9/27/2021

## 2023-12-12 ENCOUNTER — OFFICE VISIT (OUTPATIENT)
Dept: RADIATION ONCOLOGY | Facility: HOSPITAL | Age: 83
End: 2023-12-12
Payer: MEDICARE

## 2023-12-12 VITALS
OXYGEN SATURATION: 98 % | WEIGHT: 170.6 LBS | RESPIRATION RATE: 18 BRPM | TEMPERATURE: 96.3 F | HEART RATE: 85 BPM | DIASTOLIC BLOOD PRESSURE: 91 MMHG | SYSTOLIC BLOOD PRESSURE: 131 MMHG | BODY MASS INDEX: 24.48 KG/M2

## 2023-12-12 DIAGNOSIS — C82.59 DIFFUSE FOLLICLE CENTER LYMPHOMA OF EXTRANODAL SITE: Primary | Chronic | ICD-10-CM

## 2023-12-12 PROCEDURE — G0463 HOSPITAL OUTPT CLINIC VISIT: HCPCS

## 2023-12-12 NOTE — PROGRESS NOTES
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 Lomeli returns to clinic for a scheduled follow-up visit.  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  He completed radiation in March of this year.  Since completing radiation, he states he is continue to do well.  He denies any ongoing symptoms of abdominal pain or changes in her his bowels.  His chief complaint today is related to persistent headaches and nasal drainage which she says has been progressively worse over the past month.  He is scheduled to have repeat CT scans next week.  He otherwise denies any fevers, chills, night sweats, or weight loss.      MEDICATIONS: Medication reconciliation for the patient was reviewed and confirmed in the electronic medical record.    Review of Systems   Musculoskeletal:  Positive for back pain (intermittent down right leg).   Neurological:  Positive for headaches (reports daily last few weeks-HX migraines).        Numbness bilateral hands   All other systems reviewed and are negative.      Karnofsky score: 80     Physical Exam  Vitals and nursing note reviewed.   Constitutional:       General: He is not in acute distress.     Appearance: He is well-developed.   HENT:      Head: Normocephalic and atraumatic.      Comments: Nontender to his sinuses.  Clear nasal drainage.  Turbinates appear normal without any erythema or enlargement.  Tonsils are within normal limits.  No palpable cervical or supraclavicular adenopathy.  Eyes:      Conjunctiva/sclera: Conjunctivae normal.       Pupils: Pupils are equal, round, and reactive to light.   Cardiovascular:      Rate and Rhythm: Normal rate and regular rhythm.      Heart sounds: No murmur heard.     No friction rub.   Pulmonary:      Effort: Pulmonary effort is normal.      Breath sounds: Normal breath sounds. No wheezing.   Abdominal:      General: Bowel sounds are normal. There is no distension.      Palpations: Abdomen is soft. There is no mass.      Tenderness: There is no abdominal tenderness.      Comments: No splenomegaly   Musculoskeletal:         General: Normal range of motion.      Cervical back: Normal range of motion and neck supple.      Comments: No evidence of bilateral lower extremity swelling   Lymphadenopathy:      Cervical: No cervical adenopathy.   Skin:     General: Skin is warm and dry.   Neurological:      Mental Status: He is alert and oriented to person, place, and time.   Psychiatric:         Behavior: Behavior normal.         Thought Content: Thought content normal.         Judgment: Judgment normal.         VITAL SIGNS:   Vitals:    12/12/23 1342   BP: 131/91   Pulse: 85   Resp: 18   Temp: 96.3 °F (35.7 °C)   TempSrc: Temporal   SpO2: 98%   Weight: 77.4 kg (170 lb 9.6 oz)   PainSc:   2   PainLoc: Comment: head ache             Karnofsky score: 80         The following portions of the patient's history were reviewed and updated as appropriate: allergies, current medications, past family history, past medical history, past social history, past surgical history and problem list.         Diagnoses and all orders for this visit:    1. 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 Lomeli is an 83-year-old gentleman with a history of recurrent follicular lymphoma.  He is due to have repeat imaging which is scheduled for next week.  Considering he is having persistent headaches and nasal drainage, I do not see anything on his exam to  suggest new disease, but I still think would be worthwhile to include this area and his next imaging, therefore I have included a CT of the neck.  Once those scans are completed, I will review them via telehealth and if any additional steps need to be taking, I will coordinate them with Mr. Lomeli.    RECOMMENDATIONS: Obtain CT of the neck.  Follow-up via telehealth.    I spent a total of 30 minutes on todays visit, with more than 20 minutes in direct face to face communication, and the remainder of the time spent in reviewing the relevant history, records, available imaging, and for documentation.    Return in about 2 weeks (around 12/26/2023) for Telehealth, Imaging - See orders.    Milo Christianson MD

## 2023-12-14 ENCOUNTER — APPOINTMENT (OUTPATIENT)
Dept: RADIATION ONCOLOGY | Facility: HOSPITAL | Age: 83
End: 2023-12-14
Payer: MEDICARE

## 2023-12-14 ENCOUNTER — HOSPITAL ENCOUNTER (OUTPATIENT)
Dept: RADIATION ONCOLOGY | Facility: HOSPITAL | Age: 83
Setting detail: RADIATION/ONCOLOGY SERIES
Discharge: HOME | End: 2023-12-14
Payer: MEDICARE

## 2023-12-22 ENCOUNTER — HOSPITAL ENCOUNTER (OUTPATIENT)
Dept: CT IMAGING | Facility: HOSPITAL | Age: 83
Discharge: HOME | End: 2023-12-22
Admitting: NURSE PRACTITIONER
Payer: MEDICARE

## 2023-12-22 DIAGNOSIS — C82.59 DIFFUSE FOLLICLE CENTER LYMPHOMA OF EXTRANODAL SITE: Chronic | ICD-10-CM

## 2023-12-22 LAB — CREAT BLDA-MCNC: 1 MG/DL (ref 0.6–1.3)

## 2023-12-22 PROCEDURE — 70491 CT SOFT TISSUE NECK W/DYE: CPT

## 2023-12-22 PROCEDURE — 71260 CT THORAX DX C+: CPT

## 2023-12-22 PROCEDURE — 74177 CT ABD & PELVIS W/CONTRAST: CPT

## 2023-12-22 PROCEDURE — 82565 ASSAY OF CREATININE: CPT

## 2023-12-22 PROCEDURE — 25510000001 IOPAMIDOL 61 % SOLUTION: Performed by: NURSE PRACTITIONER

## 2023-12-22 RX ADMIN — IOPAMIDOL 85 ML: 612 INJECTION, SOLUTION INTRAVENOUS at 11:57

## 2023-12-26 ENCOUNTER — OFFICE VISIT (OUTPATIENT)
Dept: RADIATION ONCOLOGY | Facility: HOSPITAL | Age: 83
End: 2023-12-26
Payer: MEDICARE

## 2023-12-26 VITALS
HEART RATE: 97 BPM | TEMPERATURE: 96.5 F | BODY MASS INDEX: 24.22 KG/M2 | SYSTOLIC BLOOD PRESSURE: 135 MMHG | RESPIRATION RATE: 18 BRPM | WEIGHT: 168.8 LBS | DIASTOLIC BLOOD PRESSURE: 88 MMHG | OXYGEN SATURATION: 96 %

## 2023-12-26 DIAGNOSIS — C82.59 DIFFUSE FOLLICLE CENTER LYMPHOMA OF EXTRANODAL SITE: Primary | Chronic | ICD-10-CM

## 2023-12-26 PROCEDURE — G0463 HOSPITAL OUTPT CLINIC VISIT: HCPCS

## 2023-12-26 NOTE — PROGRESS NOTES
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 Lomeli returns to clinic today for a scheduled follow visit today related to his follicular center lymphoma.  I last saw him a few weeks ago when he was experiencing some swallowing difficulty and headaches.  He has since underwent CT scans and he returns to clinic today for reevaluation.  From a symptomatic standpoint, he denies fevers or chills.  States that he feels like his swallowing and headaches have improved somewhat.    MEDICATIONS: Medication reconciliation for the patient was reviewed and confirmed in the electronic medical record.    Review of Systems   Musculoskeletal:  Positive for back pain.   All other systems reviewed and are negative.      Karnofsky score: 80     Physical Exam  Vitals and nursing note reviewed.   Constitutional:       General: He is not in acute distress.     Appearance: He is well-developed.   HENT:      Head: Normocephalic and atraumatic.      Mouth/Throat:      Comments: Oropharynx is clear without enlarged tonsils  Eyes:      Conjunctiva/sclera: Conjunctivae normal.      Pupils: Pupils are equal, round, and reactive to light.   Neck:      Comments: No palpable adenopathy  Cardiovascular:      Rate and Rhythm: Normal rate and regular rhythm.      Heart sounds: No murmur heard.     No friction rub.   Pulmonary:      Effort: Pulmonary effort is normal.      Breath sounds: Normal breath sounds. No wheezing.   Abdominal:      General: Bowel sounds are normal. There is no distension.      Palpations: Abdomen is soft. There is no mass.      Tenderness: There is no abdominal tenderness.      Comments: No splenomegaly   Musculoskeletal:          General: Normal range of motion.      Cervical back: Normal range of motion and neck supple.   Lymphadenopathy:      Cervical: No cervical adenopathy.   Skin:     General: Skin is warm and dry.   Neurological:      Mental Status: He is alert and oriented to person, place, and time.   Psychiatric:         Behavior: Behavior normal.         Thought Content: Thought content normal.         Judgment: Judgment normal.         VITAL SIGNS:   Vitals:    12/26/23 1350   BP: 135/88   Pulse: 97   Resp: 18   Temp: 96.5 °F (35.8 °C)   TempSrc: Temporal   SpO2: 96%   Weight: 76.6 kg (168 lb 12.8 oz)   PainSc:   7   PainLoc: Back             Karnofsky score: 80     IMAGING  I have personally reviewed the relevant imaging studies, as follows:  CT Chest With Contrast Diagnostic    Result Date: 12/22/2023  Impression: Likely mild interval decrease in size of the presacral soft tissue mass reflecting treatment response. Otherwise no new osseous or soft tissue findings in the neck, chest, abdomen and pelvis to suggest new or progressive lymphomatous disease elsewhere. Somewhat increased homogeneous enhancement is noted within the stent excluded infrarenal abdominal aortic aneurysm, without distinct contrast extravasation noted but remaining concerning for endoleak. Consider CT angiogram of the abdomen and pelvis to more specifically evaluate. No acute findings in the neck, chest, abdomen and pelvis otherwise. Electronically Signed: Rah Peña MD  12/22/2023 12:39 PM EST  Workstation ID: [id number]    CT Abdomen Pelvis With Contrast    Result Date: 12/22/2023  Impression: Likely mild interval decrease in size of the presacral soft tissue mass reflecting treatment response. Otherwise no new osseous or soft tissue findings in the neck, chest, abdomen and pelvis to suggest new or progressive lymphomatous disease elsewhere. Somewhat increased homogeneous enhancement is noted within the stent excluded infrarenal abdominal aortic  aneurysm, without distinct contrast extravasation noted but remaining concerning for endoleak. Consider CT angiogram of the abdomen and pelvis to more specifically evaluate. No acute findings in the neck, chest, abdomen and pelvis otherwise. Electronically Signed: Rah Peña MD  12/22/2023 12:39 PM EST  Workstation ID: [id number]    CT Soft Tissue Neck With Contrast    Result Date: 12/22/2023  Impression: Likely mild interval decrease in size of the presacral soft tissue mass reflecting treatment response. Otherwise no new osseous or soft tissue findings in the neck, chest, abdomen and pelvis to suggest new or progressive lymphomatous disease elsewhere. Somewhat increased homogeneous enhancement is noted within the stent excluded infrarenal abdominal aortic aneurysm, without distinct contrast extravasation noted but remaining concerning for endoleak. Consider CT angiogram of the abdomen and pelvis to more specifically evaluate. No acute findings in the neck, chest, abdomen and pelvis otherwise. Electronically Signed: Rah Peña MD  12/22/2023 12:39 PM EST  Workstation ID: [id number]    XR Chest PA & Lateral    Result Date: 10/12/2023  Impression: 1. No acute process. 2. Bronchiectasis in the medial basilar left lower lobe better demonstrated on prior CT. Electronically Signed: Toni Singh MD  10/12/2023 8:39 AM EDT  Workstation ID: [id number]       The following portions of the patient's history were reviewed and updated as appropriate: allergies, current medications, past family history, past medical history, past social history, past surgical history and problem list.         There are no diagnoses linked to this encounter.     IMPRESSION: Mr. Lomeli is an 83-year-old gentleman with a known history of recurrent follicular lymphoma.  He continues to do well, and his CT scans continue to show improvement in the large pelvic mass that was previously irradiated.  I discussed the ongoing management including  timeframe for follow-up visits.  I will plan to see him back in my clinic in 6 months.    RECOMMENDATIONS: Return to clinic in 6 months    I spent a total of 30 minutes on todays visit, with more than 20 minutes in direct face to face communication, and the remainder of the time spent in reviewing the relevant history, records, available imaging, and for documentation.    Return in about 6 months (around 6/26/2024) for Office Visit.    Milo Christianson MD

## 2023-12-29 ENCOUNTER — OFFICE VISIT (OUTPATIENT)
Dept: ONCOLOGY | Facility: CLINIC | Age: 83
End: 2023-12-29
Payer: MEDICARE

## 2023-12-29 VITALS
DIASTOLIC BLOOD PRESSURE: 86 MMHG | RESPIRATION RATE: 20 BRPM | OXYGEN SATURATION: 98 % | HEART RATE: 74 BPM | HEIGHT: 70 IN | TEMPERATURE: 98.1 F | BODY MASS INDEX: 23.91 KG/M2 | WEIGHT: 167 LBS | SYSTOLIC BLOOD PRESSURE: 137 MMHG

## 2023-12-29 DIAGNOSIS — R93.5 ABNORMAL CT OF THE ABDOMEN: ICD-10-CM

## 2023-12-29 DIAGNOSIS — C82.59 DIFFUSE FOLLICLE CENTER LYMPHOMA OF EXTRANODAL SITE: Primary | Chronic | ICD-10-CM

## 2023-12-29 DIAGNOSIS — I71.40 ABDOMINAL AORTIC ANEURYSM (AAA) WITHOUT RUPTURE, UNSPECIFIED PART: ICD-10-CM

## 2023-12-29 PROCEDURE — 99214 OFFICE O/P EST MOD 30 MIN: CPT | Performed by: NURSE PRACTITIONER

## 2023-12-29 PROCEDURE — 3075F SYST BP GE 130 - 139MM HG: CPT | Performed by: NURSE PRACTITIONER

## 2023-12-29 PROCEDURE — 1160F RVW MEDS BY RX/DR IN RCRD: CPT | Performed by: NURSE PRACTITIONER

## 2023-12-29 PROCEDURE — 1125F AMNT PAIN NOTED PAIN PRSNT: CPT | Performed by: NURSE PRACTITIONER

## 2023-12-29 PROCEDURE — 1159F MED LIST DOCD IN RCRD: CPT | Performed by: NURSE PRACTITIONER

## 2023-12-29 PROCEDURE — 3079F DIAST BP 80-89 MM HG: CPT | Performed by: NURSE PRACTITIONER

## 2023-12-29 NOTE — PROGRESS NOTES
CHIEF COMPLAINT: Lymphoma    Problem List:  Oncology/Hematology History Overview Note   1.  Follicular lymphoma:  -  initial consultation 3/12/18: Had been having left inguinal pains and went for scanning that found left groin node.  Needle biopsy of this was called lymphoma CD10 positive and Fish testing positive for translocation (14;18) consistent with follicular lymphoma though could also be seen in diffuse large B cell.  BCL 6 /3q rearranged.  No Myc..  Hence cannot say at this junction the exact subtype of lymphoma.  He has not been having bed drenching night sweats.  No unexplained weight loss.  Has not had any known anemia, thrombocytopenia, leukopenia.  Has not had frequent infections.  CT did not show any splenomegaly or other significant adenopathy.  The left inguinal pain has subsided since the needle biopsy.  He has an abdominal aortic aneurysm that is being watched and had previously been repaired.  Apparently 15+ years ago he was treated at [facility] for lymphoma for which he received right neck radiation and some chemotherapy.  He is not sure of the subtype of lymphoma nor of the type of chemotherapy nor the number of radiation treatments and this was far enough back that our records have been expunged.      -9/27/2018 - 10/17/2018 received radiation to the left groin and iliac    -10/17/2019 [facility] hematology oncology APRN follow-up visit: I reviewed his CT scans and discussed results with him which showed no evidence of active or recurrent lymphoma.  Labs pending.  Will review results and notify patient of any significant findings.  We would not treat unless he had 3 more than 3 cm, one more than 7 cm nodes, or the other GELF criteria such as unexplained bed drenching night sweats/fevers/weight loss/anemia/thrombocytopenia/frequent infections. We will follow up with patient in 6 months with repeat labs.      -9/18/2020 [facility] hematology oncology follow-up visit:No cervical axillary or  inguinal adenopathy.  Blood counts have been normal as of April along with CT showing no bulky adenopathy at that point.  We will get his labs today and CTs now in light of his abdominal pain.  I will have him do telephone visit with my nurse practitioner in a couple of weeks.  The pain is not excruciating and is not every day but just been present for the past few weeks.  If the scans are negative then I would ask him to follow-up with gastroenterology on that and we can make referral as he desires.  Would not treat unless he reached our previously mentioned G ELF criteria.     -10/2/2020 [facility] hematology oncology APRN follow-up visit: Patient currently feeling well, previous concerns over abdominal pain resolved spontaneously with no intervention.  CT chest, abdomen and pelvis as shown above are stable with no evidence of disease recurrence or progression.  He has no adenopathy.  CBC on 9/18/2020 was unremarkable.  We will continue to monitor per NCCN guidelines.  He is now 2 years out from treatment, we will go to annual CT scan.  I will see him back in 6 months for follow-up with CBC and CMP on return.  I discussed with the patient to notify us if he has any concerns.  Would not treat unless he had 3 more than 3 cm or 1 more than 7 cm node, or other golf criteria such as unexplained bed drenching night sweats, fevers, weight loss, anemia, thrombocytopenia, frequent infections.    -9/27/2021 sedimentation rate 13 C-reactive protein 0.33.    -10/5/2021 CT chest abdomen pelvis without contrast showed soft tissue prominence anterior to sacral/lumbar region and infra aortic bifurcation lymphadenopathy maximum 4.3 cm.    -10/7/2021 [facility] medical oncology follow-up visit: No new somatic complaints.  Feels great.  I will check a CBC now along with repeat blood work and CT in 3 months but unless he develops hemoglobin less than 10, platelets less than 100,000, unexplained bed drenching night sweats/weight  loss/fevers/recurrent infections, 1 more than 7 cm or 3 or more 3 cm or larger nodes, I would not treat him.  At the point he reaches those criteria I would like to get tissue and a PET to make sure that we have accurate staging before treatment and to be sure that he has not transformed.  He will see my nurse practitioner back in 3 months to go over repeat test.    -1/3/2022 [facility] follow-up:  Mr. Lomeli overall is doing well, no evidence of disease recurrence on current CT scans from 12/28/2021 which I reviewed with him today.  CT scan showed chronic interstitial changes throughout the lung fields with old granulomatous disease, stable adenopathy beneath the aortic bifurcation and anterior to the sacrum stable and unchanged without evidence of disease progression.  CBC is unremarkable, mild anemia with hemoglobin of 12, hematocrit normal at 37.5%, WBC normal at 7900, platelets 2000.  CMP pending. We will continue with CT scan again in 6 months for surveillance.    -7/11/2022 [facility] hematology follow-up: Other than chronic migraine headaches, no other complaints.  Did not get recent CAT scans as ordered but I am okay with just checking noncontrast CTs in January which will be a little over a year from his last scans and getting his blood counts today and only treating if he meets GELF criteria possible changes seen on his CT scans .    -8/12/2022 CT chest, abdomen and pelvis, no evidence of lymphoma or other active disease in the chest, the previously described residual presacral mass has had interval enlargement from prior scan 12/28/2021 with interval development of mildly enlarged superiorly adjacent retroperitoneal lymph nodes.  No evidence of new adenopathy or mass elsewhere.  No evidence of resulting obstructive uropathy.  Nonspecific gaseous distention of the transverse colon, no evidence of high-grade ileus or obstruction.    -12/3/2022 presented to the ER with abdominal pain and  diarrhea, CT abdomen and pelvis shows stable presacral mass.  Nonspecific bulky appearance of the cecum probably due to decompression, cannot exclude cicadas/diverticulitis.  Patient treated with Augmentin and Flagyl by ER physician.    -1/20/2023 [facility] follow-up: Mr. Lomeli is having symptoms with excessive flatus, lower back pain and sciatica down his right buttocks and right hip.  I am concerned with with changes that were noted on his CT scans of the summer that were done after his visit with us therefore we were not made aware of these changes nor any symptoms that he has been having until today.  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  He was treated with Augmentin and Flagyl for possible diverticulitis.  He has decreased appetite although no significant weight loss, no bed drenching night sweats but he does report feeling hot when he wakes up in the mornings.  We will get a PET scan for further evaluation and also get a biopsy of the presacral mass.  We will see him back in a few weeks for follow-up to go over the results and further plan of care. His CMP from 12/2/2022 was unremarkable, CBC from today shows mild anemia with hemoglobin of 12.1, hematocrit 38.9%, absolute neutrophils 7.48 otherwise normal CBC.  Plan of care was discussed with Dr. Thor Ruggiero at time of visit.    - 2/7/2023 follow-up with Dr. 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    -2/10/2023 [facility] follow-up: His pathology and his clinical scenario suggest this to be a low-grade follicular lymphoma.  The presacral mass had not changed much from August and December but the PET also in the legs at this area for which Dr. Christianson can treat him quite effectively as outlined  above.  This has not transformed to high-grade lymphoma and his labs are for the most part unremarkable as outlined above by my nurse practitioner.  I will repeat his PET in 3 months.    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  Max SUV 1.7.    -6/26/2023 [facility] follow-up: Mr. Lomeli is doing well, he had an excellent response to radiation to the presacral mass as shown on current PET scan that is now negative.  He is feeling much better.  He reports that he had lab work done recently with his primary care provider, I will reach out to get a copy of that.  We will plan on continued surveillance per NCCN guidelines with repeat CT chest, abdomen and pelvis in 6 months.  I did ask about any contrast allergy and he denies knowledge of any reaction to contrast in the past.  He did have CT with IV contrast in February and March with no difficulty.    -12/22/2023 CT neck, chest, abdomen and pelvis shows likely mild interval decrease in size of presacral soft tissue mass reflecting treatment response otherwise no new areas of concern.  There was somewhat increased homogenous enhancement noted within the abdominal aortic aneurysm stent without distinct contrast extravasation noted but remaining concerning for endoleak.     Follicular lymphoma (Resolved)    Initial Diagnosis    Follicular lymphoma (HCC) (Resolved)     Diffuse follicle center lymphoma of extranodal site   3/12/2018 Initial Diagnosis    Follicular lymphoma     3/15/2018 Imaging    PET     IMPRESSION:     Multiple hypermetabolic abnormalities are identified but the  distribution is unusual. Although there is bulky hypermetabolic rishi  mass in the left inguinal area and in the right internal iliac rishi  chain certainly consistent with active malignant lymphoma, there is an  intense abnormal area of tracer uptake and  hypermetabolic activity  throughout the marrow or intramedullary portion of the left ilium and  iliac wing in the absence of cortical destruction. This is an intensely  hypermetabolic abnormal lengthy intraosseous abnormality.     The lower extremity dedicated datasets are negative.     Intermediate lymph nodes are identified in the medial inguinal region  which are not above malignant threshold.     Above the pelvis, there is no evidence of additional hypermetabolic  focus or active neoplastic disease, and the chest, mediastinum and neck  are clear.     6/7/2018 Imaging    MRI lumbar spine IMPRESSION:    Study is limited by susceptibility artifact greatest at L1-L2.       No definite significant central canal stenosis.       Moderate to severe right and moderate left foraminal stenosis at L3-L4.    Moderate to severe bilateral foraminal stenosis at L4-L5.     7/24/2018 Imaging    CT chest IMPRESSION:  There are no acute findings. Specifically, there is no  axillary, mediastinal or hilar lymphadenopathy. There is bronchiectasis  in the medial segment of the left lower lobe. This was also present on  CT/PET examination of 03/15/2018.  CT abdomen and pelvis IMPRESSION:  Left inguinal lymphadenopathy, unchanged when compared to a  whole-body PET CT scan of 03/15/2018.     9/6/2018 Imaging    MRI pelvis  1. Decreasing size of patient's left inguinal mass.  2. Interval development of diffuse marrow edema and enhancement,  practically throughout the left ilium, and adjacent soft tissue disease  deep to the left iliacus muscle, raising suspicion for lymphoma.     9/27/2018 - 10/17/2018 Radiation    Radiation OncologyTreatment Course:  George Lomeli received 3000 cGy in 15 fractions to left groin & iliac via External Beam Radiation - EBRT.     11/27/2018 Imaging    CT chest abdomen and pelvis  IMPRESSION: Bronchiectasis in the lungs but no significant mediastinal adenopathy or axillary adenopathy or cervical  adenopathy.  There is a mildly enlarged left inguinal lymph node. The  node has smooth ovoid margins and has central low density consistent  with a necrotic node. Lastly more superiorly and anteriorly in a region  where there are surgical clips there is a lentiform mass which is much  much smaller than on the previous examination of 07/24/2018.     4/14/2020 Imaging    CT chest, abdomen and pelvis IMPRESSION:  Stable examination with no evidence of active or recurrent  lymphoma. No bulky adenopathy identified. Chronic age-related changes  identified throughout the chest, abdomen and pelvis which are all     9/29/2020 Imaging    CT chest, abdomen and pelvis IMPRESSION:  Stable examination with no evidence of active or recurrent  lymphoma. The findings are similar to that when compared to the prior  examination. No significant changes in the interval.     10/5/2021 Imaging    CT chest abdomen pelvis without contrast showed soft tissue prominence anterior to sacral/lumbar region and infra aortic bifurcation lymphadenopathy maximum 4.3 cm.     12/28/2021 Imaging    CT chest, abdomen and pelvis IMPRESSION:  Chronic interstitial changes seen throughout the lung  fields. Old healed granulomatous disease seen within the chest. There is  stable adenopathy identified beneath the aortic bifurcation and anterior  to the sacrum stable and unchanged. There are no signs of progression.     2/2/2023 Progression    PET shows strongly hypermetabolic lobular pelvic mass with separate small lower presacral nodes and no other hypermetabolism.     2/9/2023 - 3/1/2023 Radiation    Radiation OncologyTreatment Course:  George Lomeli received 3000 cGy in 15 fractions to sacrum via External Beam Radiation - EBRT.         HISTORY OF PRESENT ILLNESS:  The patient is a 83 y.o. male, here for follow up on management of recurrent low grade follicular lymphoma, he completed radiation therapy to presacral mass on 3/1/2023.  Mr. Lomeli reports  "overall he has been doing well, continues to deal with chronic back pain and sciatica that he states is not new and has been occurring off and on for years, not worsening with time.  He also has occasional headaches, he states these also are not new and have not changed over time.  His appetite is good.  He denies any abdominal pain.  No fevers or chills or bit drenching night sweats.  No change in his bowel or bladder habits.    Past Medical History:   Diagnosis Date    AAA (abdominal aortic aneurysm)     Arthritis     Broken neck     Gout     Hypertension     Lymphoma     Lymphoma     had radiation    Migraine     Sciatica     Thyroid cancer      Past Surgical History:   Procedure Laterality Date    BACK SURGERY      lumbar    CATARACT EXTRACTION EXTRACAPSULAR W/ INTRAOCULAR LENS IMPLANTATION      EYE SURGERY      MULTIPLE TOOTH EXTRACTIONS      NECK SURGERY      PINS/RODS IN NECK       Allergies   Allergen Reactions    Contrast Dye (Echo Or Unknown Ct/Mr) Nausea And Vomiting       Family History and Social History reviewed and changed as necessary    REVIEW OF SYSTEM:   Chronic back pain otherwise no new somatic concerns    PHYSICAL EXAM:  Well-developed, well-nourished male in no distress  No cervical, supraclavicular or axillary nodes palpable on exam  Abdomen soft, nontender, nondistended    Vitals:    12/29/23 1056   BP: 137/86   Pulse: 74   Resp: 20   Temp: 98.1 °F (36.7 °C)   TempSrc: Temporal   SpO2: 98%   Weight: 75.8 kg (167 lb)   Height: 177.8 cm (70\")     Vitals:    12/29/23 1056   PainSc: 7  Comment: Head/ Back          ECOG score: 0           Vitals reviewed.  Results from CT scans 12/22/2023 reviewed at time of visit      Lab Results   Component Value Date    HGB 10.8 (L) 07/21/2023    HCT 33.6 (L) 07/21/2023    MCV 91.8 07/21/2023     07/21/2023    WBC 8.81 07/21/2023    NEUTROABS 7.44 (H) 07/21/2023    LYMPHSABS 0.43 (L) 07/21/2023    MONOSABS 0.71 07/21/2023    EOSABS 0.16 07/21/2023    " BASOSABS 0.01 07/21/2023       Lab Results   Component Value Date    GLUCOSE 101 (H) 07/21/2023    BUN 12 07/21/2023    CREATININE 1.00 12/22/2023     07/21/2023    K 3.8 07/21/2023     07/21/2023    CO2 24.0 07/21/2023    CALCIUM 8.7 07/21/2023    PROTEINTOT 6.0 07/21/2023    ALBUMIN 3.8 07/21/2023    BILITOT 0.3 07/21/2023    ALKPHOS 110 07/21/2023    AST 19 07/21/2023    ALT 17 07/21/2023     CT Chest With Contrast Diagnostic    Result Date: 12/22/2023  Impression: Likely mild interval decrease in size of the presacral soft tissue mass reflecting treatment response. Otherwise no new osseous or soft tissue findings in the neck, chest, abdomen and pelvis to suggest new or progressive lymphomatous disease elsewhere. Somewhat increased homogeneous enhancement is noted within the stent excluded infrarenal abdominal aortic aneurysm, without distinct contrast extravasation noted but remaining concerning for endoleak. Consider CT angiogram of the abdomen and pelvis to more specifically evaluate. No acute findings in the neck, chest, abdomen and pelvis otherwise. Electronically Signed: Rah Peña MD  12/22/2023 12:39 PM EST  Workstation ID: [id number]    CT Abdomen Pelvis With Contrast    Result Date: 12/22/2023  Impression: Likely mild interval decrease in size of the presacral soft tissue mass reflecting treatment response. Otherwise no new osseous or soft tissue findings in the neck, chest, abdomen and pelvis to suggest new or progressive lymphomatous disease elsewhere. Somewhat increased homogeneous enhancement is noted within the stent excluded infrarenal abdominal aortic aneurysm, without distinct contrast extravasation noted but remaining concerning for endoleak. Consider CT angiogram of the abdomen and pelvis to more specifically evaluate. No acute findings in the neck, chest, abdomen and pelvis otherwise. Electronically Signed: Rah Peña MD  12/22/2023 12:39 PM EST  Workstation ID:  [id number]    CT Soft Tissue Neck With Contrast    Result Date: 12/22/2023  Impression: Likely mild interval decrease in size of the presacral soft tissue mass reflecting treatment response. Otherwise no new osseous or soft tissue findings in the neck, chest, abdomen and pelvis to suggest new or progressive lymphomatous disease elsewhere. Somewhat increased homogeneous enhancement is noted within the stent excluded infrarenal abdominal aortic aneurysm, without distinct contrast extravasation noted but remaining concerning for endoleak. Consider CT angiogram of the abdomen and pelvis to more specifically evaluate. No acute findings in the neck, chest, abdomen and pelvis otherwise. Electronically Signed: Rah Peña MD  12/22/2023 12:39 PM EST  Workstation ID: [id number]           ASSESSMENT & PLAN:  1.  Follicular lymphoma:  -  initial consultation 3/12/18: Had been having left inguinal pains and went for scanning that found left groin node.  Needle biopsy of this was called lymphoma CD10 positive and Fish testing positive for translocation (14;18) consistent with follicular lymphoma though could also be seen in diffuse large B cell.  BCL 6 /3q rearranged.  No Myc..  Hence cannot say at this junction the exact subtype of lymphoma.  He has not been having bed drenching night sweats.  No unexplained weight loss.  Has not had any known anemia, thrombocytopenia, leukopenia.  Has not had frequent infections.  CT did not show any splenomegaly or other significant adenopathy.  The left inguinal pain has subsided since the needle biopsy.  He has an abdominal aortic aneurysm that is being watched and had previously been repaired.  Apparently 15+ years ago he was treated at [facility] for lymphoma for which he received right neck radiation and some chemotherapy.  He is not sure of the subtype of lymphoma nor of the type of chemotherapy nor the number of radiation treatments and this was far enough back that our records  have been expunged.      -9/27/2018 - 10/17/2018 received radiation to the left groin and iliac    -10/17/2019 [facility] hematology oncology APRN follow-up visit: I reviewed his CT scans and discussed results with him which showed no evidence of active or recurrent lymphoma.  Labs pending.  Will review results and notify patient of any significant findings.  We would not treat unless he had 3 more than 3 cm, one more than 7 cm nodes, or the other GELF criteria such as unexplained bed drenching night sweats/fevers/weight loss/anemia/thrombocytopenia/frequent infections. We will follow up with patient in 6 months with repeat labs.      -9/18/2020 [facility] hematology oncology follow-up visit:No cervical axillary or inguinal adenopathy.  Blood counts have been normal as of April along with CT showing no bulky adenopathy at that point.  We will get his labs today and CTs now in light of his abdominal pain.  I will have him do telephone visit with my nurse practitioner in a couple of weeks.  The pain is not excruciating and is not every day but just been present for the past few weeks.  If the scans are negative then I would ask him to follow-up with gastroenterology on that and we can make referral as he desires.  Would not treat unless he reached our previously mentioned G ELF criteria.     -10/2/2020 [facility] hematology oncology APRN follow-up visit: Patient currently feeling well, previous concerns over abdominal pain resolved spontaneously with no intervention.  CT chest, abdomen and pelvis as shown above are stable with no evidence of disease recurrence or progression.  He has no adenopathy.  CBC on 9/18/2020 was unremarkable.  We will continue to monitor per NCCN guidelines.  He is now 2 years out from treatment, we will go to annual CT scan.  I will see him back in 6 months for follow-up with CBC and CMP on return.  I discussed with the patient to notify us if he has any concerns.  Would not treat unless he had 3 more  than 3 cm or 1 more than 7 cm node, or other golf criteria such as unexplained bed drenching night sweats, fevers, weight loss, anemia, thrombocytopenia, frequent infections.    -9/27/2021 sedimentation rate 13 C-reactive protein 0.33.    -10/5/2021 CT chest abdomen pelvis without contrast showed soft tissue prominence anterior to sacral/lumbar region and infra aortic bifurcation lymphadenopathy maximum 4.3 cm.    -10/7/2021 [facility], [place], operated by [facility] medical oncology follow-up visit: No new somatic complaints.  Feels great.  I will check a CBC now along with repeat blood work and CT in 3 months but unless he develops hemoglobin less than 10, platelets less than 100,000, unexplained bed drenching night sweats/weight loss/fevers/recurrent infections, 1 more than 7 cm or 3 or more 3 cm or larger nodes, I would not treat him.  At the point he reaches those criteria I would like to get tissue and a PET to make sure that we have accurate staging before treatment and to be sure that he has not transformed.  He will see my nurse practitioner back in 3 months to go over repeat test.    -1/3/2022 [facility] [place] Oncology clinic follow-up:  Mr. Lomeli overall is doing well, no evidence of disease recurrence on current CT scans from 12/28/2021 which I reviewed with him today.  CT scan showed chronic interstitial changes throughout the lung fields with old granulomatous disease, stable adenopathy beneath the aortic bifurcation and anterior to the sacrum stable and unchanged without evidence of disease progression.  CBC is unremarkable, mild anemia with hemoglobin of 12, hematocrit normal at 37.5%, WBC normal at 7900, platelets 2000.  CMP pending. We will continue with CT scan again in 6 months for surveillance.    -7/11/2022 [facility] [place] oncology hematology follow-up: Other than chronic migraine headaches, no other complaints.  Did not get recent CAT scans as ordered but I am okay with just checking noncontrast CTs in January which will be a little over a  year from his last scans and getting his blood counts today and only treating if he meets GELF criteria possible changes seen on his CT scans .    -8/12/2022 CT chest, abdomen and pelvis, no evidence of lymphoma or other active disease in the chest, the previously described residual presacral mass has had interval enlargement from prior scan 12/28/2021 with interval development of mildly enlarged superiorly adjacent retroperitoneal lymph nodes.  No evidence of new adenopathy or mass elsewhere.  No evidence of resulting obstructive uropathy.  Nonspecific gaseous distention of the transverse colon, no evidence of high-grade ileus or obstruction.    -12/3/2022 presented to the ER with abdominal pain and diarrhea, CT abdomen and pelvis shows stable presacral mass.  Nonspecific bulky appearance of the cecum probably due to decompression, cannot exclude cicadas/diverticulitis.  Patient treated with Augmentin and Flagyl by ER physician.    -1/20/2023 [facility] Oncology clinic follow-up: Mr. Lomeli is having symptoms with excessive flatus, lower back pain and sciatica down his right buttocks and right hip.  I am concerned with changes that were noted on his CT scans of the summer that were done after his visit with us therefore we were not made aware of these changes nor any symptoms that he has been having until today.  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  He was treated with Augmentin and Flagyl for possible diverticulitis.  He has decreased appetite although no significant weight loss, no bed drenching night sweats but he does report feeling hot when he wakes up in the mornings.  We will get a PET scan for further evaluation and also get a biopsy of the presacral mass.  We will see him back in a few weeks for follow-up to go over the results and further plan of care. His CMP from 12/2/2022 was unremarkable, CBC from today shows mild  anemia with hemoglobin of 12.1, hematocrit 38.9%, absolute neutrophils 7.48 otherwise normal CBC.  Plan of care was discussed with Dr. Thor Ruggiero at time of visit.    - 2/7/2023 follow-up with Dr. 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  The presacral mass had not changed much from August and December but the PET also in the legs at this area for which Dr. Christianson can treat him quite effectively as outlined above.  This has not transformed to high-grade lymphoma and his labs are for the most part unremarkable as outlined above by my nurse practitioner.  I will repeat his PET in 3 months.    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  Max SUV 1.7.    -6/26/2023 [facility] Oncology clinic follow-up: Mr. Lomeli is doing well, he had an excellent response to radiation to the presacral mass as shown on current PET scan that is now negative.  He is feeling much better.  He reports that he had lab work done recently with his primary care provider, I will reach out to get a copy of that.  We will plan on continued surveillance per NCCN guidelines with repeat CT chest, abdomen and pelvis in 6 months not for that today.  I did ask about any contrast allergy and he denies knowledge of any reaction to contrast in the past.  He did have CT with IV contrast in February and March with no difficulty.    -12/22/2023 CT neck, chest, abdomen and pelvis shows likely mild interval decrease in size of presacral soft tissue mass reflecting treatment response otherwise no new areas of concern.  There was somewhat increased homogenous  enhancement noted within the abdominal aortic aneurysm stent without distinct contrast extravasation noted but remaining concerning for endoleak.  -12/29/2023 [facility] clinic follow-up: Mr. Lomeli is doing well with no evidence of recurrent lymphoma currently and no new worrisome symptoms.  CT neck, chest, abdomen and pelvis show interval decrease in size of presacral mass reflecting treatment response but otherwise no new areas of concern.  There was some concern for possible endoleak from his abdominal aortic aneurysm stent.  He reports that he does not recall having a stent placed and could not remember who the physician was.  I will get him to our cardiothoracic surgeons for review of his CT scan and recommendation for any further follow-up.  Otherwise we will plan on seeing him back in 6 months for follow-up, we will do CT chest, abdomen and pelvis without contrast prior to return along with CBC and CMP and I have ordered those today.    Return to clinic in 6 months for follow-up    I spent 30 minutes caring for George on this date of service. This time includes time spent by me in the following activities: preparing for the visit, reviewing tests, performing a medically appropriate examination and/or evaluation, ordering medications, tests, or procedures, referring and communicating with other health care professionals, documenting information in the medical record, and discussed with Dr. Thor Ruggiero also at time of visist .     Laxmi Scott, APRN    12/29/2023

## 2024-01-01 DIAGNOSIS — I48.91 ATRIAL FIBRILLATION, UNSPECIFIED TYPE: ICD-10-CM

## 2024-01-03 RX ORDER — APIXABAN 5 MG/1
5 TABLET, FILM COATED ORAL 2 TIMES DAILY
Qty: 60 TABLET | Refills: 11 | Status: SHIPPED | OUTPATIENT
Start: 2024-01-03

## 2024-01-11 ENCOUNTER — OFFICE VISIT (OUTPATIENT)
Dept: CARDIAC SURGERY | Facility: CLINIC | Age: 84
End: 2024-01-11
Payer: MEDICARE

## 2024-01-11 VITALS
TEMPERATURE: 99 F | HEIGHT: 70 IN | OXYGEN SATURATION: 96 % | BODY MASS INDEX: 24.05 KG/M2 | SYSTOLIC BLOOD PRESSURE: 128 MMHG | WEIGHT: 168 LBS | HEART RATE: 82 BPM | DIASTOLIC BLOOD PRESSURE: 80 MMHG

## 2024-01-11 DIAGNOSIS — Z87.891 FORMER SMOKER: ICD-10-CM

## 2024-01-11 DIAGNOSIS — I71.43 INFRARENAL ABDOMINAL AORTIC ANEURYSM (AAA) WITHOUT RUPTURE: Primary | ICD-10-CM

## 2024-01-11 DIAGNOSIS — Z98.890 STATUS POST ENDOVASCULAR ANEURYSM REPAIR (EVAR): ICD-10-CM

## 2024-01-11 DIAGNOSIS — Z86.79 STATUS POST ENDOVASCULAR ANEURYSM REPAIR (EVAR): ICD-10-CM

## 2024-01-11 PROCEDURE — 3079F DIAST BP 80-89 MM HG: CPT | Performed by: STUDENT IN AN ORGANIZED HEALTH CARE EDUCATION/TRAINING PROGRAM

## 2024-01-11 PROCEDURE — 3074F SYST BP LT 130 MM HG: CPT | Performed by: STUDENT IN AN ORGANIZED HEALTH CARE EDUCATION/TRAINING PROGRAM

## 2024-01-11 PROCEDURE — 99204 OFFICE O/P NEW MOD 45 MIN: CPT | Performed by: STUDENT IN AN ORGANIZED HEALTH CARE EDUCATION/TRAINING PROGRAM

## 2024-01-11 NOTE — PROGRESS NOTES
01/11/2024  Patient Information  Geroge Lomeli                                                                                          [address] [facility] [id number]   1940  'PCP/Referring Physician'  Kaycee Douglas MD  [phone]  Laxmi Scott, YOVANI  [phone]  Chief Complaint   Patient presents with    Consult     NP per Laxmi PINA for Hx of EVAR [facility] 2013-Now Possible Endoleak       History of Present Illness:  83-year-old man with history of lymphoma and AAA s/p bilateral femoral cutdowns and placement of bifurcated Cook endograft at [facility] in 2016 by Dr. Jesse Darling who presents to the outpatient Cardioth[facility]acic [facility] as a referral from Laxmi PINA for evaluation and management of AAA. The patient denies any knowledge of AAA nor surgical repair and is confused by our discussion.         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    Thyroid disease      Past Surgical History:   Procedure Laterality Date    ABDOMINAL AORTIC ANEURYSM REPAIR W/ ENDOLUMINAL GRAFT      BACK SURGERY      lumbar    CATARACT EXTRACTION EXTRACAPSULAR W/ INTRAOCULAR LENS IMPLANTATION      EYE SURGERY      MULTIPLE TOOTH EXTRACTIONS      NECK SURGERY      PINS/RODS IN NECK       Current Outpatient Medications:     albuterol (PROVENTIL) (2.5 MG/3ML) 0.083% nebulizer solution, Take 2.5 mg by nebulization Every 6 (Six) Hours As Needed for Wheezing., Disp: 120  each, Rfl: 5    allopurinol (ZYLOPRIM) 100 MG tablet, Take 1 tablet by mouth Daily., Disp: , Rfl:     amLODIPine (NORVASC) 5 MG tablet, Take 1 tablet by mouth Daily., Disp: , Rfl:     apixaban (Eliquis) 5 MG tablet tablet, Take 1 tablet by mouth twice daily, Disp: 60 tablet, Rfl: 11    carvedilol (COREG) 25 MG tablet, Take 1 tablet by mouth 2 (Two) Times a Day With Meals., Disp: , Rfl:     docusate sodium (COLACE) 50 MG capsule, Take 1 capsule by mouth As Needed., Disp: , Rfl:     gabapentin (Neurontin) 100 MG capsule, Take 2 PO TID (Patient taking differently: Take 1 capsule by mouth 2 (Two) Times a Day.), Disp: 180 capsule, Rfl: 5    levothyroxine (SYNTHROID, LEVOTHROID) 25 MCG tablet, Take 1 tablet by mouth Daily., Disp: , Rfl:     lisinopril (PRINIVIL,ZESTRIL) 40 MG tablet, Take 1 tablet by mouth Daily., Disp: , Rfl:     oxyCODONE-acetaminophen (PERCOCET)  MG per tablet, Take 1 tablet by mouth Every 6 (Six) Hours As Needed for Moderate Pain., Disp: , Rfl:     potassium chloride (K-DUR) 10 MEQ CR tablet, Take 1 tablet by mouth 2 (Two) Times a Day., Disp: , Rfl:     tamsulosin (FLOMAX) 0.4 MG capsule 24 hr capsule, Take 1 capsule by mouth Every Night., Disp: , Rfl:     timolol (TIMOPTIC) 0.5 % ophthalmic solution, Administer 1 drop to the right eye 2 (Two) Times a Day., Disp: , Rfl:   Allergies   Allergen Reactions    Contrast Dye (Echo Or Unknown Ct/Mr) Nausea And Vomiting     Social History     Socioeconomic History    Marital status:     Number of children: 2   Tobacco Use    Smoking status: Former     Packs/day: 1.00     Years: 15.00     Additional pack years: 0.00     Total pack years: 15.00     Types: Cigarettes     Quit date:      Years since quittin.0    Smokeless tobacco: Never    Tobacco comments:     QUIT 30 YEARS AGO    Vaping Use    Vaping Use: Never used   Substance and Sexual Activity    Alcohol use: No    Drug use: No    Sexual activity: Defer     Family History   Problem  "Relation Age of Onset    Hypertension Mother     Hypertension Father     Hypertension Sister      Review of Systems   Constitutional: Positive for malaise/fatigue and weight loss. Negative for chills, fever and night sweats.   HENT:  Negative for hearing loss, odynophagia and sore throat.    Cardiovascular:  Positive for leg swelling. Negative for chest pain, dyspnea on exertion, orthopnea and palpitations.   Respiratory:  Positive for cough. Negative for hemoptysis.    Endocrine: Negative for cold intolerance, heat intolerance, polydipsia, polyphagia and polyuria.   Hematologic/Lymphatic: Bruises/bleeds easily.   Skin:  Negative for itching and rash.   Musculoskeletal:  Positive for back pain and neck pain. Negative for joint swelling and myalgias.   Gastrointestinal:  Positive for abdominal pain and constipation. Negative for diarrhea, hematemesis, hematochezia, melena, nausea and vomiting.   Genitourinary:  Negative for dysuria, frequency and hematuria.   Neurological:  Negative for focal weakness, headaches, numbness and seizures.   Psychiatric/Behavioral:  Negative for suicidal ideas.    All other systems reviewed and are negative.  Vitals:    01/11/24 0936   BP: 128/80   BP Location: Right arm   Patient Position: Sitting   Pulse: 82   Temp: 99 °F (37.2 °C)   SpO2: 96%   Weight: 76.2 kg (168 lb)   Height: 177.8 cm (70\")      Physical Exam  General no acute distress, pleasant, interactive  Head normocephalic, atraumatic  Eyes clear sclerae  ENT no discharge, neck supple  Mouth mucous membranes moist  Cardiac regular rate rhythm, no murmurs rubs gallops  Vascular warm well perfused x4 extremities, no tenderness at the mid abdomen, easily palpable femoral pulses bilateral  Pulmonary lungs clear to auscultation bilaterally  Abdomen soft nontender nondistended  Lymphatic no edema bilateral lower extremities  Neurological grossly intact  Psychological appropriate  Dermatological suggestion of transverse 4 cm " incisions within the folds of the skin of the bilateral groin areas  Musculoskeletal normal tone and bulk      The ROS, past medical history, surgical history, family history, social history, and vitals were reviewed by myself and corrected as needed.      Labs/Imaging:  I reviewed the images and reports of his CT abdomen pelvis from July 2023 and December 2023 notable for AAA approximately 4.2 cm in greatest diameter stable on both exams, and decreased from the descriptions of the large AAA approximately 5 cm described in the operative report of Dr. Darling from 2016.  Due to the lack of dedicated intra-arterial contrast, evaluation for endoleak is limited.    Assessment/Plan:  83-year-old man with history of lymphoma and AAA s/p bilateral femoral cutdowns and placement of bifurcated Cook endograft at [facility] in 2016 by Dr. Jesse Darling who presents to the outpatient Cardiothoracic and Vascular Surgery clinic at [facility] as a referral from Laxmi PINA for evaluation and management of AAA. The patient denies any knowledge of AAA nor surgical repair and is confused by our discussion.  I provided reassurance to the patient that there is no indication for intervention at this time, and we should continue to follow his AAA with annual CT abdominal aorta with bilateral lower extremity runoffs to assess for enlarging aneurysm and endoleak.  I also provided the patient with a copy of his operative report from 2016.  I would like to see the patient back in approximately 1 year to review the results of his CT.    I would like to thank you for the opportunity to participate in the care of this very pleasant patient.     Philippe Bae M.D., R.P.V.I.  Cardiothoracic and Vascular Surgeon  [facility]      Patient Active Problem List   Diagnosis    Sciatica    Migraine    Hypertension    Gout    Broken neck    Abdominal aortic aneurysm with recent repair at [facility]  follicle center lymphoma of extranodal site    Bronchiectasis without complication    Cough    PAF (paroxysmal atrial fibrillation)

## 2024-02-09 ENCOUNTER — HOSPITAL ENCOUNTER (EMERGENCY)
Facility: HOSPITAL | Age: 84
Discharge: HOME | End: 2024-02-09
Attending: EMERGENCY MEDICINE
Payer: MEDICARE

## 2024-02-09 ENCOUNTER — APPOINTMENT (OUTPATIENT)
Dept: GENERAL RADIOLOGY | Facility: HOSPITAL | Age: 84
End: 2024-02-09
Payer: MEDICARE

## 2024-02-09 VITALS
OXYGEN SATURATION: 98 % | RESPIRATION RATE: 15 BRPM | DIASTOLIC BLOOD PRESSURE: 92 MMHG | HEART RATE: 73 BPM | TEMPERATURE: 98 F | WEIGHT: 175 LBS | BODY MASS INDEX: 25.05 KG/M2 | HEIGHT: 70 IN | SYSTOLIC BLOOD PRESSURE: 126 MMHG

## 2024-02-09 DIAGNOSIS — Z79.01 ANTICOAGULATED: ICD-10-CM

## 2024-02-09 DIAGNOSIS — B34.9 VIRAL ILLNESS: ICD-10-CM

## 2024-02-09 DIAGNOSIS — Z86.79 HISTORY OF ATRIAL FIBRILLATION: ICD-10-CM

## 2024-02-09 DIAGNOSIS — J40 BRONCHITIS: Primary | ICD-10-CM

## 2024-02-09 DIAGNOSIS — J47.9 BRONCHIECTASIS WITHOUT COMPLICATION: ICD-10-CM

## 2024-02-09 LAB
ALBUMIN SERPL-MCNC: 4.2 G/DL (ref 3.5–5.2)
ALBUMIN/GLOB SERPL: 1.7 G/DL
ALP SERPL-CCNC: 112 U/L (ref 39–117)
ALT SERPL W P-5'-P-CCNC: 6 U/L (ref 1–41)
ANION GAP SERPL CALCULATED.3IONS-SCNC: 8 MMOL/L (ref 5–15)
AST SERPL-CCNC: 14 U/L (ref 1–40)
BASOPHILS # BLD AUTO: 0.01 10*3/MM3 (ref 0–0.2)
BASOPHILS NFR BLD AUTO: 0.2 % (ref 0–1.5)
BILIRUB SERPL-MCNC: 0.6 MG/DL (ref 0–1.2)
BILIRUB UR QL STRIP: NEGATIVE
BUN SERPL-MCNC: 7 MG/DL (ref 8–23)
BUN/CREAT SERPL: 7.4 (ref 7–25)
CALCIUM SPEC-SCNC: 9.3 MG/DL (ref 8.6–10.5)
CHLORIDE SERPL-SCNC: 108 MMOL/L (ref 98–107)
CLARITY UR: CLEAR
CO2 SERPL-SCNC: 28 MMOL/L (ref 22–29)
COLOR UR: YELLOW
CREAT SERPL-MCNC: 0.94 MG/DL (ref 0.76–1.27)
DEPRECATED RDW RBC AUTO: 50.1 FL (ref 37–54)
EGFRCR SERPLBLD CKD-EPI 2021: 80.4 ML/MIN/1.73
EOSINOPHIL # BLD AUTO: 0.34 10*3/MM3 (ref 0–0.4)
EOSINOPHIL NFR BLD AUTO: 5.3 % (ref 0.3–6.2)
ERYTHROCYTE [DISTWIDTH] IN BLOOD BY AUTOMATED COUNT: 14.6 % (ref 12.3–15.4)
FLUAV RNA RESP QL NAA+PROBE: NOT DETECTED
FLUBV RNA RESP QL NAA+PROBE: NOT DETECTED
GLOBULIN UR ELPH-MCNC: 2.5 GM/DL
GLUCOSE SERPL-MCNC: 93 MG/DL (ref 65–99)
GLUCOSE UR STRIP-MCNC: NEGATIVE MG/DL
HCT VFR BLD AUTO: 37.9 % (ref 37.5–51)
HGB BLD-MCNC: 12.4 G/DL (ref 13–17.7)
HGB UR QL STRIP.AUTO: NEGATIVE
HOLD SPECIMEN: NORMAL
IMM GRANULOCYTES # BLD AUTO: 0.02 10*3/MM3 (ref 0–0.05)
IMM GRANULOCYTES NFR BLD AUTO: 0.3 % (ref 0–0.5)
KETONES UR QL STRIP: NEGATIVE
LEUKOCYTE ESTERASE UR QL STRIP.AUTO: NEGATIVE
LYMPHOCYTES # BLD AUTO: 0.77 10*3/MM3 (ref 0.7–3.1)
LYMPHOCYTES NFR BLD AUTO: 12 % (ref 19.6–45.3)
MCH RBC QN AUTO: 30.2 PG (ref 26.6–33)
MCHC RBC AUTO-ENTMCNC: 32.7 G/DL (ref 31.5–35.7)
MCV RBC AUTO: 92.4 FL (ref 79–97)
MONOCYTES # BLD AUTO: 0.53 10*3/MM3 (ref 0.1–0.9)
MONOCYTES NFR BLD AUTO: 8.2 % (ref 5–12)
NEUTROPHILS NFR BLD AUTO: 4.77 10*3/MM3 (ref 1.7–7)
NEUTROPHILS NFR BLD AUTO: 74 % (ref 42.7–76)
NITRITE UR QL STRIP: NEGATIVE
NRBC BLD AUTO-RTO: 0 /100 WBC (ref 0–0.2)
PH UR STRIP.AUTO: 7 [PH] (ref 5–8)
PLATELET # BLD AUTO: 213 10*3/MM3 (ref 140–450)
PMV BLD AUTO: 11.6 FL (ref 6–12)
POTASSIUM SERPL-SCNC: 3.7 MMOL/L (ref 3.5–5.2)
PROT SERPL-MCNC: 6.7 G/DL (ref 6–8.5)
PROT UR QL STRIP: NEGATIVE
RBC # BLD AUTO: 4.1 10*6/MM3 (ref 4.14–5.8)
SARS-COV-2 RNA RESP QL NAA+PROBE: NOT DETECTED
SODIUM SERPL-SCNC: 144 MMOL/L (ref 136–145)
SP GR UR STRIP: 1.01 (ref 1–1.03)
UROBILINOGEN UR QL STRIP: NORMAL
WBC NRBC COR # BLD AUTO: 6.44 10*3/MM3 (ref 3.4–10.8)
WHOLE BLOOD HOLD COAG: NORMAL
WHOLE BLOOD HOLD SPECIMEN: NORMAL

## 2024-02-09 PROCEDURE — 71045 X-RAY EXAM CHEST 1 VIEW: CPT

## 2024-02-09 PROCEDURE — 36415 COLL VENOUS BLD VENIPUNCTURE: CPT

## 2024-02-09 PROCEDURE — 99283 EMERGENCY DEPT VISIT LOW MDM: CPT

## 2024-02-09 PROCEDURE — 80053 COMPREHEN METABOLIC PANEL: CPT | Performed by: PHYSICIAN ASSISTANT

## 2024-02-09 PROCEDURE — 87636 SARSCOV2 & INF A&B AMP PRB: CPT | Performed by: PHYSICIAN ASSISTANT

## 2024-02-09 PROCEDURE — 81003 URINALYSIS AUTO W/O SCOPE: CPT | Performed by: PHYSICIAN ASSISTANT

## 2024-02-09 PROCEDURE — 85025 COMPLETE CBC W/AUTO DIFF WBC: CPT | Performed by: PHYSICIAN ASSISTANT

## 2024-02-09 RX ORDER — ALBUTEROL SULFATE 2.5 MG/3ML
2.5 SOLUTION RESPIRATORY (INHALATION) EVERY 6 HOURS PRN
Qty: 120 EACH | Refills: 0 | Status: SHIPPED | OUTPATIENT
Start: 2024-02-09

## 2024-02-09 NOTE — ED PROVIDER NOTES
Subjective   History of Present Illness  Pt is a 82 yo male presenting to ED with complaints of cough. PMHx significant for HTN, PAfib (Eliquis), AAA s/p repair, Hypothyroidism, Gout and Migraines. Pt complains of a productive cough with nasal congestion and chills the past few days. He explains was at a family reunion and drank too much soda and now his urine is also dark and his lower back is aching but denies burning or frequency. He denies dizziness, fever, CP, SOB, V/D or abdominal pain. No specific sick contacts or recent antibiotics. He denies tobacco, drug or ETOH use.     History provided by:  Patient and medical records      Review of Systems   Constitutional:  Positive for chills. Negative for fever.   HENT:  Positive for congestion. Negative for sore throat.    Eyes:  Negative for visual disturbance.   Respiratory:  Positive for cough. Negative for shortness of breath.    Cardiovascular:  Negative for chest pain and leg swelling.   Gastrointestinal:  Negative for abdominal pain, diarrhea, nausea and vomiting.   Genitourinary:  Positive for flank pain. Negative for difficulty urinating, dysuria and frequency.   Neurological:  Negative for dizziness, weakness and headaches.       Past Medical History:   Diagnosis Date    AAA (abdominal aortic aneurysm)     Arthritis     Broken neck     Gout     Hypertension     Lymphoma     Lymphoma     had radiation    Migraine     Sciatica     Thyroid cancer     ???    Thyroid disease        Allergies   Allergen Reactions    Contrast Dye (Echo Or Unknown Ct/Mr) Nausea And Vomiting       Past Surgical History:   Procedure Laterality Date    ABDOMINAL AORTIC ANEURYSM REPAIR W/ ENDOLUMINAL GRAFT      BACK SURGERY      lumbar    CATARACT EXTRACTION EXTRACAPSULAR W/ INTRAOCULAR LENS IMPLANTATION      EYE SURGERY      MULTIPLE TOOTH EXTRACTIONS      NECK SURGERY      PINS/RODS IN NECK       Family History   Problem Relation Age of Onset    Hypertension Mother     Hypertension  Father     Hypertension Sister        Social History     Socioeconomic History    Marital status:     Number of children: 2   Tobacco Use    Smoking status: Former     Packs/day: 1.00     Years: 15.00     Additional pack years: 0.00     Total pack years: 15.00     Types: Cigarettes     Quit date:      Years since quittin.1    Smokeless tobacco: Never    Tobacco comments:     QUIT 30 YEARS AGO    Vaping Use    Vaping Use: Never used   Substance and Sexual Activity    Alcohol use: No    Drug use: No    Sexual activity: Defer           Objective   Physical Exam  Vitals and nursing note reviewed.   Constitutional:       General: He is not in acute distress.     Appearance: He is well-developed.   HENT:      Head: Atraumatic.      Nose: Nose normal.   Eyes:      General: Lids are normal.      Conjunctiva/sclera: Conjunctivae normal.      Pupils: Pupils are equal, round, and reactive to light.   Cardiovascular:      Rate and Rhythm: Normal rate and regular rhythm.      Heart sounds: Normal heart sounds.   Pulmonary:      Effort: Pulmonary effort is normal.      Breath sounds: Wheezing and rhonchi present.   Abdominal:      General: There is no distension.      Palpations: Abdomen is soft.      Tenderness: There is no abdominal tenderness. There is no guarding or rebound.   Musculoskeletal:         General: No tenderness or deformity. Normal range of motion.      Cervical back: Normal range of motion and neck supple.   Skin:     General: Skin is warm and dry.   Neurological:      Mental Status: He is alert and oriented to person, place, and time.      Sensory: No sensory deficit.   Psychiatric:         Behavior: Behavior normal.         Procedures           ED Course  ED Course as of 24 2257      160 SpO2: 98 %  RA [RT]      ED Course User Index  [RT] Nani Austin PA      Recent Results (from the past 24 hour(s))   Comprehensive Metabolic Panel    Collection Time: 24  2:59  PM    Specimen: Blood   Result Value Ref Range    Glucose 93 65 - 99 mg/dL    BUN 7 (L) 8 - 23 mg/dL    Creatinine 0.94 0.76 - 1.27 mg/dL    Sodium 144 136 - 145 mmol/L    Potassium 3.7 3.5 - 5.2 mmol/L    Chloride 108 (H) 98 - 107 mmol/L    CO2 28.0 22.0 - 29.0 mmol/L    Calcium 9.3 8.6 - 10.5 mg/dL    Total Protein 6.7 6.0 - 8.5 g/dL    Albumin 4.2 3.5 - 5.2 g/dL    ALT (SGPT) 6 1 - 41 U/L    AST (SGOT) 14 1 - 40 U/L    Alkaline Phosphatase 112 39 - 117 U/L    Total Bilirubin 0.6 0.0 - 1.2 mg/dL    Globulin 2.5 gm/dL    A/G Ratio 1.7 g/dL    BUN/Creatinine Ratio 7.4 7.0 - 25.0    Anion Gap 8.0 5.0 - 15.0 mmol/L    eGFR 80.4 >60.0 mL/min/1.73   COVID-19 and FLU A/B PCR, 1 HR TAT - Swab, Nasopharynx    Collection Time: 02/09/24  2:59 PM    Specimen: Nasopharynx; Swab   Result Value Ref Range    COVID19 Not Detected Not Detected - Ref. Range    Influenza A PCR Not Detected Not Detected    Influenza B PCR Not Detected Not Detected   CBC Auto Differential    Collection Time: 02/09/24  2:59 PM    Specimen: Blood   Result Value Ref Range    WBC 6.44 3.40 - 10.80 10*3/mm3    RBC 4.10 (L) 4.14 - 5.80 10*6/mm3    Hemoglobin 12.4 (L) 13.0 - 17.7 g/dL    Hematocrit 37.9 37.5 - 51.0 %    MCV 92.4 79.0 - 97.0 fL    MCH 30.2 26.6 - 33.0 pg    MCHC 32.7 31.5 - 35.7 g/dL    RDW 14.6 12.3 - 15.4 %    RDW-SD 50.1 37.0 - 54.0 fl    MPV 11.6 6.0 - 12.0 fL    Platelets 213 140 - 450 10*3/mm3    Neutrophil % 74.0 42.7 - 76.0 %    Lymphocyte % 12.0 (L) 19.6 - 45.3 %    Monocyte % 8.2 5.0 - 12.0 %    Eosinophil % 5.3 0.3 - 6.2 %    Basophil % 0.2 0.0 - 1.5 %    Immature Grans % 0.3 0.0 - 0.5 %    Neutrophils, Absolute 4.77 1.70 - 7.00 10*3/mm3    Lymphocytes, Absolute 0.77 0.70 - 3.10 10*3/mm3    Monocytes, Absolute 0.53 0.10 - 0.90 10*3/mm3    Eosinophils, Absolute 0.34 0.00 - 0.40 10*3/mm3    Basophils, Absolute 0.01 0.00 - 0.20 10*3/mm3    Immature Grans, Absolute 0.02 0.00 - 0.05 10*3/mm3    nRBC 0.0 0.0 - 0.2 /100 WBC   Green Top  "(Gel)    Collection Time: 02/09/24  2:59 PM   Result Value Ref Range    Extra Tube Hold for add-ons.    Lavender Top    Collection Time: 02/09/24  2:59 PM   Result Value Ref Range    Extra Tube hold for add-on    Gold Top - SST    Collection Time: 02/09/24  2:59 PM   Result Value Ref Range    Extra Tube Hold for add-ons.    Gray Top    Collection Time: 02/09/24  2:59 PM   Result Value Ref Range    Extra Tube Hold for add-ons.    Light Blue Top    Collection Time: 02/09/24  2:59 PM   Result Value Ref Range    Extra Tube Hold for add-ons.    Urinalysis With Culture If Indicated - Urine, Clean Catch    Collection Time: 02/09/24  2:59 PM    Specimen: Urine, Clean Catch   Result Value Ref Range    Color, UA Yellow Yellow, Straw    Appearance, UA Clear Clear    pH, UA 7.0 5.0 - 8.0    Specific Gravity, UA 1.015 1.005 - 1.030    Glucose, UA Negative Negative    Ketones, UA Negative Negative    Bilirubin, UA Negative Negative    Blood, UA Negative Negative    Protein, UA Negative Negative    Leuk Esterase, UA Negative Negative    Nitrite, UA Negative Negative    Urobilinogen, UA 0.2 E.U./dL 0.2 - 1.0 E.U./dL     Note: In addition to lab results from this visit, the labs listed above may include labs taken at another facility or during a different encounter within the last 24 hours. Please correlate lab times with ED admission and discharge times for further clarification of the services performed during this visit.    XR Chest 1 View   Final Result   No evidence of acute disease in the chest.      Electronically Signed: Rah ePña MD     2/9/2024 3:04 PM EST     Workstation ID: [id number]        Vitals:    02/09/24 1230 02/09/24 1458   BP: 137/89 126/92   BP Location: Right arm    Patient Position: Sitting    Pulse: 96 73   Resp: 16 15   Temp: 98 °F (36.7 °C)    TempSrc: Oral    SpO2: 97% 98%   Weight: 79.4 kg (175 lb)    Height: 177.8 cm (70\")      Medications - No data to display  ECG/EMG Results (last 24 hours)       " ** No results found for the last 24 hours. **          No orders to display                                              Medical Decision Making  Pt is a 82 yo male presenting to ED with complaints of cough and body aches for 3 days. Labs in ED notable for WBC 6.44, Cr 0.94, Glucose 93, K 3.7 and Na 144. Covid/Flu negative. CXR no acute findings. EKG Afib, rate controlled. Patient has hx of Afib and is anticoagulated on Eliquis. Vitals stable. Discussed results and tx plan. Discussed viral illness and no need for antibiotics at this time. He will f/u with PCP.    DDx  Covid, Flu, Pneumonia, CHF, Dehydration    Problems Addressed:  Anticoagulated: chronic illness or injury  Bronchitis: complicated acute illness or injury  History of atrial fibrillation: chronic illness or injury  Viral illness: complicated acute illness or injury    Amount and/or Complexity of Data Reviewed  External Data Reviewed: notes.     Details: Reviewed previous non ED visits including prior labs, imaging, available notes, medications, allergies and surgical hx.     Labs: ordered. Decision-making details documented in ED Course.  Radiology: ordered and independent interpretation performed. Decision-making details documented in ED Course.  ECG/medicine tests: independent interpretation performed. Decision-making details documented in ED Course.    Risk  Prescription drug management.        Final diagnoses:   Bronchitis   History of atrial fibrillation   Anticoagulated   Viral illness       ED Disposition  ED Disposition       ED Disposition   Discharge    Condition   Stable    Comment   --               Kaycee Douglas MD  [address]  [address]  [place] [address]  [phone]    Schedule an appointment as soon as possible for a visit       [facility] EMERGENCY DEPARTMENT  [address] [place] [id number] [phone]    If symptoms worsen         Medication List        Changed      gabapentin 100 MG  capsule  Commonly known as: Neurontin  Take 2 PO TID  What changed:   how much to take  how to take this  when to take this  additional instructions               Where to Get Your Medications        These medications were sent to [facility] - [place], [place] - [address] [facility] - [phone]  - [phone]   [address] [facility] [address]      Phone: [phone]   albuterol (2.5 MG/3ML) 0.083% nebulizer solution            Nani Austin PA  02/09/24 4959

## 2024-02-21 ENCOUNTER — APPOINTMENT (OUTPATIENT)
Dept: GENERAL RADIOLOGY | Facility: HOSPITAL | Age: 84
End: 2024-02-21
Payer: MEDICARE

## 2024-02-21 ENCOUNTER — HOSPITAL ENCOUNTER (EMERGENCY)
Facility: HOSPITAL | Age: 84
Discharge: HOME | End: 2024-02-21
Attending: EMERGENCY MEDICINE | Admitting: EMERGENCY MEDICINE
Payer: MEDICARE

## 2024-02-21 VITALS
HEIGHT: 70 IN | DIASTOLIC BLOOD PRESSURE: 89 MMHG | BODY MASS INDEX: 25.62 KG/M2 | OXYGEN SATURATION: 98 % | RESPIRATION RATE: 16 BRPM | TEMPERATURE: 98 F | SYSTOLIC BLOOD PRESSURE: 125 MMHG | WEIGHT: 179 LBS | HEART RATE: 78 BPM

## 2024-02-21 DIAGNOSIS — J44.1 COPD WITH ACUTE EXACERBATION: ICD-10-CM

## 2024-02-21 DIAGNOSIS — J20.9 ACUTE BRONCHITIS, UNSPECIFIED ORGANISM: Primary | ICD-10-CM

## 2024-02-21 DIAGNOSIS — Z78.9 FAILURE OF OUTPATIENT TREATMENT: ICD-10-CM

## 2024-02-21 LAB
ALBUMIN SERPL-MCNC: 3.9 G/DL (ref 3.5–5.2)
ALBUMIN/GLOB SERPL: 1.5 G/DL
ALP SERPL-CCNC: 112 U/L (ref 39–117)
ALT SERPL W P-5'-P-CCNC: 6 U/L (ref 1–41)
ANION GAP SERPL CALCULATED.3IONS-SCNC: 9 MMOL/L (ref 5–15)
AST SERPL-CCNC: 14 U/L (ref 1–40)
BASOPHILS # BLD AUTO: 0.01 10*3/MM3 (ref 0–0.2)
BASOPHILS NFR BLD AUTO: 0.2 % (ref 0–1.5)
BILIRUB SERPL-MCNC: 0.5 MG/DL (ref 0–1.2)
BUN SERPL-MCNC: 9 MG/DL (ref 8–23)
BUN/CREAT SERPL: 10.8 (ref 7–25)
CALCIUM SPEC-SCNC: 9.3 MG/DL (ref 8.6–10.5)
CHLORIDE SERPL-SCNC: 107 MMOL/L (ref 98–107)
CO2 SERPL-SCNC: 27 MMOL/L (ref 22–29)
CREAT SERPL-MCNC: 0.83 MG/DL (ref 0.76–1.27)
DEPRECATED RDW RBC AUTO: 50.7 FL (ref 37–54)
EGFRCR SERPLBLD CKD-EPI 2021: 86.8 ML/MIN/1.73
EOSINOPHIL # BLD AUTO: 0.46 10*3/MM3 (ref 0–0.4)
EOSINOPHIL NFR BLD AUTO: 8.1 % (ref 0.3–6.2)
ERYTHROCYTE [DISTWIDTH] IN BLOOD BY AUTOMATED COUNT: 14.8 % (ref 12.3–15.4)
GLOBULIN UR ELPH-MCNC: 2.6 GM/DL
GLUCOSE SERPL-MCNC: 97 MG/DL (ref 65–99)
HCT VFR BLD AUTO: 35.9 % (ref 37.5–51)
HGB BLD-MCNC: 11.7 G/DL (ref 13–17.7)
HOLD SPECIMEN: NORMAL
IMM GRANULOCYTES # BLD AUTO: 0.02 10*3/MM3 (ref 0–0.05)
IMM GRANULOCYTES NFR BLD AUTO: 0.4 % (ref 0–0.5)
LYMPHOCYTES # BLD AUTO: 0.76 10*3/MM3 (ref 0.7–3.1)
LYMPHOCYTES NFR BLD AUTO: 13.4 % (ref 19.6–45.3)
MCH RBC QN AUTO: 30.4 PG (ref 26.6–33)
MCHC RBC AUTO-ENTMCNC: 32.6 G/DL (ref 31.5–35.7)
MCV RBC AUTO: 93.2 FL (ref 79–97)
MONOCYTES # BLD AUTO: 0.6 10*3/MM3 (ref 0.1–0.9)
MONOCYTES NFR BLD AUTO: 10.6 % (ref 5–12)
NEUTROPHILS NFR BLD AUTO: 3.81 10*3/MM3 (ref 1.7–7)
NEUTROPHILS NFR BLD AUTO: 67.3 % (ref 42.7–76)
NRBC BLD AUTO-RTO: 0 /100 WBC (ref 0–0.2)
NT-PROBNP SERPL-MCNC: 465.3 PG/ML (ref 0–1800)
PLATELET # BLD AUTO: 189 10*3/MM3 (ref 140–450)
PMV BLD AUTO: 12.1 FL (ref 6–12)
POTASSIUM SERPL-SCNC: 3.6 MMOL/L (ref 3.5–5.2)
PROT SERPL-MCNC: 6.5 G/DL (ref 6–8.5)
RBC # BLD AUTO: 3.85 10*6/MM3 (ref 4.14–5.8)
SODIUM SERPL-SCNC: 143 MMOL/L (ref 136–145)
TROPONIN T SERPL HS-MCNC: 16 NG/L
WBC NRBC COR # BLD AUTO: 5.66 10*3/MM3 (ref 3.4–10.8)
WHOLE BLOOD HOLD COAG: NORMAL
WHOLE BLOOD HOLD SPECIMEN: NORMAL

## 2024-02-21 PROCEDURE — 80053 COMPREHEN METABOLIC PANEL: CPT | Performed by: EMERGENCY MEDICINE

## 2024-02-21 PROCEDURE — 94640 AIRWAY INHALATION TREATMENT: CPT

## 2024-02-21 PROCEDURE — 85025 COMPLETE CBC W/AUTO DIFF WBC: CPT | Performed by: EMERGENCY MEDICINE

## 2024-02-21 PROCEDURE — 93005 ELECTROCARDIOGRAM TRACING: CPT | Performed by: EMERGENCY MEDICINE

## 2024-02-21 PROCEDURE — 36415 COLL VENOUS BLD VENIPUNCTURE: CPT

## 2024-02-21 PROCEDURE — 99284 EMERGENCY DEPT VISIT MOD MDM: CPT

## 2024-02-21 PROCEDURE — 83880 ASSAY OF NATRIURETIC PEPTIDE: CPT | Performed by: EMERGENCY MEDICINE

## 2024-02-21 PROCEDURE — 84484 ASSAY OF TROPONIN QUANT: CPT | Performed by: EMERGENCY MEDICINE

## 2024-02-21 PROCEDURE — 71045 X-RAY EXAM CHEST 1 VIEW: CPT

## 2024-02-21 RX ORDER — AZITHROMYCIN 250 MG/1
TABLET, FILM COATED ORAL
Qty: 6 TABLET | Refills: 0 | Status: SHIPPED | OUTPATIENT
Start: 2024-02-21

## 2024-02-21 RX ORDER — ALBUTEROL SULFATE 90 UG/1
2 AEROSOL, METERED RESPIRATORY (INHALATION) EVERY 6 HOURS PRN
Qty: 8 G | Refills: 0 | Status: SHIPPED | OUTPATIENT
Start: 2024-02-21

## 2024-02-21 RX ORDER — BENZONATATE 100 MG/1
100 CAPSULE ORAL 3 TIMES DAILY PRN
Qty: 8 CAPSULE | Refills: 0 | Status: SHIPPED | OUTPATIENT
Start: 2024-02-21

## 2024-02-21 RX ORDER — IPRATROPIUM BROMIDE AND ALBUTEROL SULFATE 2.5; .5 MG/3ML; MG/3ML
3 SOLUTION RESPIRATORY (INHALATION) ONCE
Status: COMPLETED | OUTPATIENT
Start: 2024-02-21 | End: 2024-02-21

## 2024-02-21 RX ORDER — METHYLPREDNISOLONE 4 MG/1
TABLET ORAL
Qty: 21 TABLET | Refills: 0 | Status: SHIPPED | OUTPATIENT
Start: 2024-02-21

## 2024-02-21 RX ADMIN — IPRATROPIUM BROMIDE AND ALBUTEROL SULFATE 3 ML: 2.5; .5 SOLUTION RESPIRATORY (INHALATION) at 18:51

## 2024-02-21 NOTE — ED PROVIDER NOTES
Subjective   History of Present Illness  Patient is a pleasant 83-year-old male who presents to the emergency department with productive cough.  He says that for the past 2 weeks has had this persistent cough and occasional wheezing.  He presented to the emergency department almost 2 weeks ago with these similar symptoms.  Workup was reassuring and he was prescribed albuterol.  He has been taking the albuterol but unfortunately it has not relieved his symptoms.  He says is not necessarily worse but they are no better and this is the reason for presentation to the emergency department.  He denies other associated symptoms.  Denies fever, chills, chest pain, shortness of breath, abdominal pain, vomiting, diarrhea, or other acute complaints.  Denies similar episodes in the past.      Review of Systems   All other systems reviewed and are negative.      Past Medical History:   Diagnosis Date    AAA (abdominal aortic aneurysm)     Arthritis     Broken neck     Gout     Hypertension     Lymphoma     Lymphoma     had radiation    Migraine     Sciatica     Thyroid cancer     ???    Thyroid disease        Allergies   Allergen Reactions    Contrast Dye (Echo Or Unknown Ct/Mr) Nausea And Vomiting       Past Surgical History:   Procedure Laterality Date    ABDOMINAL AORTIC ANEURYSM REPAIR W/ ENDOLUMINAL GRAFT      BACK SURGERY      lumbar    CATARACT EXTRACTION EXTRACAPSULAR W/ INTRAOCULAR LENS IMPLANTATION      EYE SURGERY      MULTIPLE TOOTH EXTRACTIONS      NECK SURGERY      PINS/RODS IN NECK       Family History   Problem Relation Age of Onset    Hypertension Mother     Hypertension Father     Hypertension Sister        Social History     Socioeconomic History    Marital status:     Number of children: 2   Tobacco Use    Smoking status: Former     Packs/day: 1.00     Years: 15.00     Additional pack years: 0.00     Total pack years: 15.00     Types: Cigarettes     Quit date:      Years since quittin.1     Smokeless tobacco: Never    Tobacco comments:     QUIT 30 YEARS AGO    Vaping Use    Vaping Use: Never used   Substance and Sexual Activity    Alcohol use: No    Drug use: No    Sexual activity: Defer           Objective   Physical Exam  Vitals and nursing note reviewed.   Constitutional:       General: He is not in acute distress.  HENT:      Head: Normocephalic and atraumatic.   Eyes:      Pupils: Pupils are equal, round, and reactive to light.   Neck:      Thyroid: No thyromegaly.      Vascular: No JVD.   Cardiovascular:      Rate and Rhythm: Normal rate and regular rhythm.      Heart sounds: Normal heart sounds. No murmur heard.     No friction rub. No gallop.   Pulmonary:      Effort: Pulmonary effort is normal. No respiratory distress.      Breath sounds: Wheezing present. No rales.   Abdominal:      Palpations: Abdomen is soft.      Tenderness: There is no abdominal tenderness.   Musculoskeletal:         General: Normal range of motion.      Cervical back: Normal range of motion and neck supple.   Lymphadenopathy:      Cervical: No cervical adenopathy.   Skin:     General: Skin is warm and dry.   Neurological:      Mental Status: He is alert and oriented to person, place, and time.   Psychiatric:         Behavior: Behavior normal.         Procedures           ED Course      Recent Results (from the past 24 hour(s))   ECG 12 Lead Dyspnea    Collection Time: 02/21/24  5:44 PM   Result Value Ref Range    QT Interval 422 ms    QTC Interval 464 ms   Comprehensive Metabolic Panel    Collection Time: 02/21/24  5:48 PM    Specimen: Blood   Result Value Ref Range    Glucose 97 65 - 99 mg/dL    BUN 9 8 - 23 mg/dL    Creatinine 0.83 0.76 - 1.27 mg/dL    Sodium 143 136 - 145 mmol/L    Potassium 3.6 3.5 - 5.2 mmol/L    Chloride 107 98 - 107 mmol/L    CO2 27.0 22.0 - 29.0 mmol/L    Calcium 9.3 8.6 - 10.5 mg/dL    Total Protein 6.5 6.0 - 8.5 g/dL    Albumin 3.9 3.5 - 5.2 g/dL    ALT (SGPT) 6 1 - 41 U/L    AST (SGOT) 14 1 -  40 U/L    Alkaline Phosphatase 112 39 - 117 U/L    Total Bilirubin 0.5 0.0 - 1.2 mg/dL    Globulin 2.6 gm/dL    A/G Ratio 1.5 g/dL    BUN/Creatinine Ratio 10.8 7.0 - 25.0    Anion Gap 9.0 5.0 - 15.0 mmol/L    eGFR 86.8 >60.0 mL/min/1.73   BNP    Collection Time: 02/21/24  5:48 PM    Specimen: Blood   Result Value Ref Range    proBNP 465.3 0.0 - 1,800.0 pg/mL   Single High Sensitivity Troponin T    Collection Time: 02/21/24  5:48 PM    Specimen: Blood   Result Value Ref Range    HS Troponin T 16 <22 ng/L   Green Top (Gel)    Collection Time: 02/21/24  5:48 PM   Result Value Ref Range    Extra Tube Hold for add-ons.    Lavender Top    Collection Time: 02/21/24  5:48 PM   Result Value Ref Range    Extra Tube hold for add-on    Gold Top - SST    Collection Time: 02/21/24  5:48 PM   Result Value Ref Range    Extra Tube Hold for add-ons.    Gray Top    Collection Time: 02/21/24  5:48 PM   Result Value Ref Range    Extra Tube Hold for add-ons.    Light Blue Top    Collection Time: 02/21/24  5:48 PM   Result Value Ref Range    Extra Tube Hold for add-ons.    CBC Auto Differential    Collection Time: 02/21/24  5:48 PM    Specimen: Blood   Result Value Ref Range    WBC 5.66 3.40 - 10.80 10*3/mm3    RBC 3.85 (L) 4.14 - 5.80 10*6/mm3    Hemoglobin 11.7 (L) 13.0 - 17.7 g/dL    Hematocrit 35.9 (L) 37.5 - 51.0 %    MCV 93.2 79.0 - 97.0 fL    MCH 30.4 26.6 - 33.0 pg    MCHC 32.6 31.5 - 35.7 g/dL    RDW 14.8 12.3 - 15.4 %    RDW-SD 50.7 37.0 - 54.0 fl    MPV 12.1 (H) 6.0 - 12.0 fL    Platelets 189 140 - 450 10*3/mm3    Neutrophil % 67.3 42.7 - 76.0 %    Lymphocyte % 13.4 (L) 19.6 - 45.3 %    Monocyte % 10.6 5.0 - 12.0 %    Eosinophil % 8.1 (H) 0.3 - 6.2 %    Basophil % 0.2 0.0 - 1.5 %    Immature Grans % 0.4 0.0 - 0.5 %    Neutrophils, Absolute 3.81 1.70 - 7.00 10*3/mm3    Lymphocytes, Absolute 0.76 0.70 - 3.10 10*3/mm3    Monocytes, Absolute 0.60 0.10 - 0.90 10*3/mm3    Eosinophils, Absolute 0.46 (H) 0.00 - 0.40 10*3/mm3     "Basophils, Absolute 0.01 0.00 - 0.20 10*3/mm3    Immature Grans, Absolute 0.02 0.00 - 0.05 10*3/mm3    nRBC 0.0 0.0 - 0.2 /100 WBC     Note: In addition to lab results from this visit, the labs listed above may include labs taken at another facility or during a different encounter within the last 24 hours. Please correlate lab times with ED admission and discharge times for further clarification of the services performed during this visit.    XR Chest 1 View   Final Result   Impression: No acute cardiopulmonary disease.      Electronically Signed: Juvenal Gannon MD     2/21/2024 7:00 PM EST     Workstation ID: [id number]        Vitals:    02/21/24 1702 02/21/24 1845 02/21/24 1848 02/21/24 1851   BP: (!) 152/101 125/89     BP Location: Left arm      Patient Position: Sitting      Pulse: 87  77 78   Resp: 14   16   Temp: 98 °F (36.7 °C)      SpO2: 96%  97% 98%   Weight: 81.2 kg (179 lb)      Height: 177.8 cm (70\")        Medications   ipratropium-albuterol (DUO-NEB) nebulizer solution 3 mL (3 mL Nebulization Given 2/21/24 1851)     ECG/EMG Results (last 24 hours)       ** No results found for the last 24 hours. **          ECG 12 Lead Dyspnea   Preliminary Result   Test Reason : Dyspnea   Blood Pressure :   */*   mmHG   Vent. Rate :  73 BPM     Atrial Rate :  73 BPM      P-R Int : 182 ms          QRS Dur : 108 ms       QT Int : 422 ms       P-R-T Axes :  77 -22  20 degrees      QTc Int : 464 ms      Sinus rhythm with marked sinus arrhythmia   Incomplete right bundle branch block   Borderline ECG   When compared with ECG of 21-JUL-2023 16:44,   Sinus rhythm has replaced Atrial fibrillation      Referred By: EDMD           Confirmed By:                                                  Medical Decision Making  Problems Addressed:  Acute bronchitis, unspecified organism: complicated acute illness or injury  COPD with acute exacerbation: complicated acute illness or injury  Failure of outpatient treatment: complicated acute " illness or injury    Amount and/or Complexity of Data Reviewed  External Data Reviewed: notes.  Labs: ordered. Decision-making details documented in ED Course.  Radiology: ordered and independent interpretation performed. Decision-making details documented in ED Course.  ECG/medicine tests: ordered and independent interpretation performed. Decision-making details documented in ED Course.    Risk  Prescription drug management.        Final diagnoses:   Acute bronchitis, unspecified organism   COPD with acute exacerbation   Failure of outpatient treatment       ED Disposition  ED Disposition       ED Disposition   Discharge    Condition   Stable    Comment   --             DISCHARGE    Patient discharged in stable condition.    Reviewed implications of results, diagnosis, meds, responsibility to follow up, warning signs and symptoms of possible worsening, potential complications and reasons to return to ER.    Patient/Family voiced understanding of above instructions.    Discussed plan for discharge, as there is no emergent indication for admission.  Pt/family is agreeable and understands need for follow up and possible repeat testing.  Pt/family is aware that discharge does not mean that nothing is wrong but that it indicates no emergency is currently present that requires admission and they must continue care with follow-up as given below or with a physician of their choice.     FOLLOW-UP  Kaycee Douglas MD  [address]  [address]  [place] [address]  [phone]    Schedule an appointment as soon as possible for a visit       [facility] EMERGENCY DEPARTMENT  [address] [facility] [address] [phone]    If symptoms worsen         Medication List        New Prescriptions      azithromycin 250 MG tablet  Commonly known as: ZITHROMAX  Take 2 tablets the first day, then 1 tablet daily for 4 days.     benzonatate 100 MG capsule  Commonly known as: TESSALON  Take 1 capsule by  mouth 3 (Three) Times a Day As Needed for Cough.     methylPREDNISolone 4 MG dose pack  Commonly known as: MEDROL  Take as directed on package instructions.            Changed      * albuterol (2.5 MG/3ML) 0.083% nebulizer solution  Commonly known as: PROVENTIL  Take 2.5 mg by nebulization Every 6 (Six) Hours As Needed for Wheezing.  What changed: Another medication with the same name was added. Make sure you understand how and when to take each.     * albuterol sulfate  (90 Base) MCG/ACT inhaler  Commonly known as: PROVENTIL HFA;VENTOLIN HFA;PROAIR HFA  Inhale 2 puffs Every 6 (Six) Hours As Needed for Wheezing.  What changed: You were already taking a medication with the same name, and this prescription was added. Make sure you understand how and when to take each.     gabapentin 100 MG capsule  Commonly known as: Neurontin  Take 2 PO TID  What changed:   how much to take  how to take this  when to take this  additional instructions           * This list has 2 medication(s) that are the same as other medications prescribed for you. Read the directions carefully, and ask your doctor or other care provider to review them with you.                   Where to Get Your Medications        These medications were sent to [facility] [address] [place] [address] - [address] [place] [address] - [phone] [facility] [phone] [address] [address]      Phone: [phone]   albuterol sulfate  (90 Base) MCG/ACT inhaler  azithromycin 250 MG tablet  benzonatate 100 MG capsule  methylPREDNISolone 4 MG dose pack            Wallace Wilson DO  02/22/24 6902

## 2024-02-22 LAB
QT INTERVAL: 422 MS
QTC INTERVAL: 464 MS

## 2024-04-11 DIAGNOSIS — J47.9 BRONCHIECTASIS WITHOUT COMPLICATION: ICD-10-CM

## 2024-04-11 RX ORDER — ALBUTEROL SULFATE 2.5 MG/3ML
2.5 SOLUTION RESPIRATORY (INHALATION) EVERY 6 HOURS PRN
Qty: 120 EACH | Refills: 0 | Status: SHIPPED | OUTPATIENT
Start: 2024-04-11

## 2024-04-30 ENCOUNTER — APPOINTMENT (OUTPATIENT)
Dept: GENERAL RADIOLOGY | Facility: HOSPITAL | Age: 84
End: 2024-04-30
Payer: MEDICARE

## 2024-04-30 ENCOUNTER — APPOINTMENT (OUTPATIENT)
Dept: CT IMAGING | Facility: HOSPITAL | Age: 84
End: 2024-04-30
Payer: MEDICARE

## 2024-04-30 ENCOUNTER — HOSPITAL ENCOUNTER (EMERGENCY)
Facility: HOSPITAL | Age: 84
Discharge: HOME | End: 2024-04-30
Attending: EMERGENCY MEDICINE
Payer: MEDICARE

## 2024-04-30 VITALS
HEART RATE: 57 BPM | BODY MASS INDEX: 25.63 KG/M2 | TEMPERATURE: 98 F | OXYGEN SATURATION: 98 % | RESPIRATION RATE: 16 BRPM | DIASTOLIC BLOOD PRESSURE: 74 MMHG | WEIGHT: 179.01 LBS | HEIGHT: 70 IN | SYSTOLIC BLOOD PRESSURE: 116 MMHG

## 2024-04-30 DIAGNOSIS — S90.31XA CONTUSION OF RIGHT FOOT, INITIAL ENCOUNTER: ICD-10-CM

## 2024-04-30 DIAGNOSIS — R53.1 GENERAL WEAKNESS: ICD-10-CM

## 2024-04-30 DIAGNOSIS — M62.838 CERVICAL PARASPINAL MUSCLE SPASM: Primary | ICD-10-CM

## 2024-04-30 LAB
ALBUMIN SERPL-MCNC: 3.7 G/DL (ref 3.5–5.2)
ALBUMIN/GLOB SERPL: 1.5 G/DL
ALP SERPL-CCNC: 105 U/L (ref 39–117)
ALT SERPL W P-5'-P-CCNC: 8 U/L (ref 1–41)
ANION GAP SERPL CALCULATED.3IONS-SCNC: 9 MMOL/L (ref 5–15)
AST SERPL-CCNC: 14 U/L (ref 1–40)
BACTERIA UR QL AUTO: NORMAL /HPF
BASOPHILS # BLD AUTO: 0.01 10*3/MM3 (ref 0–0.2)
BASOPHILS NFR BLD AUTO: 0.2 % (ref 0–1.5)
BILIRUB SERPL-MCNC: 0.5 MG/DL (ref 0–1.2)
BILIRUB UR QL STRIP: NEGATIVE
BUN SERPL-MCNC: 11 MG/DL (ref 8–23)
BUN/CREAT SERPL: 10.2 (ref 7–25)
CALCIUM SPEC-SCNC: 9 MG/DL (ref 8.6–10.5)
CHLORIDE SERPL-SCNC: 109 MMOL/L (ref 98–107)
CLARITY UR: ABNORMAL
CO2 SERPL-SCNC: 28 MMOL/L (ref 22–29)
COD CRY URNS QL: NORMAL /HPF
COLOR UR: YELLOW
CREAT SERPL-MCNC: 1.08 MG/DL (ref 0.76–1.27)
DEPRECATED RDW RBC AUTO: 53.1 FL (ref 37–54)
EGFRCR SERPLBLD CKD-EPI 2021: 68.1 ML/MIN/1.73
EOSINOPHIL # BLD AUTO: 0.29 10*3/MM3 (ref 0–0.4)
EOSINOPHIL NFR BLD AUTO: 5.7 % (ref 0.3–6.2)
ERYTHROCYTE [DISTWIDTH] IN BLOOD BY AUTOMATED COUNT: 15.7 % (ref 12.3–15.4)
GLOBULIN UR ELPH-MCNC: 2.5 GM/DL
GLUCOSE SERPL-MCNC: 105 MG/DL (ref 65–99)
GLUCOSE UR STRIP-MCNC: NEGATIVE MG/DL
HCT VFR BLD AUTO: 33.9 % (ref 37.5–51)
HGB BLD-MCNC: 10.8 G/DL (ref 13–17.7)
HGB UR QL STRIP.AUTO: NEGATIVE
HOLD SPECIMEN: NORMAL
HYALINE CASTS UR QL AUTO: NORMAL /LPF
IMM GRANULOCYTES # BLD AUTO: 0.01 10*3/MM3 (ref 0–0.05)
IMM GRANULOCYTES NFR BLD AUTO: 0.2 % (ref 0–0.5)
KETONES UR QL STRIP: NEGATIVE
LEUKOCYTE ESTERASE UR QL STRIP.AUTO: NEGATIVE
LYMPHOCYTES # BLD AUTO: 0.78 10*3/MM3 (ref 0.7–3.1)
LYMPHOCYTES NFR BLD AUTO: 15.4 % (ref 19.6–45.3)
MAGNESIUM SERPL-MCNC: 1.8 MG/DL (ref 1.6–2.4)
MCH RBC QN AUTO: 29.3 PG (ref 26.6–33)
MCHC RBC AUTO-ENTMCNC: 31.9 G/DL (ref 31.5–35.7)
MCV RBC AUTO: 92.1 FL (ref 79–97)
MONOCYTES # BLD AUTO: 0.5 10*3/MM3 (ref 0.1–0.9)
MONOCYTES NFR BLD AUTO: 9.9 % (ref 5–12)
NEUTROPHILS NFR BLD AUTO: 3.47 10*3/MM3 (ref 1.7–7)
NEUTROPHILS NFR BLD AUTO: 68.6 % (ref 42.7–76)
NITRITE UR QL STRIP: NEGATIVE
NRBC BLD AUTO-RTO: 0 /100 WBC (ref 0–0.2)
PH UR STRIP.AUTO: 6.5 [PH] (ref 5–8)
PLATELET # BLD AUTO: 187 10*3/MM3 (ref 140–450)
PMV BLD AUTO: 12.1 FL (ref 6–12)
POTASSIUM SERPL-SCNC: 3.7 MMOL/L (ref 3.5–5.2)
PROT SERPL-MCNC: 6.2 G/DL (ref 6–8.5)
PROT UR QL STRIP: NEGATIVE
RBC # BLD AUTO: 3.68 10*6/MM3 (ref 4.14–5.8)
RBC # UR STRIP: NORMAL /HPF
REF LAB TEST METHOD: NORMAL
SODIUM SERPL-SCNC: 146 MMOL/L (ref 136–145)
SP GR UR STRIP: 1.01 (ref 1–1.03)
SQUAMOUS #/AREA URNS HPF: NORMAL /HPF
TROPONIN T SERPL HS-MCNC: 18 NG/L
UROBILINOGEN UR QL STRIP: ABNORMAL
WBC # UR STRIP: NORMAL /HPF
WBC NRBC COR # BLD AUTO: 5.06 10*3/MM3 (ref 3.4–10.8)
WHOLE BLOOD HOLD COAG: NORMAL
WHOLE BLOOD HOLD SPECIMEN: NORMAL

## 2024-04-30 PROCEDURE — 99284 EMERGENCY DEPT VISIT MOD MDM: CPT

## 2024-04-30 PROCEDURE — 71045 X-RAY EXAM CHEST 1 VIEW: CPT

## 2024-04-30 PROCEDURE — 93005 ELECTROCARDIOGRAM TRACING: CPT | Performed by: EMERGENCY MEDICINE

## 2024-04-30 PROCEDURE — 84484 ASSAY OF TROPONIN QUANT: CPT | Performed by: EMERGENCY MEDICINE

## 2024-04-30 PROCEDURE — 73630 X-RAY EXAM OF FOOT: CPT

## 2024-04-30 PROCEDURE — 80053 COMPREHEN METABOLIC PANEL: CPT | Performed by: EMERGENCY MEDICINE

## 2024-04-30 PROCEDURE — 85025 COMPLETE CBC W/AUTO DIFF WBC: CPT | Performed by: EMERGENCY MEDICINE

## 2024-04-30 PROCEDURE — 72125 CT NECK SPINE W/O DYE: CPT

## 2024-04-30 PROCEDURE — 36415 COLL VENOUS BLD VENIPUNCTURE: CPT

## 2024-04-30 PROCEDURE — 81001 URINALYSIS AUTO W/SCOPE: CPT | Performed by: EMERGENCY MEDICINE

## 2024-04-30 PROCEDURE — 83735 ASSAY OF MAGNESIUM: CPT | Performed by: EMERGENCY MEDICINE

## 2024-04-30 RX ORDER — HYDROCODONE BITARTRATE AND ACETAMINOPHEN 5; 325 MG/1; MG/1
1 TABLET ORAL ONCE
Status: COMPLETED | OUTPATIENT
Start: 2024-04-30 | End: 2024-04-30

## 2024-04-30 RX ORDER — METHOCARBAMOL 750 MG/1
750 TABLET, FILM COATED ORAL ONCE
Status: COMPLETED | OUTPATIENT
Start: 2024-04-30 | End: 2024-04-30

## 2024-04-30 RX ORDER — METHOCARBAMOL 750 MG/1
750 TABLET, FILM COATED ORAL 3 TIMES DAILY
Qty: 21 TABLET | Refills: 0 | Status: SHIPPED | OUTPATIENT
Start: 2024-04-30 | End: 2024-05-07

## 2024-04-30 RX ORDER — SODIUM CHLORIDE 0.9 % (FLUSH) 0.9 %
10 SYRINGE (ML) INJECTION AS NEEDED
Status: DISCONTINUED | OUTPATIENT
Start: 2024-04-30 | End: 2024-04-30 | Stop reason: HOSPADM

## 2024-04-30 RX ADMIN — HYDROCODONE BITARTRATE AND ACETAMINOPHEN 1 TABLET: 5; 325 TABLET ORAL at 18:49

## 2024-04-30 RX ADMIN — METHOCARBAMOL 750 MG: 750 TABLET ORAL at 18:49

## 2024-04-30 NOTE — ED PROVIDER NOTES
Subjective   History of Present Illness  This is a 83-year-old male with past medical history of previous neck fracture and hypertension presented to the emergency department with some pain and generalized weakness.  The patient states that he has been having some right foot pain and neck pain for the last 3 weeks.  He states that it is intensified over the last 4 days.  Is dull in nature.  Mainly located on the left lateral aspect of the neck.  Does not radiate anywhere.  States that hurts when he touches it or moves his neck.  Also complained of some pain in the right lower foot.  Denies any trauma.  Patient states that he just feels fatigued and weak all over.  Denies any fevers or chills.  No headache or change in vision.  No focal weakness.  No chest pain or shortness of breath.    History provided by:  Patient   used: No        Review of Systems   Constitutional:  Positive for fatigue. Negative for chills and fever.   HENT:  Negative for congestion, ear pain and sore throat.    Eyes:  Negative for visual disturbance.   Respiratory:  Negative for shortness of breath.    Cardiovascular:  Negative for chest pain.   Gastrointestinal:  Negative for abdominal pain.   Genitourinary:  Negative for difficulty urinating.   Musculoskeletal:  Positive for arthralgias, myalgias and neck pain.   Skin:  Negative for rash.   Neurological:  Negative for dizziness, weakness and numbness.   Psychiatric/Behavioral:  Negative for agitation.        Past Medical History:   Diagnosis Date    AAA (abdominal aortic aneurysm)     Arthritis     Broken neck     Gout     Hypertension     Lymphoma     Lymphoma     had radiation    Migraine     Sciatica     Thyroid cancer     ???    Thyroid disease        Allergies   Allergen Reactions    Contrast Dye (Echo Or Unknown Ct/Mr) Nausea And Vomiting       Past Surgical History:   Procedure Laterality Date    ABDOMINAL AORTIC ANEURYSM REPAIR W/ ENDOLUMINAL GRAFT      BACK  SURGERY      lumbar    CATARACT EXTRACTION EXTRACAPSULAR W/ INTRAOCULAR LENS IMPLANTATION      EYE SURGERY      MULTIPLE TOOTH EXTRACTIONS      NECK SURGERY      PINS/RODS IN NECK       Family History   Problem Relation Age of Onset    Hypertension Mother     Hypertension Father     Hypertension Sister        Social History     Socioeconomic History    Marital status:     Number of children: 2   Tobacco Use    Smoking status: Former     Current packs/day: 0.00     Average packs/day: 1 pack/day for 15.0 years (15.0 ttl pk-yrs)     Types: Cigarettes     Start date:      Quit date:      Years since quittin.3    Smokeless tobacco: Never    Tobacco comments:     QUIT 30 YEARS AGO    Vaping Use    Vaping status: Never Used   Substance and Sexual Activity    Alcohol use: No    Drug use: No    Sexual activity: Defer           Objective   Physical Exam  Vitals and nursing note reviewed.   Constitutional:       General: He is not in acute distress.     Appearance: He is not ill-appearing or toxic-appearing.   HENT:      Mouth/Throat:      Pharynx: No posterior oropharyngeal erythema.   Eyes:      Extraocular Movements: Extraocular movements intact.      Pupils: Pupils are equal, round, and reactive to light.   Neck:      Comments: Patient is some tenderness to the left lateral aspect of the cervical spine.  There is no midline deformity or tenderness.  Normal range of motion, however elicits pain.  No meningismus.  Cardiovascular:      Rate and Rhythm: Normal rate and regular rhythm.   Pulmonary:      Effort: Pulmonary effort is normal. No respiratory distress.   Abdominal:      General: Abdomen is flat. There is no distension.      Palpations: There is no mass.      Tenderness: There is no abdominal tenderness. There is no guarding or rebound.   Musculoskeletal:         General: No deformity. Normal range of motion.      Comments: Patient is some minor tenderness to palpation in the mid right foot.  There  is no deformity.  Compartments are soft.  Neurovascularly intact.   Neurological:      General: No focal deficit present.      Mental Status: He is alert.      Sensory: No sensory deficit.      Motor: No weakness.         Procedures           ED Course  ED Course as of 04/30/24 2038 Tue Apr 30, 2024 1749 BP: 122/81 [JK]   1749 Temp: 98 °F (36.7 °C) [JK]   1749 Heart Rate: 78 [JK]   1749 Resp: 16 [JK]   1749 SpO2: 97 %  Interpretation:  Patient's repeat vitals, telemetry tracing, and pulse oximetry tracing were directly viewed and interpreted by myself.  Normal sinus rhythm [JK]   2036 Comprehensive Metabolic Panel(!) [JK]   2036 Single High Sensitivity Troponin T [JK]   2036 Urinalysis, Microscopic Only - Urine, Clean Catch [JK]   2036 Urinalysis With Microscopic If Indicated (No Culture) - Urine, Clean Catch(!) [JK]   2036 CBC & Differential(!) [JK]   2036 Magnesium  Interpretation:  Laboratory studies were reviewed and interpreted directly by myself.  CMP was unremarkable, troponin normal, urinalysis was unremarkable, CBC showed some chronic anemia with a hemoglobin of 10.8, magnesium normal [JK]   2036 CT Cervical Spine Without Contrast [JK]   2036 XR Chest 1 View [JK]   2036 XR Foot 3+ View Right  Interpretation:  Imaging was directly visualized by myself, per my interpretations, x-ray of the right foot showed some nonspecific edema, CT the cervical spine showed some chronic changes, chest x-ray unremarkable. [JK]   2036 On reevaluation, patient is feeling better.  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  With regards to the foot, findings most consistent with contusion.  Patient is repeat exam shows no neurologic deficit or vascular compromise.  Patient follow-up with PCP in 48 hours.  Strict return precautions.  Verbalized understanding. [JK]   2036 I had a discussion with the patient/family regarding diagnosis, diagnostic results, treatment plan, and medications. The  patient/family indicated understanding of these instructions. I spent adequate time at the bedside prior to discharge necessary to discuss the aftercare instructions, giving patient education, providing explanations of the results of our evaluations/findings, and my decision making to assure that the patient/family understand the plan of care. Time was allotted to answer questions at that time and throughout the ED course. Patient is required to maintain timely follow up, as discussed. I also discussed the potential for the development of an acute emergent condition requiring further evaluation, return to the ER, admission, or even surgical intervention.  I encouraged the patient to return to the emergency department immediately for any concerns, worsening symptoms, new complaints, or if symptoms persist and they are unable to seek follow-up in a timely fashion. The patient/family expressed understanding and agreement with this plan    Shared decision making:   After full review of the patient's clinical presentation, review of any work-up including but not limited to laboratory studies and radiology obtained, I had a discussion with the patient.  Treatment options were discussed as well as the risks, benefits and consequences.  I discussed all findings with the patient and family members if available.  During the discussion, treatment goals were understood by all as well as any misconceptions which were addressed with the patient.  Ample time was given for any questions they may have had.  They are in agreement with the treatment plan as well as final disposition. [JK]      ED Course User Index  [JK] Guillermo Trejo MD                                             Medical Decision Making  This is a 83-year-old male with a history of hypertension presenting to the emergency department with some pain and generalized weakness.  The patient is a poor historian and has very vague symptoms at baseline.  His.  Be  chronic issues.  Patient no acute findings, however some minor pain in the areas.  Overall he is neurologically intact.  Hemodynamically stable.  Nontoxic.  Afebrile.  IV access established the patient.  Provided symptomatic treatment.  Workup initiated.  Placed on continuous telemetry.      Differential diagnosis: Arthralgia, myalgia, acute kidney injury, electrolyte abnormality, anemia, hypertension      Amount and/or Complexity of Data Reviewed  External Data Reviewed: labs, radiology, ECG and notes.     Details: External laboratories, imaging as well as notes were reviewed personally by myself.  All relevant studies were used to guide decision making.     Date of previous record: 1/11/2024    Source of note: Cardiothoracic surgery    Summary: Patient was seen for routine follow-up of his AAA.  I did review based laboratory studies on file as well as previous CT angiogram of the chest, abdomen pelvis and ultrasound.  EKG reviewed.  Records reviewed    Labs: ordered. Decision-making details documented in ED Course.  Radiology: ordered and independent interpretation performed. Decision-making details documented in ED Course.  ECG/medicine tests: ordered and independent interpretation performed. Decision-making details documented in ED Course.    Risk  Prescription drug management.        Final diagnoses:   Cervical paraspinal muscle spasm   General weakness   Contusion of right foot, initial encounter       ED Disposition  ED Disposition       ED Disposition   Discharge    Condition   Stable    Comment   --               Kaycee Douglas MD  [address] [place] [address]  [phone]    Call in 1 day           Medication List        New Prescriptions      methocarbamol 750 MG tablet  Commonly known as: ROBAXIN  Take 1 tablet by mouth 3 (Three) Times a Day for 7 days.            Changed      gabapentin 100 MG capsule  Commonly known as: Neurontin  Take 2 PO TID  What changed:   how much to take  how  to take this  when to take this  additional instructions               Where to Get Your Medications        These medications were sent to [facility] - [place], [place] - [address] [place] [address] - [phone]  - [phone] [address] [address]      Phone: [phone]   methocarbamol 750 MG tablet            Guillermo Trejo MD  04/30/24 2034

## 2024-05-01 LAB
QT INTERVAL: 422 MS
QTC INTERVAL: 445 MS

## 2024-05-08 LAB
QT INTERVAL: 422 MS
QTC INTERVAL: 445 MS

## 2024-06-17 ENCOUNTER — HOSPITAL ENCOUNTER (OUTPATIENT)
Dept: CT IMAGING | Facility: HOSPITAL | Age: 84
Discharge: HOME | End: 2024-06-17
Admitting: NURSE PRACTITIONER
Payer: MEDICARE

## 2024-06-17 DIAGNOSIS — C82.59 DIFFUSE FOLLICLE CENTER LYMPHOMA OF EXTRANODAL SITE: Chronic | ICD-10-CM

## 2024-06-17 PROCEDURE — 71250 CT THORAX DX C-: CPT

## 2024-06-17 PROCEDURE — 74176 CT ABD & PELVIS W/O CONTRAST: CPT

## 2024-06-24 ENCOUNTER — OFFICE VISIT (OUTPATIENT)
Dept: ONCOLOGY | Facility: CLINIC | Age: 84
End: 2024-06-24
Payer: MEDICARE

## 2024-06-24 ENCOUNTER — LAB (OUTPATIENT)
Dept: LAB | Facility: HOSPITAL | Age: 84
End: 2024-06-24
Payer: MEDICARE

## 2024-06-24 VITALS
TEMPERATURE: 98.1 F | RESPIRATION RATE: 20 BRPM | HEART RATE: 78 BPM | BODY MASS INDEX: 23.62 KG/M2 | OXYGEN SATURATION: 98 % | HEIGHT: 70 IN | DIASTOLIC BLOOD PRESSURE: 84 MMHG | SYSTOLIC BLOOD PRESSURE: 141 MMHG | WEIGHT: 165 LBS

## 2024-06-24 DIAGNOSIS — I71.43 INFRARENAL ABDOMINAL AORTIC ANEURYSM (AAA) WITHOUT RUPTURE: ICD-10-CM

## 2024-06-24 DIAGNOSIS — C82.59 DIFFUSE FOLLICLE CENTER LYMPHOMA OF EXTRANODAL SITE: ICD-10-CM

## 2024-06-24 DIAGNOSIS — C82.59 DIFFUSE FOLLICLE CENTER LYMPHOMA OF EXTRANODAL SITE: Primary | Chronic | ICD-10-CM

## 2024-06-24 LAB
ALBUMIN SERPL-MCNC: 3.9 G/DL (ref 3.5–5.2)
ALBUMIN/GLOB SERPL: 1.6 G/DL
ALP SERPL-CCNC: 128 U/L (ref 39–117)
ALT SERPL W P-5'-P-CCNC: 9 U/L (ref 1–41)
ANION GAP SERPL CALCULATED.3IONS-SCNC: 8 MMOL/L (ref 5–15)
AST SERPL-CCNC: 16 U/L (ref 1–40)
BASOPHILS # BLD AUTO: 0.01 10*3/MM3 (ref 0–0.2)
BASOPHILS NFR BLD AUTO: 0.1 % (ref 0–1.5)
BILIRUB SERPL-MCNC: 0.3 MG/DL (ref 0–1.2)
BUN SERPL-MCNC: 10 MG/DL (ref 8–23)
BUN/CREAT SERPL: 10.4 (ref 7–25)
CALCIUM SPEC-SCNC: 9.1 MG/DL (ref 8.6–10.5)
CHLORIDE SERPL-SCNC: 109 MMOL/L (ref 98–107)
CO2 SERPL-SCNC: 28 MMOL/L (ref 22–29)
CREAT SERPL-MCNC: 0.96 MG/DL (ref 0.76–1.27)
DEPRECATED RDW RBC AUTO: 54 FL (ref 37–54)
EGFRCR SERPLBLD CKD-EPI 2021: 78.4 ML/MIN/1.73
EOSINOPHIL # BLD AUTO: 0.3 10*3/MM3 (ref 0–0.4)
EOSINOPHIL NFR BLD AUTO: 4.3 % (ref 0.3–6.2)
ERYTHROCYTE [DISTWIDTH] IN BLOOD BY AUTOMATED COUNT: 15.8 % (ref 12.3–15.4)
GLOBULIN UR ELPH-MCNC: 2.5 GM/DL
GLUCOSE SERPL-MCNC: 120 MG/DL (ref 65–99)
HCT VFR BLD AUTO: 34.1 % (ref 37.5–51)
HGB BLD-MCNC: 10.9 G/DL (ref 13–17.7)
IMM GRANULOCYTES # BLD AUTO: 0.02 10*3/MM3 (ref 0–0.05)
IMM GRANULOCYTES NFR BLD AUTO: 0.3 % (ref 0–0.5)
LYMPHOCYTES # BLD AUTO: 0.64 10*3/MM3 (ref 0.7–3.1)
LYMPHOCYTES NFR BLD AUTO: 9.2 % (ref 19.6–45.3)
MCH RBC QN AUTO: 29.5 PG (ref 26.6–33)
MCHC RBC AUTO-ENTMCNC: 32 G/DL (ref 31.5–35.7)
MCV RBC AUTO: 92.2 FL (ref 79–97)
MONOCYTES # BLD AUTO: 0.55 10*3/MM3 (ref 0.1–0.9)
MONOCYTES NFR BLD AUTO: 7.9 % (ref 5–12)
NEUTROPHILS NFR BLD AUTO: 5.46 10*3/MM3 (ref 1.7–7)
NEUTROPHILS NFR BLD AUTO: 78.2 % (ref 42.7–76)
PLATELET # BLD AUTO: 192 10*3/MM3 (ref 140–450)
PMV BLD AUTO: 11.3 FL (ref 6–12)
POTASSIUM SERPL-SCNC: 4.2 MMOL/L (ref 3.5–5.2)
PROT SERPL-MCNC: 6.4 G/DL (ref 6–8.5)
RBC # BLD AUTO: 3.7 10*6/MM3 (ref 4.14–5.8)
SODIUM SERPL-SCNC: 145 MMOL/L (ref 136–145)
WBC NRBC COR # BLD AUTO: 6.98 10*3/MM3 (ref 3.4–10.8)

## 2024-06-24 PROCEDURE — 80053 COMPREHEN METABOLIC PANEL: CPT

## 2024-06-24 PROCEDURE — 1159F MED LIST DOCD IN RCRD: CPT | Performed by: INTERNAL MEDICINE

## 2024-06-24 PROCEDURE — 3077F SYST BP >= 140 MM HG: CPT | Performed by: INTERNAL MEDICINE

## 2024-06-24 PROCEDURE — 1160F RVW MEDS BY RX/DR IN RCRD: CPT | Performed by: INTERNAL MEDICINE

## 2024-06-24 PROCEDURE — 85025 COMPLETE CBC W/AUTO DIFF WBC: CPT

## 2024-06-24 PROCEDURE — 3079F DIAST BP 80-89 MM HG: CPT | Performed by: INTERNAL MEDICINE

## 2024-06-24 PROCEDURE — 36415 COLL VENOUS BLD VENIPUNCTURE: CPT

## 2024-06-24 PROCEDURE — 1126F AMNT PAIN NOTED NONE PRSNT: CPT | Performed by: INTERNAL MEDICINE

## 2024-06-24 PROCEDURE — 99214 OFFICE O/P EST MOD 30 MIN: CPT | Performed by: INTERNAL MEDICINE

## 2024-06-24 NOTE — PROGRESS NOTES
CHIEF COMPLAINT: No new somatic complaints    Problem List:  Oncology/Hematology History Overview Note   1.  Follicular lymphoma:  -  initial consultation 3/12/18: Had been having left inguinal pains and went for scanning that found left groin node.  Needle biopsy of this was called lymphoma CD10 positive and Fish testing positive for translocation (14;18) consistent with follicular lymphoma though could also be seen in diffuse large B cell.  BCL 6 /3q rearranged.  No Myc..  Hence cannot say at this junction the exact subtype of lymphoma.  He has not been having bed drenching night sweats.  No unexplained weight loss.  Has not had any known anemia, thrombocytopenia, leukopenia.  Has not had frequent infections.  CT did not show any splenomegaly or other significant adenopathy.  The left inguinal pain has subsided since the needle biopsy.  He has an abdominal aortic aneurysm that is being watched and had previously been repaired.  Apparently 15+ years ago he was treated at [facility] for lymphoma for which he received right neck radiation and some chemotherapy.  He is not sure of the subtype of lymphoma nor of the type of chemotherapy nor the number of radiation treatments and this was far enough back that our records have been expunged.      -9/27/2018 - 10/17/2018 received radiation to the left groin and iliac    -10/17/2019 [facility] hematology oncology APRN follow-up visit: I reviewed his CT scans and discussed results with him which showed no evidence of active or recurrent lymphoma.  Labs pending.  Will review results and notify patient of any significant findings.  We would not treat unless he had 3 more than 3 cm, one more than 7 cm nodes, or the other GELF criteria such as unexplained bed drenching night sweats/fevers/weight loss/anemia/thrombocytopenia/frequent infections. We will follow up with patient in 6 months with repeat labs.      -9/18/2020 [facility] hematology oncology follow-up visit:No  cervical axillary or inguinal adenopathy.  Blood counts have been normal as of April along with CT showing no bulky adenopathy at that point.  We will get his labs today and CTs now in light of his abdominal pain.  I will have him do telephone visit with my nurse practitioner in a couple of weeks.  The pain is not excruciating and is not every day but just been present for the past few weeks.  If the scans are negative then I would ask him to follow-up with gastroenterology on that and we can make referral as he desires.  Would not treat unless he reached our previously mentioned G ELF criteria.     -10/2/2020 [facility] hematology oncology APRN follow-up visit: Patient currently feeling well, previous concerns over abdominal pain resolved spontaneously with no intervention.  CT chest, abdomen and pelvis as shown above are stable with no evidence of disease recurrence or progression.  He has no adenopathy.  CBC on 9/18/2020 was unremarkable.  We will continue to monitor per NCCN guidelines.  He is now 2 years out from treatment, we will go to annual CT scan.  I will see him back in 6 months for follow-up with CBC and CMP on return.  I discussed with the patient to notify us if he has any concerns.  Would not treat unless he had 3 more than 3 cm or 1 more than 7 cm node, or other golf criteria such as unexplained bed drenching night sweats, fevers, weight loss, anemia, thrombocytopenia, frequent infections.    -9/27/2021 sedimentation rate 13 C-reactive protein 0.33.    -10/5/2021 CT chest abdomen pelvis without contrast showed soft tissue prominence anterior to sacral/lumbar region and infra aortic bifurcation lymphadenopathy maximum 4.3 cm.    -10/7/2021 [facility] medical oncology follow-up visit: No new somatic complaints.  Feels great.  I will check a CBC now along with repeat blood work and CT in 3 months but unless he develops hemoglobin less than 10, platelets less than 100,000, unexplained bed drenching night  sweats/weight loss/fevers/recurrent infections, 1 more than 7 cm or 3 or more 3 cm or larger nodes, I would not treat him.  At the point he reaches those criteria I would like to get tissue and a PET to make sure that we have accurate staging before treatment and to be sure that he has not transformed.  He will see my nurse practitioner back in 3 months to go over repeat test.    -1/3/2022 [facility] follow-up:  Mr. Lomeli overall is doing well, no evidence of disease recurrence on current CT scans from 12/28/2021 which I reviewed with him today.  CT scan showed chronic interstitial changes throughout the lung fields with old granulomatous disease, stable adenopathy beneath the aortic bifurcation and anterior to the sacrum stable and unchanged without evidence of disease progression.  CBC is unremarkable, mild anemia with hemoglobin of 12, hematocrit normal at 37.5%, WBC normal at 7900, platelets 2000.  CMP pending. We will continue with CT scan again in 6 months for surveillance.    -7/11/2022 [facility] follow-up: Other than chronic migraine headaches, no other complaints.  Did not get recent CAT scans as ordered but I am okay with just checking noncontrast CTs in January which will be a little over a year from his last scans and getting his blood counts today and only treating if he meets GELF criteria possible changes seen on his CT scans .    -8/12/2022 CT chest, abdomen and pelvis, no evidence of lymphoma or other active disease in the chest, the previously described residual presacral mass has had interval enlargement from prior scan 12/28/2021 with interval development of mildly enlarged superiorly adjacent retroperitoneal lymph nodes.  No evidence of new adenopathy or mass elsewhere.  No evidence of resulting obstructive uropathy.  Nonspecific gaseous distention of the transverse colon, no evidence of high-grade ileus or obstruction.    -12/3/2022 presented to the ER with  abdominal pain and diarrhea, CT abdomen and pelvis shows stable presacral mass.  Nonspecific bulky appearance of the cecum probably due to decompression, cannot exclude cicadas/diverticulitis.  Patient treated with Augmentin and Flagyl by ER physician.    -1/20/2023 [facility] follow-up: Mr. Lomeli is having symptoms with excessive flatus, lower back pain and sciatica down his right buttocks and right hip.  I am concerned with with changes that were noted on his CT scans of the summer that were done after his visit with us therefore we were not made aware of these changes nor any symptoms that he has been having until today.  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  He was treated with Augmentin and Flagyl for possible diverticulitis.  He has decreased appetite although no significant weight loss, no bed drenching night sweats but he does report feeling hot when he wakes up in the mornings.  We will get a PET scan for further evaluation and also get a biopsy of the presacral mass.  We will see him back in a few weeks for follow-up to go over the results and further plan of care. His CMP from 12/2/2022 was unremarkable, CBC from today shows mild anemia with hemoglobin of 12.1, hematocrit 38.9%, absolute neutrophils 7.48 otherwise normal CBC.  Plan of care was discussed with Dr. Thor Ruggiero at time of visit.    - 2/7/2023 follow-up with Dr. 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    -2/10/2023 [facility] medical oncology follow-up: His pathology and his clinical scenario suggest this to be a low-grade follicular lymphoma.  The presacral mass had not changed much from August and December but the PET also in the legs at this area for which Dr. Christianson can treat him quite  effectively as outlined above.  This has not transformed to high-grade lymphoma and his labs are for the most part unremarkable as outlined above by my nurse practitioner.  I will repeat his PET in 3 months.    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  Max SUV 1.7.    -6/26/2023 [facility] follow-up: Mr. Lomeli is doing well, he had an excellent response to radiation to the presacral mass as shown on current PET scan that is now negative.  He is feeling much better.  He reports that he had lab work done recently with his primary care provider, I will reach out to get a copy of that.  We will plan on continued surveillance per NCCN guidelines with repeat CT chest, abdomen and pelvis in 6 months.  I did ask about any contrast allergy and he denies knowledge of any reaction to contrast in the past.  He did have CT with IV contrast in February and March with no difficulty.    -12/22/2023 CT neck, chest, abdomen and pelvis shows likely mild interval decrease in size of presacral soft tissue mass reflecting treatment response otherwise no new areas of concern.  There was somewhat increased homogenous enhancement noted within the abdominal aortic aneurysm stent without distinct contrast extravasation noted but remaining concerning for endoleak.    -12/29/2023 [facility] follow-up: Mr. Lomeli is doing well with no evidence of recurrent lymphoma currently and no new worrisome symptoms.  CT neck, chest, abdomen and pelvis show interval decrease in size of presacral mass reflecting treatment response but otherwise no new areas of concern.  There was some concern for possible endoleak from his abdominal aortic aneurysm stent.  He reports that he does not recall having a stent placed and could not remember who the physician was.  I will get him to our cardiothoracic surgeons for  review of his CT scan and recommendation for any further follow-up.  Otherwise we will plan on seeing him back in 6 months for follow-up, we will do CT chest, abdomen and pelvis without contrast prior to return along with CBC and CMP and I have ordered those today.     -1/11/2024 Dr. Bae consultation with history of AAA status post bilateral femoral cutdowns and placement of bifurcated Cook endograft at [facility] 2016 by Jesse Darling.  Patient could not recall this procedure no intervention needed at this time and should continue to follow his AAA with annual CT abdomen aorta with bilateral lower extremity runoff to assess for enlarging aneurysm and endoleak.  Plan to follow-up in 1 year.    -6/17/2024 CT chest abdomen pelvis without contrast showed stable presacral soft tissue mass relating to treated lymphoma with no new adenopathy in the chest abdomen or pelvis.  Emphysema chronic.  Stable cardiomegaly and coronary artery calcifications.  No splenomegaly.  Low-attenuation right hepatic lobe stable cyst 1.7 cm.  Infrarenal aortic aneurysm sac 4.2 cm stable.  Stable aortobiiliac stents.  No osseous lesions.    - 6/24/2024 [facility] hematology oncology follow-up: Stable hemoglobin 10.9 today with unremarkable differential.  CMP pending.  Will repeat CTs in 6 months as current CT showed no evidence of recurrence.  His radiation for the pelvic mass recurrence ended March 1, 2023.  He had his endovascular procedures done in the past by Dr. Darilng at  [facility] to whom we will refer him back for monitoring of his AAA and endoleak potentiality's.     Follicular lymphoma (Resolved)    Initial Diagnosis    Follicular lymphoma (HCC) (Resolved)     Diffuse follicle center lymphoma of extranodal site   3/12/2018 Initial Diagnosis    Follicular lymphoma     3/15/2018 Imaging    PET     IMPRESSION:     Multiple hypermetabolic abnormalities are identified but the  distribution is unusual. Although there is bulky  hypermetabolic rishi  mass in the left inguinal area and in the right internal iliac rishi  chain certainly consistent with active malignant lymphoma, there is an  intense abnormal area of tracer uptake and hypermetabolic activity  throughout the marrow or intramedullary portion of the left ilium and  iliac wing in the absence of cortical destruction. This is an intensely  hypermetabolic abnormal lengthy intraosseous abnormality.     The lower extremity dedicated datasets are negative.     Intermediate lymph nodes are identified in the medial inguinal region  which are not above malignant threshold.     Above the pelvis, there is no evidence of additional hypermetabolic  focus or active neoplastic disease, and the chest, mediastinum and neck  are clear.     6/7/2018 Imaging    MRI lumbar spine IMPRESSION:    Study is limited by susceptibility artifact greatest at L1-L2.       No definite significant central canal stenosis.       Moderate to severe right and moderate left foraminal stenosis at L3-L4.    Moderate to severe bilateral foraminal stenosis at L4-L5.     7/24/2018 Imaging    CT chest IMPRESSION:  There are no acute findings. Specifically, there is no  axillary, mediastinal or hilar lymphadenopathy. There is bronchiectasis  in the medial segment of the left lower lobe. This was also present on  CT/PET examination of 03/15/2018.  CT abdomen and pelvis IMPRESSION:  Left inguinal lymphadenopathy, unchanged when compared to a  whole-body PET CT scan of 03/15/2018.     9/6/2018 Imaging    MRI pelvis  1. Decreasing size of patient's left inguinal mass.  2. Interval development of diffuse marrow edema and enhancement,  practically throughout the left ilium, and adjacent soft tissue disease  deep to the left iliacus muscle, raising suspicion for lymphoma.     9/27/2018 - 10/17/2018 Radiation    Radiation OncologyTreatment Course:  George Lomeli received 3000 cGy in 15 fractions to left groin & iliac via External  Beam Radiation - EBRT.     11/27/2018 Imaging    CT chest abdomen and pelvis  IMPRESSION: Bronchiectasis in the lungs but no significant mediastinal adenopathy or axillary adenopathy or cervical adenopathy.  There is a mildly enlarged left inguinal lymph node. The  node has smooth ovoid margins and has central low density consistent  with a necrotic node. Lastly more superiorly and anteriorly in a region  where there are surgical clips there is a lentiform mass which is much  much smaller than on the previous examination of 07/24/2018.     4/14/2020 Imaging    CT chest, abdomen and pelvis IMPRESSION:  Stable examination with no evidence of active or recurrent  lymphoma. No bulky adenopathy identified. Chronic age-related changes  identified throughout the chest, abdomen and pelvis which are all     9/29/2020 Imaging    CT chest, abdomen and pelvis IMPRESSION:  Stable examination with no evidence of active or recurrent  lymphoma. The findings are similar to that when compared to the prior  examination. No significant changes in the interval.     10/5/2021 Imaging    CT chest abdomen pelvis without contrast showed soft tissue prominence anterior to sacral/lumbar region and infra aortic bifurcation lymphadenopathy maximum 4.3 cm.     12/28/2021 Imaging    CT chest, abdomen and pelvis IMPRESSION:  Chronic interstitial changes seen throughout the lung  fields. Old healed granulomatous disease seen within the chest. There is  stable adenopathy identified beneath the aortic bifurcation and anterior  to the sacrum stable and unchanged. There are no signs of progression.     2/2/2023 Progression    PET shows strongly hypermetabolic lobular pelvic mass with separate small lower presacral nodes and no other hypermetabolism.     2/9/2023 - 3/1/2023 Radiation    Radiation OncologyTreatment Course:  George Lomeli received 3000 cGy in 15 fractions to sacrum via External Beam Radiation - EBRT.         HISTORY OF PRESENT ILLNESS:   "The patient is a 83 y.o. male, here for follow up on management of recurrent follicular lymphoma status post retreatment radiation 15 fractions to sacrum ending March 2023.    Past Medical History:   Diagnosis Date    AAA (abdominal aortic aneurysm)     Arthritis     Broken neck     Gout     Hypertension     Lymphoma     Lymphoma     had radiation    Migraine     Sciatica     Thyroid cancer     ???    Thyroid disease      Past Surgical History:   Procedure Laterality Date    ABDOMINAL AORTIC ANEURYSM REPAIR W/ ENDOLUMINAL GRAFT      BACK SURGERY      lumbar    CATARACT EXTRACTION EXTRACAPSULAR W/ INTRAOCULAR LENS IMPLANTATION      EYE SURGERY      MULTIPLE TOOTH EXTRACTIONS      NECK SURGERY      PINS/RODS IN NECK       Allergies   Allergen Reactions    Contrast Dye (Echo Or Unknown Ct/Mr) Nausea And Vomiting       Family History and Social History reviewed and changed as necessary    REVIEW OF SYSTEM:   No B symptoms.  No new somatic complaints    PHYSICAL EXAM:  No palpable cervical axillary or inguinal nodes.  No other lymphadenopathy or hepatosplenomegaly.    Vitals:    06/24/24 1306   BP: 141/84   Pulse: 78   Resp: 20   Temp: 98.1 °F (36.7 °C)   TempSrc: Temporal   SpO2: 98%   Weight: 74.8 kg (165 lb)   Height: 177.8 cm (70\")     Vitals:    06/24/24 1306   PainSc: 0-No pain          ECOG score: 1           Vitals reviewed.      Lab Results   Component Value Date    HGB 10.9 (L) 06/24/2024    HCT 34.1 (L) 06/24/2024    MCV 92.2 06/24/2024     06/24/2024    WBC 6.98 06/24/2024    NEUTROABS 5.46 06/24/2024    LYMPHSABS 0.64 (L) 06/24/2024    MONOSABS 0.55 06/24/2024    EOSABS 0.30 06/24/2024    BASOSABS 0.01 06/24/2024       Lab Results   Component Value Date    GLUCOSE 105 (H) 04/30/2024    BUN 11 04/30/2024    CREATININE 1.08 04/30/2024     (H) 04/30/2024    K 3.7 04/30/2024     (H) 04/30/2024    CO2 28.0 04/30/2024    CALCIUM 9.0 04/30/2024    PROTEINTOT 6.2 04/30/2024    ALBUMIN 3.7 " 04/30/2024    BILITOT 0.5 04/30/2024    ALKPHOS 105 04/30/2024    AST 14 04/30/2024    ALT 8 04/30/2024             ASSESSMENT & PLAN:  1.  Follicular lymphoma:  -  initial consultation 3/12/18: Had been having left inguinal pains and went for scanning that found left groin node.  Needle biopsy of this was called lymphoma CD10 positive and Fish testing positive for translocation (14;18) consistent with follicular lymphoma though could also be seen in diffuse large B cell.  BCL 6 /3q rearranged.  No Myc..  Hence cannot say at this junction the exact subtype of lymphoma.  He has not been having bed drenching night sweats.  No unexplained weight loss.  Has not had any known anemia, thrombocytopenia, leukopenia.  Has not had frequent infections.  CT did not show any splenomegaly or other significant adenopathy.  The left inguinal pain has subsided since the needle biopsy.  He has an abdominal aortic aneurysm that is being watched and had previously been repaired.  Apparently 15+ years ago he was treated at [facility] for lymphoma for which he received right neck radiation and some chemotherapy.  He is not sure of the subtype of lymphoma nor of the type of chemotherapy nor the number of radiation treatments and this was far enough back that our records have been expunged.      -9/27/2018 - 10/17/2018 received radiation to the left groin and iliac    -10/17/2019 [facility] hematology oncology APRN follow-up visit: I reviewed his CT scans and discussed results with him which showed no evidence of active or recurrent lymphoma.  Labs pending.  Will review results and notify patient of any significant findings.  We would not treat unless he had 3 more than 3 cm, one more than 7 cm nodes, or the other GELF criteria such as unexplained bed drenching night sweats/fevers/weight loss/anemia/thrombocytopenia/frequent infections. We will follow up with patient in 6 months with repeat labs.      -9/18/2020 [facility] hematology  oncology follow-up visit:No cervical axillary or inguinal adenopathy.  Blood counts have been normal as of April along with CT showing no bulky adenopathy at that point.  We will get his labs today and CTs now in light of his abdominal pain.  I will have him do telephone visit with my nurse practitioner in a couple of weeks.  The pain is not excruciating and is not every day but just been present for the past few weeks.  If the scans are negative then I would ask him to follow-up with gastroenterology on that and we can make referral as he desires.  Would not treat unless he reached our previously mentioned G ELF criteria.     -10/2/2020 [facility] hematology oncology APRN follow-up visit: Patient currently feeling well, previous concerns over abdominal pain resolved spontaneously with no intervention.  CT chest, abdomen and pelvis as shown above are stable with no evidence of disease recurrence or progression.  He has no adenopathy.  CBC on 9/18/2020 was unremarkable.  We will continue to monitor per NCCN guidelines.  He is now 2 years out from treatment, we will go to annual CT scan.  I will see him back in 6 months for follow-up with CBC and CMP on return.  I discussed with the patient to notify us if he has any concerns.  Would not treat unless he had 3 more than 3 cm or 1 more than 7 cm node, or other golf criteria such as unexplained bed drenching night sweats, fevers, weight loss, anemia, thrombocytopenia, frequent infections.    -9/27/2021 sedimentation rate 13 C-reactive protein 0.33.    -10/5/2021 CT chest abdomen pelvis without contrast showed soft tissue prominence anterior to sacral/lumbar region and infra aortic bifurcation lymphadenopathy maximum 4.3 cm.    -10/7/2021 [facility] medical oncology follow-up visit: No new somatic complaints.  Feels great.  I will check a CBC now along with repeat blood work and CT in 3 months but unless he develops hemoglobin less than 10, platelets less than 100,000,  unexplained bed drenching night sweats/weight loss/fevers/recurrent infections, 1 more than 7 cm or 3 or more 3 cm or larger nodes, I would not treat him.  At the point he reaches those criteria I would like to get tissue and a PET to make sure that we have accurate staging before treatment and to be sure that he has not transformed.  He will see my nurse practitioner back in 3 months to go over repeat test.    -1/3/2022 [facility] follow-up:  Mr. Lomeli overall is doing well, no evidence of disease recurrence on current CT scans from 12/28/2021 which I reviewed with him today.  CT scan showed chronic interstitial changes throughout the lung fields with old granulomatous disease, stable adenopathy beneath the aortic bifurcation and anterior to the sacrum stable and unchanged without evidence of disease progression.  CBC is unremarkable, mild anemia with hemoglobin of 12, hematocrit normal at 37.5%, WBC normal at 7900, platelets 2000.  CMP pending. We will continue with CT scan again in 6 months for surveillance.    -7/11/2022 [facility] hematology follow-up: Other than chronic migraine headaches, no other complaints.  Did not get recent CAT scans as ordered but I am okay with just checking noncontrast CTs in January which will be a little over a year from his last scans and getting his blood counts today and only treating if he meets GELF criteria possible changes seen on his CT scans .    -8/12/2022 CT chest, abdomen and pelvis, no evidence of lymphoma or other active disease in the chest, the previously described residual presacral mass has had interval enlargement from prior scan 12/28/2021 with interval development of mildly enlarged superiorly adjacent retroperitoneal lymph nodes.  No evidence of new adenopathy or mass elsewhere.  No evidence of resulting obstructive uropathy.  Nonspecific gaseous distention of the transverse colon, no evidence of high-grade ileus or  obstruction.    -12/3/2022 presented to the ER with abdominal pain and diarrhea, CT abdomen and pelvis shows stable presacral mass.  Nonspecific bulky appearance of the cecum probably due to decompression, cannot exclude cicadas/diverticulitis.  Patient treated with Augmentin and Flagyl by ER physician.    -1/20/2023 [facility] clinic follow-up: Mr. Lomeil is having symptoms with excessive flatus, lower back pain and sciatica down his right buttocks and right hip.  I am concerned with with changes that were noted on his CT scans of the summer that were done after his visit with us therefore we were not made aware of these changes nor any symptoms that he has been having until today.  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  He was treated with Augmentin and Flagyl for possible diverticulitis.  He has decreased appetite although no significant weight loss, no bed drenching night sweats but he does report feeling hot when he wakes up in the mornings.  We will get a PET scan for further evaluation and also get a biopsy of the presacral mass.  We will see him back in a few weeks for follow-up to go over the results and further plan of care. His CMP from 12/2/2022 was unremarkable, CBC from today shows mild anemia with hemoglobin of 12.1, hematocrit 38.9%, absolute neutrophils 7.48 otherwise normal CBC.  Plan of care was discussed with Dr. Thor Ruggiero at time of visit.    - 2/7/2023 follow-up with Dr. 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  The presacral mass had not changed much from August and December but the PET also in the legs at this  area for which Dr. Christianson can treat him quite effectively as outlined above.  This has not transformed to high-grade lymphoma and his labs are for the most part unremarkable as outlined above by my nurse practitioner.  I will repeat his PET in 3 months.    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  Max SUV 1.7.    -6/26/2023 [facility] Oncology clinic follow-up: Mr. Lomeli is doing well, he had an excellent response to radiation to the presacral mass as shown on current PET scan that is now negative.  He is feeling much better.  He reports that he had lab work done recently with his primary care provider, I will reach out to get a copy of that.  We will plan on continued surveillance per NCCN guidelines with repeat CT chest, abdomen and pelvis in 6 months.  I did ask about any contrast allergy and he denies knowledge of any reaction to contrast in the past.  He did have CT with IV contrast in February and March with no difficulty.    -12/22/2023 CT neck, chest, abdomen and pelvis shows likely mild interval decrease in size of presacral soft tissue mass reflecting treatment response otherwise no new areas of concern.  There was somewhat increased homogenous enhancement noted within the abdominal aortic aneurysm stent without distinct contrast extravasation noted but remaining concerning for endoleak.    -12/29/2023 [facility] Oncology clinic follow-up: Mr. Lomeli is doing well with no evidence of recurrent lymphoma currently and no new worrisome symptoms.  CT neck, chest, abdomen and pelvis show interval decrease in size of presacral mass reflecting treatment response but otherwise no new areas of concern.  There was some concern for possible endoleak from his abdominal aortic aneurysm stent.  He reports that he does not recall having a stent placed and could not remember who the physician was.  I  will get him to our cardiothoracic surgeons for review of his CT scan and recommendation for any further follow-up.  Otherwise we will plan on seeing him back in 6 months for follow-up, we will do CT chest, abdomen and pelvis without contrast prior to return along with CBC and CMP and I have ordered those today.     -1/11/2024 Dr. Bae consultation with history of AAA status post bilateral femoral cutdowns and placement of bifurcated Cook endograft at [facility] 2016 by Jesse Darling.  Patient could not recall this procedure no intervention needed at this time and should continue to follow his AAA with annual CT abdomen aorta with bilateral lower extremity runoff to assess for enlarging aneurysm and endoleak.  Plan to follow-up in 1 year.    -6/17/2024 CT chest abdomen pelvis without contrast showed stable presacral soft tissue mass relating to treated lymphoma with no new adenopathy in the chest abdomen or pelvis.  Emphysema chronic.  Stable cardiomegaly and coronary artery calcifications.  No splenomegaly.  Low-attenuation right hepatic lobe stable cyst 1.7 cm.  Infrarenal aortic aneurysm sac 4.2 cm stable.  Stable aortobiiliac stents.  No osseous lesions.    - 6/24/2024 [facility] hematology oncology follow-up: Stable hemoglobin 10.9 today with unremarkable differential.  CMP pending.  Will repeat CTs in 6 months as current CT showed no evidence of recurrence.  His radiation for the pelvic mass recurrence ended March 1, 2023.  He had his endovascular procedures done in the past by Dr. Darling at  [facility] to whom we will refer him back for monitoring of his AAA and endoleak potentiality's.    Total time of care today inclusive of time spent today prior to patient's arrival reviewing interval images and reports thereof and notes from Dr. Bae and during visit interviewing him as to signs or symptoms of his disease and management thereof and after visit instituting the plan as outlined above took 40 minutes  patient care time throughout the day today.  Thor Ruggiero MD    06/24/2024

## 2024-06-24 NOTE — LETTER
June 24, 2024       No Recipients    Patient: George Lomeli   YOB: 1940   Date of Visit: 6/24/2024     Dear Kaycee Douglas MD:       Thank you for referring George Lomeli to me for evaluation. Below are the relevant portions of my assessment and plan of care.    If you have questions, please do not hesitate to call me. I look forward to following George along with you.         Sincerely,        Thor Ruggiero MD        CC:   No Recipients    Thor Ruggiero MD  06/24/24 1311  Sign when Signing Visit  CHIEF COMPLAINT: No new somatic complaints    Problem List:  Oncology/Hematology History Overview Note   1.  Follicular lymphoma:  -  initial consultation 3/12/18: Had been having left inguinal pains and went for scanning that found left groin node.  Needle biopsy of this was called lymphoma CD10 positive and Fish testing positive for translocation (14;18) consistent with follicular lymphoma though could also be seen in diffuse large B cell.  BCL 6 /3q rearranged.  No Myc..  Hence cannot say at this junction the exact subtype of lymphoma.  He has not been having bed drenching night sweats.  No unexplained weight loss.  Has not had any known anemia, thrombocytopenia, leukopenia.  Has not had frequent infections.  CT did not show any splenomegaly or other significant adenopathy.  The left inguinal pain has subsided since the needle biopsy.  He has an abdominal aortic aneurysm that is being watched and had previously been repaired.  Apparently 15+ years ago he was treated at [facility] for lymphoma for which he received right neck radiation and some chemotherapy.  He is not sure of the subtype of lymphoma nor of the type of chemotherapy nor the number of radiation treatments and this was far enough back that our records have been expunged.      -9/27/2018 - 10/17/2018 received radiation to the left groin and iliac    -10/17/2019 [facility] hematology oncology APRN follow-up visit: I reviewed his CT scans and  discussed results with him which showed no evidence of active or recurrent lymphoma.  Labs pending.  Will review results and notify patient of any significant findings.  We would not treat unless he had 3 more than 3 cm, one more than 7 cm nodes, or the other GELF criteria such as unexplained bed drenching night sweats/fevers/weight loss/anemia/thrombocytopenia/frequent infections. We will follow up with patient in 6 months with repeat labs.      -9/18/2020 [facility] hematology oncology follow-up visit:No cervical axillary or inguinal adenopathy.  Blood counts have been normal as of April along with CT showing no bulky adenopathy at that point.  We will get his labs today and CTs now in light of his abdominal pain.  I will have him do telephone visit with my nurse practitioner in a couple of weeks.  The pain is not excruciating and is not every day but just been present for the past few weeks.  If the scans are negative then I would ask him to follow-up with gastroenterology on that and we can make referral as he desires.  Would not treat unless he reached our previously mentioned G ELF criteria.     -10/2/2020 [facility] hematology oncology APRN follow-up visit: Patient currently feeling well, previous concerns over abdominal pain resolved spontaneously with no intervention.  CT chest, abdomen and pelvis as shown above are stable with no evidence of disease recurrence or progression.  He has no adenopathy.  CBC on 9/18/2020 was unremarkable.  We will continue to monitor per NCCN guidelines.  He is now 2 years out from treatment, we will go to annual CT scan.  I will see him back in 6 months for follow-up with CBC and CMP on return.  I discussed with the patient to notify us if he has any concerns.  Would not treat unless he had 3 more than 3 cm or 1 more than 7 cm node, or other golf criteria such as unexplained bed drenching night sweats, fevers, weight loss, anemia, thrombocytopenia, frequent infections.    -9/27/2021  sedimentation rate 13 C-reactive protein 0.33.    -10/5/2021 CT chest abdomen pelvis without contrast showed soft tissue prominence anterior to sacral/lumbar region and infra aortic bifurcation lymphadenopathy maximum 4.3 cm.    -10/7/2021 [facility] follow-up visit: No new somatic complaints.  Feels great.  I will check a CBC now along with repeat blood work and CT in 3 months but unless he develops hemoglobin less than 10, platelets less than 100,000, unexplained bed drenching night sweats/weight loss/fevers/recurrent infections, 1 more than 7 cm or 3 or more 3 cm or larger nodes, I would not treat him.  At the point he reaches those criteria I would like to get tissue and a PET to make sure that we have accurate staging before treatment and to be sure that he has not transformed.  He will see my nurse practitioner back in 3 months to go over repeat test.    -1/3/2022 [facility] follow-up:  Mr. Lomeli overall is doing well, no evidence of disease recurrence on current CT scans from 12/28/2021 which I reviewed with him today.  CT scan showed chronic interstitial changes throughout the lung fields with old granulomatous disease, stable adenopathy beneath the aortic bifurcation and anterior to the sacrum stable and unchanged without evidence of disease progression.  CBC is unremarkable, mild anemia with hemoglobin of 12, hematocrit normal at 37.5%, WBC normal at 7900, platelets 2000.  CMP pending. We will continue with CT scan again in 6 months for surveillance.    -7/11/2022 [facility] hematology follow-up: Other than chronic migraine headaches, no other complaints.  Did not get recent CAT scans as ordered but I am okay with just checking noncontrast CTs in January which will be a little over a year from his last scans and getting his blood counts today and only treating if he meets GELF criteria possible changes seen on his CT scans .    -8/12/2022 CT chest, abdomen and pelvis, no  evidence of lymphoma or other active disease in the chest, the previously described residual presacral mass has had interval enlargement from prior scan 12/28/2021 with interval development of mildly enlarged superiorly adjacent retroperitoneal lymph nodes.  No evidence of new adenopathy or mass elsewhere.  No evidence of resulting obstructive uropathy.  Nonspecific gaseous distention of the transverse colon, no evidence of high-grade ileus or obstruction.    -12/3/2022 presented to the ER with abdominal pain and diarrhea, CT abdomen and pelvis shows stable presacral mass.  Nonspecific bulky appearance of the cecum probably due to decompression, cannot exclude cicadas/diverticulitis.  Patient treated with Augmentin and Flagyl by ER physician.    -1/20/2023 [facility] Oncology clinic follow-up: Mr. Lomeli is having symptoms with excessive flatus, lower back pain and sciatica down his right buttocks and right hip.  I am concerned with with changes that were noted on his CT scans of the summer that were done after his visit with us therefore we were not made aware of these changes nor any symptoms that he has been having until today.  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  He was treated with Augmentin and Flagyl for possible diverticulitis.  He has decreased appetite although no significant weight loss, no bed drenching night sweats but he does report feeling hot when he wakes up in the mornings.  We will get a PET scan for further evaluation and also get a biopsy of the presacral mass.  We will see him back in a few weeks for follow-up to go over the results and further plan of care. His CMP from 12/2/2022 was unremarkable, CBC from today shows mild anemia with hemoglobin of 12.1, hematocrit 38.9%, absolute neutrophils 7.48 otherwise normal CBC.  Plan of care was discussed with Dr. Thor Ruggiero at time of visit.    - 2/7/2023 follow-up  with Dr. 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    -2/10/2023 [facility] oncology follow-up: His pathology and his clinical scenario suggest this to be a low-grade follicular lymphoma.  The presacral mass had not changed much from August and December but the PET also in the legs at this area for which Dr. Christianson can treat him quite effectively as outlined above.  This has not transformed to high-grade lymphoma and his labs are for the most part unremarkable as outlined above by my nurse practitioner.  I will repeat his PET in 3 months.    -6/22/2023 PET/CT likely decreased size and now metabolic resolution of previously seen pelvic mass/rishi conglomerate compatible with complete response, Sreekanth 2.  Max SUV 1.7.    -6/26/2023 [facility] Oncology clinic follow-up: Mr. Lomeli is doing well, he had an excellent response to radiation to the presacral mass as shown on current PET scan that is now negative.  He is feeling much better.  He reports that he had lab work done recently with his primary care provider, I will reach out to get a copy of that.  We will plan on continued surveillance per NCCN guidelines with repeat CT chest, abdomen and pelvis in 6 months.  I did ask about any contrast allergy and he denies knowledge of any reaction to contrast in the past.  He did have CT with IV contrast in February and March with no difficulty.    -12/22/2023 CT neck, chest, abdomen and pelvis shows likely mild interval decrease in size of presacral soft tissue mass reflecting treatment response otherwise no new areas of concern.  There was somewhat increased homogenous enhancement noted within the abdominal aortic aneurysm stent without distinct contrast extravasation noted but remaining concerning for endoleak.    -12/29/2023 [facility] Oncology clinic follow-up:   Yani is doing well with no evidence of recurrent lymphoma currently and no new worrisome symptoms.  CT neck, chest, abdomen and pelvis show interval decrease in size of presacral mass reflecting treatment response but otherwise no new areas of concern.  There was some concern for possible endoleak from his abdominal aortic aneurysm stent.  He reports that he does not recall having a stent placed and could not remember who the physician was.  I will get him to our cardiothoracic surgeons for review of his CT scan and recommendation for any further follow-up.  Otherwise we will plan on seeing him back in 6 months for follow-up, we will do CT chest, abdomen and pelvis without contrast prior to return along with CBC and CMP and I have ordered those today.     -1/11/2024 Dr. Bae consultation with history of AAA status post bilateral femoral cutdowns and placement of bifurcated Cook endograft at [facility] 2016 by Jesse Darling.  Patient could not recall this procedure no intervention needed at this time and should continue to follow his AAA with annual CT abdomen aorta with bilateral lower extremity runoff to assess for enlarging aneurysm and endoleak.  Plan to follow-up in 1 year.    -6/17/2024 CT chest abdomen pelvis without contrast showed stable presacral soft tissue mass relating to treated lymphoma with no new adenopathy in the chest abdomen or pelvis.  Emphysema chronic.  Stable cardiomegaly and coronary artery calcifications.  No splenomegaly.  Low-attenuation right hepatic lobe stable cyst 1.7 cm.  Infrarenal aortic aneurysm sac 4.2 cm stable.  Stable aortobiiliac stents.  No osseous lesions.    - 6/24/2024 [facility] hematology oncology follow-up: Stable hemoglobin 10.9 today with unremarkable differential.  CMP pending.  Will repeat CTs in 6 months as current CT showed no evidence of recurrence.  His radiation for the pelvic mass recurrence ended March 1, 2023.  He had his endovascular procedures done in  the past by Dr. Darling at  [facility] to whom we will refer him back for monitoring of his AAA and endoleak potentiality's.     Follicular lymphoma (Resolved)    Initial Diagnosis    Follicular lymphoma (HCC) (Resolved)     Diffuse follicle center lymphoma of extranodal site   3/12/2018 Initial Diagnosis    Follicular lymphoma     3/15/2018 Imaging    PET     IMPRESSION:     Multiple hypermetabolic abnormalities are identified but the  distribution is unusual. Although there is bulky hypermetabolic rishi  mass in the left inguinal area and in the right internal iliac rishi  chain certainly consistent with active malignant lymphoma, there is an  intense abnormal area of tracer uptake and hypermetabolic activity  throughout the marrow or intramedullary portion of the left ilium and  iliac wing in the absence of cortical destruction. This is an intensely  hypermetabolic abnormal lengthy intraosseous abnormality.     The lower extremity dedicated datasets are negative.     Intermediate lymph nodes are identified in the medial inguinal region  which are not above malignant threshold.     Above the pelvis, there is no evidence of additional hypermetabolic  focus or active neoplastic disease, and the chest, mediastinum and neck  are clear.     6/7/2018 Imaging    MRI lumbar spine IMPRESSION:    Study is limited by susceptibility artifact greatest at L1-L2.       No definite significant central canal stenosis.       Moderate to severe right and moderate left foraminal stenosis at L3-L4.    Moderate to severe bilateral foraminal stenosis at L4-L5.     7/24/2018 Imaging    CT chest IMPRESSION:  There are no acute findings. Specifically, there is no  axillary, mediastinal or hilar lymphadenopathy. There is bronchiectasis  in the medial segment of the left lower lobe. This was also present on  CT/PET examination of 03/15/2018.  CT abdomen and pelvis IMPRESSION:  Left inguinal lymphadenopathy, unchanged when compared to  a  whole-body PET CT scan of 03/15/2018.     9/6/2018 Imaging    MRI pelvis  1. Decreasing size of patient's left inguinal mass.  2. Interval development of diffuse marrow edema and enhancement,  practically throughout the left ilium, and adjacent soft tissue disease  deep to the left iliacus muscle, raising suspicion for lymphoma.     9/27/2018 - 10/17/2018 Radiation    Radiation OncologyTreatment Course:  George Lomeli received 3000 cGy in 15 fractions to left groin & iliac via External Beam Radiation - EBRT.     11/27/2018 Imaging    CT chest abdomen and pelvis  IMPRESSION: Bronchiectasis in the lungs but no significant mediastinal adenopathy or axillary adenopathy or cervical adenopathy.  There is a mildly enlarged left inguinal lymph node. The  node has smooth ovoid margins and has central low density consistent  with a necrotic node. Lastly more superiorly and anteriorly in a region  where there are surgical clips there is a lentiform mass which is much  much smaller than on the previous examination of 07/24/2018.     4/14/2020 Imaging    CT chest, abdomen and pelvis IMPRESSION:  Stable examination with no evidence of active or recurrent  lymphoma. No bulky adenopathy identified. Chronic age-related changes  identified throughout the chest, abdomen and pelvis which are all     9/29/2020 Imaging    CT chest, abdomen and pelvis IMPRESSION:  Stable examination with no evidence of active or recurrent  lymphoma. The findings are similar to that when compared to the prior  examination. No significant changes in the interval.     10/5/2021 Imaging    CT chest abdomen pelvis without contrast showed soft tissue prominence anterior to sacral/lumbar region and infra aortic bifurcation lymphadenopathy maximum 4.3 cm.     12/28/2021 Imaging    CT chest, abdomen and pelvis IMPRESSION:  Chronic interstitial changes seen throughout the lung  fields. Old healed granulomatous disease seen within the chest. There is  stable  "adenopathy identified beneath the aortic bifurcation and anterior  to the sacrum stable and unchanged. There are no signs of progression.     2/2/2023 Progression    PET shows strongly hypermetabolic lobular pelvic mass with separate small lower presacral nodes and no other hypermetabolism.     2/9/2023 - 3/1/2023 Radiation    Radiation OncologyTreatment Course:  George Lomeli received 3000 cGy in 15 fractions to sacrum via External Beam Radiation - EBRT.         HISTORY OF PRESENT ILLNESS:  The patient is a 83 y.o. male, here for follow up on management of recurrent follicular lymphoma status post retreatment radiation 15 fractions to sacrum ending March 2023.    Past Medical History:   Diagnosis Date   • AAA (abdominal aortic aneurysm)    • Arthritis    • Broken neck    • Gout    • Hypertension    • Lymphoma    • Lymphoma     had radiation   • Migraine    • Sciatica    • Thyroid cancer     ???   • Thyroid disease      Past Surgical History:   Procedure Laterality Date   • ABDOMINAL AORTIC ANEURYSM REPAIR W/ ENDOLUMINAL GRAFT     • BACK SURGERY      lumbar   • CATARACT EXTRACTION EXTRACAPSULAR W/ INTRAOCULAR LENS IMPLANTATION     • EYE SURGERY     • MULTIPLE TOOTH EXTRACTIONS     • NECK SURGERY      PINS/RODS IN NECK       Allergies   Allergen Reactions   • Contrast Dye (Echo Or Unknown Ct/Mr) Nausea And Vomiting       Family History and Social History reviewed and changed as necessary    REVIEW OF SYSTEM:   No B symptoms.  No new somatic complaints    PHYSICAL EXAM:  No palpable cervical axillary or inguinal nodes.  No other lymphadenopathy or hepatosplenomegaly.    Vitals:    06/24/24 1306   BP: 141/84   Pulse: 78   Resp: 20   Temp: 98.1 °F (36.7 °C)   TempSrc: Temporal   SpO2: 98%   Weight: 74.8 kg (165 lb)   Height: 177.8 cm (70\")     Vitals:    06/24/24 1306   PainSc: 0-No pain          ECOG score: 1           Vitals reviewed.      Lab Results   Component Value Date    HGB 10.9 (L) 06/24/2024    HCT 34.1 (L) " 06/24/2024    MCV 92.2 06/24/2024     06/24/2024    WBC 6.98 06/24/2024    NEUTROABS 5.46 06/24/2024    LYMPHSABS 0.64 (L) 06/24/2024    MONOSABS 0.55 06/24/2024    EOSABS 0.30 06/24/2024    BASOSABS 0.01 06/24/2024       Lab Results   Component Value Date    GLUCOSE 105 (H) 04/30/2024    BUN 11 04/30/2024    CREATININE 1.08 04/30/2024     (H) 04/30/2024    K 3.7 04/30/2024     (H) 04/30/2024    CO2 28.0 04/30/2024    CALCIUM 9.0 04/30/2024    PROTEINTOT 6.2 04/30/2024    ALBUMIN 3.7 04/30/2024    BILITOT 0.5 04/30/2024    ALKPHOS 105 04/30/2024    AST 14 04/30/2024    ALT 8 04/30/2024             ASSESSMENT & PLAN:  1.  Follicular lymphoma:  -  initial consultation 3/12/18: Had been having left inguinal pains and went for scanning that found left groin node.  Needle biopsy of this was called lymphoma CD10 positive and Fish testing positive for translocation (14;18) consistent with follicular lymphoma though could also be seen in diffuse large B cell.  BCL 6 /3q rearranged.  No Myc..  Hence cannot say at this junction the exact subtype of lymphoma.  He has not been having bed drenching night sweats.  No unexplained weight loss.  Has not had any known anemia, thrombocytopenia, leukopenia.  Has not had frequent infections.  CT did not show any splenomegaly or other significant adenopathy.  The left inguinal pain has subsided since the needle biopsy.  He has an abdominal aortic aneurysm that is being watched and had previously been repaired.  Apparently 15+ years ago he was treated at [facility] for lymphoma for which he received right neck radiation and some chemotherapy.  He is not sure of the subtype of lymphoma nor of the type of chemotherapy nor the number of radiation treatments and this was far enough back that our records have been expunged.      -9/27/2018 - 10/17/2018 received radiation to the left groin and iliac    -10/17/2019 [facility] hematology oncology APRN follow-up visit: I  reviewed his CT scans and discussed results with him which showed no evidence of active or recurrent lymphoma.  Labs pending.  Will review results and notify patient of any significant findings.  We would not treat unless he had 3 more than 3 cm, one more than 7 cm nodes, or the other GELF criteria such as unexplained bed drenching night sweats/fevers/weight loss/anemia/thrombocytopenia/frequent infections. We will follow up with patient in 6 months with repeat labs.      -9/18/2020 [facility] hematology oncology follow-up visit:No cervical axillary or inguinal adenopathy.  Blood counts have been normal as of April along with CT showing no bulky adenopathy at that point.  We will get his labs today and CTs now in light of his abdominal pain.  I will have him do telephone visit with my nurse practitioner in a couple of weeks.  The pain is not excruciating and is not every day but just been present for the past few weeks.  If the scans are negative then I would ask him to follow-up with gastroenterology on that and we can make referral as he desires.  Would not treat unless he reached our previously mentioned G ELF criteria.     -10/2/2020 [facility] hematology oncology APRN follow-up visit: Patient currently feeling well, previous concerns over abdominal pain resolved spontaneously with no intervention.  CT chest, abdomen and pelvis as shown above are stable with no evidence of disease recurrence or progression.  He has no adenopathy.  CBC on 9/18/2020 was unremarkable.  We will continue to monitor per NCCN guidelines.  He is now 2 years out from treatment, we will go to annual CT scan.  I will see him back in 6 months for follow-up with CBC and CMP on return.  I discussed with the patient to notify us if he has any concerns.  Would not treat unless he had 3 more than 3 cm or 1 more than 7 cm node, or other golf criteria such as unexplained bed drenching night sweats, fevers, weight loss, anemia, thrombocytopenia, frequent  infections.    -9/27/2021 sedimentation rate 13 C-reactive protein 0.33.    -10/5/2021 CT chest abdomen pelvis without contrast showed soft tissue prominence anterior to sacral/lumbar region and infra aortic bifurcation lymphadenopathy maximum 4.3 cm.    -10/7/2021 [facility]: No new somatic complaints.  Feels great.  I will check a CBC now along with repeat blood work and CT in 3 months but unless he develops hemoglobin less than 10, platelets less than 100,000, unexplained bed drenching night sweats/weight loss/fevers/recurrent infections, 1 more than 7 cm or 3 or more 3 cm or larger nodes, I would not treat him.  At the point he reaches those criteria I would like to get tissue and a PET to make sure that we have accurate staging before treatment and to be sure that he has not transformed.  He will see my nurse practitioner back in 3 months to go over repeat test.    -1/3/2022 [facility] follow[facility]:  Mr. Lomeli overall is doing well, no evidence of disease recurrence on current CT scans from 12/28/2021 which I reviewed with him today.  CT scan showed chronic interstitial changes throughout the lung fields with old granulomatous disease, stable adenopathy beneath the aortic bifurcation and anterior to the sacrum stable and unchanged without evidence of disease progression.  CBC is unremarkable, mild anemia with hemoglobin of 12, hematocrit normal at 37.5%, WBC normal at 7900, platelets 2000.  CMP pending. We will continue with CT scan again in 6 months for surveillance.    -7/11/2022 [facility] follow-up: Other than chronic migraine headaches, no other complaints.  Did not get recent CAT scans as ordered but I am okay with just checking noncontrast CTs in January which will be a little over a year from his last scans and getting his blood counts today and only treating if he meets GELF criteria possible changes seen on his CT scans .    -8/12/2022 CT  chest, abdomen and pelvis, no evidence of lymphoma or other active disease in the chest, the previously described residual presacral mass has had interval enlargement from prior scan 12/28/2021 with interval development of mildly enlarged superiorly adjacent retroperitoneal lymph nodes.  No evidence of new adenopathy or mass elsewhere.  No evidence of resulting obstructive uropathy.  Nonspecific gaseous distention of the transverse colon, no evidence of high-grade ileus or obstruction.    -12/3/2022 presented to the ER with abdominal pain and diarrhea, CT abdomen and pelvis shows stable presacral mass.  Nonspecific bulky appearance of the cecum probably due to decompression, cannot exclude cicadas/diverticulitis.  Patient treated with Augmentin and Flagyl by ER physician.    -1/20/2023 [facility] Oncology clinic follow-up: Mr. Lomeli is having symptoms with excessive flatus, lower back pain and sciatica down his right buttocks and right hip.  I am concerned with with changes that were noted on his CT scans of the summer that were done after his visit with us therefore we were not made aware of these changes nor any symptoms that he has been having until today.  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  He was treated with Augmentin and Flagyl for possible diverticulitis.  He has decreased appetite although no significant weight loss, no bed drenching night sweats but he does report feeling hot when he wakes up in the mornings.  We will get a PET scan for further evaluation and also get a biopsy of the presacral mass.  We will see him back in a few weeks for follow-up to go over the results and further plan of care. His CMP from 12/2/2022 was unremarkable, CBC from today shows mild anemia with hemoglobin of 12.1, hematocrit 38.9%, absolute neutrophils 7.48 otherwise normal CBC.  Plan of care was discussed with Dr. Thor Ruggiero at time of  visit.    - 2/7/2023 follow-up with Dr. 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  The presacral mass had not changed much from August and December but the PET also in the legs at this area for which Dr. Christianson can treat him quite effectively as outlined above.  This has not transformed to high-grade lymphoma and his labs are for the most part unremarkable as outlined above by my nurse practitioner.  I will repeat his PET in 3 months.    -6/22/2023 PET/CT likely decreased size and now metabolic resolution of previously seen pelvic mass/r[employer] compatible with complete response, [place] 2.  Max SUV 1.7.    -6/26/2023 [facility] Oncology clinic follow-up: Mr. Lomeli is doing well, he had an excellent response to radiation to the presacral mass as shown on current PET scan that is now negative.  He is feeling much better.  He reports that he had lab work done recently with his primary care provider, I will reach out to get a copy of that.  We will plan on continued surveillance per NCCN guidelines with repeat CT chest, abdomen and pelvis in 6 months.  I did ask about any contrast allergy and he denies knowledge of any reaction to contrast in the past.  He did have CT with IV contrast in February and March with no difficulty.    -12/22/2023 CT neck, chest, abdomen and pelvis shows likely mild interval decrease in size of presacral soft tissue mass reflecting treatment response otherwise no new areas of concern.  There was somewhat increased homogenous enhancement noted within the abdominal aortic aneurysm stent without distinct contrast extravasation noted but remaining concerning for endoleak.    -12/29/2023 [facility]  Oncology clinic follow-up: Mr. Lomeli is doing well with no evidence of recurrent lymphoma currently and no new worrisome symptoms.  CT neck, chest, abdomen and pelvis show interval decrease in size of presacral mass reflecting treatment response but otherwise no new areas of concern.  There was some concern for possible endoleak from his abdominal aortic aneurysm stent.  He reports that he does not recall having a stent placed and could not remember who the physician was.  I will get him to our cardiothoracic surgeons for review of his CT scan and recommendation for any further follow-up.  Otherwise we will plan on seeing him back in 6 months for follow-up, we will do CT chest, abdomen and pelvis without contrast prior to return along with CBC and CMP and I have ordered those today.     -1/11/2024 Dr. Bae consultation with history of AAA status post bilateral femoral cutdowns and placement of bifurcated Cook endograft at [facility] 2016 by Jesse Darling.  Patient could not recall this procedure no intervention needed at this time and should continue to follow his AAA with annual CT abdomen aorta with bilateral lower extremity runoff to assess for enlarging aneurysm and endoleak.  Plan to follow-up in 1 year.    -6/17/2024 CT chest abdomen pelvis without contrast showed stable presacral soft tissue mass relating to treated lymphoma with no new adenopathy in the chest abdomen or pelvis.  Emphysema chronic.  Stable cardiomegaly and coronary artery calcifications.  No splenomegaly.  Low-attenuation right hepatic lobe stable cyst 1.7 cm.  Infrarenal aortic aneurysm sac 4.2 cm stable.  Stable aortobiiliac stents.  No osseous lesions.    - 6/24/2024 [facility] hematology oncology follow-up: Stable hemoglobin 10.9 today with unremarkable differential.  CMP pending.  Will repeat CTs in 6 months as current CT showed no evidence of recurrence.  His radiation for the pelvic mass recurrence ended March 1, 2023.  He had his  endovascular procedures done in the past by Dr. Darling at  [facility] to whom we will refer him back for monitoring of his AAA and endoleak potentiality's.    Total time of care today inclusive of time spent today prior to patient's arrival reviewing interval images and reports thereof and notes from Dr. Bae and during visit interviewing him as to signs or symptoms of his disease and management thereof and after visit instituting the plan as outlined above took 40 minutes patient care time throughout the day today.  Thor Ruggiero MD    06/24/2024

## 2024-06-27 ENCOUNTER — OFFICE VISIT (OUTPATIENT)
Dept: RADIATION ONCOLOGY | Facility: HOSPITAL | Age: 84
End: 2024-06-27
Payer: MEDICARE

## 2024-06-27 ENCOUNTER — HOSPITAL ENCOUNTER (OUTPATIENT)
Dept: RADIATION ONCOLOGY | Facility: HOSPITAL | Age: 84
Setting detail: RADIATION/ONCOLOGY SERIES
Discharge: HOME | End: 2024-06-27
Payer: MEDICARE

## 2024-06-27 VITALS
BODY MASS INDEX: 23.86 KG/M2 | HEART RATE: 71 BPM | DIASTOLIC BLOOD PRESSURE: 87 MMHG | WEIGHT: 166.3 LBS | SYSTOLIC BLOOD PRESSURE: 138 MMHG | RESPIRATION RATE: 16 BRPM | OXYGEN SATURATION: 97 % | TEMPERATURE: 97.5 F

## 2024-06-27 DIAGNOSIS — C82.59 DIFFUSE FOLLICLE CENTER LYMPHOMA OF EXTRANODAL SITE: Primary | Chronic | ICD-10-CM

## 2024-06-27 NOTE — PROGRESS NOTES
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 Lomeli is a pleasant 83 y.o. gentleman who returns to clinic today for a scheduled follow visit related to his follicular center lymphoma.  He underwent a course of involved field radiation therapy to a large complex presacral mass in the pelvis to a dose of 30 Gray in 15 fractions, completing 3/2/2023.  He tolerated treatment well and has had excellent subsequent improvement in his pain within this region.  From a symptom standpoint, he denies drenching night sweats, fever, chills, worsening fatigue or weight loss.  He returns today following recent surveillance CT scans.      MEDICATIONS: Medication reconciliation for the patient was reviewed and confirmed in the electronic medical record.    Review of Systems   Musculoskeletal:  Positive for back pain.   All other systems reviewed and are negative.          KPS 80%      Physical Exam  Vitals and nursing note reviewed.   Constitutional:       General: He is not in acute distress.     Appearance: Normal appearance. He is well-developed.   HENT:      Head: Normocephalic and atraumatic.   Eyes:      Conjunctiva/sclera: Conjunctivae normal.      Pupils: Pupils are equal, round, and reactive to light.   Cardiovascular:      Rate and Rhythm: Normal rate and regular rhythm.      Heart sounds: No murmur heard.     No friction rub.   Pulmonary:      Effort: Pulmonary effort is normal. No respiratory distress.      Breath sounds: Normal breath sounds.   Musculoskeletal:         General: Normal range of motion.      Cervical back: Normal range of motion and neck supple.   Lymphadenopathy:      Cervical: No cervical adenopathy.    Skin:     General: Skin is warm and dry.   Neurological:      Mental Status: He is alert and oriented to person, place, and time.   Psychiatric:         Behavior: Behavior normal.         Thought Content: Thought content normal.         Judgment: Judgment normal.         VITAL SIGNS:   Vitals:    06/27/24 1439   BP: 138/87   Pulse: 71   Resp: 16   Temp: 97.5 °F (36.4 °C)   TempSrc: Temporal   SpO2: 97%   Weight: 75.4 kg (166 lb 4.8 oz)   PainSc: 0-No pain           IMAGING:  Narrative & Impression   CT CHEST WO CONTRAST DIAGNOSTIC, CT ABDOMEN PELVIS WO CONTRAST     Date of Exam: 6/17/2024 12:55 PM EDT     Indication: f/u follicular lymphoma.     Comparison: CT chest, abdomen, and pelvis with contrast 12/22/2023     Technique: Axial CT images were obtained of the chest, abdomen, and pelvis without contrast administration.  Reconstructed coronal and sagittal images were also obtained. Automated exposure control and iterative construction methods were used.     Findings:  Chest:  Noncontrast visualized soft tissues of the lower neck are without acute abnormality. Heart is enlarged. Coronary calcifications are present. Small pericardial effusion similar to the prior study. Ascending aorta at the upper limits of normal measuring 4   cm at the level of the main pulmonary artery. Main pulmonary dilated up to 3.8 cm similar to the prior study. Calcified mediastinal and right hilar nodes related to granulomatous disease. No axillary adenopathy.     Trachea and mainstem bronchi are patent. Emphysema. No consolidation or findings of pneumonia. Negative for pneumothorax. No suspicious pulmonary nodule or mass. Stable area of parenchymal cyst formation involving the medial basilar left lower lobe which   may relate to cystic bronchiectasis (4/85). No aggressive osseous lesion or acute fracture.     Abdomen and pelvis:  Lack of contrast limits assessment of abdominal organs and vasculature. Atrophy of the left hepatic lobe  similar to prior study. Low-attenuation lesion the right hepatic lobe stable which may relate to cyst measuring 1.7 cm. Several other scattered small   subcentimeter right hepatic lobe low-attenuation lesions are also unchanged. The adrenal glands are normal. Spleen is normal in size measuring 10.7 x 3.7 x 9.8 cm. Cholelithiasis. No pericholecystic inflammation. Negative for biliary dilatation.   Pancreas without findings of pancreatitis.     Kidneys are without hydronephrosis. Low-attenuation bilateral renal lesions most suggestive of cyst, better assessed on prior contrast-enhanced study. Negative for hydroureteronephrosis. No ureteral calculus. Urinary bladder is thin-walled. Mild   prostatomegaly.     Negative for pneumoperitoneum. No bowel obstruction. No fluid collection in the abdomen or pelvis. Moderate amount of stool in the colon. Appendix not visualized, no findings to indicate appendicitis. Redemonstration of infrarenal aortic aneurysm with   the native aneurysm sac measuring 4.2 cm, unchanged from prior study. Aortobiiliac stents noted extending into the right left common iliac arteries, similar to the prior study.     Abnormal presacral soft tissue again noted with representative measurement of 3.8 x 2.1 cm, similar to prior study when accounting for differences in technique (2/88). Negative for inguinal adenopathy. Surgical clips noted at the left and right inguinal   region. Small fat-containing right inguinal hernia. Small fat-containing left femoral hernia. Negative for central mesenteric adenopathy.     Negative for aggressive osseous lesion or acute fracture. There are few small sclerotic foci in the pelvis which may relate to bone islands. Stable trabecular thickening at the left acetabulum. Decompression laminectomy noted at L5.     IMPRESSION:  Impression:  Chest:  1. No intrathoracic adenopathy or findings of residual/recurrent malignancy.  2. Stable cardiomegaly and coronary artery  calcifications.  3. Emphysema and additional chronic findings above.     Abdomen and pelvis:  1. Stable presacral soft tissue mass which may relate to treated lymphoma.  2. No new adenopathy within the abdomen or pelvis.  3. Negative for splenomegaly.  4. Stable size of native infrarenal aortic aneurysm sac status post aortobiiliac stent placement.  5. Cholelithiasis and additional chronic findings above.           Electronically Signed: Toni Singh MD    6/18/2024 10:26 PM EDT    Workstation ID: [id number]       The following portions of the patient's history were reviewed and updated as appropriate: allergies, current medications, past family history, past medical history, past social history, past surgical history and problem list.         Diagnoses and all orders for this visit:    1. 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  He completed involved field radiation therapy to the pelvis for a bulky recurrence just over a year ago, and he has had a good response both clinically and radiographically.  He is without B symptoms.  His recent surveillance CT scans demonstrate stable treated presacral soft tissue mass and no evidence of new or progressive disease within the chest, abdomen or pelvis.  The patient and I discussed ongoing surveillance including follow-up visits, along with repeat imaging and labwork which are being arranged per medical oncology.      RECOMMENDATIONS:  Follows with Dr. Ruggiero in 6 months with repeat CT scans and labwork.  He will return to our clinic in 1 year or sooner should clinical/radiographic findings warrant.      Return in about 1 year (around 6/27/2025) for Office Visit.    YOVANI Jonas spent a total of 25 minutes on today's visit, with more than 10 minutes in direct face to face communication, and the remainder of the time spent in  reviewing the relevant history, records, available imaging, and for documentation.

## 2024-08-17 ENCOUNTER — APPOINTMENT (OUTPATIENT)
Dept: CT IMAGING | Facility: HOSPITAL | Age: 84
End: 2024-08-17
Payer: MEDICARE

## 2024-08-17 ENCOUNTER — HOSPITAL ENCOUNTER (EMERGENCY)
Facility: HOSPITAL | Age: 84
Discharge: HOME | End: 2024-08-17
Attending: EMERGENCY MEDICINE
Payer: MEDICARE

## 2024-08-17 VITALS
HEIGHT: 70 IN | DIASTOLIC BLOOD PRESSURE: 86 MMHG | HEART RATE: 75 BPM | OXYGEN SATURATION: 96 % | BODY MASS INDEX: 24.34 KG/M2 | TEMPERATURE: 98.6 F | SYSTOLIC BLOOD PRESSURE: 139 MMHG | RESPIRATION RATE: 14 BRPM | WEIGHT: 170 LBS

## 2024-08-17 DIAGNOSIS — H60.501 ACUTE OTITIS EXTERNA OF RIGHT EAR, UNSPECIFIED TYPE: Primary | ICD-10-CM

## 2024-08-17 DIAGNOSIS — R51.9 ACUTE NONINTRACTABLE HEADACHE, UNSPECIFIED HEADACHE TYPE: ICD-10-CM

## 2024-08-17 LAB
ALBUMIN SERPL-MCNC: 4 G/DL (ref 3.5–5.2)
ALBUMIN/GLOB SERPL: 1.8 G/DL
ALP SERPL-CCNC: 101 U/L (ref 39–117)
ALT SERPL W P-5'-P-CCNC: 5 U/L (ref 1–41)
ANION GAP SERPL CALCULATED.3IONS-SCNC: 9 MMOL/L (ref 5–15)
AST SERPL-CCNC: 14 U/L (ref 1–40)
BASOPHILS # BLD AUTO: 0.01 10*3/MM3 (ref 0–0.2)
BASOPHILS NFR BLD AUTO: 0.2 % (ref 0–1.5)
BILIRUB SERPL-MCNC: 0.5 MG/DL (ref 0–1.2)
BUN SERPL-MCNC: 8 MG/DL (ref 8–23)
BUN/CREAT SERPL: 8.5 (ref 7–25)
CALCIUM SPEC-SCNC: 9.2 MG/DL (ref 8.6–10.5)
CHLORIDE SERPL-SCNC: 108 MMOL/L (ref 98–107)
CO2 SERPL-SCNC: 27 MMOL/L (ref 22–29)
CREAT BLDA-MCNC: 0.9 MG/DL (ref 0.6–1.3)
CREAT SERPL-MCNC: 0.94 MG/DL (ref 0.76–1.27)
DEPRECATED RDW RBC AUTO: 51.8 FL (ref 37–54)
EGFRCR SERPLBLD CKD-EPI 2021: 79.9 ML/MIN/1.73
EOSINOPHIL # BLD AUTO: 0.2 10*3/MM3 (ref 0–0.4)
EOSINOPHIL NFR BLD AUTO: 3.7 % (ref 0.3–6.2)
ERYTHROCYTE [DISTWIDTH] IN BLOOD BY AUTOMATED COUNT: 15.5 % (ref 12.3–15.4)
GLOBULIN UR ELPH-MCNC: 2.2 GM/DL
GLUCOSE SERPL-MCNC: 96 MG/DL (ref 65–99)
HCT VFR BLD AUTO: 32.9 % (ref 37.5–51)
HGB BLD-MCNC: 10.7 G/DL (ref 13–17.7)
IMM GRANULOCYTES # BLD AUTO: 0.01 10*3/MM3 (ref 0–0.05)
IMM GRANULOCYTES NFR BLD AUTO: 0.2 % (ref 0–0.5)
LYMPHOCYTES # BLD AUTO: 0.67 10*3/MM3 (ref 0.7–3.1)
LYMPHOCYTES NFR BLD AUTO: 12.5 % (ref 19.6–45.3)
MCH RBC QN AUTO: 29.5 PG (ref 26.6–33)
MCHC RBC AUTO-ENTMCNC: 32.5 G/DL (ref 31.5–35.7)
MCV RBC AUTO: 90.6 FL (ref 79–97)
MONOCYTES # BLD AUTO: 0.48 10*3/MM3 (ref 0.1–0.9)
MONOCYTES NFR BLD AUTO: 8.9 % (ref 5–12)
NEUTROPHILS NFR BLD AUTO: 4.01 10*3/MM3 (ref 1.7–7)
NEUTROPHILS NFR BLD AUTO: 74.5 % (ref 42.7–76)
NRBC BLD AUTO-RTO: 0 /100 WBC (ref 0–0.2)
PLATELET # BLD AUTO: 170 10*3/MM3 (ref 140–450)
PMV BLD AUTO: 10.9 FL (ref 6–12)
POTASSIUM SERPL-SCNC: 3.5 MMOL/L (ref 3.5–5.2)
PROT SERPL-MCNC: 6.2 G/DL (ref 6–8.5)
RBC # BLD AUTO: 3.63 10*6/MM3 (ref 4.14–5.8)
SODIUM SERPL-SCNC: 144 MMOL/L (ref 136–145)
WBC NRBC COR # BLD AUTO: 5.38 10*3/MM3 (ref 3.4–10.8)

## 2024-08-17 PROCEDURE — 80053 COMPREHEN METABOLIC PANEL: CPT | Performed by: EMERGENCY MEDICINE

## 2024-08-17 PROCEDURE — 25510000001 IOPAMIDOL 61 % SOLUTION: Performed by: EMERGENCY MEDICINE

## 2024-08-17 PROCEDURE — 99285 EMERGENCY DEPT VISIT HI MDM: CPT

## 2024-08-17 PROCEDURE — 82565 ASSAY OF CREATININE: CPT

## 2024-08-17 PROCEDURE — 70450 CT HEAD/BRAIN W/O DYE: CPT

## 2024-08-17 PROCEDURE — 85025 COMPLETE CBC W/AUTO DIFF WBC: CPT | Performed by: EMERGENCY MEDICINE

## 2024-08-17 PROCEDURE — 70491 CT SOFT TISSUE NECK W/DYE: CPT

## 2024-08-17 PROCEDURE — 70481 CT ORBIT/EAR/FOSSA W/DYE: CPT

## 2024-08-17 RX ORDER — OFLOXACIN 3 MG/ML
5 SOLUTION AURICULAR (OTIC) DAILY
Qty: 2 ML | Refills: 0 | Status: SHIPPED | OUTPATIENT
Start: 2024-08-17 | End: 2024-08-22

## 2024-08-17 RX ORDER — AMOXICILLIN AND CLAVULANATE POTASSIUM 875; 125 MG/1; MG/1
1 TABLET, FILM COATED ORAL ONCE
Status: COMPLETED | OUTPATIENT
Start: 2024-08-17 | End: 2024-08-17

## 2024-08-17 RX ORDER — AMOXICILLIN AND CLAVULANATE POTASSIUM 875; 125 MG/1; MG/1
1 TABLET, FILM COATED ORAL EVERY 12 HOURS
Qty: 14 TABLET | Refills: 0 | Status: SHIPPED | OUTPATIENT
Start: 2024-08-17 | End: 2024-08-24

## 2024-08-17 RX ADMIN — IOPAMIDOL 85 ML: 612 INJECTION, SOLUTION INTRAVENOUS at 18:38

## 2024-08-17 RX ADMIN — AMOXICILLIN AND CLAVULANATE POTASSIUM 1 TABLET: 875; 125 TABLET, FILM COATED ORAL at 19:53

## 2024-08-17 NOTE — ED PROVIDER NOTES
[place]    EMERGENCY DEPARTMENT ENCOUNTER      Pt Name: George Lomeli  MRN: [id number]  YOB: 1940  Date of evaluation: 8/17/2024  Provider: Aime Keller MD    CHIEF COMPLAINT       Chief Complaint   Patient presents with    Headache         HISTORY OF PRESENT ILLNESS   George Lomeli is a 84 y.o. male who presents to the emergency department with complaint of moderate aching right-sided headache this been going on over the course of the last couple of days extending to the right side of his neck as well as to his right ear.  He denies any visual changes as well as any peripheral paresthesias, weakness, numbness, or difficulty ambulating.  Denies any trauma.  He has had no neck stiffness as well as any fever or chills.      Nursing notes were reviewed.    REVIEW OF SYSTEMS     ROS:  A chief complaint appropriate review of systems was completed and is negative except as noted in the HPI.      PAST MEDICAL HISTORY     Past Medical History:   Diagnosis Date    AAA (abdominal aortic aneurysm)     Arthritis     Broken neck     Gout     Hypertension     Lymphoma     Lymphoma     had radiation    Migraine     Sciatica     Thyroid cancer     ???    Thyroid disease          SURGICAL HISTORY       Past Surgical History:   Procedure Laterality Date    ABDOMINAL AORTIC ANEURYSM REPAIR W/ ENDOLUMINAL GRAFT      BACK SURGERY      lumbar    CATARACT EXTRACTION EXTRACAPSULAR W/ INTRAOCULAR LENS IMPLANTATION      EYE SURGERY      MULTIPLE TOOTH EXTRACTIONS      NECK SURGERY      PINS/RODS IN NECK         CURRENT MEDICATIONS     No current facility-administered medications for this encounter.    Current Outpatient Medications:     albuterol (PROVENTIL) (2.5 MG/3ML) 0.083% nebulizer solution, Take 2.5 mg by nebulization Every 6 (Six) Hours As Needed for Wheezing., Disp: 120 each, Rfl: 0    albuterol sulfate  (90 Base) MCG/ACT inhaler, Inhale 2 puffs Every 6 (Six) Hours As Needed for Wheezing., Disp: 8 g, Rfl:  0    allopurinol (ZYLOPRIM) 100 MG tablet, Take 1 tablet by mouth Daily., Disp: , Rfl:     amLODIPine (NORVASC) 5 MG tablet, Take 1 tablet by mouth Daily., Disp: , Rfl:     amoxicillin-clavulanate (AUGMENTIN) 875-125 MG per tablet, Take 1 tablet by mouth Every 12 (Twelve) Hours for 7 days., Disp: 14 tablet, Rfl: 0    apixaban (Eliquis) 5 MG tablet tablet, Take 1 tablet by mouth twice daily, Disp: 60 tablet, Rfl: 11    azithromycin (ZITHROMAX) 250 MG tablet, Take 2 tablets the first day, then 1 tablet daily for 4 days., Disp: 6 tablet, Rfl: 0    benzonatate (TESSALON) 100 MG capsule, Take 1 capsule by mouth 3 (Three) Times a Day As Needed for Cough., Disp: 8 capsule, Rfl: 0    carvedilol (COREG) 25 MG tablet, Take 1 tablet by mouth 2 (Two) Times a Day With Meals., Disp: , Rfl:     docusate sodium (COLACE) 50 MG capsule, Take 1 capsule by mouth As Needed., Disp: , Rfl:     gabapentin (Neurontin) 100 MG capsule, Take 2 PO TID (Patient taking differently: Take 1 capsule by mouth 2 (Two) Times a Day.), Disp: 180 capsule, Rfl: 5    levothyroxine (SYNTHROID, LEVOTHROID) 25 MCG tablet, Take 1 tablet by mouth Daily., Disp: , Rfl:     lisinopril (PRINIVIL,ZESTRIL) 40 MG tablet, Take 1 tablet by mouth Daily., Disp: , Rfl:     methylPREDNISolone (MEDROL) 4 MG dose pack, Take as directed on package instructions., Disp: 21 tablet, Rfl: 0    ofloxacin (FLOXIN) 0.3 % otic solution, Administer 5 drops to the right ear Daily for 5 days., Disp: 2 mL, Rfl: 0    oxyCODONE-acetaminophen (PERCOCET)  MG per tablet, Take 1 tablet by mouth Every 6 (Six) Hours As Needed for Moderate Pain., Disp: , Rfl:     potassium chloride (K-DUR) 10 MEQ CR tablet, Take 1 tablet by mouth 2 (Two) Times a Day., Disp: , Rfl:     tamsulosin (FLOMAX) 0.4 MG capsule 24 hr capsule, Take 1 capsule by mouth Every Night., Disp: , Rfl:     timolol (TIMOPTIC) 0.5 % ophthalmic solution, Administer 1 drop to the right eye 2 (Two) Times a Day., Disp: , Rfl:      ALLERGIES     Contrast dye (echo or unknown ct/mr)    FAMILY HISTORY       Family History   Problem Relation Age of Onset    Hypertension Mother     Hypertension Father     Hypertension Sister           SOCIAL HISTORY       Social History     Socioeconomic History    Marital status:     Number of children: 2   Tobacco Use    Smoking status: Former     Current packs/day: 0.00     Average packs/day: 1 pack/day for 15.0 years (15.0 ttl pk-yrs)     Types: Cigarettes     Start date:      Quit date:      Years since quittin.6    Smokeless tobacco: Never    Tobacco comments:     QUIT 30 YEARS AGO    Vaping Use    Vaping status: Never Used   Substance and Sexual Activity    Alcohol use: No    Drug use: No    Sexual activity: Defer         PHYSICAL EXAM    (up to 7 for level 4, 8 or more for level 5)     Vitals:    24 1545 24 1547 24 1700 24 1717   BP: 140/97  139/86    BP Location:       Patient Position:       Pulse:  75 64 75   Resp:       Temp:       TempSrc:       SpO2:  97% 96% 96%   Weight:       Height:           General: Awake, alert, no acute distress.  HEENT: Conjunctivae normal.  Right external auditory canal is swollen and tender.  Tympanic membrane is intact and not opacified.  Neck: Trachea midline.  There is no nuchal rigidity.  Cardiac: Heart regular rate, rhythm, no murmurs, rubs, or gallops  Lungs: Lungs are clear to auscultation, there is no wheezing, rhonchi, or rales. There is no use of accessory muscles.  Chest wall: There is no tenderness to palpation over the chest wall or over ribs  Abdomen: Abdomen is soft, nontender, nondistended. There are no firm or pulsatile masses, no rebound rigidity or guarding.   Musculoskeletal: No deformity.  Neuro: Alert and oriented x 4.  Dermatology: Skin is warm and dry  Psych: Mentation is grossly normal, cognition is grossly normal. Affect is appropriate.        DIAGNOSTIC RESULTS     EKG: All EKGs are interpreted by  the Emergency Department Physician who either signs or Co-signs this chart in the absence of a cardiologist.    No orders to display         RADIOLOGY:   [x] Radiologist's Report Reviewed:  CT Head Without Contrast   Final Result   Impression:   No acute intracranial abnormality.            Electronically Signed: Dino Goode     8/17/2024 6:46 PM EDT     Workstation ID: [id number]      CT Soft Tissue Neck With Contrast   Final Result   Impression:   No acute findings in the temporal bone or soft tissues of the neck.            Electronically Signed: Dino Goode     8/17/2024 7:04 PM EDT     Workstation ID: [id number]      CT Temporal Bones With Contrast   Final Result   Impression:   No acute findings in the temporal bone or soft tissues of the neck.            Electronically Signed: Dino Goode     8/17/2024 7:04 PM EDT     Workstation ID: [id number]          I ordered and independently reviewed the above noted radiographic studies.        LABS:    I have reviewed and interpreted all of the currently available lab results from this visit (if applicable):  Results for orders placed or performed during the hospital encounter of 08/17/24   Comprehensive Metabolic Panel    Specimen: Blood   Result Value Ref Range    Glucose 96 65 - 99 mg/dL    BUN 8 8 - 23 mg/dL    Creatinine 0.94 0.76 - 1.27 mg/dL    Sodium 144 136 - 145 mmol/L    Potassium 3.5 3.5 - 5.2 mmol/L    Chloride 108 (H) 98 - 107 mmol/L    CO2 27.0 22.0 - 29.0 mmol/L    Calcium 9.2 8.6 - 10.5 mg/dL    Total Protein 6.2 6.0 - 8.5 g/dL    Albumin 4.0 3.5 - 5.2 g/dL    ALT (SGPT) 5 1 - 41 U/L    AST (SGOT) 14 1 - 40 U/L    Alkaline Phosphatase 101 39 - 117 U/L    Total Bilirubin 0.5 0.0 - 1.2 mg/dL    Globulin 2.2 gm/dL    A/G Ratio 1.8 g/dL    BUN/Creatinine Ratio 8.5 7.0 - 25.0    Anion Gap 9.0 5.0 - 15.0 mmol/L    eGFR 79.9 >60.0 mL/min/1.73   CBC Auto Differential    Specimen: Blood   Result Value Ref Range    WBC 5.38 3.40 - 10.80 10*3/mm3    RBC  3.63 (L) 4.14 - 5.80 10*6/mm3    Hemoglobin 10.7 (L) 13.0 - 17.7 g/dL    Hematocrit 32.9 (L) 37.5 - 51.0 %    MCV 90.6 79.0 - 97.0 fL    MCH 29.5 26.6 - 33.0 pg    MCHC 32.5 31.5 - 35.7 g/dL    RDW 15.5 (H) 12.3 - 15.4 %    RDW-SD 51.8 37.0 - 54.0 fl    MPV 10.9 6.0 - 12.0 fL    Platelets 170 140 - 450 10*3/mm3    Neutrophil % 74.5 42.7 - 76.0 %    Lymphocyte % 12.5 (L) 19.6 - 45.3 %    Monocyte % 8.9 5.0 - 12.0 %    Eosinophil % 3.7 0.3 - 6.2 %    Basophil % 0.2 0.0 - 1.5 %    Immature Grans % 0.2 0.0 - 0.5 %    Neutrophils, Absolute 4.01 1.70 - 7.00 10*3/mm3    Lymphocytes, Absolute 0.67 (L) 0.70 - 3.10 10*3/mm3    Monocytes, Absolute 0.48 0.10 - 0.90 10*3/mm3    Eosinophils, Absolute 0.20 0.00 - 0.40 10*3/mm3    Basophils, Absolute 0.01 0.00 - 0.20 10*3/mm3    Immature Grans, Absolute 0.01 0.00 - 0.05 10*3/mm3    nRBC 0.0 0.0 - 0.2 /100 WBC   POC Creatinine    Specimen: Blood   Result Value Ref Range    Creatinine 0.90 0.60 - 1.30 mg/dL        If labs were ordered, I independently reviewed the results and considered them in treating the patient.      EMERGENCY DEPARTMENT COURSE and DIFFERENTIAL DIAGNOSIS/MDM:   Vitals:  AS OF 19:53 EDT    BP - 139/86  HR - 75  TEMP - 98.6 °F (37 °C) (Oral)  O2 SATS - 96%        Discussion below represents my analysis of pertinent findings related to patient's condition, differential diagnosis, treatment plan and final disposition.      Differential diagnosis:  The differential diagnosis associated with the patient's presentation includes: Migraine, tension headache, otitis externa      Independent interpretations (ECG/rhythm strip/X-ray/US/CT scan): I dependently interpreted the patient's head CT and neck CT.  I see no evidence of airway narrowing and no evidence of intracranial hemorrhage.    Patient's care impacted by:   [] Diabetes   [x] Hypertension   [] Coronary Artery Disease   [] Cancer   [x] Other: History of migraine    Care significantly affected by Social Determinants  of Health (housing and economic circumstances, unemployment)    [] Yes     [x] No   If yes, Patient's care significantly limited by  Social Determinants of Health including:    [] Inadequate housing    [] Low income    [] Alcoholism and drug addiction in family    [] Problems related to primary support group    [] Unemployment    [] Problems related to employment    [] Other Social Determinants of Health:       Consideration of admission/observation vs discharge: Patient well-appearing and nontoxic during the course of his stay in the emergency department.  Appears to have simple otitis externa in the right ear.  No evidence of associated otitis media.  Clinically looks well and nontoxic and presentation is not consistent with malignant otitis externa.  Temporal bone CT was reassuring.  Feel that he is stable for discharge home with prescription for antibiotics and close outpatient follow-up.      I had a discussion with the patient/family regarding diagnosis, diagnostic results, treatment plan, and medications.  The patient/family indicated understanding of these instructions.  I spent adequate time at the bedside preceding discharge necessary to personally discuss the aftercare instructions, giving patient education, providing explanations of the results of our evaluations/findings, and my decision making to assure that the patient/family understand the plan of care.  Time was allotted to answer questions at that time and throughout the ED course.  Emphasis was placed on timely follow-up after discharge.  I also discussed the potential for the development of an acute emergent condition requiring further evaluation, admission, or even surgical intervention. I discussed that we found nothing during the visit today indicating the need for further workup, admission, or the presence of an unstable medical condition.  I encouraged the patient to return to the emergency department immediately for ANY concerns, worsening,  new complaints, or if symptoms persist and unable to seek follow-up in a timely fashion.  The patient/family expressed understanding and agreement with this plan.  The patient will follow-up with their PCP in 1-2 days for reevaluation.           PROCEDURES:  Procedures    CRITICAL CARE TIME        FINAL IMPRESSION      1. Acute otitis externa of right ear, unspecified type    2. Acute nonintractable headache, unspecified headache type          DISPOSITION/PLAN     ED Disposition       ED Disposition   Discharge    Condition   Stable    Comment   --                 Comment: Please note this report has been produced using speech recognition software.      Aime Keller MD  Attending Emergency Physician             Aime Keller MD  08/18/24 1954

## 2024-08-21 ENCOUNTER — HOSPITAL ENCOUNTER (EMERGENCY)
Facility: HOSPITAL | Age: 84
Discharge: HOME | End: 2024-08-21
Attending: STUDENT IN AN ORGANIZED HEALTH CARE EDUCATION/TRAINING PROGRAM
Payer: MEDICARE

## 2024-08-21 VITALS
WEIGHT: 170 LBS | TEMPERATURE: 97.8 F | HEART RATE: 82 BPM | BODY MASS INDEX: 24.34 KG/M2 | SYSTOLIC BLOOD PRESSURE: 144 MMHG | RESPIRATION RATE: 16 BRPM | DIASTOLIC BLOOD PRESSURE: 91 MMHG | OXYGEN SATURATION: 98 % | HEIGHT: 70 IN

## 2024-08-21 DIAGNOSIS — H92.01 RIGHT EAR PAIN: ICD-10-CM

## 2024-08-21 DIAGNOSIS — H61.21 IMPACTED CERUMEN OF RIGHT EAR: Primary | ICD-10-CM

## 2024-08-21 PROCEDURE — 99283 EMERGENCY DEPT VISIT LOW MDM: CPT

## 2024-08-21 PROCEDURE — 69209 REMOVE IMPACTED EAR WAX UNI: CPT

## 2024-08-21 RX ORDER — LACTULOSE 10 G/15ML
20 SOLUTION ORAL ONCE
Status: COMPLETED | OUTPATIENT
Start: 2024-08-21 | End: 2024-08-21

## 2024-08-21 RX ADMIN — LACTULOSE 20 G: 20 SOLUTION ORAL at 17:27

## 2024-08-21 NOTE — ED PROVIDER NOTES
EMERGENCY DEPARTMENT ENCOUNTER    Pt Name: George Lomeli  MRN: [id number]  Pt :   1940  Room Number:  RW2/R2  Date of encounter:  2024  PCP: Kaycee Douglas MD  ED Provider: YOVANI Jordan    Historian: Patient    HPI:  Chief Complaint:  Rt ear pain    Context: Goerge Lomeli is a 84 y.o. male who presents to the ED c/o Rt ear pain.  Patient was diagnosed with an otitis media on .  Patient returns today for continued pain in the right ear.  Patient denies fever, chills.  Patient reports a decreased hearing.  HPI     REVIEW OF SYSTEMS  A chief complaint appropriate review of systems was completed and is negative except as noted in the HPI.     PAST MEDICAL HISTORY  Past Medical History:   Diagnosis Date    AAA (abdominal aortic aneurysm)     Arthritis     Broken neck     Gout     Hypertension     Lymphoma     Lymphoma     had radiation    Migraine     Sciatica     Thyroid cancer     ???    Thyroid disease        PAST SURGICAL HISTORY  Past Surgical History:   Procedure Laterality Date    ABDOMINAL AORTIC ANEURYSM REPAIR W/ ENDOLUMINAL GRAFT      BACK SURGERY      lumbar    CATARACT EXTRACTION EXTRACAPSULAR W/ INTRAOCULAR LENS IMPLANTATION      EYE SURGERY      MULTIPLE TOOTH EXTRACTIONS      NECK SURGERY      PINS/RODS IN NECK       FAMILY HISTORY  Family History   Problem Relation Age of Onset    Hypertension Mother     Hypertension Father     Hypertension Sister        SOCIAL HISTORY  Social History     Socioeconomic History    Marital status:     Number of children: 2   Tobacco Use    Smoking status: Former     Current packs/day: 0.00     Average packs/day: 1 pack/day for 15.0 years (15.0 ttl pk-yrs)     Types: Cigarettes     Start date:      Quit date:      Years since quittin.6    Smokeless tobacco: Never    Tobacco comments:     QUIT 30 YEARS AGO    Vaping Use    Vaping status: Never Used   Substance and Sexual Activity    Alcohol use: No    Drug use: No     Sexual activity: Defer       ALLERGIES  Contrast dye (echo or unknown ct/mr)    PHYSICAL EXAM  Physical Exam  Vitals and nursing note reviewed.   Constitutional:       General: He is not in acute distress.     Appearance: Normal appearance. He is not ill-appearing.   HENT:      Head: Normocephalic and atraumatic.      Right Ear: There is impacted cerumen.      Nose: Nose normal.      Mouth/Throat:      Mouth: Mucous membranes are moist.   Eyes:      Extraocular Movements: Extraocular movements intact.      Conjunctiva/sclera: Conjunctivae normal.   Cardiovascular:      Rate and Rhythm: Normal rate and regular rhythm.   Pulmonary:      Effort: Pulmonary effort is normal. No respiratory distress.      Breath sounds: Normal breath sounds.   Musculoskeletal:         General: Normal range of motion.      Cervical back: Normal range of motion.   Skin:     General: Skin is warm and dry.   Neurological:      General: No focal deficit present.      Mental Status: He is alert and oriented to person, place, and time.   Psychiatric:         Mood and Affect: Mood normal.         Behavior: Behavior normal.           LAB RESULTS  Results for orders placed or performed during the hospital encounter of 08/17/24   Comprehensive Metabolic Panel    Specimen: Blood   Result Value Ref Range    Glucose 96 65 - 99 mg/dL    BUN 8 8 - 23 mg/dL    Creatinine 0.94 0.76 - 1.27 mg/dL    Sodium 144 136 - 145 mmol/L    Potassium 3.5 3.5 - 5.2 mmol/L    Chloride 108 (H) 98 - 107 mmol/L    CO2 27.0 22.0 - 29.0 mmol/L    Calcium 9.2 8.6 - 10.5 mg/dL    Total Protein 6.2 6.0 - 8.5 g/dL    Albumin 4.0 3.5 - 5.2 g/dL    ALT (SGPT) 5 1 - 41 U/L    AST (SGOT) 14 1 - 40 U/L    Alkaline Phosphatase 101 39 - 117 U/L    Total Bilirubin 0.5 0.0 - 1.2 mg/dL    Globulin 2.2 gm/dL    A/G Ratio 1.8 g/dL    BUN/Creatinine Ratio 8.5 7.0 - 25.0    Anion Gap 9.0 5.0 - 15.0 mmol/L    eGFR 79.9 >60.0 mL/min/1.73   CBC Auto Differential    Specimen: Blood   Result Value  Ref Range    WBC 5.38 3.40 - 10.80 10*3/mm3    RBC 3.63 (L) 4.14 - 5.80 10*6/mm3    Hemoglobin 10.7 (L) 13.0 - 17.7 g/dL    Hematocrit 32.9 (L) 37.5 - 51.0 %    MCV 90.6 79.0 - 97.0 fL    MCH 29.5 26.6 - 33.0 pg    MCHC 32.5 31.5 - 35.7 g/dL    RDW 15.5 (H) 12.3 - 15.4 %    RDW-SD 51.8 37.0 - 54.0 fl    MPV 10.9 6.0 - 12.0 fL    Platelets 170 140 - 450 10*3/mm3    Neutrophil % 74.5 42.7 - 76.0 %    Lymphocyte % 12.5 (L) 19.6 - 45.3 %    Monocyte % 8.9 5.0 - 12.0 %    Eosinophil % 3.7 0.3 - 6.2 %    Basophil % 0.2 0.0 - 1.5 %    Immature Grans % 0.2 0.0 - 0.5 %    Neutrophils, Absolute 4.01 1.70 - 7.00 10*3/mm3    Lymphocytes, Absolute 0.67 (L) 0.70 - 3.10 10*3/mm3    Monocytes, Absolute 0.48 0.10 - 0.90 10*3/mm3    Eosinophils, Absolute 0.20 0.00 - 0.40 10*3/mm3    Basophils, Absolute 0.01 0.00 - 0.20 10*3/mm3    Immature Grans, Absolute 0.01 0.00 - 0.05 10*3/mm3    nRBC 0.0 0.0 - 0.2 /100 WBC   POC Creatinine    Specimen: Blood   Result Value Ref Range    Creatinine 0.90 0.60 - 1.30 mg/dL       If labs were ordered, I independently reviewed the results and considered them in treating the patient.    RADIOLOGY  No orders to display     [] Radiologist's Report Reviewed:  I ordered and independently interpreted the above noted radiographic studies.  See radiologist's dictation for official interpretation.      PROCEDURES    Foreign Body Removal - Orifice    Date/Time: 8/21/2024 5:30 PM    Performed by: Jazmín Warner APRN  Authorized by: Sriram Garcia MD    Consent:     Consent obtained:  Verbal    Consent given by:  Patient    Risks discussed:  Incomplete removal and pain    Alternatives discussed:  No treatment  Universal protocol:     Procedure explained and questions answered to patient or proxy's satisfaction: yes      Patient identity confirmed:  Verbally with patient  Location:     Location:  Ear  Pre-procedure details:     Imaging:  None  Sedation:     Sedation type:  None  Anesthesia:      Topical anesthetic:  None  Procedure details:     Ear: Otoscope.    Removal mechanism:  Irrigation    Procedure complexity:  Simple    Foreign bodies recovered:  1    Description:  Cerumen    Intact foreign body removal: yes    Post-procedure details:     Confirmation:  No additional foreign bodies on visualization    Procedure completion:  Tolerated      No orders to display       MEDICATIONS GIVEN IN ER    Medications   lactulose (CHRONULAC) 10 GM/15ML solution 20 g (20 g Oral Given 8/21/24 9836)       MEDICAL DECISION MAKING, PROGRESS, and CONSULTS   Medical Decision Making  George Lomeli is a 84 y.o. male who presents to the ED c/o Rt ear pain.  Patient was diagnosed with an otitis media on August 17.  Patient returns today for continued pain in the right ear.  Patient denies fever, chills.  Patient reports a decreased hearing.    Problems Addressed:  Impacted cerumen of right ear: complicated acute illness or injury     Details: Right ear irrigated patient tolerated well.  Patient reported relief.  Right ear pain: complicated acute illness or injury     Details: Right ear was examined.  There is impacted cerumen of the right ear.    Risk  Prescription drug management.        Discussion below represents my analysis of pertinent findings related to patient's condition, differential diagnosis, treatment plan and final disposition.    Differential diagnosis:  The differential diagnosis associated with the patient's presentation includes: Otitis media, otitis externa, impacted cerumen.    Additional sources  Discussed/ obtained information from independent historians:   [] Spouse  [] Parent  [] Family member  [] Friend  [] EMS   [] Other:  External (non-ED) record review:   [] Inpatient record:   [] Office record:   [x] Outpatient record:   [] Prior Outpatient labs:   [] Prior Outpatient radiology:   [] Primary Care record:   [] Outside ED record:   [] Other:   Patient's care impacted by:   [] Diabetes  [x]  Hypertension  [] Hyperlipidemia  [] Hypothyroidism   [] Coronary Artery Disease   [] COPD   [] Cancer   [] Obesity  [] GERD   [] Tobacco Abuse   [] Substance Abuse    [] Anxiety   [] Depression   [] Other:   Care significantly affected by Social Determinants of Health (housing and economic circumstances, unemployment)    [] Yes     [x] No   If yes, Patient's care significantly limited by  Social Determinants of Health including:   [] Inadequate housing   [] Low income   [] Alcoholism and drug addiction in family   [] Problems related to primary support group   [] Unemployment   [] Problems related to employment   [] Other Social Determinants of Health:   Shared decision making: Shared decision making with patient patient has impacted cerumen of the right ear.  Right ear was irrigated.  Cerumen was removed from the right ear.  Patient tolerated well.    Orders placed during this visit:  Orders Placed This Encounter   Procedures    Foreign Body Removal    Ear wax removal       I considered prescription management  with:   [] Pain medication  [] Antiviral  [] Antibiotic   [] Other:   Rationale:  Additional orders considered but not ordered:  The following testing was considered but ultimately not selected after discussion with patient/family:    ED Course:              DIAGNOSIS  Final diagnoses:   Impacted cerumen of right ear   Right ear pain       DISPOSITION    DISCHARGE    Patient discharged in stable condition.    Reviewed implications of results, diagnosis, meds, responsibility to follow up, warning signs and symptoms of possible worsening, potential complications and reasons to return to ER.    Patient/Family voiced understanding of above instructions.    Discussed plan for discharge, as there is no emergent indication for admission.  Pt/family is agreeable and understands need for follow up and possible repeat testing.  Pt/family is aware that discharge does not mean that nothing is wrong but that it indicates  no emergency is currently present that requires admission and they must continue care with follow-up as given below or with a physician of their choice.     FOLLOW-UP  Kaycee Douglas MD  [address]  [facility] [address]  [place] [address]  [phone]               Medication List        Changed      gabapentin 100 MG capsule  Commonly known as: Neurontin  Take 2 PO TID  What changed:   how much to take  how to take this  when to take this  additional instructions               ED Disposition       ED Disposition   Discharge    Condition   Stable    Comment   --               Please note that portions of this document were completed with voice recognition software.       Jazmín Warner, APRN  08/21/24 1922

## 2024-09-18 ENCOUNTER — HOSPITAL ENCOUNTER (OUTPATIENT)
Dept: GENERAL RADIOLOGY | Facility: HOSPITAL | Age: 84
Discharge: HOME | End: 2024-09-18
Admitting: PHYSICIAN ASSISTANT
Payer: MEDICARE

## 2024-09-18 ENCOUNTER — TRANSCRIBE ORDERS (OUTPATIENT)
Dept: GENERAL RADIOLOGY | Facility: HOSPITAL | Age: 84
End: 2024-09-18
Payer: MEDICARE

## 2024-09-18 DIAGNOSIS — G89.29 CHRONIC NECK PAIN: Primary | ICD-10-CM

## 2024-09-18 DIAGNOSIS — M54.2 CHRONIC NECK PAIN: Primary | ICD-10-CM

## 2024-09-18 DIAGNOSIS — M54.50 CHRONIC LOW BACK PAIN, UNSPECIFIED BACK PAIN LATERALITY, UNSPECIFIED WHETHER SCIATICA PRESENT: ICD-10-CM

## 2024-09-18 DIAGNOSIS — G89.29 CHRONIC LOW BACK PAIN, UNSPECIFIED BACK PAIN LATERALITY, UNSPECIFIED WHETHER SCIATICA PRESENT: ICD-10-CM

## 2024-09-18 DIAGNOSIS — M54.2 CHRONIC NECK PAIN: ICD-10-CM

## 2024-09-18 DIAGNOSIS — G89.29 CHRONIC NECK PAIN: ICD-10-CM

## 2024-09-18 PROCEDURE — 72114 X-RAY EXAM L-S SPINE BENDING: CPT

## 2024-09-18 PROCEDURE — 72050 X-RAY EXAM NECK SPINE 4/5VWS: CPT

## 2024-10-30 DIAGNOSIS — J47.9 BRONCHIECTASIS WITHOUT COMPLICATION: ICD-10-CM

## 2024-10-30 RX ORDER — ALBUTEROL SULFATE 0.83 MG/ML
SOLUTION RESPIRATORY (INHALATION) EVERY 6 HOURS PRN
Qty: 240 ML | Refills: 0 | Status: SHIPPED | OUTPATIENT
Start: 2024-10-30

## 2024-11-24 NOTE — PROGRESS NOTES
FOLLOW UP NOTE    PATIENT:                                                      George Lomeli  MEDICAL RECORD #:                        [id number]  :                                                          1940  COMPLETION DATE:   10/17/2018  DIAGNOSIS:     Follicular lymphoma (HCC)  - Stage IV      BRIEF HISTORY:   George Lomeli is an 81 y.o. gentleman returning for routine follow-up.  He has a history of relapsed follicular lymphoma, initially involving cervical lymph nodes status post surgery followed by radiation, with more recent recurrence involving the left groin, external iliac lymph nodes, and left iliac bone treated with a course of involved field radiotherapy in 2018.  Serial imaging has been without evidence of recurrent disease since that time.  He returns today following repeat CT scans of the chest, abdomen, and pelvis.  From a symptom standpoint, he reports mild, chronic fatigue is stable.  He denies fever, chills, night sweats, recent infections, weight loss, or new/progressive focal pains.  He does report chronic low back pain for which he takes percocet as needed prescribed by a pain specialist, though he reports these pains are stable.  He was recently referred to neurology for daily headaches x 9 months.  He denies vision change, nausea, or focal neurologic deficits.  Noncontrasted MRI obtained 2021 demonstrates chronic small vessel ischemic changes and no evidence of mass or acute pathology.  Otherwise, Mr. Lomeli feels well and denies further acute concerns today.      MEDICATIONS: Medication reconciliation for the patient was reviewed and confirmed in the electronic medical record.    Review of Systems   Constitutional: Positive for fatigue.   Musculoskeletal: Positive for arthralgias and back pain.   Neurological: Positive for headaches.   All other systems reviewed and are negative.    KPS  80%        Physical Exam  Vitals and nursing note reviewed.   Constitutional:      "  General: He is not in acute distress.     Appearance: Normal appearance. He is well-developed.   HENT:      Head: Normocephalic and atraumatic.   Eyes:      Conjunctiva/sclera: Conjunctivae normal.      Pupils: Pupils are equal, round, and reactive to light.   Neck:      Comments: No palpable cervical or supraclavicular lymphadenopathy.  Cardiovascular:      Rate and Rhythm: Normal rate and regular rhythm.      Heart sounds: No murmur heard.  No friction rub.   Pulmonary:      Effort: Pulmonary effort is normal.      Breath sounds: Normal breath sounds. No wheezing.   Abdominal:      General: Bowel sounds are normal. There is no distension.      Palpations: Abdomen is soft. There is no mass.      Tenderness: There is no abdominal tenderness.   Musculoskeletal:         General: Normal range of motion.      Cervical back: Normal range of motion and neck supple.      Comments: No focal bony tenderness to palpation.   Lymphadenopathy:      Cervical: No cervical adenopathy.      Comments: No palpable inguinal lymphadenopathy.   Skin:     General: Skin is warm and dry.   Neurological:      Mental Status: He is alert and oriented to person, place, and time.   Psychiatric:         Behavior: Behavior normal.         Thought Content: Thought content normal.         Judgment: Judgment normal.         VITAL SIGNS:   Vitals:    10/19/21 0732   BP: 138/85   Pulse: 86   Resp: 16   Temp: 97.4 °F (36.3 °C)   TempSrc: Infrared   SpO2: 96%   Weight: 79.9 kg (176 lb 1.6 oz)   Height: 177.8 cm (70\")   PainSc:   3   PainLoc: Head           IMAGING:  I have personally reviewed the following imaging studies:    CT chest, abdomen, pelvis 10/5/2021:  FINDINGS:      CHEST: The thyroid is homogeneous. No mediastinal mass or adenopathy.  The cardiac chambers are within normal limits. Tortuosity identified  within the thoracic aorta. The lung parenchyma is clear. No parenchymal  consolidation, pulmonary mass or nodule. There are emphysematous " changes  seen within the lower lung fields. Degenerative changes seen within the  spine.     ABDOMEN: The liver reveals multiple low-density areas identified  throughout the liver which are stable and unchanged suggesting small  cysts. The spleen is unremarkable. Small cyst seen within the right  kidney. Adrenal glands are within normal limits. The pancreas is  homogeneous. Stable endovascular stent repair of the infrarenal  abdominal aortic aneurysm. There is an abnormal soft tissue mass  identified anterior to the sacrum and lower lumbar spine just below the  aortic bifurcation. This soft tissue mass today measures approximately  2.9 x 4.3 cm. Findings likely representing lymphadenopathy. There is no  pelvic adenopathy identified.     PELVIS: Small right inguinal hernia containing fat only. The pelvic  portions of the gastrointestinal tract are within normal limits. The  prostate reveals calcifications which are stable. No additional pelvic  adenopathy. Again, abnormal soft tissue anterior to the sacrum. Findings  concerning for recurrent lymphoma.     IMPRESSION:  Abnormal soft tissue seen anterior to the sacrum and lower  lumbar spine within the retroperitoneum beneath the aortic bifurcation  suggesting recurrent adenopathy. The remainder of the chest, abdomen and  pelvis is stable.     D:  10/05/2021  E:  10/05/2021     This report was finalized on 10/7/2021 3:51 PM by Dr. Kristi Santacruz MD.        The following portions of the patient's history were reviewed and updated as appropriate: allergies, current medications, past family history, past medical history, past social history, past surgical history and problem list.       Diagnoses and all orders for this visit:    1. Follicular lymphoma grade I, unspecified body region (HCC) (Primary)         IMPRESSION:  George Lomeli is an 81 y.o. male with a history of recurrent follicular lymphoma who completed a course of radiation therapy 3 years ago for an  inguinal recurrence.  Clinically, he is doing very well and denies B symptoms.  Most recent repeat CT scans of the chest, abdomen, and pelvis without contrast unfortunately show adenopathy measuring 2.9 x 4.3 cm located anterior to the sacrum and lower lumbar spine within the retroperitoneum beneath the aortic bifurcation, concerning for recurrent lymphoma.  The remainder of the chest, abdomen, and pelvis appear stable.  Dr. Ruggiero has ordered repeat labs as well as repeat CT imaging studies for 3 months, with no immediate plans to start systemic therapy at present.  I will schedule the patient to return to our clinic at that time for review of imaging.      RECOMMENDATIONS:    Return in about 3 months (around 1/18/2022) for Office Visit.    Kamran Mcneal, APRN       27.7

## 2024-12-05 DIAGNOSIS — I71.43 INFRARENAL ABDOMINAL AORTIC ANEURYSM (AAA) WITHOUT RUPTURE: Primary | ICD-10-CM

## 2024-12-05 RX ORDER — DIPHENHYDRAMINE HCL 50 MG
50 CAPSULE ORAL ONCE
Qty: 1 CAPSULE | Refills: 0 | Status: SHIPPED | OUTPATIENT
Start: 2024-12-05 | End: 2024-12-05

## 2024-12-05 RX ORDER — METHYLPREDNISOLONE 32 MG/1
TABLET ORAL
Qty: 2 TABLET | Refills: 0 | Status: SHIPPED | OUTPATIENT
Start: 2024-12-05

## 2025-01-02 ENCOUNTER — HOSPITAL ENCOUNTER (OUTPATIENT)
Dept: CT IMAGING | Facility: HOSPITAL | Age: 85
Discharge: HOME | End: 2025-01-02
Admitting: INTERNAL MEDICINE
Payer: MEDICARE

## 2025-01-02 DIAGNOSIS — C82.59 DIFFUSE FOLLICLE CENTER LYMPHOMA OF EXTRANODAL SITE: Chronic | ICD-10-CM

## 2025-01-02 PROCEDURE — 70490 CT SOFT TISSUE NECK W/O DYE: CPT

## 2025-01-02 PROCEDURE — 71250 CT THORAX DX C-: CPT

## 2025-01-02 PROCEDURE — 74176 CT ABD & PELVIS W/O CONTRAST: CPT

## 2025-01-14 ENCOUNTER — OFFICE VISIT (OUTPATIENT)
Dept: ONCOLOGY | Facility: CLINIC | Age: 85
End: 2025-01-14
Payer: MEDICARE

## 2025-01-14 ENCOUNTER — LAB (OUTPATIENT)
Dept: LAB | Facility: HOSPITAL | Age: 85
End: 2025-01-14
Payer: MEDICARE

## 2025-01-14 VITALS
HEIGHT: 70 IN | OXYGEN SATURATION: 98 % | RESPIRATION RATE: 18 BRPM | BODY MASS INDEX: 24.05 KG/M2 | WEIGHT: 168 LBS | HEART RATE: 80 BPM | DIASTOLIC BLOOD PRESSURE: 71 MMHG | SYSTOLIC BLOOD PRESSURE: 119 MMHG

## 2025-01-14 DIAGNOSIS — Z98.890 HISTORY OF ABDOMINAL AORTIC ANEURYSM (AAA) REPAIR: ICD-10-CM

## 2025-01-14 DIAGNOSIS — C82.59 DIFFUSE FOLLICLE CENTER LYMPHOMA OF EXTRANODAL SITE: ICD-10-CM

## 2025-01-14 DIAGNOSIS — C82.59 DIFFUSE FOLLICLE CENTER LYMPHOMA OF EXTRANODAL SITE: Primary | Chronic | ICD-10-CM

## 2025-01-14 LAB
ALBUMIN SERPL-MCNC: 4 G/DL (ref 3.5–5.2)
ALBUMIN/GLOB SERPL: 1.7 G/DL
ALP SERPL-CCNC: 110 U/L (ref 39–117)
ALT SERPL W P-5'-P-CCNC: 11 U/L (ref 1–41)
ANION GAP SERPL CALCULATED.3IONS-SCNC: 6 MMOL/L (ref 5–15)
AST SERPL-CCNC: 21 U/L (ref 1–40)
BASOPHILS # BLD AUTO: 0.03 10*3/MM3 (ref 0–0.2)
BASOPHILS NFR BLD AUTO: 0.5 % (ref 0–1.5)
BILIRUB SERPL-MCNC: 0.5 MG/DL (ref 0–1.2)
BUN SERPL-MCNC: 9 MG/DL (ref 8–23)
BUN/CREAT SERPL: 9.2 (ref 7–25)
CALCIUM SPEC-SCNC: 9.2 MG/DL (ref 8.6–10.5)
CHLORIDE SERPL-SCNC: 107 MMOL/L (ref 98–107)
CO2 SERPL-SCNC: 31 MMOL/L (ref 22–29)
CREAT SERPL-MCNC: 0.98 MG/DL (ref 0.76–1.27)
DEPRECATED RDW RBC AUTO: 53.4 FL (ref 37–54)
EGFRCR SERPLBLD CKD-EPI 2021: 76 ML/MIN/1.73
EOSINOPHIL # BLD AUTO: 0.4 10*3/MM3 (ref 0–0.4)
EOSINOPHIL NFR BLD AUTO: 6.2 % (ref 0.3–6.2)
ERYTHROCYTE [DISTWIDTH] IN BLOOD BY AUTOMATED COUNT: 15.9 % (ref 12.3–15.4)
GLOBULIN UR ELPH-MCNC: 2.3 GM/DL
GLUCOSE SERPL-MCNC: 93 MG/DL (ref 65–99)
HCT VFR BLD AUTO: 33.9 % (ref 37.5–51)
HGB BLD-MCNC: 10.9 G/DL (ref 13–17.7)
IMM GRANULOCYTES # BLD AUTO: 0.04 10*3/MM3 (ref 0–0.05)
IMM GRANULOCYTES NFR BLD AUTO: 0.6 % (ref 0–0.5)
LYMPHOCYTES # BLD AUTO: 0.85 10*3/MM3 (ref 0.7–3.1)
LYMPHOCYTES NFR BLD AUTO: 13.2 % (ref 19.6–45.3)
MCH RBC QN AUTO: 29.5 PG (ref 26.6–33)
MCHC RBC AUTO-ENTMCNC: 32.2 G/DL (ref 31.5–35.7)
MCV RBC AUTO: 91.9 FL (ref 79–97)
MONOCYTES # BLD AUTO: 0.59 10*3/MM3 (ref 0.1–0.9)
MONOCYTES NFR BLD AUTO: 9.2 % (ref 5–12)
NEUTROPHILS NFR BLD AUTO: 4.52 10*3/MM3 (ref 1.7–7)
NEUTROPHILS NFR BLD AUTO: 70.3 % (ref 42.7–76)
NRBC BLD AUTO-RTO: 0 /100 WBC (ref 0–0.2)
PLATELET # BLD AUTO: 200 10*3/MM3 (ref 140–450)
PMV BLD AUTO: 11.3 FL (ref 6–12)
POTASSIUM SERPL-SCNC: 3.6 MMOL/L (ref 3.5–5.2)
PROT SERPL-MCNC: 6.3 G/DL (ref 6–8.5)
RBC # BLD AUTO: 3.69 10*6/MM3 (ref 4.14–5.8)
SODIUM SERPL-SCNC: 144 MMOL/L (ref 136–145)
WBC NRBC COR # BLD AUTO: 6.43 10*3/MM3 (ref 3.4–10.8)

## 2025-01-14 PROCEDURE — 80053 COMPREHEN METABOLIC PANEL: CPT

## 2025-01-14 PROCEDURE — 1160F RVW MEDS BY RX/DR IN RCRD: CPT | Performed by: NURSE PRACTITIONER

## 2025-01-14 PROCEDURE — 3078F DIAST BP <80 MM HG: CPT | Performed by: NURSE PRACTITIONER

## 2025-01-14 PROCEDURE — 1125F AMNT PAIN NOTED PAIN PRSNT: CPT | Performed by: NURSE PRACTITIONER

## 2025-01-14 PROCEDURE — 36415 COLL VENOUS BLD VENIPUNCTURE: CPT

## 2025-01-14 PROCEDURE — 1159F MED LIST DOCD IN RCRD: CPT | Performed by: NURSE PRACTITIONER

## 2025-01-14 PROCEDURE — 99214 OFFICE O/P EST MOD 30 MIN: CPT | Performed by: NURSE PRACTITIONER

## 2025-01-14 PROCEDURE — 85025 COMPLETE CBC W/AUTO DIFF WBC: CPT

## 2025-01-14 PROCEDURE — 3074F SYST BP LT 130 MM HG: CPT | Performed by: NURSE PRACTITIONER

## 2025-01-14 RX ORDER — DULOXETIN HYDROCHLORIDE 30 MG/1
CAPSULE, DELAYED RELEASE ORAL
COMMUNITY
Start: 2024-10-14

## 2025-01-14 RX ORDER — LISINOPRIL AND HYDROCHLOROTHIAZIDE 12.5; 2 MG/1; MG/1
TABLET ORAL
COMMUNITY
Start: 2024-09-16

## 2025-01-14 NOTE — PROGRESS NOTES
CHIEF COMPLAINT: Follicular lymphoma    Problem List:  Oncology/Hematology History Overview Note   1.  Follicular lymphoma:  -  initial consultation 3/12/18: Had been having left inguinal pains and went for scanning that found left groin node.  Needle biopsy of this was called lymphoma CD10 positive and Fish testing positive for translocation (14;18) consistent with follicular lymphoma though could also be seen in diffuse large B cell.  BCL 6 /3q rearranged.  No Myc..  Hence cannot say at this junction the exact subtype of lymphoma.  He has not been having bed drenching night sweats.  No unexplained weight loss.  Has not had any known anemia, thrombocytopenia, leukopenia.  Has not had frequent infections.  CT did not show any splenomegaly or other significant adenopathy.  The left inguinal pain has subsided since the needle biopsy.  He has an abdominal aortic aneurysm that is being watched and had previously been repaired.  Apparently 15+ years ago he was treated at [facility] for lymphoma for which he received right neck radiation and some chemotherapy.  He is not sure of the subtype of lymphoma nor of the type of chemotherapy nor the number of radiation treatments and this was far enough back that our records have been expunged.      -9/27/2018 - 10/17/2018 received radiation to the left groin and iliac    -10/17/2019 [facility] hematology oncology APRN follow-up visit: I reviewed his CT scans and discussed results with him which showed no evidence of active or recurrent lymphoma.  Labs pending.  Will review results and notify patient of any significant findings.  We would not treat unless he had 3 more than 3 cm, one more than 7 cm nodes, or the other GELF criteria such as unexplained bed drenching night sweats/fevers/weight loss/anemia/thrombocytopenia/frequent infections. We will follow up with patient in 6 months with repeat labs.      -9/18/2020 [facility] hematology oncology follow-up visit:No cervical  axillary or inguinal adenopathy.  Blood counts have been normal as of April along with CT showing no bulky adenopathy at that point.  We will get his labs today and CTs now in light of his abdominal pain.  I will have him do telephone visit with my nurse practitioner in a couple of weeks.  The pain is not excruciating and is not every day but just been present for the past few weeks.  If the scans are negative then I would ask him to follow-up with gastroenterology on that and we can make referral as he desires.  Would not treat unless he reached our previously mentioned G ELF criteria.     -10/2/2020 [facility] hematology oncology APRN follow-up visit: Patient currently feeling well, previous concerns over abdominal pain resolved spontaneously with no intervention.  CT chest, abdomen and pelvis as shown above are stable with no evidence of disease recurrence or progression.  He has no adenopathy.  CBC on 9/18/2020 was unremarkable.  We will continue to monitor per NCCN guidelines.  He is now 2 years out from treatment, we will go to annual CT scan.  I will see him back in 6 months for follow-up with CBC and CMP on return.  I discussed with the patient to notify us if he has any concerns.  Would not treat unless he had 3 more than 3 cm or 1 more than 7 cm node, or other golf criteria such as unexplained bed drenching night sweats, fevers, weight loss, anemia, thrombocytopenia, frequent infections.    -9/27/2021 sedimentation rate 13 C-reactive protein 0.33.    -10/5/2021 CT chest abdomen pelvis without contrast showed soft tissue prominence anterior to sacral/lumbar region and infra aortic bifurcation lymphadenopathy maximum 4.3 cm.    -10/7/2021 [facility] medical oncology follow-up visit: No new somatic complaints.  Feels great.  I will check a CBC now along with repeat blood work and CT in 3 months but unless he develops hemoglobin less than 10, platelets less than 100,000, unexplained bed drenching night sweats/weight  loss/fevers/recurrent infections, 1 more than 7 cm or 3 or more 3 cm or larger nodes, I would not treat him.  At the point he reaches those criteria I would like to get tissue and a PET to make sure that we have accurate staging before treatment and to be sure that he has not transformed.  He will see my nurse practitioner back in 3 months to go over repeat test.    -1/3/2022 [facility]:  Mr. Lomeli overall is doing well, no evidence of disease recurrence on current CT scans from 12/28/2021 which I reviewed with him today.  CT scan showed chronic interstitial changes throughout the lung fields with old granulomatous disease, stable adenopathy beneath the aortic bifurcation and anterior to the sacrum stable and unchanged without evidence of disease progression.  CBC is unremarkable, mild anemia with hemoglobin of 12, hematocrit normal at 37.5%, WBC normal at 7900, platelets 2000.  CMP pending. We will continue with CT scan again in 6 months for surveillance.    -7/11/2022 [facility] follow-up: Other than chronic migraine headaches, no other complaints.  Did not get recent CAT scans as ordered but I am okay with just checking noncontrast CTs in January which will be a little over a year from his last scans and getting his blood counts today and only treating if he meets GELF criteria possible changes seen on his CT scans .    -8/12/2022 CT chest, abdomen and pelvis, no evidence of lymphoma or other active disease in the chest, the previously described residual presacral mass has had interval enlargement from prior scan 12/28/2021 with interval development of mildly enlarged superiorly adjacent retroperitoneal lymph nodes.  No evidence of new adenopathy or mass elsewhere.  No evidence of resulting obstructive uropathy.  Nonspecific gaseous distention of the transverse colon, no evidence of high-grade ileus or obstruction.    -12/3/2022 presented to the ER with abdominal pain and  diarrhea, CT abdomen and pelvis shows stable presacral mass.  Nonspecific bulky appearance of the cecum probably due to decompression, cannot exclude cicadas/diverticulitis.  Patient treated with Augmentin and Flagyl by ER physician.    -1/20/2023 [facility] follow-[facility]: Mr. Lomeli is having symptoms with excessive flatus, lower back pain and sciatica down his right buttocks and right hip.  I am concerned with with changes that were noted on his CT scans of the summer that were done after his visit with us therefore we were not made aware of these changes nor any symptoms that he has been having until today.  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  He was treated with Augmentin and Flagyl for possible diverticulitis.  He has decreased appetite although no significant weight loss, no bed drenching night sweats but he does report feeling hot when he wakes up in the mornings.  We will get a PET scan for further evaluation and also get a biopsy of the presacral mass.  We will see him back in a few weeks for follow-up to go over the results and further plan of care. His CMP from 12/2/2022 was unremarkable, CBC from today shows mild anemia with hemoglobin of 12.1, hematocrit 38.9%, absolute neutrophils 7.48 otherwise normal CBC.  Plan of care was discussed with Dr. Thor Ruggiero at time of visit.    - 2/7/2023 follow-up with Dr. 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    -2/10/2023 [facility] follow-[facility]: His pathology and his clinical scenario suggest this to be a low-grade follicular lymphoma.  The presacral mass had not changed much from August and December but the PET also in the legs at this area for which Dr. Christianson can treat him quite effectively as outlined  above.  This has not transformed to high-grade lymphoma and his labs are for the most part unremarkable as outlined above by my nurse practitioner.  I will repeat his PET in 3 months.    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  Max SUV 1.7.    -6/26/2023 [facility] follow-up: Mr. Lomeli is doing well, he had an excellent response to radiation to the presacral mass as shown on current PET scan that is now negative.  He is feeling much better.  He reports that he had lab work done recently with his primary care provider, I will reach out to get a copy of that.  We will plan on continued surveillance per NCCN guidelines with repeat CT chest, abdomen and pelvis in 6 months.  I did ask about any contrast allergy and he denies knowledge of any reaction to contrast in the past.  He did have CT with IV contrast in February and March with no difficulty.    -12/22/2023 CT neck, chest, abdomen and pelvis shows likely mild interval decrease in size of presacral soft tissue mass reflecting treatment response otherwise no new areas of concern.  There was somewhat increased homogenous enhancement noted within the abdominal aortic aneurysm stent without distinct contrast extravasation noted but remaining concerning for endoleak.    -12/29/2023 [facility] Oncology clinic follow-up: Mr. Lomeli is doing well with no evidence of recurrent lymphoma currently and no new worrisome symptoms.  CT neck, chest, abdomen and pelvis show interval decrease in size of presacral mass reflecting treatment response but otherwise no new areas of concern.  There was some concern for possible endoleak from his abdominal aortic aneurysm stent.  He reports that he does not recall having a stent placed and could not remember who the physician was.  I will get him to our cardiothoracic surgeons for review of his CT scan  and recommendation for any further follow-up.  Otherwise we will plan on seeing him back in 6 months for follow-up, we will do CT chest, abdomen and pelvis without contrast prior to return along with CBC and CMP and I have ordered those today.     -1/11/2024 Dr. Bae consultation with history of AAA status post bilateral femoral cutdowns and placement of bifurcated Cook endograft at [facility] 2016 by Jesse Darling.  Patient could not recall this procedure no intervention needed at this time and should continue to follow his AAA with annual CT abdomen aorta with bilateral lower extremity runoff to assess for enlarging aneurysm and endoleak.  Plan to follow-up in 1 year.    -6/17/2024 CT chest abdomen pelvis without contrast showed stable presacral soft tissue mass relating to treated lymphoma with no new adenopathy in the chest abdomen or pelvis.  Emphysema chronic.  Stable cardiomegaly and coronary artery calcifications.  No splenomegaly.  Low-attenuation right hepatic lobe stable cyst 1.7 cm.  Infrarenal aortic aneurysm sac 4.2 cm stable.  Stable aortobiiliac stents.  No osseous lesions.    - 6/24/2024 [facility], [place], operated by [facility] hematology oncology follow-up: Stable hemoglobin 10.9 today with unremarkable differential.  CMP pending.  Will repeat CTs in 6 months as current CT showed no evidence of recurrence.  His radiation for the pelvic mass recurrence ended March 1, 2023.  He had his endovascular procedures done in the past by Dr. Darling at  [facility] to whom we will refer him back for monitoring of his AAA and endoleak potentiality's.    -1/2/2025 CT neck, chest, abdomen and pelvis shows no unexpected or acute findings, stable size and appearance of persistent presacral soft tissue mass.     Follicular lymphoma (Resolved)    Initial Diagnosis    Follicular lymphoma (HCC) (Resolved)     Diffuse follicle center lymphoma of extranodal site   3/12/2018 Initial Diagnosis    Follicular lymphoma     3/15/2018 Imaging    PET      IMPRESSION:     Multiple hypermetabolic abnormalities are identified but the  distribution is unusual. Although there is bulky hypermetabolic rishi  mass in the left inguinal area and in the right internal iliac rishi  chain certainly consistent with active malignant lymphoma, there is an  intense abnormal area of tracer uptake and hypermetabolic activity  throughout the marrow or intramedullary portion of the left ilium and  iliac wing in the absence of cortical destruction. This is an intensely  hypermetabolic abnormal lengthy intraosseous abnormality.     The lower extremity dedicated datasets are negative.     Intermediate lymph nodes are identified in the medial inguinal region  which are not above malignant threshold.     Above the pelvis, there is no evidence of additional hypermetabolic  focus or active neoplastic disease, and the chest, mediastinum and neck  are clear.     6/7/2018 Imaging    MRI lumbar spine IMPRESSION:    Study is limited by susceptibility artifact greatest at L1-L2.       No definite significant central canal stenosis.       Moderate to severe right and moderate left foraminal stenosis at L3-L4.    Moderate to severe bilateral foraminal stenosis at L4-L5.     7/24/2018 Imaging    CT chest IMPRESSION:  There are no acute findings. Specifically, there is no  axillary, mediastinal or hilar lymphadenopathy. There is bronchiectasis  in the medial segment of the left lower lobe. This was also present on  CT/PET examination of 03/15/2018.  CT abdomen and pelvis IMPRESSION:  Left inguinal lymphadenopathy, unchanged when compared to a  whole-body PET CT scan of 03/15/2018.     9/6/2018 Imaging    MRI pelvis  1. Decreasing size of patient's left inguinal mass.  2. Interval development of diffuse marrow edema and enhancement,  practically throughout the left ilium, and adjacent soft tissue disease  deep to the left iliacus muscle, raising suspicion for lymphoma.     9/27/2018 - 10/17/2018  Radiation    Radiation OncologyTreatment Course:  George Lomeli received 3000 cGy in 15 fractions to left groin & iliac via External Beam Radiation - EBRT.     11/27/2018 Imaging    CT chest abdomen and pelvis  IMPRESSION: Bronchiectasis in the lungs but no significant mediastinal adenopathy or axillary adenopathy or cervical adenopathy.  There is a mildly enlarged left inguinal lymph node. The  node has smooth ovoid margins and has central low density consistent  with a necrotic node. Lastly more superiorly and anteriorly in a region  where there are surgical clips there is a lentiform mass which is much  much smaller than on the previous examination of 07/24/2018.     4/14/2020 Imaging    CT chest, abdomen and pelvis IMPRESSION:  Stable examination with no evidence of active or recurrent  lymphoma. No bulky adenopathy identified. Chronic age-related changes  identified throughout the chest, abdomen and pelvis which are all     9/29/2020 Imaging    CT chest, abdomen and pelvis IMPRESSION:  Stable examination with no evidence of active or recurrent  lymphoma. The findings are similar to that when compared to the prior  examination. No significant changes in the interval.     10/5/2021 Imaging    CT chest abdomen pelvis without contrast showed soft tissue prominence anterior to sacral/lumbar region and infra aortic bifurcation lymphadenopathy maximum 4.3 cm.     12/28/2021 Imaging    CT chest, abdomen and pelvis IMPRESSION:  Chronic interstitial changes seen throughout the lung  fields. Old healed granulomatous disease seen within the chest. There is  stable adenopathy identified beneath the aortic bifurcation and anterior  to the sacrum stable and unchanged. There are no signs of progression.     2/2/2023 Progression    PET shows strongly hypermetabolic lobular pelvic mass with separate small lower presacral nodes and no other hypermetabolism.     2/9/2023 - 3/1/2023 Radiation    Radiation OncologyTreatment Course:   "George Lomeli received 3000 cGy in 15 fractions to sacrum via External Beam Radiation - EBRT.         HISTORY OF PRESENT ILLNESS:  The patient is a 84 y.o. male, here for follow up on management of recurrent follicular lymphoma status post retreatment radiation 15 fractions to sacrum ending March 2023.  Mr. Lomeli reports overall he is doing well.  He has some issues with left knee pain, he has received an injection and is trying to avoid surgery.  Has a history of cervical surgery, he does have occasional tingling in his hands and arm.  No fevers or chills or infections since we saw him last.  No new pain.  States his appetite is fair, some days it is good other days it is less.  Weight seems to be stable.  No change in his bowel or bladder habits.  No new abdominal pain.    Past Medical History:   Diagnosis Date    AAA (abdominal aortic aneurysm)     Arthritis     Broken neck     Gout     Hypertension     Lymphoma     Lymphoma     had radiation    Migraine     Sciatica     Thyroid cancer     ???    Thyroid disease      Past Surgical History:   Procedure Laterality Date    ABDOMINAL AORTIC ANEURYSM REPAIR W/ ENDOLUMINAL GRAFT      BACK SURGERY      lumbar    CATARACT EXTRACTION EXTRACAPSULAR W/ INTRAOCULAR LENS IMPLANTATION      EYE SURGERY      MULTIPLE TOOTH EXTRACTIONS      NECK SURGERY      PINS/RODS IN NECK       Allergies   Allergen Reactions    Contrast Dye (Echo Or Unknown Ct/Mr) Nausea And Vomiting       Family History and Social History reviewed and changed as necessary    REVIEW OF SYSTEM:   No B symptoms.      PHYSICAL EXAM:  Thin, well-developed, well-nourished appearing male in no distress  No palpable lymphadenopathy  Respirations regular and unlabored    Vitals:    01/14/25 1516   BP: 119/71   Pulse: 80   Resp: 18   SpO2: 98%   Weight: 76.2 kg (168 lb)   Height: 177.8 cm (70\")     Vitals:    01/14/25 1516   PainSc:   6   PainLoc: Hand     George Lomeli reports a pain score of 6.  Given his pain " assessment as noted, treatment options were discussed and the following options were decided upon as a follow-up plan to address the patient's pain:  follow up with his PCP .    ECOG score: 1           Vitals reviewed.  Reviewed results of CT scans 1/2/2025      Lab Results   Component Value Date    HGB 10.9 (L) 01/14/2025    HCT 33.9 (L) 01/14/2025    MCV 91.9 01/14/2025     01/14/2025    WBC 6.43 01/14/2025    NEUTROABS 4.52 01/14/2025    LYMPHSABS 0.85 01/14/2025    MONOSABS 0.59 01/14/2025    EOSABS 0.40 01/14/2025    BASOSABS 0.03 01/14/2025       Lab Results   Component Value Date    GLUCOSE 96 08/17/2024    BUN 8 08/17/2024    CREATININE 0.90 08/17/2024     08/17/2024    K 3.5 08/17/2024     (H) 08/17/2024    CO2 27.0 08/17/2024    CALCIUM 9.2 08/17/2024    PROTEINTOT 6.2 08/17/2024    ALBUMIN 4.0 08/17/2024    BILITOT 0.5 08/17/2024    ALKPHOS 101 08/17/2024    AST 14 08/17/2024    ALT 5 08/17/2024             ASSESSMENT & PLAN:  1.  Follicular lymphoma:  -  initial consultation 3/12/18: Had been having left inguinal pains and went for scanning that found left groin node.  Needle biopsy of this was called lymphoma CD10 positive and Fish testing positive for translocation (14;18) consistent with follicular lymphoma though could also be seen in diffuse large B cell.  BCL 6 /3q rearranged.  No Myc..  Hence cannot say at this junction the exact subtype of lymphoma.  He has not been having bed drenching night sweats.  No unexplained weight loss.  Has not had any known anemia, thrombocytopenia, leukopenia.  Has not had frequent infections.  CT did not show any splenomegaly or other significant adenopathy.  The left inguinal pain has subsided since the needle biopsy.  He has an abdominal aortic aneurysm that is being watched and had previously been repaired.  Apparently 15+ years ago he was treated at [facility] for lymphoma for which he received right neck radiation and some chemotherapy.   He is not sure of the subtype of lymphoma nor of the type of chemotherapy nor the number of radiation treatments and this was far enough back that our records have been expunged.      -9/27/2018 - 10/17/2018 received radiation to the left groin and iliac    -10/17/2019 [facility] hematology oncology APRN follow-up visit: I reviewed his CT scans and discussed results with him which showed no evidence of active or recurrent lymphoma.  Labs pending.  Will review results and notify patient of any significant findings.  We would not treat unless he had 3 more than 3 cm, one more than 7 cm nodes, or the other GELF criteria such as unexplained bed drenching night sweats/fevers/weight loss/anemia/thrombocytopenia/frequent infections. We will follow up with patient in 6 months with repeat labs.      -9/18/2020 [facility] hematology oncology follow-up visit:No cervical axillary or inguinal adenopathy.  Blood counts have been normal as of April along with CT showing no bulky adenopathy at that point.  We will get his labs today and CTs now in light of his abdominal pain.  I will have him do telephone visit with my nurse practitioner in a couple of weeks.  The pain is not excruciating and is not every day but just been present for the past few weeks.  If the scans are negative then I would ask him to follow-up with gastroenterology on that and we can make referral as he desires.  Would not treat unless he reached our previously mentioned G ELF criteria.     -10/2/2020 [facility] hematology oncology APRN follow-up visit: Patient currently feeling well, previous concerns over abdominal pain resolved spontaneously with no intervention.  CT chest, abdomen and pelvis as shown above are stable with no evidence of disease recurrence or progression.  He has no adenopathy.  CBC on 9/18/2020 was unremarkable.  We will continue to monitor per NCCN guidelines.  He is now 2 years out from treatment, we will go to annual CT scan.  I will see him  back in 6 months for follow-up with CBC and CMP on return.  I discussed with the patient to notify us if he has any concerns.  Would not treat unless he had 3 more than 3 cm or 1 more than 7 cm node, or other golf criteria such as unexplained bed drenching night sweats, fevers, weight loss, anemia, thrombocytopenia, frequent infections.    -9/27/2021 sedimentation rate 13 C-reactive protein 0.33.    -10/5/2021 CT chest abdomen pelvis without contrast showed soft tissue prominence anterior to sacral/lumbar region and infra aortic bifurcation lymphadenopathy maximum 4.3 cm.    -10/7/2021 [facility] follow-[facility] visit: No new somatic complaints.  Feels great.  I will check a CBC now along with repeat blood work and CT in 3 months but unless he develops hemoglobin less than 10, platelets less than 100,000, unexplained bed drenching night sweats/weight loss/fevers/recurrent infections, 1 more than 7 cm or 3 or more 3 cm or larger nodes, I would not treat him.  At the point he reaches those criteria I would like to get tissue and a PET to make sure that we have accurate staging before treatment and to be sure that he has not transformed.  He will see my nurse practitioner back in 3 months to go over repeat test.    -1/3/2022 [facility] follow-up:  Mr. Lomeli overall is doing well, no evidence of disease recurrence on current CT scans from 12/28/2021 which I reviewed with him today.  CT scan showed chronic interstitial changes throughout the lung fields with old granulomatous disease, stable adenopathy beneath the aortic bifurcation and anterior to the sacrum stable and unchanged without evidence of disease progression.  CBC is unremarkable, mild anemia with hemoglobin of 12, hematocrit normal at 37.5%, WBC normal at 7900, platelets 2000.  CMP pending. We will continue with CT scan again in 6 months for surveillance.    -7/11/2022 [facility] follow-up: Other than chronic migraine  headaches, no other complaints.  Did not get recent CAT scans as ordered but I am okay with just checking noncontrast CTs in January which will be a little over a year from his last scans and getting his blood counts today and only treating if he meets GELF criteria possible changes seen on his CT scans .    -8/12/2022 CT chest, abdomen and pelvis, no evidence of lymphoma or other active disease in the chest, the previously described residual presacral mass has had interval enlargement from prior scan 12/28/2021 with interval development of mildly enlarged superiorly adjacent retroperitoneal lymph nodes.  No evidence of new adenopathy or mass elsewhere.  No evidence of resulting obstructive uropathy.  Nonspecific gaseous distention of the transverse colon, no evidence of high-grade ileus or obstruction.    -12/3/2022 presented to the ER with abdominal pain and diarrhea, CT abdomen and pelvis shows stable presacral mass.  Nonspecific bulky appearance of the cecum probably due to decompression, cannot exclude cicadas/diverticulitis.  Patient treated with Augmentin and Flagyl by ER physician.    -1/20/2023 [facility] follow-up: Mr. Lomeli is having symptoms with excessive flatus, lower back pain and sciatica down his right buttocks and right hip.  I am concerned with with changes that were noted on his CT scans of the summer that were done after his visit with us therefore we were not made aware of these changes nor any symptoms that he has been having until today.  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  He was treated with Augmentin and Flagyl for possible diverticulitis.  He has decreased appetite although no significant weight loss, no bed drenching night sweats but he does report feeling hot when he wakes up in the mornings.  We will get a PET scan for further evaluation and also get a biopsy of the presacral mass.   We will see him back in a few weeks for follow-up to go over the results and further plan of care. His CMP from 12/2/2022 was unremarkable, CBC from today shows mild anemia with hemoglobin of 12.1, hematocrit 38.9%, absolute neutrophils 7.48 otherwise normal CBC.  Plan of care was discussed with Dr. Thor Ruggiero at time of visit.    - 2/7/2023 follow-up with Dr. 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  The presacral mass had not changed much from August and December but the PET also in the legs at this area for which Dr. Christianson can treat him quite effectively as outlined above.  This has not transformed to high-grade lymphoma and his labs are for the most part unremarkable as outlined above by my nurse practitioner.  I will repeat his PET in 3 months.    -6/22/2023 PET/CT likely decreased size and now metabolic resolution of previously seen pelvic mass/[employer] compatible with complete response, [place] 2.  Max SUV 1.7.    -6/26/2023 [facility] follow-up: Mr. Lomeli is doing well, he had an excellent response to radiation to the presacral mass as shown on current PET scan that is now negative.  He is feeling much better.  He reports that he had lab work done recently with his primary care provider, I will reach out to get a copy of that.  We will plan on continued surveillance per NCCN guidelines with repeat CT chest, abdomen and pelvis in 6 months.  I did ask about any contrast allergy and he denies knowledge of any reaction to contrast in the past.  He did have CT with IV contrast in February and March with no difficulty.    -12/22/2023 CT neck, chest, abdomen and pelvis shows likely mild interval  decrease in size of presacral soft tissue mass reflecting treatment response otherwise no new areas of concern.  There was somewhat increased homogenous enhancement noted within the abdominal aortic aneurysm stent without distinct contrast extravasation noted but remaining concerning for endoleak.    -12/29/2023 [facility] follow-up: Mr. Lomeli is doing well with no evidence of recurrent lymphoma currently and no new worrisome symptoms.  CT neck, chest, abdomen and pelvis show interval decrease in size of presacral mass reflecting treatment response but otherwise no new areas of concern.  There was some concern for possible endoleak from his abdominal aortic aneurysm stent.  He reports that he does not recall having a stent placed and could not remember who the physician was.  I will get him to our cardiothoracic surgeons for review of his CT scan and recommendation for any further follow-up.  Otherwise we will plan on seeing him back in 6 months for follow-up, we will do CT chest, abdomen and pelvis without contrast prior to return along with CBC and CMP and I have ordered those today.     -1/11/2024 Dr. Bae consultation with history of AAA status post bilateral femoral cutdowns and placement of bifurcated Cook endograft at [facility] 2016 by Jesse Darling.  Patient could not recall this procedure no intervention needed at this time and should continue to follow his AAA with annual CT abdomen aorta with bilateral lower extremity runoff to assess for enlarging aneurysm and endoleak.  Plan to follow-up in 1 year.    -6/17/2024 CT chest abdomen pelvis without contrast showed stable presacral soft tissue mass relating to treated lymphoma with no new adenopathy in the chest abdomen or pelvis.  Emphysema chronic.  Stable cardiomegaly and coronary artery calcifications.  No splenomegaly.  Low-attenuation right hepatic lobe stable cyst 1.7 cm.  Infrarenal aortic aneurysm sac 4.2 cm stable.  Stable  aortobiiliac stents.  No osseous lesions.    - 6/24/2024 [facility] hematology oncology follow-up: Stable hemoglobin 10.9 today with unremarkable differential.  CMP pending.  Will repeat CTs in 6 months as current CT showed no evidence of recurrence.  His radiation for the pelvic mass recurrence ended March 1, 2023.  He had his endovascular procedures done in the past by Dr. Darling at  [facility] to whom we will refer him back for monitoring of his AAA and endoleak potentiality's.    -1/2/2025 CT neck, chest, abdomen and pelvis shows no unexpected or acute findings, stable size and appearance of persistent presacral soft tissue mass.  -1/14/2025 [facility] oncology clinic follow-up: No evidence of progression of his follicular lymphoma.  CT scans showed stable presacral soft tissue mass that was previously radiated.  He has no B symptoms or other worrisome symptoms.  He is having some tingling and discomfort in his hands, I have asked him to follow-up with his PCP, he has a history of cervical disc surgery. He also has an appointment coming up with CT surgery for follow-up on his AAA.  He states he was unaware of that appointment, I actually wrote it down for him and we will also printed on his after visit summary.  Dr. Ruggiero had referred him back to Dr. Darling at [facility] but I am not sure that he is followed up with him, we will hopefully get him at least to our cardiothoracic department here at [facility].  He has a follow-up with orthopedics also on Friday at  regarding his knee pain.  I wrote this appointment down for him also.  We will get a CBC and a CMP while he is here today.  We will see him back in 6 months for follow-up with repeat CT chest and abdomen pelvis without contrast.    I spent 31 minutes caring for George on this date of service. This time includes time spent by me in the following activities: preparing for the visit, reviewing tests, performing a medically appropriate examination and/or  evaluation, counseling and educating the patient/family/caregiver, ordering medications, tests, or procedures, documenting information in the medical record, independently interpreting results and communicating that information with the patient/family/caregiver, and care coordination.     Laxmi Scott, APRN    01/14/2025

## 2025-01-24 DIAGNOSIS — I48.91 ATRIAL FIBRILLATION, UNSPECIFIED TYPE: ICD-10-CM

## 2025-01-24 RX ORDER — APIXABAN 5 MG/1
5 TABLET, FILM COATED ORAL 2 TIMES DAILY
Qty: 180 TABLET | Refills: 0 | OUTPATIENT
Start: 2025-01-24

## 2025-01-24 NOTE — TELEPHONE ENCOUNTER
Patient was last seen by [facility] 12/1/2023. Patient will need to call to schedule a follow-up visit with [facility] or we can schedule him a follow-up visit in the A-fib clinic

## 2025-01-24 NOTE — TELEPHONE ENCOUNTER
Last visit 8/18/23, no follow up on file. Please refill if appropriate, deny, or re-route.  Sriram Willis MA

## 2025-01-27 ENCOUNTER — TELEPHONE (OUTPATIENT)
Dept: CARDIAC SURGERY | Facility: CLINIC | Age: 85
End: 2025-01-27
Payer: MEDICARE

## 2025-01-27 DIAGNOSIS — J47.9 BRONCHIECTASIS WITHOUT COMPLICATION: ICD-10-CM

## 2025-01-27 RX ORDER — ALBUTEROL SULFATE 0.83 MG/ML
SOLUTION RESPIRATORY (INHALATION)
Qty: 3 ML | Refills: 0 | Status: SHIPPED | OUTPATIENT
Start: 2025-01-27

## 2025-01-27 NOTE — TELEPHONE ENCOUNTER
Patient called requesting refill of Eliquis stating he has been out of this med for 3-4 days and he is concerned.    Med was last filled by [facility] - seems pt needs appt.   He is not a current pt at [facility].  I spoke to Mary with [facility] office and she was going to try to get temp fill of med for pt and he will come to their office tomorrow to arrange appt and appropriate follow up.    I also spoke to his pharmacy and they agreed to give him a 3 day supply so he will be able to have the medication today.    Explained to pt - he will come to our appt tomorrow and then go to [address] [facility] and arrange his appt with their office - no further questions at this time.

## 2025-01-27 NOTE — PROGRESS NOTES
[facility] Cardiothoracic Surgery Office Follow Up Note     Date of Encounter: 2025     Name: George Lomeli  : 1940     Referred By: No ref. provider found  PCP: Linn Salas APRN    Chief Complaint:    Chief Complaint   Patient presents with    Aortic Aneurysm     1 year follow-up with CTA abdomen/pelvis. Hx of EVAR.        Subjective      History of Present Illness:    George Lomeli is a 84 y.o. male established w/ [facility] 2024 for follow up on AAA s/p EVAR w/ concern for endoleak.  He presents back w/ new surveillance imaging.  PMH:  Follicular lymphoma (followed by Dr. Ruggiero), BPH, PAF, gout, HTN, and infrarenal AAA s/p EVAR 2016.  Denies any unusual abdominal pain or back pain.  Former smoker.  Reports BP generally well controlled.      Review of Systems:  Review of Systems   Constitutional: Negative for chills, decreased appetite, diaphoresis, fever, malaise/fatigue, night sweats, weight gain and weight loss.   HENT:  Negative for hoarse voice.    Eyes:  Negative for blurred vision, double vision and visual disturbance.   Cardiovascular:  Negative for chest pain, claudication, dyspnea on exertion, irregular heartbeat, leg swelling, near-syncope, orthopnea, palpitations, paroxysmal nocturnal dyspnea and syncope.   Respiratory:  Negative for cough, hemoptysis, shortness of breath, sputum production and wheezing.    Hematologic/Lymphatic: Negative for adenopathy and bleeding problem. Does not bruise/bleed easily.   Skin:  Negative for color change, nail changes, poor wound healing and rash.   Musculoskeletal:  Positive for arthritis and joint pain. Negative for back pain, falls and muscle cramps.   Gastrointestinal:  Negative for abdominal pain, dysphagia and heartburn.   Genitourinary:  Negative for flank pain.   Neurological:  Negative for brief paralysis, disturbances in coordination, dizziness, focal weakness, headaches, light-headedness, loss of balance, numbness,  paresthesias, sensory change, vertigo and weakness.   Psychiatric/Behavioral:  Negative for depression and suicidal ideas.    Allergic/Immunologic: Negative for persistent infections.       I have reviewed the following portions of the patient's history: problem list, current medications, allergies, past surgical history, past medical history, past social history, past family history, and ROS and confirm it's accurate.    Allergies:  Allergies   Allergen Reactions    Contrast Dye (Echo Or Unknown Ct/Mr) Nausea And Vomiting       Medications:      Current Outpatient Medications:     albuterol (PROVENTIL) (2.5 MG/3ML) 0.083% nebulizer solution, USE 1 VIAL IN NEBULIZER EVERY 6 HOURS AS NEEDED FOR WHEEZING . APPOINTMENT REQUIRED FOR FUTURE REFILLS, Disp: 3 mL, Rfl: 0    albuterol sulfate  (90 Base) MCG/ACT inhaler, Inhale 2 puffs Every 6 (Six) Hours As Needed for Wheezing., Disp: 8 g, Rfl: 0    allopurinol (ZYLOPRIM) 100 MG tablet, Take 1 tablet by mouth Daily., Disp: , Rfl:     amLODIPine (NORVASC) 5 MG tablet, Take 1 tablet by mouth Daily., Disp: , Rfl:     apixaban (Eliquis) 5 MG tablet tablet, Take 1 tablet by mouth twice daily, Disp: 60 tablet, Rfl: 11    benzonatate (TESSALON) 100 MG capsule, Take 1 capsule by mouth 3 (Three) Times a Day As Needed for Cough., Disp: 8 capsule, Rfl: 0    carvedilol (COREG) 25 MG tablet, Take 1 tablet by mouth 2 (Two) Times a Day With Meals., Disp: , Rfl:     docusate sodium (COLACE) 50 MG capsule, Take 1 capsule by mouth As Needed., Disp: , Rfl:     DULoxetine (CYMBALTA) 30 MG capsule, TAKE 1 CAPSULE BY MOUTH ONCE DAILY FOR HAND NUMBNESS/PAIN, Disp: , Rfl:     gabapentin (Neurontin) 100 MG capsule, Take 2 PO TID (Patient taking differently: Take 1 capsule by mouth 2 (Two) Times a Day.), Disp: 180 capsule, Rfl: 5    levothyroxine (SYNTHROID, LEVOTHROID) 25 MCG tablet, Take 1 tablet by mouth Daily., Disp: , Rfl:     lisinopril-hydrochlorothiazide (PRINZIDE,ZESTORETIC) 20-12.5  MG per tablet, TAKE 1 TABLET BY MOUTH ONCE DAILY FOR BLOOD PRESSURE WITH LEG EDEMA, Disp: , Rfl:     oxyCODONE-acetaminophen (PERCOCET)  MG per tablet, Take 1 tablet by mouth Every 6 (Six) Hours As Needed for Moderate Pain., Disp: , Rfl:     potassium chloride (K-DUR) 10 MEQ CR tablet, Take 1 tablet by mouth 2 (Two) Times a Day., Disp: , Rfl:     tamsulosin (FLOMAX) 0.4 MG capsule 24 hr capsule, Take 1 capsule by mouth Every Night., Disp: , Rfl:     timolol (TIMOPTIC) 0.5 % ophthalmic solution, Administer 1 drop to the right eye 2 (Two) Times a Day., Disp: , Rfl:     History:   Past Medical History:   Diagnosis Date    AAA (abdominal aortic aneurysm)     Arthritis     Broken neck     Gout     Hypertension     Lymphoma     Lymphoma     had radiation    Migraine     Sciatica     Thyroid cancer     ???    Thyroid disease        Past Surgical History:   Procedure Laterality Date    ABDOMINAL AORTIC ANEURYSM REPAIR W/ ENDOLUMINAL GRAFT      BACK SURGERY      lumbar    CATARACT EXTRACTION EXTRACAPSULAR W/ INTRAOCULAR LENS IMPLANTATION      EYE SURGERY      MULTIPLE TOOTH EXTRACTIONS      NECK SURGERY      PINS/RODS IN NECK       Social History     Socioeconomic History    Marital status:     Number of children: 2   Tobacco Use    Smoking status: Former     Current packs/day: 0.00     Average packs/day: 1 pack/day for 15.0 years (15.0 ttl pk-yrs)     Types: Cigarettes     Start date:      Quit date:      Years since quittin.0     Passive exposure: Past    Smokeless tobacco: Never   Vaping Use    Vaping status: Never Used   Substance and Sexual Activity    Alcohol use: No    Drug use: No    Sexual activity: Defer        Family History   Problem Relation Age of Onset    Hypertension Mother     Hypertension Father     Hypertension Sister        Objective   Physical Exam:  Vitals:    25 1044 25 1046   BP: 145/97 133/82   BP Location: Right arm Left arm   Patient Position: Sitting Sitting  "  Pulse: 83    Temp: 99.8 °F (37.7 °C)    SpO2: 98%    Weight: 70.9 kg (156 lb 6.4 oz)    Height: 177.8 cm (70\")       Body mass index is 22.44 kg/m².    Physical Exam  Vitals reviewed.   Constitutional:       General: He is not in acute distress.     Appearance: He is well-developed and well-groomed. He is not toxic-appearing.   HENT:      Head: Normocephalic and atraumatic.   Eyes:      General: Lids are normal.      Conjunctiva/sclera: Conjunctivae normal.      Pupils: Pupils are equal, round, and reactive to light.   Cardiovascular:      Rate and Rhythm: Normal rate and regular rhythm.      Heart sounds: S1 normal and S2 normal. No murmur heard.  Pulmonary:      Effort: Pulmonary effort is normal. No respiratory distress.      Breath sounds: Normal breath sounds.   Musculoskeletal:         General: Normal range of motion.      Cervical back: Normal range of motion and neck supple.   Skin:     General: Skin is warm and dry.      Capillary Refill: Capillary refill takes less than 2 seconds.   Neurological:      General: No focal deficit present.      Mental Status: He is alert and oriented to person, place, and time.   Psychiatric:         Attention and Perception: Attention normal.         Mood and Affect: Mood normal.         Speech: Speech normal.         Behavior: Behavior is cooperative.     Imaging/Labs:  CT Abdomen Pelvis Without Contrast: 1/5/2025(Personally reviewed and agree w/ native aneurysm sac 4.4 cm diameter)  Impression: Stable size and appearance of persistent presacral soft tissue mass, accounting for noncontrast technique. Otherwise no new osseous or soft tissue findings in the neck, chest, abdomen and pelvis to suggest new or progressive lymphomatous disease elsewhere. No unexpected or acute acute findings in the neck, chest, abdomen and pelvis otherwise. Electronically Signed: Rah Peña MD  1/5/2025 11:17 AM EST  Workstation ID: [id number]      CT CHEST WO CONTRAST DIAGNOSTIC, CT ABDOMEN " PELVIS WO CONTRAST: 6/17/2024   Abdomen and pelvis:  Lack of contrast limits assessment of abdominal organs and vasculature. Atrophy of the left hepatic lobe similar to prior study. Low-attenuation lesion the right hepatic lobe stable which may relate to cyst measuring 1.7 cm. Several other scattered small subcentimeter right hepatic lobe low-attenuation lesions are also unchanged. The adrenal glands are normal. Spleen is normal in size measuring 10.7 x 3.7 x 9.8 cm. Cholelithiasis. No pericholecystic inflammation. Negative for biliary dilatation. Pancreas without findings of pancreatitis.   Kidneys are without hydronephrosis. Low-attenuation bilateral renal lesions most suggestive of cyst, better assessed on prior contrast-enhanced study. Negative for hydroureteronephrosis. No ureteral calculus. Urinary bladder is thin-walled. Mild prostatomegaly.   Negative for pneumoperitoneum. No bowel obstruction. No fluid collection in the abdomen or pelvis. Moderate amount of stool in the colon. Appendix not visualized, no findings to indicate appendicitis. Redemonstration of infrarenal aortic aneurysm with the native aneurysm sac measuring 4.2 cm, unchanged from prior study. Aortobiiliac stents noted extending into the right left common iliac arteries, similar to the prior study. Abnormal presacral soft tissue again noted with representative measurement of 3.8 x 2.1 cm, similar to prior study when accounting for differences in technique (2/88). Negative for inguinal adenopathy. Surgical clips noted at the left and right inguinal region. Small fat-containing right inguinal hernia. Small fat-containing left femoral hernia. Negative for central mesenteric adenopathy. Negative for aggressive osseous lesion or acute fracture. There are few small sclerotic foci in the pelvis which may relate to bone islands. Stable trabecular thickening at the left acetabulum. Decompression laminectomy noted at L5.   IMPRESSION:  Chest:  1. No  intrathoracic adenopathy or findings of residual/recurrent malignancy.  2. Stable cardiomegaly and coronary artery calcifications.  3. Emphysema and additional chronic findings above.     Abdomen and pelvis:  1. Stable presacral soft tissue mass which may relate to treated lymphoma.  2. No new adenopathy within the abdomen or pelvis.  3. Negative for splenomegaly.  4. Stable size of native infrarenal aortic aneurysm sac status post aortobiiliac stent placement.  5. Cholelithiasis and additional chronic findings above.   Electronically Signed: Toni Singh MD 6/18/2024      Assessment / Plan      Assessment / Plan:  1. Infrarenal abdominal aortic aneurysm (AAA) s/p EVAR  - 84 y.o. male established w/ Vascular Surgery 1/2024 for surveillance of AAA s/p EVAR w/ concern for endoleak.   - Presents back w/ new surveillance imaging: non-contrast CT imaging/ stable appearing native aneurysm sac diameter @ 4.4 cm (similar to prior imaging).      - PMH:  Follicular lymphoma (followed by Dr. Ruggiero), BPH, PAF, gout, HTN, and infrarenal AAA s/p EVAR 2016.    - Denies any unusual abdominal pain or back pain.    - BP above goal in clinic, but patient reports good control @ home  - Former smoker.    - Will continue to follow annually for surveillance w/ imaging performed by Oncology.  If native aneurysm sac significantly increases in size, will obtain CTA abd/pelvis to more directly assess for endoleak.    Patient Education: Continue to avoid tobacco use.  Continue to maintain strict BP control w/ goal <130/80 mmHg      Follow Up:   Return in about 1 year (around 1/28/2026) for CTA abd/pelvis per Oncology.   Or sooner for any further concerns or worsening sign and symptoms. If unable to reach us in the office please dial 911 or go to the nearest emergency department.      YOVANI Becker  [facility] Cardiothoracic Surgery    Time Spent: I spent 21 minutes caring for George on this date of service. This time includes time  spent by me in the following activities: preparing for the visit, reviewing tests, obtaining and/or reviewing a separately obtained history, performing a medically appropriate examination and/or evaluation, counseling and educating the patient/family/caregiver, documenting information in the medical record, independently interpreting results and communicating that information with the patient/family/caregiver, and care coordination.

## 2025-01-28 ENCOUNTER — OFFICE VISIT (OUTPATIENT)
Dept: CARDIAC SURGERY | Facility: CLINIC | Age: 85
End: 2025-01-28
Payer: MEDICARE

## 2025-01-28 VITALS
DIASTOLIC BLOOD PRESSURE: 82 MMHG | WEIGHT: 156.4 LBS | HEIGHT: 70 IN | TEMPERATURE: 99.8 F | HEART RATE: 83 BPM | OXYGEN SATURATION: 98 % | BODY MASS INDEX: 22.39 KG/M2 | SYSTOLIC BLOOD PRESSURE: 133 MMHG

## 2025-01-28 DIAGNOSIS — I71.43 INFRARENAL ABDOMINAL AORTIC ANEURYSM (AAA) WITHOUT RUPTURE: Primary | ICD-10-CM

## 2025-01-28 PROCEDURE — 3079F DIAST BP 80-89 MM HG: CPT | Performed by: NURSE PRACTITIONER

## 2025-01-28 PROCEDURE — 3075F SYST BP GE 130 - 139MM HG: CPT | Performed by: NURSE PRACTITIONER

## 2025-01-28 PROCEDURE — 1159F MED LIST DOCD IN RCRD: CPT | Performed by: NURSE PRACTITIONER

## 2025-01-28 PROCEDURE — 99213 OFFICE O/P EST LOW 20 MIN: CPT | Performed by: NURSE PRACTITIONER

## 2025-01-28 PROCEDURE — 1160F RVW MEDS BY RX/DR IN RCRD: CPT | Performed by: NURSE PRACTITIONER

## 2025-01-30 ENCOUNTER — OFFICE VISIT (OUTPATIENT)
Dept: CARDIOLOGY | Facility: HOSPITAL | Age: 85
End: 2025-01-30
Payer: MEDICARE

## 2025-01-30 VITALS
HEIGHT: 70 IN | HEART RATE: 71 BPM | DIASTOLIC BLOOD PRESSURE: 72 MMHG | RESPIRATION RATE: 18 BRPM | BODY MASS INDEX: 22.76 KG/M2 | OXYGEN SATURATION: 100 % | WEIGHT: 159 LBS | SYSTOLIC BLOOD PRESSURE: 130 MMHG

## 2025-01-30 DIAGNOSIS — Z79.01 LONG TERM (CURRENT) USE OF ANTICOAGULANTS: ICD-10-CM

## 2025-01-30 DIAGNOSIS — I71.43 INFRARENAL ABDOMINAL AORTIC ANEURYSM (AAA) WITHOUT RUPTURE: ICD-10-CM

## 2025-01-30 DIAGNOSIS — E87.5 HYPERKALEMIA: ICD-10-CM

## 2025-01-30 DIAGNOSIS — I10 PRIMARY HYPERTENSION: ICD-10-CM

## 2025-01-30 DIAGNOSIS — I48.0 PAF (PAROXYSMAL ATRIAL FIBRILLATION): Primary | ICD-10-CM

## 2025-01-30 RX ORDER — POTASSIUM CHLORIDE 750 MG/1
10 TABLET, EXTENDED RELEASE ORAL 2 TIMES DAILY
Qty: 60 TABLET | Refills: 3 | Status: SHIPPED | OUTPATIENT
Start: 2025-01-30

## 2025-01-30 NOTE — PROGRESS NOTES
"[facility], [facility]    Chief Complaint  Atrial Fibrillation and Follow-up    Subjective    History of Present Illness {CC  Problem List  Visit  Diagnosis   Encounters  Notes  Medications  Labs  Result Review Imaging  Media :23}     George Lomeli presents to [facility] CARDIOLOGY for   History of Present Illness     84-year-old male with paroxysmal atrial fibrillation, hypertension, hypothyroidism AAA status post EVAR (followed by CT surgery).    Patient presents today for evaluation of PAF, and refills for Eliquis.    Patient last seen in the heart valve center 1 year ago.  Had a referral completed for [facility] for continued management.  Patient did not keep appointment as scheduled.    No CP or pressure, dyspnea, palpitations, dizziness, near syncope, syncope.  Occasional lower extremity edema.         Objective     Vital Signs:   Vitals:    01/30/25 0849 01/30/25 0933   BP: 139/90 130/72   BP Location: Left arm    Patient Position: Sitting    Cuff Size: Adult    Pulse: 71    Resp: 18    SpO2: 100%    Weight: 72.1 kg (159 lb)    Height: 177.8 cm (70\")      Body mass index is 22.81 kg/m².  Physical Exam  Vitals reviewed.   Constitutional:       General: He is not in acute distress.  Cardiovascular:      Rate and Rhythm: Normal rate and regular rhythm.      Heart sounds: No murmur heard.  Pulmonary:      Effort: Pulmonary effort is normal.      Breath sounds: Normal breath sounds.   Musculoskeletal:      Right lower leg: No edema.      Left lower leg: No edema.   Skin:     Coloration: Skin is not pale.   Neurological:      Mental Status: He is alert.   Psychiatric:         Mood and Affect: Mood normal.         Behavior: Behavior normal. Behavior is cooperative.              Result Review  Data Reviewed:{ Labs  Result Review  Imaging  Med Tab  Media :23}   EKG 5/8/2024: Sinus rhythm with PVCs 67 bpm    Echocardiogram " 11/21/2023: EF 51 to 55%, grade 1 diastolic dysfunction, RV cavity is mildly dilated, RA cavity is dilated, mild MR, mild TR RVSP less than 35 mmHg    Myocardial perfusion stress PET 2017: No ischemia EF 54%    Lab Results   Component Value Date    WBC 6.43 01/14/2025    HGB 10.9 (L) 01/14/2025    HCT 33.9 (L) 01/14/2025    MCV 91.9 01/14/2025     01/14/2025     Lab Results   Component Value Date    GLUCOSE 93 01/14/2025    BUN 9 01/14/2025    CREATININE 0.98 01/14/2025     01/14/2025    K 3.6 01/14/2025     01/14/2025    CALCIUM 9.2 01/14/2025    PROTEINTOT 6.3 01/14/2025    ALBUMIN 4.0 01/14/2025    ALT 11 01/14/2025    AST 21 01/14/2025    ALKPHOS 110 01/14/2025    BILITOT 0.5 01/14/2025    GLOB 2.3 01/14/2025    AGRATIO 1.7 01/14/2025    BCR 9.2 01/14/2025    ANIONGAP 6.0 01/14/2025    EGFR 76.0 01/14/2025                   Assessment and Plan {CC Problem List  Visit Diagnosis  ROS  Review (Popup)  Health Maintenance  Quality  BestPractice  Medications  SmartSets  SnapShot Encounters  Media :23}   1. PAF (paroxysmal atrial fibrillation)  Rate regular and controlled today.  Refill Eliquis  Coronary    2. Primary hypertension  Repeat blood pressure improved  Continue amlodipine, carvedilol, lisinopril/hydrochlorothiazide    3. Infrarenal abdominal aortic aneurysm (AAA) without rupture  Followed by CT surgery    4. Long term (current) use of anticoagulants  Reported signs and symptoms of bleeding.    5. Hyperkalemia  Last potassium stable.  Refill potassium supplement at this time.  Patient will need to see primary care provider for continued management  - potassium chloride 10 MEQ CR tablet; Take 1 tablet by mouth 2 (Two) Times a Day.  Dispense: 60 tablet; Refill: 3    Patient missed his last follow-up appointment with cardiology.  Will help to reschedule appointment today.  Discussed the importance of keeping follow-ups as scheduled.      Follow Up {Instructions Charge Capture   Follow-up Communications :23}   Return if symptoms worsen or fail to improve.    Patient was given instructions and counseling regarding his condition or for health maintenance advice. Please see specific information pulled into the AVS if appropriate.  Patient was instructed to call the Heart and Valve Center with any questions, concerns, or worsening symptoms.

## 2025-04-06 ENCOUNTER — HOSPITAL ENCOUNTER (INPATIENT)
Facility: HOSPITAL | Age: 85
LOS: 3 days | Discharge: HOME | End: 2025-04-09
Attending: EMERGENCY MEDICINE | Admitting: INTERNAL MEDICINE
Payer: MEDICARE

## 2025-04-06 ENCOUNTER — APPOINTMENT (OUTPATIENT)
Dept: GENERAL RADIOLOGY | Facility: HOSPITAL | Age: 85
End: 2025-04-06
Payer: MEDICARE

## 2025-04-06 DIAGNOSIS — I48.91 ATRIAL FIBRILLATION WITH RVR: Primary | ICD-10-CM

## 2025-04-06 DIAGNOSIS — U07.1 COVID-19: ICD-10-CM

## 2025-04-06 DIAGNOSIS — E87.6 HYPOKALEMIA: ICD-10-CM

## 2025-04-06 DIAGNOSIS — Z79.01 ANTICOAGULATED: ICD-10-CM

## 2025-04-06 DIAGNOSIS — J96.01 ACUTE RESPIRATORY FAILURE WITH HYPOXIA: ICD-10-CM

## 2025-04-06 DIAGNOSIS — Z74.09 IMPAIRED FUNCTIONAL MOBILITY, BALANCE, GAIT, AND ENDURANCE: ICD-10-CM

## 2025-04-06 PROBLEM — D63.8 ANEMIA, CHRONIC DISEASE: Status: ACTIVE | Noted: 2025-04-06

## 2025-04-06 PROBLEM — R79.89 ELEVATED TROPONIN: Status: ACTIVE | Noted: 2025-04-06

## 2025-04-06 PROBLEM — R05.9 COUGH: Status: RESOLVED | Noted: 2020-07-22 | Resolved: 2025-04-06

## 2025-04-06 PROBLEM — E03.9 HYPOTHYROIDISM (ACQUIRED): Status: ACTIVE | Noted: 2025-04-06

## 2025-04-06 LAB
ALBUMIN SERPL-MCNC: 4 G/DL (ref 3.5–5.2)
ALBUMIN/GLOB SERPL: 1.5 G/DL
ALP SERPL-CCNC: 91 U/L (ref 39–117)
ALT SERPL W P-5'-P-CCNC: 7 U/L (ref 1–41)
ANION GAP SERPL CALCULATED.3IONS-SCNC: 11 MMOL/L (ref 5–15)
AST SERPL-CCNC: 17 U/L (ref 1–40)
BACTERIA UR QL AUTO: ABNORMAL /HPF
BASOPHILS # BLD AUTO: 0.01 10*3/MM3 (ref 0–0.2)
BASOPHILS NFR BLD AUTO: 0.1 % (ref 0–1.5)
BILIRUB SERPL-MCNC: 0.5 MG/DL (ref 0–1.2)
BILIRUB UR QL STRIP: NEGATIVE
BUN SERPL-MCNC: 15 MG/DL (ref 8–23)
BUN/CREAT SERPL: 14.2 (ref 7–25)
CALCIUM SPEC-SCNC: 9.5 MG/DL (ref 8.6–10.5)
CHLORIDE SERPL-SCNC: 101 MMOL/L (ref 98–107)
CLARITY UR: CLEAR
CO2 SERPL-SCNC: 29 MMOL/L (ref 22–29)
COLOR UR: YELLOW
CREAT SERPL-MCNC: 1.06 MG/DL (ref 0.76–1.27)
CRP SERPL-MCNC: 3.78 MG/DL (ref 0–0.5)
D-LACTATE SERPL-SCNC: 1.2 MMOL/L (ref 0.5–2)
DEPRECATED RDW RBC AUTO: 52.5 FL (ref 37–54)
EGFRCR SERPLBLD CKD-EPI 2021: 69.2 ML/MIN/1.73
EOSINOPHIL # BLD AUTO: 0.03 10*3/MM3 (ref 0–0.4)
EOSINOPHIL NFR BLD AUTO: 0.4 % (ref 0.3–6.2)
ERYTHROCYTE [DISTWIDTH] IN BLOOD BY AUTOMATED COUNT: 15.7 % (ref 12.3–15.4)
FERRITIN SERPL-MCNC: 58.14 NG/ML (ref 30–400)
FLUAV SUBTYP SPEC NAA+PROBE: NOT DETECTED
FLUBV RNA ISLT QL NAA+PROBE: NOT DETECTED
GEN 5 1HR TROPONIN T REFLEX: 30 NG/L
GLOBULIN UR ELPH-MCNC: 2.7 GM/DL
GLUCOSE SERPL-MCNC: 94 MG/DL (ref 65–99)
GLUCOSE UR STRIP-MCNC: NEGATIVE MG/DL
HCT VFR BLD AUTO: 32.9 % (ref 37.5–51)
HGB BLD-MCNC: 10.4 G/DL (ref 13–17.7)
HGB UR QL STRIP.AUTO: NEGATIVE
HOLD SPECIMEN: NORMAL
HYALINE CASTS UR QL AUTO: ABNORMAL /LPF
IMM GRANULOCYTES # BLD AUTO: 0.02 10*3/MM3 (ref 0–0.05)
IMM GRANULOCYTES NFR BLD AUTO: 0.3 % (ref 0–0.5)
IRON 24H UR-MRATE: 27 MCG/DL (ref 59–158)
IRON 24H UR-MRATE: 27 MCG/DL (ref 59–158)
IRON SATN MFR SERPL: 9 % (ref 20–50)
KETONES UR QL STRIP: NEGATIVE
LEUKOCYTE ESTERASE UR QL STRIP.AUTO: ABNORMAL
LYMPHOCYTES # BLD AUTO: 0.42 10*3/MM3 (ref 0.7–3.1)
LYMPHOCYTES NFR BLD AUTO: 5.8 % (ref 19.6–45.3)
MAGNESIUM SERPL-MCNC: 1.7 MG/DL (ref 1.6–2.4)
MCH RBC QN AUTO: 29 PG (ref 26.6–33)
MCHC RBC AUTO-ENTMCNC: 31.6 G/DL (ref 31.5–35.7)
MCV RBC AUTO: 91.6 FL (ref 79–97)
MONOCYTES # BLD AUTO: 0.86 10*3/MM3 (ref 0.1–0.9)
MONOCYTES NFR BLD AUTO: 11.9 % (ref 5–12)
NEUTROPHILS NFR BLD AUTO: 5.89 10*3/MM3 (ref 1.7–7)
NEUTROPHILS NFR BLD AUTO: 81.5 % (ref 42.7–76)
NITRITE UR QL STRIP: NEGATIVE
NRBC BLD AUTO-RTO: 0 /100 WBC (ref 0–0.2)
NT-PROBNP SERPL-MCNC: 611.4 PG/ML (ref 0–1800)
PH UR STRIP.AUTO: 7 [PH] (ref 5–8)
PLATELET # BLD AUTO: 196 10*3/MM3 (ref 140–450)
PMV BLD AUTO: 12.2 FL (ref 6–12)
POTASSIUM SERPL-SCNC: 3 MMOL/L (ref 3.5–5.2)
PROCALCITONIN SERPL-MCNC: 0.08 NG/ML (ref 0–0.25)
PROT SERPL-MCNC: 6.7 G/DL (ref 6–8.5)
PROT UR QL STRIP: NEGATIVE
QT INTERVAL: 358 MS
QT INTERVAL: 384 MS
QTC INTERVAL: 484 MS
QTC INTERVAL: 503 MS
RBC # BLD AUTO: 3.59 10*6/MM3 (ref 4.14–5.8)
RBC # UR STRIP: ABNORMAL /HPF
REF LAB TEST METHOD: ABNORMAL
RETICS # AUTO: 0.05 10*6/MM3 (ref 0.02–0.13)
RETICS/RBC NFR AUTO: 1.41 % (ref 0.7–1.9)
SARS-COV-2 RNA RESP QL NAA+PROBE: DETECTED
SODIUM SERPL-SCNC: 141 MMOL/L (ref 136–145)
SP GR UR STRIP: 1.01 (ref 1–1.03)
SQUAMOUS #/AREA URNS HPF: ABNORMAL /HPF
TIBC SERPL-MCNC: 305 MCG/DL (ref 298–536)
TRANSFERRIN SERPL-MCNC: 205 MG/DL (ref 200–360)
TROPONIN T % DELTA: 0
TROPONIN T NUMERIC DELTA: 0 NG/L
TROPONIN T SERPL HS-MCNC: 30 NG/L
UROBILINOGEN UR QL STRIP: ABNORMAL
WBC # UR STRIP: ABNORMAL /HPF
WBC NRBC COR # BLD AUTO: 7.23 10*3/MM3 (ref 3.4–10.8)
WHOLE BLOOD HOLD COAG: NORMAL
WHOLE BLOOD HOLD SPECIMEN: NORMAL

## 2025-04-06 PROCEDURE — 25010000002 REMDESIVIR 100 MG/20ML SOLUTION 1 EACH VIAL

## 2025-04-06 PROCEDURE — 83605 ASSAY OF LACTIC ACID: CPT | Performed by: PHYSICIAN ASSISTANT

## 2025-04-06 PROCEDURE — 83540 ASSAY OF IRON: CPT | Performed by: NURSE PRACTITIONER

## 2025-04-06 PROCEDURE — 99285 EMERGENCY DEPT VISIT HI MDM: CPT

## 2025-04-06 PROCEDURE — 25810000003 SODIUM CHLORIDE 0.9 % SOLUTION: Performed by: PHYSICIAN ASSISTANT

## 2025-04-06 PROCEDURE — 87040 BLOOD CULTURE FOR BACTERIA: CPT | Performed by: PHYSICIAN ASSISTANT

## 2025-04-06 PROCEDURE — 83880 ASSAY OF NATRIURETIC PEPTIDE: CPT | Performed by: EMERGENCY MEDICINE

## 2025-04-06 PROCEDURE — 81001 URINALYSIS AUTO W/SCOPE: CPT | Performed by: PHYSICIAN ASSISTANT

## 2025-04-06 PROCEDURE — 25810000003 SODIUM CHLORIDE 0.9 % SOLUTION 250 ML FLEX CONT

## 2025-04-06 PROCEDURE — 82728 ASSAY OF FERRITIN: CPT | Performed by: NURSE PRACTITIONER

## 2025-04-06 PROCEDURE — 93005 ELECTROCARDIOGRAM TRACING: CPT

## 2025-04-06 PROCEDURE — 84484 ASSAY OF TROPONIN QUANT: CPT | Performed by: EMERGENCY MEDICINE

## 2025-04-06 PROCEDURE — 93005 ELECTROCARDIOGRAM TRACING: CPT | Performed by: EMERGENCY MEDICINE

## 2025-04-06 PROCEDURE — 84145 PROCALCITONIN (PCT): CPT | Performed by: PHYSICIAN ASSISTANT

## 2025-04-06 PROCEDURE — XW033E5 INTRODUCTION OF REMDESIVIR ANTI-INFECTIVE INTO PERIPHERAL VEIN, PERCUTANEOUS APPROACH, NEW TECHNOLOGY GROUP 5: ICD-10-PCS | Performed by: INTERNAL MEDICINE

## 2025-04-06 PROCEDURE — 25010000002 DEXAMETHASONE PER 1 MG: Performed by: NURSE PRACTITIONER

## 2025-04-06 PROCEDURE — 85045 AUTOMATED RETICULOCYTE COUNT: CPT | Performed by: NURSE PRACTITIONER

## 2025-04-06 PROCEDURE — 36415 COLL VENOUS BLD VENIPUNCTURE: CPT

## 2025-04-06 PROCEDURE — 99222 1ST HOSP IP/OBS MODERATE 55: CPT | Performed by: NURSE PRACTITIONER

## 2025-04-06 PROCEDURE — 86140 C-REACTIVE PROTEIN: CPT | Performed by: NURSE PRACTITIONER

## 2025-04-06 PROCEDURE — 71045 X-RAY EXAM CHEST 1 VIEW: CPT

## 2025-04-06 PROCEDURE — 80053 COMPREHEN METABOLIC PANEL: CPT | Performed by: EMERGENCY MEDICINE

## 2025-04-06 PROCEDURE — 85025 COMPLETE CBC W/AUTO DIFF WBC: CPT | Performed by: EMERGENCY MEDICINE

## 2025-04-06 PROCEDURE — 83735 ASSAY OF MAGNESIUM: CPT | Performed by: PHYSICIAN ASSISTANT

## 2025-04-06 PROCEDURE — 87636 SARSCOV2 & INF A&B AMP PRB: CPT | Performed by: PHYSICIAN ASSISTANT

## 2025-04-06 PROCEDURE — 82607 VITAMIN B-12: CPT | Performed by: NURSE PRACTITIONER

## 2025-04-06 PROCEDURE — 25010000002 POTASSIUM CHLORIDE 10 MEQ/100ML SOLUTION: Performed by: FAMILY MEDICINE

## 2025-04-06 PROCEDURE — 82746 ASSAY OF FOLIC ACID SERUM: CPT | Performed by: NURSE PRACTITIONER

## 2025-04-06 PROCEDURE — 84466 ASSAY OF TRANSFERRIN: CPT | Performed by: NURSE PRACTITIONER

## 2025-04-06 RX ORDER — LEVOTHYROXINE SODIUM 25 UG/1
25 TABLET ORAL
Status: DISCONTINUED | OUTPATIENT
Start: 2025-04-07 | End: 2025-04-09 | Stop reason: HOSPADM

## 2025-04-06 RX ORDER — ACETAMINOPHEN 500 MG
1000 TABLET ORAL ONCE
Status: COMPLETED | OUTPATIENT
Start: 2025-04-06 | End: 2025-04-06

## 2025-04-06 RX ORDER — SODIUM CHLORIDE 0.9 % (FLUSH) 0.9 %
10 SYRINGE (ML) INJECTION AS NEEDED
Status: DISCONTINUED | OUTPATIENT
Start: 2025-04-06 | End: 2025-04-09 | Stop reason: HOSPADM

## 2025-04-06 RX ORDER — TIMOLOL MALEATE 5 MG/ML
1 SOLUTION/ DROPS OPHTHALMIC 2 TIMES DAILY
Status: DISCONTINUED | OUTPATIENT
Start: 2025-04-06 | End: 2025-04-09 | Stop reason: HOSPADM

## 2025-04-06 RX ORDER — AMOXICILLIN 250 MG
2 CAPSULE ORAL 2 TIMES DAILY PRN
Status: DISCONTINUED | OUTPATIENT
Start: 2025-04-06 | End: 2025-04-09 | Stop reason: HOSPADM

## 2025-04-06 RX ORDER — DEXTROMETHORPHAN POLISTIREX 30 MG/5ML
60 SUSPENSION ORAL EVERY 12 HOURS PRN
Status: DISCONTINUED | OUTPATIENT
Start: 2025-04-06 | End: 2025-04-09 | Stop reason: HOSPADM

## 2025-04-06 RX ORDER — HYDROCHLOROTHIAZIDE 25 MG/1
12.5 TABLET ORAL
Status: DISCONTINUED | OUTPATIENT
Start: 2025-04-07 | End: 2025-04-09 | Stop reason: HOSPADM

## 2025-04-06 RX ORDER — DEXAMETHASONE SODIUM PHOSPHATE 10 MG/ML
6 INJECTION, SOLUTION INTRA-ARTICULAR; INTRALESIONAL; INTRAMUSCULAR; INTRAVENOUS; SOFT TISSUE DAILY
Status: DISCONTINUED | OUTPATIENT
Start: 2025-04-06 | End: 2025-04-08

## 2025-04-06 RX ORDER — ALBUTEROL SULFATE 90 UG/1
2 INHALANT RESPIRATORY (INHALATION) EVERY 6 HOURS PRN
Status: DISCONTINUED | OUTPATIENT
Start: 2025-04-06 | End: 2025-04-09 | Stop reason: HOSPADM

## 2025-04-06 RX ORDER — POTASSIUM CHLORIDE 7.45 MG/ML
10 INJECTION INTRAVENOUS
Status: DISCONTINUED | OUTPATIENT
Start: 2025-04-06 | End: 2025-04-06

## 2025-04-06 RX ORDER — ACETAMINOPHEN 325 MG/1
650 TABLET ORAL EVERY 4 HOURS PRN
Status: DISCONTINUED | OUTPATIENT
Start: 2025-04-06 | End: 2025-04-09 | Stop reason: HOSPADM

## 2025-04-06 RX ORDER — ALLOPURINOL 100 MG/1
100 TABLET ORAL DAILY
Status: DISCONTINUED | OUTPATIENT
Start: 2025-04-07 | End: 2025-04-09 | Stop reason: HOSPADM

## 2025-04-06 RX ORDER — AMLODIPINE BESYLATE 5 MG/1
5 TABLET ORAL DAILY
Status: DISCONTINUED | OUTPATIENT
Start: 2025-04-07 | End: 2025-04-09 | Stop reason: HOSPADM

## 2025-04-06 RX ORDER — BENZONATATE 100 MG/1
100 CAPSULE ORAL 3 TIMES DAILY PRN
Status: DISCONTINUED | OUTPATIENT
Start: 2025-04-06 | End: 2025-04-09 | Stop reason: HOSPADM

## 2025-04-06 RX ORDER — SODIUM CHLORIDE 9 MG/ML
40 INJECTION, SOLUTION INTRAVENOUS AS NEEDED
Status: DISCONTINUED | OUTPATIENT
Start: 2025-04-06 | End: 2025-04-09 | Stop reason: HOSPADM

## 2025-04-06 RX ORDER — SODIUM CHLORIDE 0.9 % (FLUSH) 0.9 %
10 SYRINGE (ML) INJECTION EVERY 12 HOURS SCHEDULED
Status: DISCONTINUED | OUTPATIENT
Start: 2025-04-06 | End: 2025-04-09 | Stop reason: HOSPADM

## 2025-04-06 RX ORDER — BISACODYL 5 MG/1
5 TABLET, DELAYED RELEASE ORAL DAILY PRN
Status: DISCONTINUED | OUTPATIENT
Start: 2025-04-06 | End: 2025-04-09 | Stop reason: HOSPADM

## 2025-04-06 RX ORDER — OXYCODONE AND ACETAMINOPHEN 10; 325 MG/1; MG/1
1 TABLET ORAL EVERY 6 HOURS PRN
Status: DISCONTINUED | OUTPATIENT
Start: 2025-04-06 | End: 2025-04-09 | Stop reason: HOSPADM

## 2025-04-06 RX ORDER — DULOXETIN HYDROCHLORIDE 30 MG/1
30 CAPSULE, DELAYED RELEASE ORAL DAILY
Status: DISCONTINUED | OUTPATIENT
Start: 2025-04-07 | End: 2025-04-09 | Stop reason: HOSPADM

## 2025-04-06 RX ORDER — BISACODYL 10 MG
10 SUPPOSITORY, RECTAL RECTAL DAILY PRN
Status: DISCONTINUED | OUTPATIENT
Start: 2025-04-06 | End: 2025-04-09 | Stop reason: HOSPADM

## 2025-04-06 RX ORDER — POTASSIUM CHLORIDE 1500 MG/1
40 TABLET, EXTENDED RELEASE ORAL
Status: COMPLETED | OUTPATIENT
Start: 2025-04-06 | End: 2025-04-06

## 2025-04-06 RX ORDER — CARVEDILOL 12.5 MG/1
25 TABLET ORAL 2 TIMES DAILY WITH MEALS
Status: DISCONTINUED | OUTPATIENT
Start: 2025-04-06 | End: 2025-04-09 | Stop reason: HOSPADM

## 2025-04-06 RX ORDER — TAMSULOSIN HYDROCHLORIDE 0.4 MG/1
0.4 CAPSULE ORAL NIGHTLY
Status: DISCONTINUED | OUTPATIENT
Start: 2025-04-06 | End: 2025-04-09 | Stop reason: HOSPADM

## 2025-04-06 RX ORDER — LISINOPRIL 20 MG/1
20 TABLET ORAL
Status: DISCONTINUED | OUTPATIENT
Start: 2025-04-07 | End: 2025-04-09 | Stop reason: HOSPADM

## 2025-04-06 RX ORDER — POLYETHYLENE GLYCOL 3350 17 G/17G
17 POWDER, FOR SOLUTION ORAL DAILY PRN
Status: DISCONTINUED | OUTPATIENT
Start: 2025-04-06 | End: 2025-04-09 | Stop reason: HOSPADM

## 2025-04-06 RX ADMIN — POTASSIUM CHLORIDE 40 MEQ: 20 TABLET, EXTENDED RELEASE ORAL at 22:35

## 2025-04-06 RX ADMIN — APIXABAN 5 MG: 5 TABLET, FILM COATED ORAL at 22:35

## 2025-04-06 RX ADMIN — DEXAMETHASONE SODIUM PHOSPHATE 6 MG: 10 INJECTION INTRAMUSCULAR; INTRAVENOUS at 22:40

## 2025-04-06 RX ADMIN — POTASSIUM CHLORIDE 40 MEQ: 20 TABLET, EXTENDED RELEASE ORAL at 20:37

## 2025-04-06 RX ADMIN — POTASSIUM CHLORIDE 10 MEQ: 7.46 INJECTION, SOLUTION INTRAVENOUS at 20:37

## 2025-04-06 RX ADMIN — Medication 10 ML: at 22:45

## 2025-04-06 RX ADMIN — TIMOLOL MALEATE 1 DROP: 5 SOLUTION/ DROPS OPHTHALMIC at 22:44

## 2025-04-06 RX ADMIN — TAMSULOSIN HYDROCHLORIDE 0.4 MG: 0.4 CAPSULE ORAL at 22:35

## 2025-04-06 RX ADMIN — CARVEDILOL 25 MG: 12.5 TABLET, FILM COATED ORAL at 22:36

## 2025-04-06 RX ADMIN — ACETAMINOPHEN 1000 MG: 500 TABLET, FILM COATED ORAL at 17:00

## 2025-04-06 RX ADMIN — SODIUM CHLORIDE 1000 ML: 900 INJECTION, SOLUTION INTRAVENOUS at 18:46

## 2025-04-06 RX ADMIN — SODIUM CHLORIDE 200 MG: 9 INJECTION, SOLUTION INTRAVENOUS at 22:46

## 2025-04-06 NOTE — ED NOTES
George Lomeli    Nursing Report ED to Floor:  Mental status: ao4  Ambulatory status: x1  Oxygen Therapy:  2l nc  Cardiac Rhythm: afib  Admitted from: ed  Safety Concerns:  fall risk  Precautions: none  Social Issues: none  ED Room #:  7    ED Nurse Phone Extension - [phone] or may call [phone].      HPI:   Chief Complaint   Patient presents with    Shortness of Breath       Past Medical History:  Past Medical History:   Diagnosis Date    AAA (abdominal aortic aneurysm)     Arthritis     Broken neck     Gout     Hypertension     Lymphoma     Lymphoma     had radiation    Migraine     Sciatica     Thyroid cancer     ???    Thyroid disease         Past Surgical History:  Past Surgical History:   Procedure Laterality Date    ABDOMINAL AORTIC ANEURYSM REPAIR W/ ENDOLUMINAL GRAFT      BACK SURGERY      lumbar    CATARACT EXTRACTION EXTRACAPSULAR W/ INTRAOCULAR LENS IMPLANTATION      EYE SURGERY      MULTIPLE TOOTH EXTRACTIONS      NECK SURGERY      PINS/RODS IN NECK        Admitting Doctor:   Wendy Brar MD    Consulting Provider(s):  Consults       No orders found from 3/8/2025 to 4/7/2025.             Admitting Diagnosis:   The primary encounter diagnosis was Atrial fibrillation with RVR. Diagnoses of COVID-19, Acute respiratory failure with hypoxia, Anticoagulated, and Hypokalemia were also pertinent to this visit.    Most Recent Vitals:   Vitals:    04/06/25 1817 04/06/25 1818 04/06/25 1830 04/06/25 1845   BP:    (!) 136/103   BP Location:       Patient Position:       Pulse: 111 113 101 (!) 148   Resp:       Temp:       TempSrc:       SpO2: (!) 87% 95% 93% (!) 79%   Weight:       Height:           Active LDAs/IV Access:   Lines, Drains & Airways       Active LDAs       Name Placement date Placement time Site Days    Peripheral IV 04/06/25 1448 Anterior;Right Forearm 04/06/25  1448  Forearm  less than 1                    Labs (abnormal labs have a star):   Labs Reviewed   COVID-19 AND FLU A/B, NP SWAB IN TRANSPORT MEDIA 1  HR TAT - Abnormal; Notable for the following components:       Result Value    COVID19 Detected (*)     All other components within normal limits    Narrative:     Fact sheet for providers: https://www.fda.gov/media/125965/download    Fact sheet for patients: https://www.fda.gov/media/485555/download    Test performed by PCR.  Influenza A and Influenza B negative results should be considered presumptive in samples that have a positive SARS-CoV-2 result.    Competitive inhibition studies showed that SARS-CoV-2 virus, when present at concentrations above 3.6E+04 copies/mL, can inhibit the detection and amplification of influenza A and influenza B virus RNA if present at or below 1.8E+02 copies/mL or 4.9E+02 copies/mL, respectively, and may lead to false negative influenza virus results. If co-infection with influenza A or influenza B virus is suspected in samples with a positive SARS-CoV-2 result, the sample should be re-tested with another FDA cleared, approved, or authorized influenza test, if influenza virus detection would change clinical management.   COMPREHENSIVE METABOLIC PANEL - Abnormal; Notable for the following components:    Potassium 3.0 (*)     All other components within normal limits    Narrative:     GFR Categories in Chronic Kidney Disease (CKD)      GFR Category          GFR (mL/min/1.73)    Interpretation  G1                     90 or greater         Normal or high (1)  G2                      60-89                Mild decrease (1)  G3a                   45-59                Mild to moderate decrease  G3b                   30-44                Moderate to severe decrease  G4                    15-29                Severe decrease  G5                    14 or less           Kidney failure          (1)In the absence of evidence of kidney disease, neither GFR category G1 or G2 fulfill the criteria for CKD.    eGFR calculation 2021 CKD-EPI creatinine equation, which does not include race as a factor    TROPONIN - Abnormal; Notable for the following components:    HS Troponin T 30 (*)     All other components within normal limits    Narrative:     High Sensitive Troponin T Reference Range:  <14.0 ng/L- Negative Female for AMI  <22.0 ng/L- Negative Male for AMI  >=14 - Abnormal Female indicating possible myocardial injury.  >=22 - Abnormal Male indicating possible myocardial injury.   Clinicians would have to utilize clinical acumen, EKG, Troponin, and serial changes to determine if it is an Acute Myocardial Infarction or myocardial injury due to an underlying chronic condition.        CBC WITH AUTO DIFFERENTIAL - Abnormal; Notable for the following components:    RBC 3.59 (*)     Hemoglobin 10.4 (*)     Hematocrit 32.9 (*)     RDW 15.7 (*)     MPV 12.2 (*)     Neutrophil % 81.5 (*)     Lymphocyte % 5.8 (*)     Lymphocytes, Absolute 0.42 (*)     All other components within normal limits   URINALYSIS W/ MICROSCOPIC IF INDICATED (NO CULTURE) - Abnormal; Notable for the following components:    Leuk Esterase, UA Trace (*)     All other components within normal limits   URINALYSIS, MICROSCOPIC ONLY - Abnormal; Notable for the following components:    WBC, UA 3-5 (*)     All other components within normal limits   HIGH SENSITIVITIY TROPONIN T 1HR - Abnormal; Notable for the following components:    HS Troponin T 30 (*)     All other components within normal limits    Narrative:     High Sensitive Troponin T Reference Range:  <14.0 ng/L- Negative Female for AMI  <22.0 ng/L- Negative Male for AMI  >=14 - Abnormal Female indicating possible myocardial injury.  >=22 - Abnormal Male indicating possible myocardial injury.   Clinicians would have to utilize clinical acumen, EKG, Troponin, and serial changes to determine if it is an Acute Myocardial Infarction or myocardial injury due to an underlying chronic condition.        BNP (IN-HOUSE) - Normal    Narrative:     This assay is used as an aid in the diagnosis of  "individuals suspected of having heart failure. It can be used as an aid in the diagnosis of acute decompensated heart failure (ADHF) in patients presenting with signs and symptoms of ADHF to the emergency department (ED). In addition, NT-proBNP of <300 pg/mL indicates ADHF is not likely.    Age Range Result Interpretation  NT-proBNP Concentration (pg/mL:      <50             Positive            >450                   Gray                 300-450                    Negative             <300    50-75           Positive            >900                  Gray                300-900                  Negative            <300      >75             Positive            >1800                  Gray                300-1800                  Negative            <300   PROCALCITONIN - Normal    Narrative:     As a Marker for Sepsis (Non-Neonates):    1. <0.5 ng/mL represents a low risk of severe sepsis and/or septic shock.  2. >2 ng/mL represents a high risk of severe sepsis and/or septic shock.    As a Marker for Lower Respiratory Tract Infections that require antibiotic therapy:    PCT on Admission    Antibiotic Therapy       6-12 Hrs later    >0.5                Strongly Recommended  >0.25 - <0.5        Recommended   0.1 - 0.25          Discouraged              Remeasure/reassess PCT  <0.1                Strongly Discouraged     Remeasure/reassess PCT    As 28 day mortality risk marker: \"Change in Procalcitonin Result\" (>80% or <=80%) if Day 0 (or Day 1) and Day 4 values are available. Refer to http://www."[email or web address]    Change in PCT <=80%  A decrease of PCT levels below or equal to 80% defines a positive change in PCT test result representing a higher risk for 28-day all-cause mortality of patients diagnosed with severe sepsis for septic shock.    Change in PCT >80%  A decrease of PCT levels of more than 80% defines a negative change in PCT result representing a lower risk for 28-day all-cause mortality of patients " diagnosed with severe sepsis or septic shock.      LACTIC ACID, PLASMA - Normal   MAGNESIUM - Normal   COVID PRE-OP / PRE-PROCEDURE SCREENING ORDER (NO ISOLATION)    Narrative:     The following orders were created for panel order COVID PRE-OP / PRE-PROCEDURE SCREENING ORDER (NO ISOLATION) - Swab, Nasopharynx.  Procedure                               Abnormality         Status                     ---------                               -----------         ------                     COVID-19 and FLU A/B PCR...[[id number]]  Abnormal            Final result                 Please view results for these tests on the individual orders.   BLOOD CULTURE   BLOOD CULTURE   RAINBOW DRAW    Narrative:     The following orders were created for panel order [place] Draw.  Procedure                               Abnormality         Status                     ---------                               -----------         ------                     Green Top (Gel)[[id number]]                                  Final result               Lavender Top[[id number]]                                     Final result               Gold Top - SST[[id number]]                                   Final result               F[place]gerald Top[[id number]]                                         Final result               Light Blue Top[[id number]]                                   Final result                 Please view results for these tests on the individual orders.   CBC AND DIFFERENTIAL    Narrative:     The following orders were created for panel order CBC & Differential.  Procedure                               Abnormality         Status                     ---------                               -----------         ------                     CBC Auto Differential[[id number]]        Abnormal            Final result                 Please view results for these tests on the individual orders.   GREEN TOP   LAVENDER TOP   GOLD TOP - SST   GRAY TOP   LIGHT BLUE TOP        Meds Given in ED:   Medications   sodium chloride 0.9 % flush 10 mL (has no administration in time range)   sodium chloride 0.9 % bolus 1,000 mL (1,000 mL Intravenous New Bag 4/6/25 1846)   Potassium Replacement - Follow Nurse / BPA Driven Protocol (has no administration in time range)   acetaminophen (TYLENOL) tablet 1,000 mg (1,000 mg Oral Given 4/6/25 1700)           Last NIH score:                                                          Dysphagia screening results:  Patient Factors Component (Dysphagia:Stroke or Rule-out)  Best Eye Response: 4-->(E4) spontaneous (04/06/25 1705)  Best Motor Response: 6-->(M6) obeys commands (04/06/25 1705)  Best Verbal Response: 5-->(V5) oriented (04/06/25 1705)  [place] Coma Scale Score: 15 (04/06/25 1705)     [place] Coma Scale:  No data recorded     CIWA:        Restraint Type:            Isolation Status:  No active isolations

## 2025-04-06 NOTE — Clinical Note
Level of Care: Telemetry [5]   Diagnosis: Acute hypoxemic respiratory failure due to COVID-19 [[id number]]   Admitting Physician: NEVA WHITAKER [[id number]]

## 2025-04-06 NOTE — ED PROVIDER NOTES
Subjective   History of Present Illness  Pt is a 85 yo male presenting to ED with complaints of SOB and cough. PMHx significant for PAfib on Eliquis, HTN, HLD, AAA s/p repair, Lymphoma, Hypothyroidism and Migraines. Pt reports cough and SOB for the past 2 days. Denies sick contacts. Denies headache, dizziness, fever, CP, V/D, abdominal pain or leg swelling. Temp on arrival to  and no meds for fever PTA. Denies tobacco, drug or ETOH use.     History provided by:  Patient and medical records      Review of Systems   Constitutional:  Negative for chills and fever.   HENT:  Negative for congestion and trouble swallowing.    Eyes:  Negative for pain and visual disturbance.   Respiratory:  Positive for cough and shortness of breath.    Cardiovascular:  Negative for chest pain and leg swelling.   Gastrointestinal:  Negative for abdominal pain, diarrhea, nausea and vomiting.   Genitourinary:  Negative for difficulty urinating, dysuria and flank pain.   Musculoskeletal:  Negative for arthralgias and back pain.   Skin: Negative.  Negative for rash and wound.   Allergic/Immunologic: Negative.    Neurological:  Negative for dizziness, syncope, weakness, numbness and headaches.   Psychiatric/Behavioral:  Negative for confusion.    All other systems reviewed and are negative.      Past Medical History:   Diagnosis Date    AAA (abdominal aortic aneurysm)     Arthritis     Broken neck     Gout     Hypertension     Lymphoma     Lymphoma     had radiation    Migraine     Sciatica     Thyroid cancer     ???    Thyroid disease        Allergies   Allergen Reactions    Contrast Dye (Echo Or Unknown Ct/Mr) Nausea And Vomiting       Past Surgical History:   Procedure Laterality Date    ABDOMINAL AORTIC ANEURYSM REPAIR W/ ENDOLUMINAL GRAFT      BACK SURGERY      lumbar    CATARACT EXTRACTION EXTRACAPSULAR W/ INTRAOCULAR LENS IMPLANTATION      EYE SURGERY      MULTIPLE TOOTH EXTRACTIONS      NECK SURGERY      PINS/RODS IN NECK        Family History   Problem Relation Age of Onset    Hypertension Mother     Hypertension Father     Hypertension Sister        Social History     Socioeconomic History    Marital status:     Number of children: 2   Tobacco Use    Smoking status: Former     Current packs/day: 0.00     Average packs/day: 1 pack/day for 15.0 years (15.0 ttl pk-yrs)     Types: Cigarettes     Start date:      Quit date:      Years since quittin.2     Passive exposure: Past    Smokeless tobacco: Never   Vaping Use    Vaping status: Never Used   Substance and Sexual Activity    Alcohol use: No    Drug use: No    Sexual activity: Defer           Objective   Physical Exam  Vitals and nursing note reviewed.   Constitutional:       Appearance: He is well-developed.   HENT:      Head: Atraumatic.      Nose: Nose normal.   Eyes:      General: Lids are normal.      Conjunctiva/sclera: Conjunctivae normal.      Pupils: Pupils are equal, round, and reactive to light.   Cardiovascular:      Rate and Rhythm: Tachycardia present. Rhythm irregular.      Heart sounds: Normal heart sounds.   Pulmonary:      Effort: Pulmonary effort is normal.      Breath sounds: Normal breath sounds.   Abdominal:      General: There is no distension.      Palpations: Abdomen is soft.      Tenderness: There is no abdominal tenderness. There is no guarding or rebound.   Musculoskeletal:         General: No tenderness or deformity. Normal range of motion.      Cervical back: Normal range of motion and neck supple.   Skin:     General: Skin is warm and dry.   Neurological:      Mental Status: He is alert and oriented to person, place, and time.      Sensory: No sensory deficit.   Psychiatric:         Behavior: Behavior normal.         Procedures           ED Course  ED Course as of 25 2156   Sun 2025   1422 I personally reviewed and interpreted vitals.  Patient evaluated in [facility] area and awaiting labs / imaging / ED bed at this time.  I  personally reviewed and interpreted labs results and went over with patient.   [RT]   1436 Temp: 100 °F (37.8 °C) [RT]   1436 Heart Rate: 102 [RT]   1436 SpO2: 97 %  RA [RT]   1637 WBC: 7.23 [RT]   1637 Hemoglobin(!): 10.4 [RT]   1637 Hematocrit(!): 32.9 [RT]   1637 Baseline anemia [RT]   1637 Lactate: 1.2 [RT]   1637 proBNP: 611.4 [RT]   1637 HS Troponin T(!): 30 [RT]   1637 COVID19(!!): Detected [RT]   1637 Procalcitonin: 0.08 [RT]   1637 Will repeat Trop and recheck vitals. [RT]   1755 HS Troponin T(!): 30  Stable trop [RT]   1755 SpO2(!): 82 %  Does not wear O2 at home. Placed on oxygen [RT]   1757 HR range  in ED [RT]   1800 Discussed results and tx plan for admission with patient.  [RT]   1826 Discussed admission with hospitalist Dr. Brar [RT]      ED Course User Index  [RT] Nani Austin, PA      Recent Results (from the past 24 hours)   ECG 12 Lead Dyspnea    Collection Time: 04/06/25  2:28 PM   Result Value Ref Range    QT Interval 358 ms    QTC Interval 484 ms   Comprehensive Metabolic Panel    Collection Time: 04/06/25  2:48 PM    Specimen: Blood   Result Value Ref Range    Glucose 94 65 - 99 mg/dL    BUN 15 8 - 23 mg/dL    Creatinine 1.06 0.76 - 1.27 mg/dL    Sodium 141 136 - 145 mmol/L    Potassium 3.0 (L) 3.5 - 5.2 mmol/L    Chloride 101 98 - 107 mmol/L    CO2 29.0 22.0 - 29.0 mmol/L    Calcium 9.5 8.6 - 10.5 mg/dL    Total Protein 6.7 6.0 - 8.5 g/dL    Albumin 4.0 3.5 - 5.2 g/dL    ALT (SGPT) 7 1 - 41 U/L    AST (SGOT) 17 1 - 40 U/L    Alkaline Phosphatase 91 39 - 117 U/L    Total Bilirubin 0.5 0.0 - 1.2 mg/dL    Globulin 2.7 gm/dL    A/G Ratio 1.5 g/dL    BUN/Creatinine Ratio 14.2 7.0 - 25.0    Anion Gap 11.0 5.0 - 15.0 mmol/L    eGFR 69.2 >60.0 mL/min/1.73   BNP    Collection Time: 04/06/25  2:48 PM    Specimen: Blood   Result Value Ref Range    proBNP 611.4 0.0 - 1,800.0 pg/mL   High Sensitivity Troponin T    Collection Time: 04/06/25  2:48 PM    Specimen: Blood   Result Value Ref Range     HS Troponin T 30 (H) <22 ng/L   Green Top (Gel)    Collection Time: 04/06/25  2:48 PM   Result Value Ref Range    Extra Tube Hold for add-ons.    Lavender Top    Collection Time: 04/06/25  2:48 PM   Result Value Ref Range    Extra Tube hold for add-on    Gold Top - SST    Collection Time: 04/06/25  2:48 PM   Result Value Ref Range    Extra Tube Hold for add-ons.    Gray Top    Collection Time: 04/06/25  2:48 PM   Result Value Ref Range    Extra Tube Hold for add-ons.    Light Blue Top    Collection Time: 04/06/25  2:48 PM   Result Value Ref Range    Extra Tube Hold for add-ons.    CBC Auto Differential    Collection Time: 04/06/25  2:48 PM    Specimen: Blood   Result Value Ref Range    WBC 7.23 3.40 - 10.80 10*3/mm3    RBC 3.59 (L) 4.14 - 5.80 10*6/mm3    Hemoglobin 10.4 (L) 13.0 - 17.7 g/dL    Hematocrit 32.9 (L) 37.5 - 51.0 %    MCV 91.6 79.0 - 97.0 fL    MCH 29.0 26.6 - 33.0 pg    MCHC 31.6 31.5 - 35.7 g/dL    RDW 15.7 (H) 12.3 - 15.4 %    RDW-SD 52.5 37.0 - 54.0 fl    MPV 12.2 (H) 6.0 - 12.0 fL    Platelets 196 140 - 450 10*3/mm3    Neutrophil % 81.5 (H) 42.7 - 76.0 %    Lymphocyte % 5.8 (L) 19.6 - 45.3 %    Monocyte % 11.9 5.0 - 12.0 %    Eosinophil % 0.4 0.3 - 6.2 %    Basophil % 0.1 0.0 - 1.5 %    Immature Grans % 0.3 0.0 - 0.5 %    Neutrophils, Absolute 5.89 1.70 - 7.00 10*3/mm3    Lymphocytes, Absolute 0.42 (L) 0.70 - 3.10 10*3/mm3    Monocytes, Absolute 0.86 0.10 - 0.90 10*3/mm3    Eosinophils, Absolute 0.03 0.00 - 0.40 10*3/mm3    Basophils, Absolute 0.01 0.00 - 0.20 10*3/mm3    Immature Grans, Absolute 0.02 0.00 - 0.05 10*3/mm3    nRBC 0.0 0.0 - 0.2 /100 WBC   Procalcitonin    Collection Time: 04/06/25  2:48 PM    Specimen: Blood   Result Value Ref Range    Procalcitonin 0.08 0.00 - 0.25 ng/mL   Lactic Acid, Plasma    Collection Time: 04/06/25  2:48 PM    Specimen: Blood   Result Value Ref Range    Lactate 1.2 0.5 - 2.0 mmol/L   COVID-19 and FLU A/B PCR, 1 HR TAT - Swab, Nasopharynx    Collection Time:  04/06/25  2:48 PM    Specimen: Nasopharynx; Swab   Result Value Ref Range    COVID19 Detected (C) Not Detected - Ref. Range    Influenza A PCR Not Detected Not Detected    Influenza B PCR Not Detected Not Detected   Reticulocytes    Collection Time: 04/06/25  2:48 PM    Specimen: Blood   Result Value Ref Range    Reticulocyte % 1.41 0.70 - 1.90 %    Reticulocyte Absolute 0.0505 0.0200 - 0.1300 10*6/mm3   Urinalysis With Microscopic If Indicated (No Culture) - Urine, Clean Catch    Collection Time: 04/06/25  2:50 PM    Specimen: Urine, Clean Catch   Result Value Ref Range    Color, UA Yellow Yellow, Straw    Appearance, UA Clear Clear    pH, UA 7.0 5.0 - 8.0    Specific Gravity, UA 1.014 1.001 - 1.030    Glucose, UA Negative Negative    Ketones, UA Negative Negative    Bilirubin, UA Negative Negative    Blood, UA Negative Negative    Protein, UA Negative Negative    Leuk Esterase, UA Trace (A) Negative    Nitrite, UA Negative Negative    Urobilinogen, UA 1.0 E.U./dL 0.2 - 1.0 E.U./dL   Urinalysis, Microscopic Only - Urine, Clean Catch    Collection Time: 04/06/25  2:50 PM    Specimen: Urine, Clean Catch   Result Value Ref Range    RBC, UA 0-2 None Seen, 0-2 /HPF    WBC, UA 3-5 (A) None Seen, 0-2 /HPF    Bacteria, UA None Seen None Seen, Trace /HPF    Squamous Epithelial Cells, UA 0-2 None Seen, 0-2 /HPF    Hyaline Casts, UA 0-6 0 - 6 /LPF    Methodology Automated Microscopy    ECG 12 Lead Dyspnea    Collection Time: 04/06/25  4:56 PM   Result Value Ref Range    QT Interval 384 ms    QTC Interval 503 ms   High Sensitivity Troponin T 1Hr    Collection Time: 04/06/25  5:04 PM    Specimen: Blood   Result Value Ref Range    HS Troponin T 30 (H) <22 ng/L    Troponin T Numeric Delta 0 ng/L    Troponin T % Delta 0 Abnormal if >/= 20%   Magnesium    Collection Time: 04/06/25  5:04 PM    Specimen: Blood   Result Value Ref Range    Magnesium 1.7 1.6 - 2.4 mg/dL   C-reactive Protein    Collection Time: 04/06/25  5:04 PM     Specimen: Blood   Result Value Ref Range    C-Reactive Protein 3.78 (H) 0.00 - 0.50 mg/dL     Note: In addition to lab results from this visit, the labs listed above may include labs taken at another facility or during a different encounter within the last 24 hours. Please correlate lab times with ED admission and discharge times for further clarification of the services performed during this visit.    XR Chest 1 View   Final Result   Impression:   Stable chest without evidence of acute disease.         Electronically Signed: Rah Peña MD     4/6/2025 4:53 PM EDT     Workstation ID: [id number]        Vitals:    04/06/25 2015 04/06/25 2030 04/06/25 2045 04/06/25 2108   BP: 111/84 119/89 103/86 133/94   BP Location:    Left arm   Patient Position:    Lying   Pulse: 108 88 100 91   Resp:    18   Temp:    98.2 °F (36.8 °C)   TempSrc:    Oral   SpO2: 95% 98% 95% 99%   Weight:    73.7 kg (162 lb 8 oz)   Height:         Medications   sodium chloride 0.9 % flush 10 mL (has no administration in time range)   Potassium Replacement - Follow Nurse / BPA Driven Protocol (has no administration in time range)   potassium chloride (KLOR-CON M20) CR tablet 40 mEq (40 mEq Oral Given 4/6/25 2037)   potassium chloride 10 mEq in 100 mL IVPB (10 mEq Intravenous New Bag 4/6/25 2037)   albuterol sulfate HFA (PROVENTIL HFA;VENTOLIN HFA;PROAIR HFA) inhaler 2 puff (has no administration in time range)   allopurinol (ZYLOPRIM) tablet 100 mg (has no administration in time range)   amLODIPine (NORVASC) tablet 5 mg (has no administration in time range)   apixaban (ELIQUIS) tablet 5 mg (has no administration in time range)   benzonatate (TESSALON) capsule 100 mg (has no administration in time range)   carvedilol (COREG) tablet 25 mg (has no administration in time range)   DULoxetine (CYMBALTA) DR capsule 30 mg (has no administration in time range)   levothyroxine (SYNTHROID, LEVOTHROID) tablet 25 mcg (has no administration in time range)    lisinopril (PRINIVIL,ZESTRIL) tablet 20 mg (has no administration in time range)     And   hydroCHLOROthiazide tablet 12.5 mg (has no administration in time range)   timolol (TIMOPTIC) 0.5 % ophthalmic solution 1 drop (has no administration in time range)   tamsulosin (FLOMAX) 24 hr capsule 0.4 mg (has no administration in time range)   sodium chloride 0.9 % flush 10 mL (has no administration in time range)   sodium chloride 0.9 % flush 10 mL (has no administration in time range)   sodium chloride 0.9 % infusion 40 mL (has no administration in time range)   Magnesium Standard Dose Replacement - Follow Nurse / BPA Driven Protocol (has no administration in time range)   acetaminophen (TYLENOL) tablet 650 mg (has no administration in time range)   sennosides-docusate (PERICOLACE) 8.6-50 MG per tablet 2 tablet (has no administration in time range)     And   polyethylene glycol (MIRALAX) packet 17 g (has no administration in time range)     And   bisacodyl (DULCOLAX) EC tablet 5 mg (has no administration in time range)     And   bisacodyl (DULCOLAX) suppository 10 mg (has no administration in time range)   dextromethorphan polistirex ER (DELSYM) 30 MG/5ML oral suspension 60 mg (has no administration in time range)   dexAMETHasone (DECADRON) injection 6 mg (has no administration in time range)   Pharmacy Consult - Remdesivir for Mild to Moderate COVID-19 (Within 5 days of symptom onset)- only if contraindicated to Paxlovid (has no administration in time range)   oxyCODONE-acetaminophen (PERCOCET)  MG per tablet 1 tablet (has no administration in time range)   remdesivir 200 mg in sodium chloride 0.9 % 290 mL IVPB (powder vial) (has no administration in time range)     Followed by   remdesivir 100 mg in sodium chloride 0.9 % 250 mL IVPB (powder vial) (has no administration in time range)   acetaminophen (TYLENOL) tablet 1,000 mg (1,000 mg Oral Given 4/6/25 1700)   sodium chloride 0.9 % bolus 1,000 mL (1,000 mL  Intravenous New Bag 4/6/25 1846)     ECG/EMG Results (last 24 hours)       Procedure Component Value Units Date/Time    ECG 12 Lead Dyspnea [[id number]] Collected: 04/06/25 1428     Updated: 04/06/25 1428     QT Interval 358 ms      QTC Interval 484 ms     Narrative:      Test Reason : Dyspnea  Blood Pressure :   */*   mmHG  Vent. Rate : 110 BPM     Atrial Rate : 138 BPM     P-R Int :   * ms          QRS Dur : 110 ms      QT Int : 358 ms       P-R-T Axes :   * -16 -16 degrees    QTcB Int : 484 ms    Atrial fibrillation with rapid ventricular response with premature  ventricular or aberrantly conducted complexes  Incomplete right bundle branch block  Nonspecific ST abnormality  Abnormal ECG  When compared with ECG of 30-Apr-2024 17:31,  Atrial fibrillation has replaced Sinus rhythm  Vent. rate has increased by  43 bpm  ST now depressed in Anterior leads    Referred By: KURT           Confirmed By:     ECG 12 Lead Dyspnea [[id number]] Collected: 04/06/25 1656     Updated: 04/06/25 1654     QT Interval 384 ms      QTC Interval 503 ms     Narrative:      Test Reason : SOB  Blood Pressure :   */*   mmHG  Vent. Rate : 103 BPM     Atrial Rate : 103 BPM     P-R Int : 200 ms          QRS Dur : 110 ms      QT Int : 384 ms       P-R-T Axes :   * -16   1 degrees    QTcB Int : 503 ms    Sinus tachycardia with premature atrial complexes with aberrant conduction  Nonspecific ST abnormality  Abnormal ECG  When compared with ECG of 06-Apr-2025 14:28, (Unconfirmed)  Sinus rhythm has replaced Atrial fibrillation    Referred By: SAROJ DUKES           Confirmed By:           ECG 12 Lead Dyspnea   Final Result   Test Reason : SOB   Blood Pressure :   */*   mmHG   Vent. Rate : 103 BPM     Atrial Rate : 103 BPM      P-R Int : 200 ms          QRS Dur : 110 ms       QT Int : 384 ms       P-R-T Axes :   * -16   1 degrees     QTcB Int : 503 ms      Sinus tachycardia with premature atrial complexes with aberrant conduction   Nonspecific ST abnormality    Abnormal ECG   Confirmed by RANDA PRYOR MD ([id number]) on 4/6/2025 5:50:20 PM      Referred By: ER MD           Confirmed By: ARNDA PRYOR MD      ECG 12 Lead Dyspnea   Final Result   Test Reason : Dyspnea   Blood Pressure :   */*   mmHG   Vent. Rate : 110 BPM     Atrial Rate : 138 BPM      P-R Int :   * ms          QRS Dur : 110 ms       QT Int : 358 ms       P-R-T Axes :   * -16 -16 degrees     QTcB Int : 484 ms      Atrial fibrillation with rapid ventricular response with premature   ventricular or aberrantly conducted complexes   Incomplete right bundle branch block   Nonspecific ST abnormality   Abnormal ECG   When compared with ECG of 30-Apr-2024 17:31,   Atrial fibrillation has replaced Sinus rhythm   Vent. rate has increased by  43 bpm   ST now depressed in Anterior leads   Confirmed by RANDA PRYOR MD ([id number]) on 4/6/2025 5:52:16 PM      Referred By: EDMD           Confirmed By: RANDA PRYOR MD                                                           Medical Decision Making  Pt is a 83 yo male presenting to ED with complaints of SOB and cough.  I had a discussion with the patient / family regarding ED course, diagnosis, diagnostic results and treatment plan including medications and admission.    DDx  Covid, Flu, Pneumonia, CHF, Afib RVR, Sepsis, resp failure    Problems Addressed:  Acute respiratory failure with hypoxia: complicated acute illness or injury  Anticoagulated: complicated acute illness or injury  Atrial fibrillation with RVR: complicated acute illness or injury  COVID-19: complicated acute illness or injury  Hypokalemia: complicated acute illness or injury    Amount and/or Complexity of Data Reviewed  External Data Reviewed: notes.     Details: Reviewed previous non ED visits including prior labs, imaging, available notes, medications, allergies and surgical hx.   Cards 1/30/25 Afib f/u  Labs: ordered. Decision-making details documented in ED Course.  Radiology: ordered and independent  interpretation performed. Decision-making details documented in ED Course.  ECG/medicine tests: ordered and independent interpretation performed. Decision-making details documented in ED Course.    Risk  OTC drugs.  Prescription drug management.  Decision regarding hospitalization.        Final diagnoses:   Atrial fibrillation with RVR   COVID-19   Acute respiratory failure with hypoxia   Anticoagulated   Hypokalemia       ED Disposition  ED Disposition       ED Disposition   Decision to Admit    Condition   --    Comment   Level of Care: Telemetry [5]   Diagnosis: Acute hypoxemic respiratory failure due to COVID-19 [0135094]   Isolate for COVID?: Yes [1]   Certification: I Certify That Inpatient Hospital Services Are Medically Necessary For Greater Than 2 Midnights                 No follow-up provider specified.       Medication List      No changes were made to your prescriptions during this visit.            Nani Austin PA  04/06/25 [id number]

## 2025-04-07 LAB
ALBUMIN SERPL-MCNC: 3.7 G/DL (ref 3.5–5.2)
ALBUMIN/GLOB SERPL: 1.5 G/DL
ALP SERPL-CCNC: 85 U/L (ref 39–117)
ALT SERPL W P-5'-P-CCNC: 7 U/L (ref 1–41)
ANION GAP SERPL CALCULATED.3IONS-SCNC: 15 MMOL/L (ref 5–15)
AST SERPL-CCNC: 17 U/L (ref 1–40)
B PARAPERT DNA SPEC QL NAA+PROBE: NOT DETECTED
B PERT DNA SPEC QL NAA+PROBE: NOT DETECTED
BASOPHILS # BLD AUTO: 0.01 10*3/MM3 (ref 0–0.2)
BASOPHILS NFR BLD AUTO: 0.2 % (ref 0–1.5)
BILIRUB SERPL-MCNC: 0.4 MG/DL (ref 0–1.2)
BUN SERPL-MCNC: 12 MG/DL (ref 8–23)
BUN/CREAT SERPL: 13.6 (ref 7–25)
C PNEUM DNA NPH QL NAA+NON-PROBE: NOT DETECTED
CALCIUM SPEC-SCNC: 9 MG/DL (ref 8.6–10.5)
CHLORIDE SERPL-SCNC: 105 MMOL/L (ref 98–107)
CO2 SERPL-SCNC: 23 MMOL/L (ref 22–29)
CREAT SERPL-MCNC: 0.88 MG/DL (ref 0.76–1.27)
DEPRECATED RDW RBC AUTO: 54.4 FL (ref 37–54)
EGFRCR SERPLBLD CKD-EPI 2021: 84.8 ML/MIN/1.73
EOSINOPHIL # BLD AUTO: 0 10*3/MM3 (ref 0–0.4)
EOSINOPHIL NFR BLD AUTO: 0 % (ref 0.3–6.2)
ERYTHROCYTE [DISTWIDTH] IN BLOOD BY AUTOMATED COUNT: 15.8 % (ref 12.3–15.4)
FLUAV SUBTYP SPEC NAA+PROBE: NOT DETECTED
FLUBV RNA ISLT QL NAA+PROBE: NOT DETECTED
FOLATE SERPL-MCNC: 8.56 NG/ML (ref 4.78–24.2)
GLOBULIN UR ELPH-MCNC: 2.4 GM/DL
GLUCOSE SERPL-MCNC: 115 MG/DL (ref 65–99)
HADV DNA SPEC NAA+PROBE: NOT DETECTED
HCOV 229E RNA SPEC QL NAA+PROBE: NOT DETECTED
HCOV HKU1 RNA SPEC QL NAA+PROBE: NOT DETECTED
HCOV NL63 RNA SPEC QL NAA+PROBE: NOT DETECTED
HCOV OC43 RNA SPEC QL NAA+PROBE: NOT DETECTED
HCT VFR BLD AUTO: 33.9 % (ref 37.5–51)
HGB BLD-MCNC: 10.4 G/DL (ref 13–17.7)
HMPV RNA NPH QL NAA+NON-PROBE: NOT DETECTED
HPIV1 RNA ISLT QL NAA+PROBE: NOT DETECTED
HPIV2 RNA SPEC QL NAA+PROBE: NOT DETECTED
HPIV3 RNA NPH QL NAA+PROBE: NOT DETECTED
HPIV4 P GENE NPH QL NAA+PROBE: NOT DETECTED
IMM GRANULOCYTES # BLD AUTO: 0.02 10*3/MM3 (ref 0–0.05)
IMM GRANULOCYTES NFR BLD AUTO: 0.4 % (ref 0–0.5)
LYMPHOCYTES # BLD AUTO: 0.46 10*3/MM3 (ref 0.7–3.1)
LYMPHOCYTES NFR BLD AUTO: 9.6 % (ref 19.6–45.3)
M PNEUMO IGG SER IA-ACNC: NOT DETECTED
MAGNESIUM SERPL-MCNC: 1.7 MG/DL (ref 1.6–2.4)
MCH RBC QN AUTO: 28.7 PG (ref 26.6–33)
MCHC RBC AUTO-ENTMCNC: 30.7 G/DL (ref 31.5–35.7)
MCV RBC AUTO: 93.4 FL (ref 79–97)
MONOCYTES # BLD AUTO: 0.18 10*3/MM3 (ref 0.1–0.9)
MONOCYTES NFR BLD AUTO: 3.8 % (ref 5–12)
NEUTROPHILS NFR BLD AUTO: 4.11 10*3/MM3 (ref 1.7–7)
NEUTROPHILS NFR BLD AUTO: 86 % (ref 42.7–76)
NRBC BLD AUTO-RTO: 0 /100 WBC (ref 0–0.2)
PLATELET # BLD AUTO: 168 10*3/MM3 (ref 140–450)
PMV BLD AUTO: 10.7 FL (ref 6–12)
POTASSIUM SERPL-SCNC: 3.6 MMOL/L (ref 3.5–5.2)
PROT SERPL-MCNC: 6.1 G/DL (ref 6–8.5)
QT INTERVAL: 474 MS
QTC INTERVAL: 481 MS
RBC # BLD AUTO: 3.63 10*6/MM3 (ref 4.14–5.8)
RHINOVIRUS RNA SPEC NAA+PROBE: NOT DETECTED
RSV RNA NPH QL NAA+NON-PROBE: NOT DETECTED
SARS-COV-2 RNA NPH QL NAA+NON-PROBE: DETECTED
SODIUM SERPL-SCNC: 143 MMOL/L (ref 136–145)
TROPONIN T SERPL HS-MCNC: 24 NG/L
VIT B12 BLD-MCNC: 363 PG/ML (ref 211–946)
WBC NRBC COR # BLD AUTO: 4.78 10*3/MM3 (ref 3.4–10.8)

## 2025-04-07 PROCEDURE — 84484 ASSAY OF TROPONIN QUANT: CPT | Performed by: NURSE PRACTITIONER

## 2025-04-07 PROCEDURE — 25010000002 REMDESIVIR 100 MG/20ML SOLUTION 1 EACH VIAL

## 2025-04-07 PROCEDURE — 93005 ELECTROCARDIOGRAM TRACING: CPT | Performed by: NURSE PRACTITIONER

## 2025-04-07 PROCEDURE — 93010 ELECTROCARDIOGRAM REPORT: CPT | Performed by: INTERNAL MEDICINE

## 2025-04-07 PROCEDURE — 82747 ASSAY OF FOLIC ACID RBC: CPT | Performed by: NURSE PRACTITIONER

## 2025-04-07 PROCEDURE — 97165 OT EVAL LOW COMPLEX 30 MIN: CPT

## 2025-04-07 PROCEDURE — 80053 COMPREHEN METABOLIC PANEL: CPT | Performed by: NURSE PRACTITIONER

## 2025-04-07 PROCEDURE — 85025 COMPLETE CBC W/AUTO DIFF WBC: CPT | Performed by: NURSE PRACTITIONER

## 2025-04-07 PROCEDURE — 99232 SBSQ HOSP IP/OBS MODERATE 35: CPT | Performed by: INTERNAL MEDICINE

## 2025-04-07 PROCEDURE — 97535 SELF CARE MNGMENT TRAINING: CPT

## 2025-04-07 PROCEDURE — 83735 ASSAY OF MAGNESIUM: CPT | Performed by: NURSE PRACTITIONER

## 2025-04-07 PROCEDURE — 25010000002 POTASSIUM CHLORIDE 10 MEQ/100ML SOLUTION: Performed by: FAMILY MEDICINE

## 2025-04-07 PROCEDURE — 85014 HEMATOCRIT: CPT | Performed by: NURSE PRACTITIONER

## 2025-04-07 PROCEDURE — 25810000003 SODIUM CHLORIDE 0.9 % SOLUTION 250 ML FLEX CONT

## 2025-04-07 PROCEDURE — 97162 PT EVAL MOD COMPLEX 30 MIN: CPT

## 2025-04-07 PROCEDURE — 0202U NFCT DS 22 TRGT SARS-COV-2: CPT | Performed by: INTERNAL MEDICINE

## 2025-04-07 RX ORDER — POTASSIUM CHLORIDE 7.45 MG/ML
10 INJECTION INTRAVENOUS
Status: COMPLETED | OUTPATIENT
Start: 2025-04-07 | End: 2025-04-07

## 2025-04-07 RX ORDER — POTASSIUM CHLORIDE 1500 MG/1
40 TABLET, EXTENDED RELEASE ORAL EVERY 4 HOURS
Status: COMPLETED | OUTPATIENT
Start: 2025-04-07 | End: 2025-04-07

## 2025-04-07 RX ADMIN — SODIUM CHLORIDE 100 MG: 9 INJECTION, SOLUTION INTRAVENOUS at 21:21

## 2025-04-07 RX ADMIN — HYDROCHLOROTHIAZIDE 12.5 MG: 25 TABLET ORAL at 09:40

## 2025-04-07 RX ADMIN — TIMOLOL MALEATE 1 DROP: 5 SOLUTION/ DROPS OPHTHALMIC at 09:41

## 2025-04-07 RX ADMIN — TAMSULOSIN HYDROCHLORIDE 0.4 MG: 0.4 CAPSULE ORAL at 21:20

## 2025-04-07 RX ADMIN — APIXABAN 5 MG: 5 TABLET, FILM COATED ORAL at 21:20

## 2025-04-07 RX ADMIN — POTASSIUM CHLORIDE 10 MEQ: 7.46 INJECTION, SOLUTION INTRAVENOUS at 00:21

## 2025-04-07 RX ADMIN — OXYCODONE HYDROCHLORIDE AND ACETAMINOPHEN 1 TABLET: 10; 325 TABLET ORAL at 10:22

## 2025-04-07 RX ADMIN — Medication 10 ML: at 09:41

## 2025-04-07 RX ADMIN — CARVEDILOL 25 MG: 12.5 TABLET, FILM COATED ORAL at 09:41

## 2025-04-07 RX ADMIN — TIMOLOL MALEATE 1 DROP: 5 SOLUTION/ DROPS OPHTHALMIC at 21:21

## 2025-04-07 RX ADMIN — Medication 10 ML: at 21:21

## 2025-04-07 RX ADMIN — LEVOTHYROXINE SODIUM 25 MCG: 0.03 TABLET ORAL at 06:41

## 2025-04-07 RX ADMIN — POTASSIUM CHLORIDE 40 MEQ: 20 TABLET, EXTENDED RELEASE ORAL at 17:40

## 2025-04-07 RX ADMIN — AMLODIPINE BESYLATE 5 MG: 5 TABLET ORAL at 09:41

## 2025-04-07 RX ADMIN — ALLOPURINOL 100 MG: 100 TABLET ORAL at 09:40

## 2025-04-07 RX ADMIN — APIXABAN 5 MG: 5 TABLET, FILM COATED ORAL at 09:40

## 2025-04-07 RX ADMIN — DULOXETINE HYDROCHLORIDE 30 MG: 30 CAPSULE, DELAYED RELEASE ORAL at 09:40

## 2025-04-07 RX ADMIN — POTASSIUM CHLORIDE 40 MEQ: 20 TABLET, EXTENDED RELEASE ORAL at 10:22

## 2025-04-07 RX ADMIN — CARVEDILOL 25 MG: 12.5 TABLET, FILM COATED ORAL at 17:40

## 2025-04-07 RX ADMIN — LISINOPRIL 20 MG: 20 TABLET ORAL at 09:40

## 2025-04-07 NOTE — PROGRESS NOTES
[facility] Medicine Services  PROGRESS NOTE    Patient Name: George Lomeli  : 1940  MRN: [id number]    Date of Admission: 2025  Primary Care Physician: Linn Salas APRN    Subjective   Subjective     CC:  Cough    HPI:  In bed in no acute distress and tells me that he feels better.  Denies any fever or chills.  No chest pain palpitation shortness of breat, cough has improved.  No nausea vomiting or diarrhea.      Objective   Objective     Vital Signs:   Temp:  [97.4 °F (36.3 °C)-98.4 °F (36.9 °C)] 97.4 °F (36.3 °C)  Heart Rate:  [] 64  Resp:  [16-18] 16  BP: ()/() 139/96  Flow (L/min) (Oxygen Therapy):  [2] 2     Physical Exam:  Constitutional: No acute distress, awake, alert  HENT: NCAT, mucous membranes moist  Respiratory: Breath sounds, cough on deep inspiration, respiratory effort normal   Cardiovascular: RRR, no murmurs, rubs, or gallops  Gastrointestinal: Positive bowel sounds, soft, nontender, nondistended  Musculoskeletal: No bilateral ankle edema  Psychiatric: Appropriate affect, cooperative  Neurologic: Oriented x 3,  speech clear  Skin: No rashes     Results Reviewed:  LAB RESULTS:      Lab 25  1448   WBC 4.78  --  7.23   HEMOGLOBIN 10.4*  --  10.4*   HEMATOCRIT 33.9*  --  32.9*   PLATELETS 168  --  196   NEUTROS ABS 4.11  --  5.89   IMMATURE GRANS (ABS) 0.02  --  0.02   LYMPHS ABS 0.46*  --  0.42*   MONOS ABS 0.18  --  0.86   EOS ABS 0.00  --  0.03   MCV 93.4  --  91.6   CRP  --  3.78*  --    PROCALCITONIN  --   --  0.08   LACTATE  --   --  1.2   HSTROP T 24* 30* 30*         Lab 25  0610 25  17025  1448   SODIUM 143  --  141   POTASSIUM 3.6  --  3.0*   CHLORIDE 105  --  101   CO2 23.0  --  29.0   ANION GAP 15.0  --  11.0   BUN 12  --  15   CREATININE 0.88  --  1.06   EGFR 84.8  --  69.2   GLUCOSE 115*  --  94   CALCIUM 9.0  --  9.5   MAGNESIUM 1.7 1.7  --          Lab 25  0610  04/06/25  1448   TOTAL PROTEIN 6.1 6.7   ALBUMIN 3.7 4.0   GLOBULIN 2.4 2.7   ALT (SGPT) 7 7   AST (SGOT) 17 17   BILIRUBIN 0.4 0.5   ALK PHOS 85 91         Lab 04/07/25  0610 04/06/25  1704 04/06/25  1448   PROBNP  --   --  611.4   HSTROP T 24* 30* 30*             Lab 04/06/25  1704 04/06/25  1448   IRON 27*  27*  --    IRON SATURATION (TSAT) 9*  --    TIBC 305  --    TRANSFERRIN 205  --    FERRITIN 58.14  --    FOLATE  --  8.56   VITAMIN B 12  --  363         Brief Urine Lab Results  (Last result in the past 365 days)        Color   Clarity   Blood   Leuk Est   Nitrite   Protein   CREAT   Urine HCG        04/06/25 1450 Yellow   Clear   Negative   Trace   Negative   Negative                   Microbiology Results Abnormal       Procedure Component Value - Date/Time    Respiratory Panel PCR w/COVID-19(SARS-CoV-2) NICK/KOJO/JED/PAD/COR/SHANICE In-House, NP Swab in UTM/VTM, 2 HR TAT - Swab, Nasopharynx [[id number]]  (Abnormal) Collected: 04/07/25 1025    Lab Status: Final result Specimen: Swab from Nasopharynx Updated: 04/07/25 1031     ADENOVIRUS, PCR Not Detected     Coronavirus 229E Not Detected     Coronavirus HKU1 Not Detected     Coronavirus NL63 Not Detected     Coronavirus OC43 Not Detected     COVID19 Detected     Human Metapneumovirus Not Detected     Human Rhinovirus/Enterovirus Not Detected     Influenza A PCR Not Detected     Influenza B PCR Not Detected     Parainfluenza Virus 1 Not Detected     Parainfluenza Virus 2 Not Detected     Parainfluenza Virus 3 Not Detected     Parainfluenza Virus 4 Not Detected     RSV, PCR Not Detected     Bordetella pertussis pcr Not Detected     Bordetella parapertussis PCR Not Detected     Chlamydophila pneumoniae PCR Not Detected     Mycoplasma pneumo by PCR Not Detected    Narrative:      In the setting of a positive respiratory panel with a viral infection PLUS a negative procalcitonin without other underlying concern for bacterial infection, consider observing off  antibiotics or discontinuation of antibiotics and continue supportive care. If the respiratory panel is positive for atypical bacterial infection (Bordetella pertussis, Chlamydophila pneumoniae, or Mycoplasma pneumoniae), consider antibiotic de-escalation to target atypical bacterial infection.    COVID PRE-OP / PRE-PROCEDURE SCREENING ORDER (NO ISOLATION) - Swab, Nasopharynx [[id number]]  (Abnormal) Collected: 04/06/25 1448    Lab Status: Final result Specimen: Swab from Nasopharynx Updated: 04/06/25 1533    Narrative:      The following orders were created for panel order COVID PRE-OP / PRE-PROCEDURE SCREENING ORDER (NO ISOLATION) - Swab, Nasopharynx.  Procedure                               Abnormality         Status                     ---------                               -----------         ------                     COVID-19 and FLU A/B PCR...[[id number]]  Abnormal            Final result                 Please view results for these tests on the individual orders.    COVID-19 and FLU A/B PCR, 1 HR TAT - Swab, Nasopharynx [[id number]]  (Abnormal) Collected: 04/06/25 1448    Lab Status: Final result Specimen: Swab from Nasopharynx Updated: 04/06/25 1533     COVID19 Detected     Influenza A PCR Not Detected     Influenza B PCR Not Detected    Narrative:      Fact sheet for providers: https://www.fda.gov/media/205762/download    Fact sheet for patients: https://www.fda.gov/media/554026/download    Test performed by PCR.  Influenza A and Influenza B negative results should be considered presumptive in samples that have a positive SARS-CoV-2 result.    Competitive inhibition studies showed that SARS-CoV-2 virus, when present at concentrations above 3.6E+04 copies/mL, can inhibit the detection and amplification of influenza A and influenza B virus RNA if present at or below 1.8E+02 copies/mL or 4.9E+02 copies/mL, respectively, and may lead to false negative influenza virus results. If co-infection with influenza A  or influenza B virus is suspected in samples with a positive SARS-CoV-2 result, the sample should be re-tested with another FDA cleared, approved, or authorized influenza test, if influenza virus detection would change clinical management.            XR Chest 1 View  Result Date: 4/6/2025  XR CHEST 1 VW Date of Exam: 4/6/2025 3:23 PM EDT Indication: SOA triage protocol Comparison: CT 1/2/2025. Findings: Emphysematous changes are again noted. There is no evidence of new focal opacity. There is no effusion or pneumothorax. Unchanged cardiac enlargement.     Impression: Impression: Stable chest without evidence of acute disease. Electronically Signed: Rah Peña MD  4/6/2025 4:53 PM EDT  Workstation ID: [id number]      Results for orders placed during the hospital encounter of 11/21/23    Adult Transthoracic Echo Complete W/ Cont if Necessary Per Protocol    Interpretation Summary    Left ventricular systolic function is normal. Calculated left ventricular EF = 53% Left ventricular ejection fraction appears to be 51 - 55%.    Left ventricular diastolic function is consistent with (grade I) impaired relaxation.    The right ventricular cavity is mildly dilated.    The right atrial cavity is dilated.    Estimated right ventricular systolic pressure from tricuspid regurgitation is normal (<35 mmHg).      Current medications:  Scheduled Meds:allopurinol, 100 mg, Oral, Daily  amLODIPine, 5 mg, Oral, Daily  apixaban, 5 mg, Oral, BID  carvedilol, 25 mg, Oral, BID With Meals  dexAMETHasone, 6 mg, Intravenous, Daily  DULoxetine, 30 mg, Oral, Daily  lisinopril, 20 mg, Oral, Q24H   And  hydroCHLOROthiazide, 12.5 mg, Oral, Q24H  levothyroxine, 25 mcg, Oral, Q AM  potassium chloride ER, 40 mEq, Oral, Q4H  remdesivir, 100 mg, Intravenous, Q24H  sodium chloride, 10 mL, Intravenous, Q12H  tamsulosin, 0.4 mg, Oral, Nightly  timolol, 1 drop, Right Eye, BID      Continuous Infusions:Pharmacy Consult - Remdesivir,       PRN Meds:.   acetaminophen    albuterol sulfate HFA    benzonatate    senna-docusate sodium **AND** polyethylene glycol **AND** bisacodyl **AND** bisacodyl    dextromethorphan polistirex ER    Magnesium Standard Dose Replacement - Follow Nurse / BPA Driven Protocol    oxyCODONE-acetaminophen    Pharmacy Consult - Remdesivir    Potassium Replacement - Follow Nurse / BPA Driven Protocol    sodium chloride    sodium chloride    sodium chloride    Assessment & Plan   Assessment & Plan     Active Hospital Problems    Diagnosis  POA    **Acute hypoxemic respiratory failure due to COVID-19 [U07.1, J96.01]  Yes    Atrial fibrillation with RVR [I48.91]  Yes    Elevated troponin [R79.89]  Yes    Anemia, chronic disease [D63.8]  Yes    Hypothyroidism (acquired) [E03.9]  Yes    Hypokalemia [E87.6]  Yes    Hypertension [I10]  Yes    Gout [M10.9]  Yes      Resolved Hospital Problems   No resolved problems to display.        Brief Hospital Course to date:  George Lomeli is a 84 y.o. male  w/ a hx of PAF (on Eliquis), hx of AAA s/p repair, HTN, chronic anemia, hypothyroidism, hx of thyroid cancer, hx of lymphoma, gout who presented to the ED w/ c/o a cough.      **Acute hypoxic respiratory failure 2/2 COVID-19   -symptom onset ~ 2 days ago: cough and mild shortness of breath   -no prior COVID vaccine per pt   -COVID detected, negative for Flu A/B; full respiratory panel pcr pending   -Currently patient is on 2 L of nasal cannula and saturating well.  Tells me that he has improved.     **Atrial fibrillation w/ RVR   **Elevated troponin   **Hx of HTN   -initial EKG c/w A.Fib RVR; rates 's  -No chest pain and troponin is flat.  -Heart rate now is controlled.    **Hypokalemia   -K 3.0  -mag 1.7  -replace per protocol      **Hypothyroidism   **Hx of thyroid cancer   -continue synthroid      Chronic anemia   Gout   Chronic pain   -Continue home medication and monitor      Expected Discharge Location and Transportation: Home  Expected Discharge  in 2 to 3 days  Expected Discharge Date: 4/8/2025; Expected Discharge Time:      VTE Prophylaxis:  Pharmacologic & mechanical VTE prophylaxis orders are present.         AM-PAC 6 Clicks Score (PT): 17 (04/07/25 0487)    CODE STATUS:   Code Status and Medical Interventions: CPR (Attempt to Resuscitate); Full Support   Ordered at: 04/06/25 2058     Code Status (Patient has no pulse and is not breathing):    CPR (Attempt to Resuscitate)     Medical Interventions (Patient has pulse or is breathing):    Full Support     Level Of Support Discussed With:    Patient       Samir Muse MD  04/07/25

## 2025-04-07 NOTE — THERAPY EVALUATION
Patient Name: George Lomeli  : 1940    MRN: [id number]                              Today's Date: 2025       Admit Date: 2025    Visit Dx:     ICD-10-CM ICD-9-CM   1. Atrial fibrillation with RVR  I48.91 427.31   2. COVID-19  U07.1 079.89   3. Acute respiratory failure with hypoxia  J96.01 518.81   4. Anticoagulated  Z79.01 V58.61   5. Hypokalemia  E87.6 276.8   6. 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    Thyroid disease      Past Surgical History:   Procedure Laterality Date    ABDOMINAL AORTIC ANEURYSM REPAIR W/ ENDOLUMINAL GRAFT      BACK SURGERY      lumbar    CATARACT EXTRACTION EXTRACAPSULAR W/ INTRAOCULAR LENS IMPLANTATION      EYE SURGERY      MULTIPLE TOOTH EXTRACTIONS      NECK SURGERY      PINS/RODS IN NECK      General Information       Row Name 25 1021          OT Time and Intention    Document Type evaluation  -LR     Mode of Treatment occupational therapy  -LR       Row Name 25 1021          General Information    Patient Profile Reviewed yes  -LR     Prior Level of Function independent:  -LR     Existing Precautions/Restrictions fall;oxygen therapy device and L/min;other (see comments)  L knee swollen and painful, airborne precautions  -LR     Barriers to Rehab  medically complex;previous functional deficit  -LR       Row Name 04/07/25 1021          Living Environment    Current Living Arrangements home  -LR     People in Home spouse  -LR       Row Name 04/07/25 1021          Home Main Entrance    Number of Stairs, Main Entrance three  -LR     Stair Railings, Main Entrance railing on left side (ascending)  -LR       Row Name 04/07/25 1021          Stairs Within Home, Primary    Number of Stairs, Within Home, Primary eight  -LR     Stair Railings, Within Home, Primary railing on right side (ascending)  -LR       Row Name 04/07/25 1021          Cognition    Orientation Status (Cognition) oriented x 3  -LR       Row Name 04/07/25 1021          Safety Issues/Impairments Affecting Functional Mobility    Safety Issues Affecting Function (Mobility) safety precaution awareness;awareness of need for assistance;safety precautions follow-through/compliance;insight into deficits/self-awareness;sequencing abilities;judgment  -LR     Impairments Affecting Function (Mobility) balance;endurance/activity tolerance;pain;strength  -LR               User Key  (r) = Recorded By, (t) = Taken By, (c) = Cosigned By      Initials Name Provider Type    LR Rosalinda Carpenter OT Occupational Therapist                     Mobility/ADL's       Row Name 04/07/25 1153          Bed Mobility    Bed Mobility supine-sit  -LR     Supine-Sit [place] (Bed Mobility) standby assist  -LR     Assistive Device (Bed Mobility) bed rails;head of bed elevated  -       Row Name 04/07/25 1153          Transfers    Transfers sit-stand transfer;bed-chair transfer  -       Row Name 04/07/25 1153          Bed-Chair Transfer    Bed-Chair [place] (Transfers) minimum assist (75% patient effort);2 person assist;verbal cues  -       Row Name 04/07/25 1153          Sit-Stand Transfer    Sit-Stand [place] (Transfers) contact guard;1 person assist;verbal cues  -     Assistive Device (Sit-Stand Transfers) walker,  front-wheeled  -LR       Row Name 04/07/25 1153          Functional Mobility    Functional Mobility- Ind. Level minimum assist (75% patient effort);2 person assist required;verbal cues required  -LR     Functional Mobility- Device walker, front-wheeled  -LR     Functional Mobility-Distance (Feet) --  <HH distance  -LR     Patient was able to Ambulate yes  -LR       Row Name 04/07/25 1153          Activities of Daily Living    BADL Assessment/Intervention toileting;grooming;upper body dressing  -LR       Row Name 04/07/25 1153          Toileting Assessment/Training    [place] Level (Toileting) perform perineal hygiene;adjust/manage clothing;contact guard assist  -LR     Assistive Devices (Toileting) commode  -LR     Position (Toileting) supported standing  -LR     Comment, (Toileting) Pt req'd VC to keep hands on FWW when ambulating to bathroom  -LR       Row Name 04/07/25 1153          Grooming Assessment/Training    [place] Level (Grooming) wash face, hands;set up  -LR     Position (Grooming) supported sitting  -LR       Row Name 04/07/25 1153          Upper Body Dressing Assessment/Training    [place] Level (Upper Body Dressing) don;pajama/robe;minimum assist (75% patient effort)  -LR     Position (Upper Body Dressing) edge of bed sitting  -LR               User Key  (r) = Recorded By, (t) = Taken By, (c) = Cosigned By      Initials Name Provider Type    LR Rosalinda Carpenter, OT Occupational Therapist                   Obj/Interventions       Row Name 04/07/25 1303          Sensory Assessment (Somatosensory)    Sensory Assessment (Somatosensory) UE sensation intact  -       Row Name 04/07/25 1303          Vision Assessment/Intervention    Visual Impairment/Limitations WFL;corrective lenses full-time  -       Row Name 04/07/25 1303          Range of Motion Comprehensive    General Range of Motion bilateral upper extremity ROM WFL  -LR       Row Name 04/07/25 1303          Strength Comprehensive  (MMT)    General Manual Muscle Testing (MMT) Assessment upper extremity strength deficits identified  -LR     Comment, General Manual Muscle Testing (MMT) Assessment BUE grossly 3+/5 MMT  -LR       Row Name 04/07/25 1303          Balance    Balance Assessment sitting static balance;sitting dynamic balance;standing static balance;standing dynamic balance  -LR     Static Sitting Balance supervision  -LR     Dynamic Sitting Balance standby assist  -LR     Position, Sitting Balance unsupported;sitting edge of bed  -LR     Static Standing Balance minimal assist  -LR     Dynamic Standing Balance minimal assist;2-person assist;verbal cues  -LR     Position/Device Used, Standing Balance supported;walker, front-wheeled  -LR     Balance Interventions sitting;standing;sit to stand;supported;static;dynamic;occupation based/functional task  -LR               User Key  (r) = Recorded By, (t) = Taken By, (c) = Cosigned By      Initials Name Provider Type    LR Rosalinda Carpenter, OT Occupational Therapist                   Goals/Plan       Row Name 04/07/25 1311          Transfer Goal 1 (OT)    Activity/Assistive Device (Transfer Goal 1, OT) sit-to-stand/stand-to-sit;bed-to-chair/chair-to-bed;toilet  -LR     [place] Level/Cues Needed (Transfer Goal 1, OT) contact guard required  -LR     Time Frame (Transfer Goal 1, OT) long term goal (LTG);10 days  -LR     Progress/Outcome (Transfer Goal 1, OT) new goal;goal ongoing  -LR       Row Name 04/07/25 1311          Grooming Goal 1 (OT)    Activity/Device (Grooming Goal 1, OT) oral care;wash face, hands  -LR     [place] (Grooming Goal 1, OT) standby assist  -LR     Time Frame (Grooming Goal 1, OT) short term goal (STG);1 week  -LR     Strategies/Barriers (Grooming Goal 1, OT) sink side with O2 remaining above 90%  -LR     Progress/Outcome (Grooming Goal 1, OT) goal ongoing;new goal  -LR       Row Name 04/07/25 1311          Therapy Assessment/Plan (OT)    Planned Therapy  Interventions (OT) activity tolerance training;adaptive equipment training;BADL retraining;occupation/activity based interventions;strengthening exercise;functional balance retraining;IADL retraining;passive ROM/stretching;transfer/mobility retraining;ROM/therapeutic exercise;patient/caregiver education/training  -LR               User Key  (r) = Recorded By, (t) = Taken By, (c) = Cosigned By      Initials Name Provider Type    LR Rosalinda Carpenter, OT Occupational Therapist                   Clinical Impression       Row Name 04/07/25 1309          Pain Assessment    Pain Location head  -LR     Pain Side/Orientation generalized  -LR     Pain Management Interventions exercise or physical activity utilized  -LR     Response to Pain Interventions activity level improved;activity participation with tolerable pain  -LR     Pre/Posttreatment Pain Comment Pt complaining of head pain and L knee pain when ambulating  -LR       Row Name 04/07/25 8229          Pain Scale: FACES Pre/Post-Treatment    Pain: FACES Scale, Pretreatment 4-->hurts little more  -LR     Posttreatment Pain Rating 4-->hurts little more  -LR       Row Name 04/07/25 1308          Plan of Care Review    Plan of Care Reviewed With patient  -LR     Progress no change  -LR     Outcome Evaluation OT eval complete. Pt presents below baseline with elevated pain, decreased activity tolerance, strength, balance deficits, and ADL performance. Pt having head pain and L knee pain. SBA for bed mobility. Recommend IPOT POC and IRF at D/C, but will continue to monitor progress.  -LR       Row Name 04/07/25 8470          Therapy Assessment/Plan (OT)    Patient/Family Therapy Goal Statement (OT) PLOF  -LR     Rehab Potential (OT) good  -LR     Criteria for Skilled Therapeutic Interventions Met (OT) yes;skilled treatment is necessary  -LR     Therapy Frequency (OT) daily  -LR     Predicted Duration of Therapy Intervention (OT) 10 days  -LR       Row Name 04/07/25 4585           Therapy Plan Review/Discharge Plan (OT)    Anticipated Discharge Disposition (OT) inpatient rehabilitation facility  -LR       Row Name 04/07/25 1304          Vital Signs    Pre Systolic BP Rehab 143  -LR     Pre Treatment Diastolic   -LR     Intra Systolic BP Rehab 152  -LR     Intra Treatment Diastolic   -LR     Post Systolic BP Rehab 142  -LR     Post Treatment Diastolic   -LR     Pretreatment Heart Rate (beats/min) 86  -LR     Posttreatment Heart Rate (beats/min) 73  -LR     Pre SpO2 (%) 93  -LR     O2 Delivery Pre Treatment nasal cannula  -LR     O2 Delivery Intra Treatment nasal cannula  -LR     Post SpO2 (%) 100  -LR     O2 Delivery Post Treatment nasal cannula  -LR     Pre Patient Position Supine  -LR     Intra Patient Position Standing  -LR     Post Patient Position Sitting  -LR       Row Name 04/07/25 1304          Positioning and Restraints    Pre-Treatment Position in bed  -LR     Post Treatment Position chair  -LR     In Chair notified nsg;reclined;call light within reach;encouraged to call for assist;exit alarm on;waffle cushion;legs elevated  -LR               User Key  (r) = Recorded By, (t) = Taken By, (c) = Cosigned By      Initials Name Provider Type    LR Rosalinda Carpenter, OT Occupational Therapist                   Outcome Measures       Row Name 04/07/25 1312          How much help from another is currently needed...    Putting on and taking off regular lower body clothing? 3  -LR     Bathing (including washing, rinsing, and drying) 3  -LR     Toileting (which includes using toilet bed pan or urinal) 3  -LR     Putting on and taking off regular upper body clothing 3  -LR     Taking care of personal grooming (such as brushing teeth) 3  -LR     Eating meals 4  -LR     AM-PAC 6 Clicks Score (OT) 19  -LR       Row Name 04/07/25 1057 04/07/25 0900       How much help from another person do you currently need...    Turning from your back to your side while in flat bed without  using bedrails? 4  -SD 4  -AB    Moving from lying on back to sitting on the side of a flat bed without bedrails? 4  -SD 4  -AB    Moving to and from a bed to a chair (including a wheelchair)? 2  -SD 2  -AB    Standing up from a chair using your arms (e.g., wheelchair, bedside chair)? 3  -SD 3  -AB    Climbing 3-5 steps with a railing? 2  -SD 2  -AB    To walk in hospital room? 2  -SD 2  -AB    AM-PAC 6 Clicks Score (PT) 17  -SD 17  -AB    Highest Level of Mobility Goal 5 --> Static standing  -SD 5 --> Static standing  -AB      Row Name 04/07/25 1312 04/07/25 1057       Functional Assessment    Outcome Measure Options AM-PAC 6 Clicks Daily Activity (OT)  -LR AM-PAC 6 Clicks Basic Mobility (PT)  -SD              User Key  (r) = Recorded By, (t) = Taken By, (c) = Cosigned By      Initials Name Provider Type    Sulema Quintana, PT Physical Therapist    LR Rosalinda Carpenter, OT Occupational Therapist    Romelia Leblanc, RN Registered Nurse                    Occupational Therapy Education       Title: PT OT SLP Therapies (In Progress)       Topic: Occupational Therapy (In Progress)       Point: ADL training (In Progress)       Learning Progress Summary            Patient Acceptance, E, NR by LR at 4/7/2025 1312                      Point: Home exercise program (In Progress)       Learning Progress Summary            Patient Acceptance, E, NR by LR at 4/7/2025 1312                      Point: Precautions (In Progress)       Learning Progress Summary            Patient Acceptance, E, NR by LR at 4/7/2025 1312                      Point: Body mechanics (In Progress)       Learning Progress Summary            Patient Acceptance, E, NR by LR at 4/7/2025 1312                                      User Key       Initials Effective Dates Name Provider Type Discipline     10/09/24 -  Rosalinda Carpenter, OT Occupational Therapist OT                  OT Recommendation and Plan  Planned Therapy Interventions (OT):  activity tolerance training, adaptive equipment training, BADL retraining, occupation/activity based interventions, strengthening exercise, functional balance retraining, IADL retraining, passive ROM/stretching, transfer/mobility retraining, ROM/therapeutic exercise, patient/caregiver education/training  Therapy Frequency (OT): daily  Plan of Care Review  Plan of Care Reviewed With: patient  Progress: no change  Outcome Evaluation: OT eval complete. Pt presents below baseline with elevated pain, decreased activity tolerance, strength, balance deficits, and ADL performance. Pt having head pain and L knee pain. SBA for bed mobility. Recommend IPOT POC and IRF at D/C, but will continue to monitor progress.     Time Calculation:   Evaluation Complexity (OT)  Review Occupational Profile/Medical/Therapy History Complexity: brief/low complexity  Assessment, Occupational Performance/Identification of Deficit Complexity: 1-3 performance deficits  Clinical Decision Making Complexity (OT): problem focused assessment/low complexity  Overall Complexity of Evaluation (OT): low complexity     Time Calculation- OT       Row Name 04/07/25 1313             Time Calculation- OT    OT Start Time 0837  -LR      OT Received On 04/07/25  -LR      OT Goal Re-Cert Due Date 04/17/25  -LR         Timed Charges    45754 - OT Self Care/Mgmt Minutes 10  -LR         Untimed Charges    OT Eval/Re-eval Minutes 46  -LR         Total Minutes    Timed Charges Total Minutes 10  -LR      Untimed Charges Total Minutes 46  -LR       Total Minutes 56  -LR                User Key  (r) = Recorded By, (t) = Taken By, (c) = Cosigned By      Initials Name Provider Type    LR Rosalinda Carpenter OT Occupational Therapist                  Therapy Charges for Today       Code Description Service Date Service Provider Modifiers Qty    [id number]  OT SELF CARE/MGMT/TRAIN EA 15 MIN 4/7/2025 Rosalinda Carpenter OT GO 1    [id number] HC OT EVAL LOW COMPLEXITY 4  4/7/2025 Jayden, Rosalinda Bertrand, OT GO 1                 Rosalinda Carpenter, OT  4/7/2025   no hearing difficulty/no ear pain/no tinnitus/no vertigo/no sinus symptoms/no nasal congestion/no nasal discharge/no nasal obstruction/no throat pain/no dysphagia

## 2025-04-07 NOTE — PLAN OF CARE
Goal Outcome Evaluation:  Plan of Care Reviewed With: patient        Progress: no change            Problem: Adult Inpatient Plan of Care  Goal: Plan of Care Review  Outcome: Progressing  Flowsheets (Taken 4/7/2025 0456)  Progress: no change  Plan of Care Reviewed With: patient  Goal: Patient-Specific Goal (Individualized)  Outcome: Progressing  Goal: Absence of Hospital-Acquired Illness or Injury  Outcome: Progressing  Intervention: Identify and Manage Fall Risk  Recent Flowsheet Documentation  Taken 4/7/2025 0406 by Regan Ca RN  Safety Promotion/Fall Prevention:   activity supervised   clutter free environment maintained   fall prevention program maintained   nonskid shoes/slippers when out of bed   safety round/check completed  Taken 4/7/2025 0200 by Regan Ca RN  Safety Promotion/Fall Prevention:   activity supervised   clutter free environment maintained   fall prevention program maintained   nonskid shoes/slippers when out of bed   safety round/check completed  Taken 4/7/2025 0000 by Regan Ca RN  Safety Promotion/Fall Prevention:   activity supervised   clutter free environment maintained   fall prevention program maintained   nonskid shoes/slippers when out of bed   safety round/check completed  Taken 4/6/2025 2200 by Regan Ca RN  Safety Promotion/Fall Prevention:   activity supervised   clutter free environment maintained   fall prevention program maintained   nonskid shoes/slippers when out of bed   safety round/check completed  Intervention: Prevent Skin Injury  Recent Flowsheet Documentation  Taken 4/7/2025 0406 by Regan Ca RN  Body Position: position changed independently  Skin Protection:   incontinence pads utilized   silicone foam dressing in place  Taken 4/7/2025 0200 by Regan Ca RN  Body Position: position changed independently  Skin Protection:   incontinence pads utilized   silicone foam dressing in place  Taken 4/7/2025 0000 by Regan Ca RN  Body Position:  position changed independently  Skin Protection:   incontinence pads utilized   silicone foam dressing in place  Taken 4/6/2025 2200 by Regan Ca RN  Body Position: position changed independently  Skin Protection:   incontinence pads utilized   silicone foam dressing in place  Intervention: Prevent and Manage VTE (Venous Thromboembolism) Risk  Recent Flowsheet Documentation  Taken 4/6/2025 2200 by Regan Ca RN  VTE Prevention/Management: (pharmalogical) other (see comments)  Intervention: Prevent Infection  Recent Flowsheet Documentation  Taken 4/7/2025 0406 by Regan Ca RN  Infection Prevention:   hand hygiene promoted   rest/sleep promoted  Taken 4/7/2025 0200 by Regan aC RN  Infection Prevention:   hand hygiene promoted   rest/sleep promoted  Taken 4/7/2025 0000 by Regan Ca RN  Infection Prevention:   hand hygiene promoted   rest/sleep promoted  Taken 4/6/2025 2200 by Regan Ca RN  Infection Prevention:   hand hygiene promoted   rest/sleep promoted  Goal: Optimal Comfort and Wellbeing  Outcome: Progressing  Intervention: Provide Person-Centered Care  Recent Flowsheet Documentation  Taken 4/6/2025 2200 by Regan Ca RN  Trust Relationship/Rapport:   care explained   choices provided  Goal: Readiness for Transition of Care  Outcome: Progressing  Intervention: Mutually Develop Transition Plan  Recent Flowsheet Documentation  Taken 4/6/2025 2124 by Regan Ca RN  Transportation Anticipated: family or friend will provide  Patient/Family Anticipated Services at Transition: none  Patient/Family Anticipates Transition to: home with family  Taken 4/6/2025 2122 by Regan Ca RN  Equipment Currently Used at Home: cane, straight

## 2025-04-07 NOTE — H&P
[facility] Medicine Services  HISTORY AND PHYSICAL    Patient Name: George Lomeli  : 1940  MRN: [id number]  Primary Care Physician: Linn Salas APRN  Date of admission: 2025    Subjective   Subjective     Chief Complaint:  Cough     HPI:  George Lomeli is a 84 y.o. male w/ a hx of PAF (on Eliquis), hx of AAA s/p repair, HTN, chronic anemia, hypothyroidism, hx of thyroid cancer, hx of lymphoma, gout who presented to the ED w/ c/o a cough.   Pt c/o a persistent cough w/ mild shortness of breath x 2 days. Pt denies fever, chills, chest pain, palpitations, N/V/D, abdominal pain, urinary symptoms, edema.   Pt evaluated in the ED. COVID-19 detected. Noted to be 87% on room air in the ED, placed on 2 liters supplemental oxygen. CXR unremarkable. EKG c/w A.Fib RVR (rate 's). Potassium low at 3.0. Pt admitted to the hospital medicine service for further evaluation.     Review of Systems   Constitutional: Negative.  Negative for chills and fever.   HENT: Negative.  Negative for congestion, postnasal drip, rhinorrhea, sinus pain and trouble swallowing.    Eyes: Negative.  Negative for visual disturbance.   Respiratory:  Positive for cough and shortness of breath. Negative for wheezing.    Cardiovascular: Negative.  Negative for chest pain, palpitations and leg swelling.   Gastrointestinal: Negative.  Negative for abdominal distention, abdominal pain, constipation, diarrhea, nausea and vomiting.   Endocrine: Negative.    Genitourinary: Negative.  Negative for decreased urine volume, difficulty urinating, dysuria, frequency and urgency.   Musculoskeletal: Negative.  Negative for arthralgias and myalgias.   Skin: Negative.  Negative for wound.   Allergic/Immunologic: Negative.  Negative for immunocompromised state.   Neurological: Negative.  Negative for dizziness, syncope, weakness, light-headedness and headaches.   Hematological: Negative.  Does not bruise/bleed easily.    Psychiatric/Behavioral: Negative.  Negative for confusion.    All other systems reviewed and are negative.     Personal History     Past Medical History:   Diagnosis Date    AAA (abdominal aortic aneurysm)     Arthritis     Broken neck     Gout     Hypertension     Lymphoma     Lymphoma     had radiation    Migraine     Sciatica     Thyroid cancer     ???    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     2/9/2023 - 3/1/2023 Radiation    Radiation OncologyTreatment Course:  George Lomeli received 3000 cGy in 15 fractions to sacrum via External Beam Radiation - EBRT.       Past Surgical History:   Procedure Laterality Date    ABDOMINAL AORTIC ANEURYSM REPAIR W/ ENDOLUMINAL GRAFT      BACK SURGERY      lumbar    CATARACT EXTRACTION EXTRACAPSULAR W/ INTRAOCULAR LENS IMPLANTATION      EYE SURGERY      MULTIPLE TOOTH EXTRACTIONS      NECK SURGERY      PINS/RODS IN NECK     Family History: family history includes Hypertension in his father, mother, and sister.     Social History:  reports that he quit smoking about 28 years ago. His smoking use included cigarettes. He started smoking about 43 years ago. He has a 15 pack-year smoking history. He has been exposed to tobacco smoke. He has never used smokeless tobacco. He reports that he does not drink alcohol and does not use drugs.  Social History     Social History Narrative    Lives in [place].     Medications:  DULoxetine, albuterol,  albuterol sulfate HFA, allopurinol, amLODIPine, apixaban, benzonatate, carvedilol, docusate sodium, fluticasone-salmeterol, gabapentin, levothyroxine, lisinopril-hydrochlorothiazide, oxyCODONE-acetaminophen, potassium chloride, tamsulosin, and timolol    Allergies   Allergen Reactions    Contrast Dye (Echo Or Unknown Ct/Mr) Nausea And Vomiting     Objective   Objective     Vital Signs:   Temp:  [100 °F (37.8 °C)] 100 °F (37.8 °C)  Heart Rate:  [] 100  Resp:  [20] 20  BP: ()/() 103/86    Physical Exam     Constitutional: Awake, alert; chronically ill appearing   Eyes: PERRLA, sclerae anicteric, no conjunctival injection  HENT: NCAT, mucous membranes moist  Neck: Supple, no thyromegaly, no lymphadenopathy, trachea midline  Respiratory: Clear to auscultation bilaterally, nonlabored respirations   Cardiovascular: RRR, no murmurs, rubs, or gallops, BLE without pitting edema   Gastrointestinal: Positive bowel sounds, soft, nontender, nondistended  Musculoskeletal: Normal ROM bilaterally   Psychiatric: Appropriate affect, cooperative  Neurologic: Oriented x 3, strength symmetric in all extremities, speech clear  Skin: No rashes, lesions or wounds     Result Review:  I have personally reviewed the results from the time of this admission to 4/6/2025 21:07 EDT and agree with these findings:  [x]  Laboratory list / accordion  []  Microbiology  [x]  Radiology  [x]  EKG/Telemetry   []  Cardiology/Vascular   []  Pathology  [x]  Old records    LAB RESULTS:      Lab 04/06/25  1448   WBC 7.23   HEMOGLOBIN 10.4*   HEMATOCRIT 32.9*   PLATELETS 196   NEUTROS ABS 5.89   IMMATURE GRANS (ABS) 0.02   LYMPHS ABS 0.42*   MONOS ABS 0.86   EOS ABS 0.03   MCV 91.6   PROCALCITONIN 0.08   LACTATE 1.2         Lab 04/06/25  1704 04/06/25  1448   SODIUM  --  141   POTASSIUM  --  3.0*   CHLORIDE  --  101   CO2  --  29.0   ANION GAP  --  11.0   BUN  --  15   CREATININE  --  1.06   EGFR  --  69.2   GLUCOSE  --  94   CALCIUM  --  9.5    MAGNESIUM 1.7  --          Lab 04/06/25  1448   TOTAL PROTEIN 6.7   ALBUMIN 4.0   GLOBULIN 2.7   ALT (SGPT) 7   AST (SGOT) 17   BILIRUBIN 0.5   ALK PHOS 91         Lab 04/06/25  1704 04/06/25  1448   PROBNP  --  611.4   HSTROP T 30* 30*                 Brief Urine Lab Results  (Last result in the past 365 days)        Color   Clarity   Blood   Leuk Est   Nitrite   Protein   CREAT   Urine HCG        04/06/25 1450 Yellow   Clear   Negative   Trace   Negative   Negative                 Microbiology Results (last 10 days)       Procedure Component Value - Date/Time    COVID PRE-OP / PRE-PROCEDURE SCREENING ORDER (NO ISOLATION) - Swab, Nasopharynx [[id number]]  (Abnormal) Collected: 04/06/25 1448    Lab Status: Final result Specimen: Swab from Nasopharynx Updated: 04/06/25 1533    Narrative:      The following orders were created for panel order COVID PRE-OP / PRE-PROCEDURE SCREENING ORDER (NO ISOLATION) - Swab, Nasopharynx.  Procedure                               Abnormality         Status                     ---------                               -----------         ------                     COVID-19 and FLU A/B PCR...[[id number]]  Abnormal            Final result                 Please view results for these tests on the individual orders.    COVID-19 and FLU A/B PCR, 1 HR TAT - Swab, Nasopharynx [[id number]]  (Abnormal) Collected: 04/06/25 1448    Lab Status: Final result Specimen: Swab from Nasopharynx Updated: 04/06/25 1533     COVID19 Detected     Influenza A PCR Not Detected     Influenza B PCR Not Detected    Narrative:      Fact sheet for providers: https://www.fda.gov/media/339368/download    Fact sheet for patients: https://www.fda.gov/media/873879/download    Test performed by PCR.  Influenza A and Influenza B negative results should be considered presumptive in samples that have a positive SARS-CoV-2 result.    Competitive inhibition studies showed that SARS-CoV-2 virus, when present at concentrations  above 3.6E+04 copies/mL, can inhibit the detection and amplification of influenza A and influenza B virus RNA if present at or below 1.8E+02 copies/mL or 4.9E+02 copies/mL, respectively, and may lead to false negative influenza virus results. If co-infection with influenza A or influenza B virus is suspected in samples with a positive SARS-CoV-2 result, the sample should be re-tested with another FDA cleared, approved, or authorized influenza test, if influenza virus detection would change clinical management.          XR Chest 1 View  Result Date: 4/6/2025  XR CHEST 1 VW Date of Exam: 4/6/2025 3:23 PM EDT Indication: SOA triage protocol Comparison: CT 1/2/2025. Findings: Emphysematous changes are again noted. There is no evidence of new focal opacity. There is no effusion or pneumothorax. Unchanged cardiac enlargement.     Impression: Impression: Stable chest without evidence of acute disease. Electronically Signed: Rah Peña MD  4/6/2025 4:53 PM EDT  Workstation ID: [id number]    Results for orders placed during the hospital encounter of 11/21/23    Adult Transthoracic Echo Complete W/ Cont if Necessary Per Protocol    Interpretation Summary    Left ventricular systolic function is normal. Calculated left ventricular EF = 53% Left ventricular ejection fraction appears to be 51 - 55%.    Left ventricular diastolic function is consistent with (grade I) impaired relaxation.    The right ventricular cavity is mildly dilated.    The right atrial cavity is dilated.    Estimated right ventricular systolic pressure from tricuspid regurgitation is normal (<35 mmHg).    Assessment & Plan   Assessment & Plan       Acute hypoxemic respiratory failure due to COVID-19    Hypertension    Gout    Atrial fibrillation with RVR    Elevated troponin    Anemia, chronic disease    Hypothyroidism (acquired)    Hypokalemia    George Lomeli is a 84 y.o. male w/ a hx of PAF (on Eliquis), hx of AAA s/p repair, HTN, chronic anemia,  hypothyroidism, hx of thyroid cancer, hx of lymphoma, gout who presented to the ED w/ c/o a cough.     **Acute hypoxic respiratory failure 2/2 COVID-19   -symptom onset ~ 2 days ago: cough and mild shortness of breath   -no prior COVID vaccine per pt   -COVID detected, negative for Flu A/B; full respiratory panel pcr pending   -noted to be 87% on room air, placed on 2 liters supplemental oxygen   -CXR unremarkable   -blood cx pending   -WBC, lactic acid and procal all WNL   -symptom mgt  -s/p 1 liter NS bolus given in ED  -holding on further IVF for now; encourage oral intake   -Remdesivir (pharm to dose)   -Decadron   -repeat labs in am   -pt/ot and case mgt consult     **Atrial fibrillation w/ RVR   **Elevated troponin   **Hx of HTN   -initial EKG c/w A.Fib RVR; rates 's  -current HR in 80's   -repeat EKG in am   -troponin 30, 30; repeat in am   -proBNP 611.4  -continue routine Eliquis   -continue routine Norvasc, Coreg, Lisinopril/HCTZ w/ hold parameters   -monitor I/Os     **Hypokalemia   -K 3.0  -mag 1.7  -replace per protocol     **Hypothyroidism   **Hx of thyroid cancer   -continue synthroid   -tsh pending     **Chronic anemia   -previous H/H 10.9/33.9 on 1/14/25, 10.7/32.9 in August 2024  -current H/H 10.4/32.9  -no recent anemia panel on record  -anemia panel pending   -monitor     **Gout   -continue Allopurinol     **Chronic pain   -continue PRN Percocet    DVT prophylaxis:  Eliquis     CODE STATUS:    Code Status (Patient has no pulse and is not breathing): CPR (Attempt to Resuscitate)  Medical Interventions (Patient has pulse or is breathing): Full Support  Level Of Support Discussed With: Patient      Expected Discharge  Expected Discharge Date: 4/8/2025; Expected Discharge Time:     Signature: Electronically signed by YOVANI Durant, 04/06/25, 9:08 PM EDT.    Time spent: 55 minutes

## 2025-04-07 NOTE — CONSULTS
Patient welcomed a spiritual care visit, he's feeling lonely and misses his family. Patient shared about the love of his family and is worried someone in his family may get sick if they visit him. He became emotional and tearful during my visit.  provided supportive conversation and active listening.

## 2025-04-07 NOTE — PLAN OF CARE
Problem: Adult Inpatient Plan of Care  Goal: Optimal Comfort and Wellbeing  Outcome: Progressing  Intervention: Monitor Pain and Promote Comfort  Recent Flowsheet Documentation  Taken 4/7/2025 1600 by Romelia Seay RN  Pain Management Interventions: pain management plan reviewed with patient/caregiver  Taken 4/7/2025 1400 by Romelia Seay RN  Pain Management Interventions: care clustered  Taken 4/7/2025 1015 by Romelia Seay RN  Pain Management Interventions: pain medication given  Taken 4/7/2025 0945 by Romelia Seay RN  Pain Management Interventions: pain management plan reviewed with patient/caregiver  Intervention: Provide Person-Centered Care  Recent Flowsheet Documentation  Taken 4/7/2025 0900 by Romelia Seay RN  Trust Relationship/Rapport: care explained     Problem: Fall Injury Risk  Goal: Absence of Fall and Fall-Related Injury  Outcome: Progressing  Intervention: Identify and Manage Contributors  Recent Flowsheet Documentation  Taken 4/7/2025 1800 by Romelia Seay RN  Medication Review/Management: medications reviewed  Taken 4/7/2025 1600 by Romelia Seay RN  Medication Review/Management: medications reviewed  Taken 4/7/2025 1400 by Romelia Seay RN  Medication Review/Management: medications reviewed  Taken 4/7/2025 1200 by Romelia Seay RN  Medication Review/Management: medications reviewed  Taken 4/7/2025 0900 by Romelia Seay RN  Medication Review/Management: medications reviewed  Intervention: Promote Injury-Free Environment  Recent Flowsheet Documentation  Taken 4/7/2025 1800 by Romelia Seay RN  Safety Promotion/Fall Prevention:   safety round/check completed   nonskid shoes/slippers when out of bed   fall prevention program maintained   clutter free environment maintained  Taken 4/7/2025 1600 by Romelia Seay RN  Safety Promotion/Fall Prevention:   safety round/check completed   nonskid shoes/slippers when out of bed   fall prevention program maintained   clutter free  environment maintained  Taken 4/7/2025 1400 by Romelia Seay, RN  Safety Promotion/Fall Prevention:   safety round/check completed   nonskid shoes/slippers when out of bed   fall prevention program maintained   clutter free environment maintained  Taken 4/7/2025 1200 by Romelia Seay, RN  Safety Promotion/Fall Prevention:   safety round/check completed   nonskid shoes/slippers when out of bed   fall prevention program maintained   clutter free environment maintained   Goal Outcome Evaluation:              Outcome Evaluation: VSS on RA. C/O pain in left knee, neck, back. 1 PRN dose of pain meds given per MAR. Adequate UOP. Decreased appetite, supplemental drink added for increased nutrition. Potassium replaced, redraw scheduled for AM. A/O x4 throughout shift. No changes to current POC needed at this time. Will continue to monitor.

## 2025-04-07 NOTE — THERAPY EVALUATION
Patient Name: George Lomeli  : 1940    MRN: [id number]                              Today's Date: 2025       Admit Date: 2025    Visit Dx:     ICD-10-CM ICD-9-CM   1. Atrial fibrillation with RVR  I48.91 427.31   2. COVID-19  U07.1 079.89   3. Acute respiratory failure with hypoxia  J96.01 518.81   4. Anticoagulated  Z79.01 V58.61   5. Hypokalemia  E87.6 276.8   6. 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    Thyroid disease      Past Surgical History:   Procedure Laterality Date    ABDOMINAL AORTIC ANEURYSM REPAIR W/ ENDOLUMINAL GRAFT      BACK SURGERY      lumbar    CATARACT EXTRACTION EXTRACAPSULAR W/ INTRAOCULAR LENS IMPLANTATION      EYE SURGERY      MULTIPLE TOOTH EXTRACTIONS      NECK SURGERY      PINS/RODS IN NECK      General Information       Row Name 25 0844          Physical Therapy Time and Intention    Document Type evaluation  -SD     Mode of Treatment physical therapy  -SD       Row Name 25 0844          General Information    Patient Profile Reviewed yes  -SD     Prior Level of Function independent:;all household mobility;gait;transfer;bed mobility;ADL's;dependent:;community mobility  Pt amb within home with cane, uses scooter in community  -SD     Existing  Precautions/Restrictions fall;oxygen therapy device and L/min  L knee swollen and painful, airborne precautions  -SD     Barriers to Rehab medically complex;previous functional deficit;environmental barriers  -SD       Row Name 04/07/25 0844          Living Environment    Current Living Arrangements home  -SD     People in Home spouse  -SD       Row Name 04/07/25 0844          Home Main Entrance    Number of Stairs, Main Entrance three  -SD     Stair Railings, Main Entrance railing on left side (ascending)  -SD       Row Name 04/07/25 0844          Stairs Within Home, Primary    Stairs, Within Home, Primary 8  -SD     Number of Stairs, Within Home, Primary eight  -SD       Row Name 04/07/25 0844          Cognition    Orientation Status (Cognition) oriented x 3  -SD       Row Name 04/07/25 0844          Safety Issues/Impairments Affecting Functional Mobility    Safety Issues Affecting Function (Mobility) awareness of need for assistance;insight into deficits/self-awareness;judgment;positioning of assistive device;problem-solving;safety precautions follow-through/compliance;safety precaution awareness  -SD     Impairments Affecting Function (Mobility) balance;endurance/activity tolerance;pain;strength  -SD               User Key  (r) = Recorded By, (t) = Taken By, (c) = Cosigned By      Initials Name Provider Type    SD Sulema Mcmahan, IRIS Physical Therapist                   Mobility       Row Name 04/07/25 1047          Bed Mobility    Bed Mobility supine-sit  -SD     Supine-Sit [place] (Bed Mobility) standby assist  -SD     Assistive Device (Bed Mobility) bed rails;head of bed elevated  -SD     Comment, (Bed Mobility) cues for sequencing  -SD       Row Name 04/07/25 1047          Transfers    Comment, (Transfers) CGA from EOB with cues for safe hand placement  -SD       Row Name 04/07/25 1047          Sit-Stand Transfer    Sit-Stand [place] (Transfers) contact guard;1 person assist;verbal cues  -SD      Assistive Device (Sit-Stand Transfers) walker, front-wheeled  -SD       Row Name 04/07/25 1047          Gait/Stairs (Locomotion)    [place] Level (Gait) minimum assist (75% patient effort);2 person assist;verbal cues;nonverbal cues (demo/gesture)  -SD     Assistive Device (Gait) walker, front-wheeled  -SD     Patient was able to Ambulate yes  -SD     Distance in Feet (Gait) 15  -SD     Deviations/Abnormal Patterns (Gait) bilateral deviations;base of support, wide;daniela decreased;gait speed decreased;festinating/shuffling  -SD     Bilateral Gait Deviations forward flexed posture;heel strike decreased  -SD     Left Sided Gait Deviations weight shift ability decreased;decreased knee extension  -SD     Comment, (Gait/Stairs) Pt amb a short distance in room with RW and assist x2 due to significant unsteadiness and L knee instability. Pt needed cues for safe placement of RW. Pt fatigued quickly.  -SD               User Key  (r) = Recorded By, (t) = Taken By, (c) = Cosigned By      Initials Name Provider Type    SD Sulema Mcmahan, PT Physical Therapist                   Obj/Interventions       Row Name 04/07/25 1050          Range of Motion Comprehensive    General Range of Motion lower extremity range of motion deficits identified  -SD     Comment, General Range of Motion decreased bilat knee ext (L knee worse).  -SD       Row Name 04/07/25 1050          Strength Comprehensive (MMT)    General Manual Muscle Testing (MMT) Assessment lower extremity strength deficits identified  -SD     Comment, General Manual Muscle Testing (MMT) Assessment LLE 4-/5, RLE 4/5  -SD       Row Name 04/07/25 1050          Balance    Balance Assessment sitting static balance;standing static balance  -SD     Static Sitting Balance supervision  -SD     Position, Sitting Balance unsupported;sitting edge of bed  -SD     Static Standing Balance minimal assist;1-person assist  -SD     Position/Device Used, Standing Balance  supported;walker, front-wheeled  -SD               User Key  (r) = Recorded By, (t) = Taken By, (c) = Cosigned By      Initials Name Provider Type    SD Sulema Mcmahan, PT Physical Therapist                   Goals/Plan       Row Name 04/07/25 1056          Bed Mobility Goal 1 (PT)    Activity/Assistive Device (Bed Mobility Goal 1, PT) sit to supine;supine to sit  -SD     [place] Level/Cues Needed (Bed Mobility Goal 1, PT) modified independence  -SD     Time Frame (Bed Mobility Goal 1, PT) short term goal (STG);3 days  -SD       Row Name 04/07/25 1056          Transfer Goal 1 (PT)    Activity/Assistive Device (Transfer Goal 1, PT) sit-to-stand/stand-to-sit;bed-to-chair/chair-to-bed  -SD     [place] Level/Cues Needed (Transfer Goal 1, PT) supervision required  -SD     Time Frame (Transfer Goal 1, PT) long term goal (LTG);10 days  -SD       Row Name 04/07/25 1056          Gait Training Goal 1 (PT)    Activity/Assistive Device (Gait Training Goal 1, PT) gait (walking locomotion);cane, straight  -SD     [place] Level (Gait Training Goal 1, PT) standby assist  -SD     Distance (Gait Training Goal 1, PT) 150  -SD     Time Frame (Gait Training Goal 1, PT) long term goal (LTG);10 days  -SD       [facility] Name 04/07/25 1056          Stairs Goal 1 (PT)    Activity/Assistive Device (Stairs Goal 1, PT) ascending stairs;descending stairs  -SD     [place] Level/Cues Needed (Stairs Goal 1, PT) standby assist  -SD     Number of Stairs (Stairs Goal 1, PT) 8  -SD     Time Frame (Stairs Goal 1, PT) long term goal (LTG);10 days  -SD       Row Name 04/07/25 1056          Therapy Assessment/Plan (PT)    Planned Therapy Interventions (PT) balance training;bed mobility training;gait training;home exercise program;patient/family education;neuromuscular re-education;transfer training;strengthening;stair training  -SD               User Key  (r) = Recorded By, (t) = Taken By, (c) = Cosigned By      Initials Name Provider  Type    SD Sulema Mcmahan, PT Physical Therapist                   Clinical Impression       Row Name 04/07/25 1051          Pain    Pain Location head  -SD     Pain Management Interventions exercise or physical activity utilized  -SD     Response to Pain Interventions activity participation with tolerable pain  -SD     Additional Documentation Pain Scale: FACES Pre/Post-Treatment (Group)  -SD       Row Name 04/07/25 1051          Pain Scale: FACES Pre/Post-Treatment    Pain: FACES Scale, Pretreatment 4-->hurts little more  -SD     Posttreatment Pain Rating 4-->hurts little more  -SD       Row Name 04/07/25 1051          Plan of Care Review    Plan of Care Reviewed With patient  -SD     Outcome Evaluation Pt presents with weakness, decreased activity sandra, difficulty walking and is below baseline level of function for mobility. Pt amb a short distance in room with RW with Oscar x2, dem impaired balance, knee instability, and unsteadiness on his feet. Pt will benefit from IPPT services to address limitations. PT rec d/c IRF.  -SD       Row Name 04/07/25 1051          Therapy Assessment/Plan (PT)    Patient/Family Therapy Goals Statement (PT) return home  -SD     Rehab Potential (PT) good  -SD     Criteria for Skilled Interventions Met (PT) yes;skilled treatment is necessary  -SD     Therapy Frequency (PT) daily  -SD       Row Name 04/07/25 1051          Vital Signs    Pre Systolic BP Rehab 140  -SD     Pre Treatment Diastolic BP 94  -SD     Post Systolic BP Rehab 142  -SD     Post Treatment Diastolic   -SD     Pretreatment Heart Rate (beats/min) 76  -SD     Posttreatment Heart Rate (beats/min) 74  -SD     Pre SpO2 (%) 99  -SD     O2 Delivery Pre Treatment nasal cannula  -SD     Post SpO2 (%) 97  -SD     O2 Delivery Post Treatment nasal cannula  -SD     Pre Patient Position Supine  -SD     Post Patient Position Sitting  -SD       Row Name 04/07/25 1051          Positioning and Restraints    Pre-Treatment  Position in bed  -SD     Post Treatment Position chair  -SD     In Chair notified nsg;reclined;call light within reach;encouraged to call for assist;exit alarm on;waffle cushion  -SD               User Key  (r) = Recorded By, (t) = Taken By, (c) = Cosigned By      Initials Name Provider Type    Sulema Quintana PT Physical Therapist                   Outcome Measures       Row Name 04/07/25 1057          How much help from another person do you currently need...    Turning from your back to your side while in flat bed without using bedrails? 4  -SD     Moving from lying on back to sitting on the side of a flat bed without bedrails? 4  -SD     Moving to and from a bed to a chair (including a wheelchair)? 2  -SD     Standing up from a chair using your arms (e.g., wheelchair, bedside chair)? 3  -SD     Climbing 3-5 steps with a railing? 2  -SD     To walk in hospital room? 2  -SD     AM-PAC 6 Clicks Score (PT) 17  -SD     Highest Level of Mobility Goal 5 --> Static standing  -SD       Row Name 04/07/25 1057          Functional Assessment    Outcome Measure Options AM-PAC 6 Clicks Basic Mobility (PT)  -SD               User Key  (r) = Recorded By, (t) = Taken By, (c) = Cosigned By      Initials Name Provider Type    Sulema Quintana PT Physical Therapist                                 Physical Therapy Education       Title: PT OT SLP Therapies (In Progress)       Topic: Physical Therapy (In Progress)       Point: Mobility training (Done)       Learning Progress Summary            Patient Acceptance, E, VU,NR by SD at 4/7/2025 1058                      Point: Home exercise program (Not Started)       Learner Progress:  Not documented in this visit.              Point: Body mechanics (Done)       Learning Progress Summary            Patient Acceptance, E, VU,NR by SD at 4/7/2025 1058                      Point: Precautions (Not Started)       Learner Progress:  Not documented in this visit.                               User Key       Initials Effective Dates Name Provider Type Discipline    SD 03/13/23 -  Sulema Mcmahan PT Physical Therapist PT                  PT Recommendation and Plan  Planned Therapy Interventions (PT): balance training, bed mobility training, gait training, home exercise program, patient/family education, neuromuscular re-education, transfer training, strengthening, stair training  Outcome Evaluation: Pt presents with weakness, decreased activity sandra, difficulty walking and is below baseline level of function for mobility. Pt amb a short distance in room with RW with Oscar x2, dem impaired balance, knee instability, and unsteadiness on his feet. Pt will benefit from IPPT services to address limitations. PT rec d/c IRF.     Time Calculation:   PT Evaluation Complexity  History, PT Evaluation Complexity: 3 or more personal factors and/or comorbidities  Examination of Body Systems (PT Eval Complexity): total of 3 or more elements  Clinical Presentation (PT Evaluation Complexity): evolving  Clinical Decision Making (PT Evaluation Complexity): moderate complexity  Overall Complexity (PT Evaluation Complexity): moderate complexity     PT Charges       Row Name 04/07/25 1058             Time Calculation    Start Time 0844  -SD      PT Non-Billable Time (min) 56 min  -SD      PT Received On 04/07/25  -SD      PT Goal Re-Cert Due Date 04/17/25  -SD                User Key  (r) = Recorded By, (t) = Taken By, (c) = Cosigned By      Initials Name Provider Type    SD Sulema Mcmahan PT Physical Therapist                  Therapy Charges for Today       Code Description Service Date Service Provider Modifiers Qty    74153409798 HC PT EVAL MOD COMPLEXITY 4 4/7/2025 Sulema Mcmahan PT GP 1            PT G-Codes  Outcome Measure Options: AM-PAC 6 Clicks Basic Mobility (PT)  AM-PAC 6 Clicks Score (PT): 17  PT Discharge Summary  Anticipated Discharge Disposition (PT): inpatient rehabilitation  facility    Sulema Mcmahan, PT  4/7/2025

## 2025-04-07 NOTE — PLAN OF CARE
Goal Outcome Evaluation:  Plan of Care Reviewed With: patient           Outcome Evaluation: Pt presents with weakness, decreased activity sandra, difficulty walking and is below baseline level of function for mobility. Pt amb a short distance in room with RW with Oscar x2, dem impaired balance, knee instability, and unsteadiness on his feet. Pt will benefit from IPPT services to address limitations. PT rec d/c IRF.    Anticipated Discharge Disposition (PT): inpatient rehabilitation facility

## 2025-04-07 NOTE — CONSULTS
"          Clinical Nutrition Assessment     Patient Name: George Lomeli  YOB: 1940  MRN: [id number]  Date of Encounter: 04/07/25 16:31 EDT  Admission date: 4/6/2025  Reason for Visit: Identified at risk by screening criteria, MST score 2+, Consult, Reduced oral intake, \"Unsure\" unintentional weight loss    Assessment   Nutrition Assessment   Admission Diagnosis:  Acute hypoxemic respiratory failure due to COVID-19 [U07.1, J96.01]    Problem List:    Acute hypoxemic respiratory failure due to COVID-19    Hypertension    Gout    Atrial fibrillation with RVR    Elevated troponin    Anemia, chronic disease    Hypothyroidism (acquired)    Hypokalemia      PMH:   He  has a past medical history of AAA (abdominal aortic aneurysm), Arthritis, Broken neck, Gout, Hypertension, Lymphoma, Lymphoma, Migraine, Sciatica, Thyroid cancer, and Thyroid disease.    PSH:  He  has a past surgical history that includes Neck surgery; Back surgery; Eye surgery; Multiple tooth extractions; Cataract extraction, extracapsular w/ intraocular lens implant; and Abdominal aortic aneurysm repair w/ endoluminal graft.    Applicable Nutrition History:     Anthropometrics     Height: Height: 177.8 cm (70\")  Last Filed Weight: Weight: 71.1 kg (156 lb 11.2 oz) (04/07/25 0523)  Method: Weight Method: Bed scale  BMI: BMI (Calculated): 22.5    UBW:  189lb ~ 1 yr ago  Weight change: weight loss of 9 lbs (5.4%) over 1 year(s)    Significant?  No     Weight       Weight (kg) Weight (lbs) Weight Method Visit Report   1/11/2024 76.204 kg  168 lb   --    2/9/2024 79.379 kg  175 lb  Stated     2/21/2024 81.194 kg  179 lb  Stated     4/30/2024 81.2 kg  179 lb 0.2 oz  Stated     6/24/2024 74.844 kg  165 lb   --    6/27/2024 75.433 kg  166 lb 4.8 oz   --    8/17/2024 77.111 kg  170 lb  Stated     8/21/2024 77.111 kg  170 lb      1/14/2025 76.204 kg  168 lb   --    1/28/2025 70.943 kg  156 lb 6.4 oz   --    1/30/2025 72.122 kg  159 lb   --    4/6/2025 " "67.3 kg  148 lb 5.9 oz       73.71 kg  162 lb 8 oz      4/7/2025 71.079 kg  156 lb 11.2 oz  Bed scale     4/8/2025 71.07 kg  156 lb 10.9 oz        Nutrition Focused Physical Exam    Date:  4/8       Unable to perform due to COVID Isolation      Subjective   Reported/Observed/Food/Nutrition Related History:     RD called patient via room phone 2/2 COVID isolation. Patient screened per nutrition protocol for consult received for \"per nursing screen\" and MST2 or greater. Presented for consistent cough and SOB. Noted to have COVID. Reported decrease in appetite and poor PO intake, states his appetite is so-so and has worsened over the past year. Patient reported weight loss, however, no significant weight loss noted in EMR. Declined any chewing or swallowing difficulties. Agreeable to trial Boost Plus with dinner, chocolate preferred. NKFA. Pt notes that he had a stomach ache this morning after receiving meds on an empty stomach, RN reports that patient slept through breakfast and would encourage patient to have small snack with meds to help with nausea.    Current Nutrition Prescription   PO: Diet: Regular/House, Cardiac; Healthy Heart (2-3 Na+); Fluid Consistency: Thin (IDDSI 0)  Oral Nutrition Supplement: N/A  Intake: Insufficient data    Assessment & Plan   Nutrition Diagnosis   Date:  4/7            Updated:    Problem Predicted inadequate nutrient intake    Etiology Decreased ability to consume sufficient energy 2/2 COVID, advanced age   Signs/Symptoms Reported per patient   Status: New    Goal:   Nutrition to support treatment and Establish PO      Nutrition Intervention      Follow treatment progress, Care plan reviewed, Advise alternate selection, Advised available snacks, Interview for preferences, Encourage intake, Supplement provided    Trial Boost Plus with dinner, chocolate preferred  Monitor for PO establishment and adequate intakes  NFPE when patient out of isolation    Monitoring/Evaluation:   Per " protocol, PO intake, Supplement intake, Pertinent labs, Weight, Symptoms, POC/GOC    Clare Mayberry RD  Time Spent: 30m

## 2025-04-07 NOTE — PAYOR COMM NOTE
"Ref# [id number]     OMAYRA Joshi, RN  Utilization Review  Phone [phone]  Fax [phone]    [place] [address]         Parth Lomeli (84 y.o. Male)       Date of Birth   1940    Social Security Number       Address   [address] [place] [address]    Home Phone   [phone]    MRN   [id number]       [place]   [facility], [facility]    Marital Status                               Admission Date   4/6/2025    Admission Type   Emergency    Admitting Provider   Samir Muse MD    Attending Provider   Samir Muse MD    Department, Room/Bed   [address], S522/1       Discharge Date       Discharge Disposition       Discharge Destination                                 Attending Provider: Samir Muse MD    Allergies: Contrast Dye (Echo Or Unknown Ct/mr)    Isolation: Contact, Airborne   Infection: COVID (confirmed) (04/06/25)   Code Status: CPR    Ht: 177.8 cm (70\")   Wt: 71.1 kg (156 lb 11.2 oz)    Admission Cmt: None   Principal Problem: Acute hypoxemic respiratory failure due to COVID-19 [U07.1,J96.01]                   Active Insurance as of 4/6/2025       Primary Coverage       Payor Plan Insurance Group Employer/Plan Group    ANTHEM MEDICARE REPLACEMENT ANTHEM MEDICARE ADVANTAGE PPO [id number]       Payor Plan Address Payor Plan Phone Number Payor Plan Fax Number Effective Dates    [address] [phone]  2/1/2024 - None Entered    [facility] [id number]         Subscriber Name Subscriber Birth Date Member ID       PARTH LOMELI 1940 [id number]                     Emergency Contacts        (Rel.) Home Phone Work Phone Mobile Phone    Vikki Lomeli (Spouse) [phone] -- [phone]    Blanca Santiago (Daughter) [phone] -- [phone]              [place]: [facility] [id number] Tax ID [id number]  Insurance Information                  ANTHEM MEDICARE REPLACEMENT/ANTHEM MEDICARE " ADVANTAGE PPO Phone: [phone]    Subscriber: George Lomeli Subscriber#: [id number]    Group#: [id number] Precert#: --    Authorization#: -- Effective Date: --             History & Physical        Matheus NaYOVANI andersen at 25       Attestation signed by Wendy Brar MD at 25 0040      I have reviewed this documentation and agree.  Acute hypoxic resp failure due to COVID illness with concomitant PAF with RVR (now rate controlled once placed on NC O2 and hypoxia resolved).                        [facility]  HISTORY AND PHYSICAL    Patient Name: George Lomeli  : 1940  MRN: [id number]  Primary Care Physician: Linn Salas APRN  Date of admission: 2025    Subjective  Subjective     Chief Complaint:  Cough     HPI:  George oLmeli is a 84 y.o. male w/ a hx of PAF (on Eliquis), hx of AAA s/p repair, HTN, chronic anemia, hypothyroidism, hx of thyroid cancer, hx of lymphoma, gout who presented to the ED w/ c/o a cough.   Pt c/o a persistent cough w/ mild shortness of breath x 2 days. Pt denies fever, chills, chest pain, palpitations, N/V/D, abdominal pain, urinary symptoms, edema.   Pt evaluated in the ED. COVID-19 detected. Noted to be 87% on room air in the ED, placed on 2 liters supplemental oxygen. CXR unremarkable. EKG c/w A.Fib RVR (rate 's). Potassium low at 3.0. Pt admitted to the hospital medicine service for further evaluation.     Review of Systems   Constitutional: Negative.  Negative for chills and fever.   HENT: Negative.  Negative for congestion, postnasal drip, rhinorrhea, sinus pain and trouble swallowing.    Eyes: Negative.  Negative for visual disturbance.   Respiratory:  Positive for cough and shortness of breath. Negative for wheezing.    Cardiovascular: Negative.  Negative for chest pain, palpitations and leg swelling.   Gastrointestinal: Negative.  Negative for abdominal distention, abdominal pain, constipation, diarrhea,  nausea and vomiting.   Endocrine: Negative.    Genitourinary: Negative.  Negative for decreased urine volume, difficulty urinating, dysuria, frequency and urgency.   Musculoskeletal: Negative.  Negative for arthralgias and myalgias.   Skin: Negative.  Negative for wound.   Allergic/Immunologic: Negative.  Negative for immunocompromised state.   Neurological: Negative.  Negative for dizziness, syncope, weakness, light-headedness and headaches.   Hematological: Negative.  Does not bruise/bleed easily.   Psychiatric/Behavioral: Negative.  Negative for confusion.    All other systems reviewed and are negative.     Personal History     Past Medical History:   Diagnosis Date    AAA (abdominal aortic aneurysm)     Arthritis     Broken neck     Gout     Hypertension     Lymphoma     Lymphoma     had radiation    Migraine     Sciatica     Thyroid cancer     ???    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     2/9/2023 - 3/1/2023 Radiation    Radiation OncologyTreatment Course:  George Lomeli received 3000 cGy in 15 fractions to sacrum via External Beam Radiation - EBRT.       Past Surgical History:   Procedure Laterality Date    ABDOMINAL AORTIC ANEURYSM REPAIR W/ ENDOLUMINAL GRAFT      BACK SURGERY      lumbar    CATARACT EXTRACTION EXTRACAPSULAR W/ INTRAOCULAR LENS IMPLANTATION      EYE SURGERY      MULTIPLE TOOTH EXTRACTIONS      NECK SURGERY      PINS/RODS IN NECK     Family History:  family history includes Hypertension in his father, mother, and sister.     Social History:  reports that he quit smoking about 28 years ago. His smoking use included cigarettes. He started smoking about 43 years ago. He has a 15 pack-year smoking history. He has been exposed to tobacco smoke. He has never used smokeless tobacco. He reports that he does not drink alcohol and does not use drugs.  Social History     Social History Narrative    Lives in [place].     Medications:  DULoxetine, albuterol, albuterol sulfate HFA, allopurinol, amLODIPine, apixaban, benzonatate, carvedilol, docusate sodium, fluticasone-salmeterol, gabapentin, levothyroxine, lisinopril-hydrochlorothiazide, oxyCODONE-acetaminophen, potassium chloride, tamsulosin, and timolol    Allergies   Allergen Reactions    Contrast Dye (Echo Or Unknown Ct/Mr) Nausea And Vomiting     Objective  Objective     Vital Signs:   Temp:  [100 °F (37.8 °C)] 100 °F (37.8 °C)  Heart Rate:  [] 100  Resp:  [20] 20  BP: ()/() 103/86    Physical Exam     Constitutional: Awake, alert; chronically ill appearing   Eyes: PERRLA, sclerae anicteric, no conjunctival injection  HENT: NCAT, mucous membranes moist  Neck: Supple, no thyromegaly, no lymphadenopathy, trachea midline  Respiratory: Clear to auscultation bilaterally, nonlabored respirations   Cardiovascular: RRR, no murmurs, rubs, or gallops, BLE without pitting edema   Gastrointestinal: Positive bowel sounds, soft, nontender, nondistended  Musculoskeletal: Normal ROM bilaterally   Psychiatric: Appropriate affect, cooperative  Neurologic: Oriented x 3, strength symmetric in all extremities, speech clear  Skin: No rashes, lesions or wounds     Result Review:  I have personally reviewed the results from the time of this admission to 4/6/2025 21:07 EDT and agree with these findings:  [x]  Laboratory list / accordion  []  Microbiology  [x]  Radiology  [x]  EKG/Telemetry   []  Cardiology/Vascular   []   Pathology  [x]  Old records    LAB RESULTS:      Lab 04/06/25  1448   WBC 7.23   HEMOGLOBIN 10.4*   HEMATOCRIT 32.9*   PLATELETS 196   NEUTROS ABS 5.89   IMMATURE GRANS (ABS) 0.02   LYMPHS ABS 0.42*   MONOS ABS 0.86   EOS ABS 0.03   MCV 91.6   PROCALCITONIN 0.08   LACTATE 1.2         Lab 04/06/25  1704 04/06/25  1448   SODIUM  --  141   POTASSIUM  --  3.0*   CHLORIDE  --  101   CO2  --  29.0   ANION GAP  --  11.0   BUN  --  15   CREATININE  --  1.06   EGFR  --  69.2   GLUCOSE  --  94   CALCIUM  --  9.5   MAGNESIUM 1.7  --          Lab 04/06/25  1448   TOTAL PROTEIN 6.7   ALBUMIN 4.0   GLOBULIN 2.7   ALT (SGPT) 7   AST (SGOT) 17   BILIRUBIN 0.5   ALK PHOS 91         Lab 04/06/25  1704 04/06/25  1448   PROBNP  --  611.4   HSTROP T 30* 30*                 Brief Urine Lab Results  (Last result in the past 365 days)        Color   Clarity   Blood   Leuk Est   Nitrite   Protein   CREAT   Urine HCG        04/06/25 1450 Yellow   Clear   Negative   Trace   Negative   Negative                 Microbiology Results (last 10 days)       Procedure Component Value - Date/Time    COVID PRE-OP / PRE-PROCEDURE SCREENING ORDER (NO ISOLATION) - Swab, Nasopharynx [[id number]]  (Abnormal) Collected: 04/06/25 1448    Lab Status: Final result Specimen: Swab from Nasopharynx Updated: 04/06/25 1533    Narrative:      The following orders were created for panel order COVID PRE-OP / PRE-PROCEDURE SCREENING ORDER (NO ISOLATION) - Swab, Nasopharynx.  Procedure                               Abnormality         Status                     ---------                               -----------         ------                     COVID-19 and FLU A/B PCR...[[id number]]  Abnormal            Final result                 Please view results for these tests on the individual orders.    COVID-19 and FLU A/B PCR, 1 HR TAT - Swab, Nasopharynx [[id number]]  (Abnormal) Collected: 04/06/25 1448    Lab Status: Final result Specimen: Swab from Nasopharynx Updated:  04/06/25 1533     COVID19 Detected     Influenza A PCR Not Detected     Influenza B PCR Not Detected    Narrative:      Fact sheet for providers: https://www.fda.gov/media/348566/download    Fact sheet for patients: https://www.fda.gov/media/680232/download    Test performed by PCR.  Influenza A and Influenza B negative results should be considered presumptive in samples that have a positive SARS-CoV-2 result.    Competitive inhibition studies showed that SARS-CoV-2 virus, when present at concentrations above 3.6E+04 copies/mL, can inhibit the detection and amplification of influenza A and influenza B virus RNA if present at or below 1.8E+02 copies/mL or 4.9E+02 copies/mL, respectively, and may lead to false negative influenza virus results. If co-infection with influenza A or influenza B virus is suspected in samples with a positive SARS-CoV-2 result, the sample should be re-tested with another FDA cleared, approved, or authorized influenza test, if influenza virus detection would change clinical management.          XR Chest 1 View  Result Date: 4/6/2025  XR CHEST 1 VW Date of Exam: 4/6/2025 3:23 PM EDT Indication: SOA triage protocol Comparison: CT 1/2/2025. Findings: Emphysematous changes are again noted. There is no evidence of new focal opacity. There is no effusion or pneumothorax. Unchanged cardiac enlargement.     Impression: Impression: Stable chest without evidence of acute disease. Electronically Signed: Rah Peña MD  4/6/2025 4:53 PM EDT  Workstation ID: [id number]    Results for orders placed during the hospital encounter of 11/21/23    Adult Transthoracic Echo Complete W/ Cont if Necessary Per Protocol    Interpretation Summary    Left ventricular systolic function is normal. Calculated left ventricular EF = 53% Left ventricular ejection fraction appears to be 51 - 55%.    Left ventricular diastolic function is consistent with (grade I) impaired relaxation.    The right ventricular cavity is mildly  dilated.    The right atrial cavity is dilated.    Estimated right ventricular systolic pressure from tricuspid regurgitation is normal (<35 mmHg).    Assessment & Plan  Assessment & Plan       Acute hypoxemic respiratory failure due to COVID-19    Hypertension    Gout    Atrial fibrillation with RVR    Elevated troponin    Anemia, chronic disease    Hypothyroidism (acquired)    Hypokalemia    George Lomeli is a 84 y.o. male w/ a hx of PAF (on Eliquis), hx of AAA s/p repair, HTN, chronic anemia, hypothyroidism, hx of thyroid cancer, hx of lymphoma, gout who presented to the ED w/ c/o a cough.     **Acute hypoxic respiratory failure 2/2 COVID-19   -symptom onset ~ 2 days ago: cough and mild shortness of breath   -no prior COVID vaccine per pt   -COVID detected, negative for Flu A/B; full respiratory panel pcr pending   -noted to be 87% on room air, placed on 2 liters supplemental oxygen   -CXR unremarkable   -blood cx pending   -WBC, lactic acid and procal all WNL   -symptom mgt  -s/p 1 liter NS bolus given in ED  -holding on further IVF for now; encourage oral intake   -Remdesivir (pharm to dose)   -Decadron   -repeat labs in am   -pt/ot and case mgt consult     **Atrial fibrillation w/ RVR   **Elevated troponin   **Hx of HTN   -initial EKG c/w A.Fib RVR; rates 's  -current HR in 80's   -repeat EKG in am   -troponin 30, 30; repeat in am   -proBNP 611.4  -continue routine Eliquis   -continue routine Norvasc, Coreg, Lisinopril/HCTZ w/ hold parameters   -monitor I/Os     **Hypokalemia   -K 3.0  -mag 1.7  -replace per protocol     **Hypothyroidism   **Hx of thyroid cancer   -continue synthroid   -tsh pending     **Chronic anemia   -previous H/H 10.9/33.9 on 1/14/25, 10.7/32.9 in August 2024  -current H/H 10.4/32.9  -no recent anemia panel on record  -anemia panel pending   -monitor     **Gout   -continue Allopurinol     **Chronic pain   -continue PRN Percocet    DVT prophylaxis:  Eliquis     CODE STATUS:    Code  Status (Patient has no pulse and is not breathing): CPR (Attempt to Resuscitate)  Medical Interventions (Patient has pulse or is breathing): Full Support  Level Of Support Discussed With: Patient      Expected Discharge  Expected Discharge Date: 4/8/2025; Expected Discharge Time:     Signature: Electronically signed by YOVANI Durant, 04/06/25, 9:08 PM EDT.    Time spent: 55 minutes                 Electronically signed by Wendy Brar MD at 04/07/25 0040          Emergency Department Notes        Jo Oneal RN at 04/06/25 1901           George Lomeli    Nursing Report ED to Floor:  Mental status: ao4  Ambulatory status: x1  Oxygen Therapy:  2l nc  Cardiac Rhythm: afib  Admitted from: ed  Safety Concerns:  fall risk  Precautions: none  Social Issues: none  ED Room #:  7    ED Nurse Phone Extension - [phone] or may call [phone].      HPI:   Chief Complaint   Patient presents with    Shortness of Breath       Past Medical History:  Past Medical History:   Diagnosis Date    AAA (abdominal aortic aneurysm)     Arthritis     Broken neck     Gout     Hypertension     Lymphoma     Lymphoma     had radiation    Migraine     Sciatica     Thyroid cancer     ???    Thyroid disease         Past Surgical History:  Past Surgical History:   Procedure Laterality Date    ABDOMINAL AORTIC ANEURYSM REPAIR W/ ENDOLUMINAL GRAFT      BACK SURGERY      lumbar    CATARACT EXTRACTION EXTRACAPSULAR W/ INTRAOCULAR LENS IMPLANTATION      EYE SURGERY      MULTIPLE TOOTH EXTRACTIONS      NECK SURGERY      PINS/RODS IN NECK        Admitting Doctor:   Wendy Brar MD    Consulting Provider(s):  Consults       No orders found from 3/8/2025 to 4/7/2025.             Admitting Diagnosis:   The primary encounter diagnosis was Atrial fibrillation with RVR. Diagnoses of COVID-19, Acute respiratory failure with hypoxia, Anticoagulated, and Hypokalemia were also pertinent to this visit.    Most Recent Vitals:   Vitals:    04/06/25 1817 04/06/25 1818  04/06/25 1830 04/06/25 1845   BP:    (!) 136/103   BP Location:       Patient Position:       Pulse: 111 113 101 (!) 148   Resp:       Temp:       TempSrc:       SpO2: (!) 87% 95% 93% (!) 79%   Weight:       Height:           Active LDAs/IV Access:   Lines, Drains & Airways       Active LDAs       Name Placement date Placement time Site Days    Peripheral IV 04/06/25 1448 Anterior;Right Forearm 04/06/25  1448  Forearm  less than 1                    Labs (abnormal labs have a star):   Labs Reviewed   COVID-19 AND FLU A/B, NP SWAB IN TRANSPORT MEDIA 1 HR TAT - Abnormal; Notable for the following components:       Result Value    COVID19 Detected (*)     All other components within normal limits    Narrative:     Fact sheet for providers: https://www.fda.gov/media/331579/download    Fact sheet for patients: https://www.fda.gov/media/606808/download    Test performed by PCR.  Influenza A and Influenza B negative results should be considered presumptive in samples that have a positive SARS-CoV-2 result.    Competitive inhibition studies showed that SARS-CoV-2 virus, when present at concentrations above 3.6E+04 copies/mL, can inhibit the detection and amplification of influenza A and influenza B virus RNA if present at or below 1.8E+02 copies/mL or 4.9E+02 copies/mL, respectively, and may lead to false negative influenza virus results. If co-infection with influenza A or influenza B virus is suspected in samples with a positive SARS-CoV-2 result, the sample should be re-tested with another FDA cleared, approved, or authorized influenza test, if influenza virus detection would change clinical management.   COMPREHENSIVE METABOLIC PANEL - Abnormal; Notable for the following components:    Potassium 3.0 (*)     All other components within normal limits    Narrative:     GFR Categories in Chronic Kidney Disease (CKD)      GFR Category          GFR (mL/min/1.73)    Interpretation  G1                     90 or greater          Normal or high (1)  G2                      60-89                Mild decrease (1)  G3a                   45-59                Mild to moderate decrease  G3b                   30-44                Moderate to severe decrease  G4                    15-29                Severe decrease  G5                    14 or less           Kidney failure          (1)In the absence of evidence of kidney disease, neither GFR category G1 or G2 fulfill the criteria for CKD.    eGFR calculation 2021 CKD-EPI creatinine equation, which does not include race as a factor   TROPONIN - Abnormal; Notable for the following components:    HS Troponin T 30 (*)     All other components within normal limits    Narrative:     High Sensitive Troponin T Reference Range:  <14.0 ng/L- Negative Female for AMI  <22.0 ng/L- Negative Male for AMI  >=14 - Abnormal Female indicating possible myocardial injury.  >=22 - Abnormal Male indicating possible myocardial injury.   Clinicians would have to utilize clinical acumen, EKG, Troponin, and serial changes to determine if it is an Acute Myocardial Infarction or myocardial injury due to an underlying chronic condition.        CBC WITH AUTO DIFFERENTIAL - Abnormal; Notable for the following components:    RBC 3.59 (*)     Hemoglobin 10.4 (*)     Hematocrit 32.9 (*)     RDW 15.7 (*)     MPV 12.2 (*)     Neutrophil % 81.5 (*)     Lymphocyte % 5.8 (*)     Lymphocytes, Absolute 0.42 (*)     All other components within normal limits   URINALYSIS W/ MICROSCOPIC IF INDICATED (NO CULTURE) - Abnormal; Notable for the following components:    Leuk Esterase, UA Trace (*)     All other components within normal limits   URINALYSIS, MICROSCOPIC ONLY - Abnormal; Notable for the following components:    WBC, UA 3-5 (*)     All other components within normal limits   HIGH SENSITIVITIY TROPONIN T 1HR - Abnormal; Notable for the following components:    HS Troponin T 30 (*)     All other components within normal limits     Narrative:     High Sensitive Troponin T Reference Range:  <14.0 ng/L- Negative Female for AMI  <22.0 ng/L- Negative Male for AMI  >=14 - Abnormal Female indicating possible myocardial injury.  >=22 - Abnormal Male indicating possible myocardial injury.   Clinicians would have to utilize clinical acumen, EKG, Troponin, and serial changes to determine if it is an Acute Myocardial Infarction or myocardial injury due to an underlying chronic condition.        BNP (IN-HOUSE) - Normal    Narrative:     This assay is used as an aid in the diagnosis of individuals suspected of having heart failure. It can be used as an aid in the diagnosis of acute decompensated heart failure (ADHF) in patients presenting with signs and symptoms of ADHF to the emergency department (ED). In addition, NT-proBNP of <300 pg/mL indicates ADHF is not likely.    Age Range Result Interpretation  NT-proBNP Concentration (pg/mL:      <50             Positive            >450                   Gray                 300-450                    Negative             <300    50-75           Positive            >900                  Gray                300-900                  Negative            <300      >75             Positive            >1800                  Gray                300-1800                  Negative            <300   PROCALCITONIN - Normal    Narrative:     As a Marker for Sepsis (Non-Neonates):    1. <0.5 ng/mL represents a low risk of severe sepsis and/or septic shock.  2. >2 ng/mL represents a high risk of severe sepsis and/or septic shock.    As a Marker for Lower Respiratory Tract Infections that require antibiotic therapy:    PCT on Admission    Antibiotic Therapy       6-12 Hrs later    >0.5                Strongly Recommended  >0.25 - <0.5        Recommended   0.1 - 0.25          Discouraged              Remeasure/reassess PCT  <0.1                Strongly Discouraged     Remeasure/reassess PCT    As 28 day mortality risk marker:  "\"Change in Procalcitonin Result\" (>80% or <=80%) if Day 0 (or Day 1) and Day 4 values are available. Refer to http://www.[facility]-pct-calculator.com    Change in PCT <=80%  A decrease of PCT levels below or equal to 80% defines a positive change in PCT test result representing a higher risk for 28-day all-cause mortality of patients diagnosed with severe sepsis for septic shock.    Change in PCT >80%  A decrease of PCT levels of more than 80% defines a negative change in PCT result representing a lower risk for 28-day all-cause mortality of patients diagnosed with severe sepsis or septic shock.      LACTIC ACID, PLASMA - Normal   MAGNESIUM - Normal   COVID PRE-OP / PRE-PROCEDURE SCREENING ORDER (NO ISOLATION)    Narrative:     The following orders were created for panel order COVID PRE-OP / PRE-PROCEDURE SCREENING ORDER (NO ISOLATION) - Swab, Nasopharynx.  Procedure                               Abnormality         Status                     ---------                               -----------         ------                     COVID-19 and FLU A/B PCR...[[id number]]  Abnormal            Final result                 Please view results for these tests on the individual orders.   BLOOD CULTURE   BLOOD CULTURE   RAINBOW DRAW    Narrative:     The following orders were created for panel order [facility] Draw.  Procedure                               Abnormality         Status                     ---------                               -----------         ------                     Green Top (Gel)[[id number]]                                  Final result               Lavender Top[[id number]]                                     Final result               Gold Top - SST[[id number]]                                   Final result               Fitzgerald Top[[id number]]                                         Final result               Light Blue Top[[id number]]                                   Final result                 Please view " results for these tests on the individual orders.   CBC AND DIFFERENTIAL    Narrative:     The following orders were created for panel order CBC & Differential.  Procedure                               Abnormality         Status                     ---------                               -----------         ------                     CBC Auto Differential[[id number]]        Abnormal            Final result                 Please view results for these tests on the individual orders.   GREEN TOP   LAVENDER TOP   GOLD TOP - SST   GRAY TOP   LIGHT BLUE TOP       Meds Given in ED:   Medications   sodium chloride 0.9 % flush 10 mL (has no administration in time range)   sodium chloride 0.9 % bolus 1,000 mL (1,000 mL Intravenous New Bag 4/6/25 1846)   Potassium Replacement - Follow Nurse / BPA Driven Protocol (has no administration in time range)   acetaminophen (TYLENOL) tablet 1,000 mg (1,000 mg Oral Given 4/6/25 1700)           Last NIH score:                                                          Dysphagia screening results:  Patient Factors Component (Dysphagia:Stroke or Rule-out)  Best Eye Response: 4-->(E4) spontaneous (04/06/25 1705)  Best Motor Response: 6-->(M6) obeys commands (04/06/25 1705)  Best Verbal Response: 5-->(V5) oriented (04/06/25 1705)  [place] Coma Scale Score: 15 (04/06/25 1705)     [place] Coma Scale:  No data recorded     CIWA:        Restraint Type:            Isolation Status:  No active isolations           Electronically signed by Jo Oneal RN at 04/06/25 1902       Nani Austin PA at 04/06/25 1436       Attestation signed by Luis Wilson MD at 04/06/25 5060          SHARED APC NON FACE TO FACE: I performed a substantive part of the MDM during the patient's E/M visit. I personally made or approved the documented management plan and acknowledge its risk of complications. , My independent interpretation of the data: COVID swab positive, chest x-ray within normal limit., and I  personally discussed management/interpretation of the data with: Nani Austin PA-C.  A broad differential to include pneumonia, COVID, RSV, etc. considered in addition to multiple other possibilities  Luis Wilson MD 4/6/2025 23:08 EDT                             Subjective   History of Present Illness  Pt is a 85 yo male presenting to ED with complaints of SOB and cough. PMHx significant for PAfib on Eliquis, HTN, HLD, AAA s/p repair, Lymphoma, Hypothyroidism and Migraines. Pt reports cough and SOB for the past 2 days. Denies sick contacts. Denies headache, dizziness, fever, CP, V/D, abdominal pain or leg swelling. Temp on arrival to  and no meds for fever PTA. Denies tobacco, drug or ETOH use.     History provided by:  Patient and medical records      Review of Systems   Constitutional:  Negative for chills and fever.   HENT:  Negative for congestion and trouble swallowing.    Eyes:  Negative for pain and visual disturbance.   Respiratory:  Positive for cough and shortness of breath.    Cardiovascular:  Negative for chest pain and leg swelling.   Gastrointestinal:  Negative for abdominal pain, diarrhea, nausea and vomiting.   Genitourinary:  Negative for difficulty urinating, dysuria and flank pain.   Musculoskeletal:  Negative for arthralgias and back pain.   Skin: Negative.  Negative for rash and wound.   Allergic/Immunologic: Negative.    Neurological:  Negative for dizziness, syncope, weakness, numbness and headaches.   Psychiatric/Behavioral:  Negative for confusion.    All other systems reviewed and are negative.      Past Medical History:   Diagnosis Date    AAA (abdominal aortic aneurysm)     Arthritis     Broken neck     Gout     Hypertension     Lymphoma     Lymphoma     had radiation    Migraine     Sciatica     Thyroid cancer     ???    Thyroid disease        Allergies   Allergen Reactions    Contrast Dye (Echo Or Unknown Ct/Mr) Nausea And Vomiting       Past Surgical History:   Procedure  Laterality Date    ABDOMINAL AORTIC ANEURYSM REPAIR W/ ENDOLUMINAL GRAFT      BACK SURGERY      lumbar    CATARACT EXTRACTION EXTRACAPSULAR W/ INTRAOCULAR LENS IMPLANTATION      EYE SURGERY      MULTIPLE TOOTH EXTRACTIONS      NECK SURGERY      PINS/RODS IN NECK       Family History   Problem Relation Age of Onset    Hypertension Mother     Hypertension Father     Hypertension Sister        Social History     Socioeconomic History    Marital status:     Number of children: 2   Tobacco Use    Smoking status: Former     Current packs/day: 0.00     Average packs/day: 1 pack/day for 15.0 years (15.0 ttl pk-yrs)     Types: Cigarettes     Start date:      Quit date:      Years since quittin.2     Passive exposure: Past    Smokeless tobacco: Never   Vaping Use    Vaping status: Never Used   Substance and Sexual Activity    Alcohol use: No    Drug use: No    Sexual activity: Defer           Objective   Physical Exam  Vitals and nursing note reviewed.   Constitutional:       Appearance: He is well-developed.   HENT:      Head: Atraumatic.      Nose: Nose normal.   Eyes:      General: Lids are normal.      Conjunctiva/sclera: Conjunctivae normal.      Pupils: Pupils are equal, round, and reactive to light.   Cardiovascular:      Rate and Rhythm: Tachycardia present. Rhythm irregular.      Heart sounds: Normal heart sounds.   Pulmonary:      Effort: Pulmonary effort is normal.      Breath sounds: Normal breath sounds.   Abdominal:      General: There is no distension.      Palpations: Abdomen is soft.      Tenderness: There is no abdominal tenderness. There is no guarding or rebound.   Musculoskeletal:         General: No tenderness or deformity. Normal range of motion.      Cervical back: Normal range of motion and neck supple.   Skin:     General: Skin is warm and dry.   Neurological:      Mental Status: He is alert and oriented to person, place, and time.      Sensory: No sensory deficit.   Psychiatric:          Behavior: Behavior normal.         Procedures          ED Course  ED Course as of 04/06/25 2156   Sun Apr 06, 2025   1422 I personally reviewed and interpreted vitals.  Patient evaluated in PIT area and awaiting labs / imaging / ED bed at this time.  I personally reviewed and interpreted labs results and went over with patient.   [RT]   1436 Temp: 100 °F (37.8 °C) [RT]   1436 Heart Rate: 102 [RT]   1436 SpO2: 97 %  RA [RT]   1637 WBC: 7.23 [RT]   1637 Hemoglobin(!): 10.4 [RT]   1637 Hematocrit(!): 32.9 [RT]   1637 Baseline anemia [RT]   1637 Lactate: 1.2 [RT]   1637 proBNP: 611.4 [RT]   1637 HS Troponin T(!): 30 [RT]   1637 COVID19(!!): Detected [RT]   1637 Procalcitonin: 0.08 [RT]   1637 Will repeat Trop and recheck vitals. [RT]   1755 HS Troponin T(!): 30  Stable trop [RT]   1755 SpO2(!): 82 %  Does not wear O2 at home. Placed on oxygen [RT]   1757 HR range  in ED [RT]   1800 Discussed results and tx plan for admission with patient.  [RT]   1826 Discussed admission with hospitalist Dr. Brar [RT]      ED Course User Index  [RT] Nani Austin, PA      Recent Results (from the past 24 hours)   ECG 12 Lead Dyspnea    Collection Time: 04/06/25  2:28 PM   Result Value Ref Range    QT Interval 358 ms    QTC Interval 484 ms   Comprehensive Metabolic Panel    Collection Time: 04/06/25  2:48 PM    Specimen: Blood   Result Value Ref Range    Glucose 94 65 - 99 mg/dL    BUN 15 8 - 23 mg/dL    Creatinine 1.06 0.76 - 1.27 mg/dL    Sodium 141 136 - 145 mmol/L    Potassium 3.0 (L) 3.5 - 5.2 mmol/L    Chloride 101 98 - 107 mmol/L    CO2 29.0 22.0 - 29.0 mmol/L    Calcium 9.5 8.6 - 10.5 mg/dL    Total Protein 6.7 6.0 - 8.5 g/dL    Albumin 4.0 3.5 - 5.2 g/dL    ALT (SGPT) 7 1 - 41 U/L    AST (SGOT) 17 1 - 40 U/L    Alkaline Phosphatase 91 39 - 117 U/L    Total Bilirubin 0.5 0.0 - 1.2 mg/dL    Globulin 2.7 gm/dL    A/G Ratio 1.5 g/dL    BUN/Creatinine Ratio 14.2 7.0 - 25.0    Anion Gap 11.0 5.0 - 15.0 mmol/L    eGFR  69.2 >60.0 mL/min/1.73   BNP    Collection Time: 04/06/25  2:48 PM    Specimen: Blood   Result Value Ref Range    proBNP 611.4 0.0 - 1,800.0 pg/mL   High Sensitivity Troponin T    Collection Time: 04/06/25  2:48 PM    Specimen: Blood   Result Value Ref Range    HS Troponin T 30 (H) <22 ng/L   Green Top (Gel)    Collection Time: 04/06/25  2:48 PM   Result Value Ref Range    Extra Tube Hold for add-ons.    Lavender Top    Collection Time: 04/06/25  2:48 PM   Result Value Ref Range    Extra Tube hold for add-on    Gold Top - SST    Collection Time: 04/06/25  2:48 PM   Result Value Ref Range    Extra Tube Hold for add-ons.    Gray Top    Collection Time: 04/06/25  2:48 PM   Result Value Ref Range    Extra Tube Hold for add-ons.    Light Blue Top    Collection Time: 04/06/25  2:48 PM   Result Value Ref Range    Extra Tube Hold for add-ons.    CBC Auto Differential    Collection Time: 04/06/25  2:48 PM    Specimen: Blood   Result Value Ref Range    WBC 7.23 3.40 - 10.80 10*3/mm3    RBC 3.59 (L) 4.14 - 5.80 10*6/mm3    Hemoglobin 10.4 (L) 13.0 - 17.7 g/dL    Hematocrit 32.9 (L) 37.5 - 51.0 %    MCV 91.6 79.0 - 97.0 fL    MCH 29.0 26.6 - 33.0 pg    MCHC 31.6 31.5 - 35.7 g/dL    RDW 15.7 (H) 12.3 - 15.4 %    RDW-SD 52.5 37.0 - 54.0 fl    MPV 12.2 (H) 6.0 - 12.0 fL    Platelets 196 140 - 450 10*3/mm3    Neutrophil % 81.5 (H) 42.7 - 76.0 %    Lymphocyte % 5.8 (L) 19.6 - 45.3 %    Monocyte % 11.9 5.0 - 12.0 %    Eosinophil % 0.4 0.3 - 6.2 %    Basophil % 0.1 0.0 - 1.5 %    Immature Grans % 0.3 0.0 - 0.5 %    Neutrophils, Absolute 5.89 1.70 - 7.00 10*3/mm3    Lymphocytes, Absolute 0.42 (L) 0.70 - 3.10 10*3/mm3    Monocytes, Absolute 0.86 0.10 - 0.90 10*3/mm3    Eosinophils, Absolute 0.03 0.00 - 0.40 10*3/mm3    Basophils, Absolute 0.01 0.00 - 0.20 10*3/mm3    Immature Grans, Absolute 0.02 0.00 - 0.05 10*3/mm3    nRBC 0.0 0.0 - 0.2 /100 WBC   Procalcitonin    Collection Time: 04/06/25  2:48 PM    Specimen: Blood   Result Value  Ref Range    Procalcitonin 0.08 0.00 - 0.25 ng/mL   Lactic Acid, Plasma    Collection Time: 04/06/25  2:48 PM    Specimen: Blood   Result Value Ref Range    Lactate 1.2 0.5 - 2.0 mmol/L   COVID-19 and FLU A/B PCR, 1 HR TAT - Swab, Nasopharynx    Collection Time: 04/06/25  2:48 PM    Specimen: Nasopharynx; Swab   Result Value Ref Range    COVID19 Detected (C) Not Detected - Ref. Range    Influenza A PCR Not Detected Not Detected    Influenza B PCR Not Detected Not Detected   Reticulocytes    Collection Time: 04/06/25  2:48 PM    Specimen: Blood   Result Value Ref Range    Reticulocyte % 1.41 0.70 - 1.90 %    Reticulocyte Absolute 0.0505 0.0200 - 0.1300 10*6/mm3   Urinalysis With Microscopic If Indicated (No Culture) - Urine, Clean Catch    Collection Time: 04/06/25  2:50 PM    Specimen: Urine, Clean Catch   Result Value Ref Range    Color, UA Yellow Yellow, Straw    Appearance, UA Clear Clear    pH, UA 7.0 5.0 - 8.0    Specific Gravity, UA 1.014 1.001 - 1.030    Glucose, UA Negative Negative    Ketones, UA Negative Negative    Bilirubin, UA Negative Negative    Blood, UA Negative Negative    Protein, UA Negative Negative    Leuk Esterase, UA Trace (A) Negative    Nitrite, UA Negative Negative    Urobilinogen, UA 1.0 E.U./dL 0.2 - 1.0 E.U./dL   Urinalysis, Microscopic Only - Urine, Clean Catch    Collection Time: 04/06/25  2:50 PM    Specimen: Urine, Clean Catch   Result Value Ref Range    RBC, UA 0-2 None Seen, 0-2 /HPF    WBC, UA 3-5 (A) None Seen, 0-2 /HPF    Bacteria, UA None Seen None Seen, Trace /HPF    Squamous Epithelial Cells, UA 0-2 None Seen, 0-2 /HPF    Hyaline Casts, UA 0-6 0 - 6 /LPF    Methodology Automated Microscopy    ECG 12 Lead Dyspnea    Collection Time: 04/06/25  4:56 PM   Result Value Ref Range    QT Interval 384 ms    QTC Interval 503 ms   High Sensitivity Troponin T 1Hr    Collection Time: 04/06/25  5:04 PM    Specimen: Blood   Result Value Ref Range    HS Troponin T 30 (H) <22 ng/L     Troponin T Numeric Delta 0 ng/L    Troponin T % Delta 0 Abnormal if >/= 20%   Magnesium    Collection Time: 04/06/25  5:04 PM    Specimen: Blood   Result Value Ref Range    Magnesium 1.7 1.6 - 2.4 mg/dL   C-reactive Protein    Collection Time: 04/06/25  5:04 PM    Specimen: Blood   Result Value Ref Range    C-Reactive Protein 3.78 (H) 0.00 - 0.50 mg/dL     Note: In addition to lab results from this visit, the labs listed above may include labs taken at another facility or during a different encounter within the last 24 hours. Please correlate lab times with ED admission and discharge times for further clarification of the services performed during this visit.    XR Chest 1 View   Final Result   Impression:   Stable chest without evidence of acute disease.         Electronically Signed: Rah Peña MD     4/6/2025 4:53 PM EDT     Workstation ID: [id number]        Vitals:    04/06/25 2015 04/06/25 2030 04/06/25 2045 04/06/25 2108   BP: 111/84 119/89 103/86 133/94   BP Location:    Left arm   Patient Position:    Lying   Pulse: 108 88 100 91   Resp:    18   Temp:    98.2 °F (36.8 °C)   TempSrc:    Oral   SpO2: 95% 98% 95% 99%   Weight:    73.7 kg (162 lb 8 oz)   Height:         Medications   sodium chloride 0.9 % flush 10 mL (has no administration in time range)   Potassium Replacement - Follow Nurse / BPA Driven Protocol (has no administration in time range)   potassium chloride (KLOR-CON M20) CR tablet 40 mEq (40 mEq Oral Given 4/6/25 2037)   potassium chloride 10 mEq in 100 mL IVPB (10 mEq Intravenous New Bag 4/6/25 2037)   albuterol sulfate HFA (PROVENTIL HFA;VENTOLIN HFA;PROAIR HFA) inhaler 2 puff (has no administration in time range)   allopurinol (ZYLOPRIM) tablet 100 mg (has no administration in time range)   amLODIPine (NORVASC) tablet 5 mg (has no administration in time range)   apixaban (ELIQUIS) tablet 5 mg (has no administration in time range)   benzonatate (TESSALON) capsule 100 mg (has no administration  in time range)   carvedilol (COREG) tablet 25 mg (has no administration in time range)   DULoxetine (CYMBALTA) DR capsule 30 mg (has no administration in time range)   levothyroxine (SYNTHROID, LEVOTHROID) tablet 25 mcg (has no administration in time range)   lisinopril (PRINIVIL,ZESTRIL) tablet 20 mg (has no administration in time range)     And   hydroCHLOROthiazide tablet 12.5 mg (has no administration in time range)   timolol (TIMOPTIC) 0.5 % ophthalmic solution 1 drop (has no administration in time range)   tamsulosin (FLOMAX) 24 hr capsule 0.4 mg (has no administration in time range)   sodium chloride 0.9 % flush 10 mL (has no administration in time range)   sodium chloride 0.9 % flush 10 mL (has no administration in time range)   sodium chloride 0.9 % infusion 40 mL (has no administration in time range)   Magnesium Standard Dose Replacement - Follow Nurse / BPA Driven Protocol (has no administration in time range)   acetaminophen (TYLENOL) tablet 650 mg (has no administration in time range)   sennosides-docusate (PERICOLACE) 8.6-50 MG per tablet 2 tablet (has no administration in time range)     And   polyethylene glycol (MIRALAX) packet 17 g (has no administration in time range)     And   bisacodyl (DULCOLAX) EC tablet 5 mg (has no administration in time range)     And   bisacodyl (DULCOLAX) suppository 10 mg (has no administration in time range)   dextromethorphan polistirex ER (DELSYM) 30 MG/5ML oral suspension 60 mg (has no administration in time range)   dexAMETHasone (DECADRON) injection 6 mg (has no administration in time range)   Pharmacy Consult - Remdesivir for Mild to Moderate COVID-19 (Within 5 days of symptom onset)- only if contraindicated to Paxlovid (has no administration in time range)   oxyCODONE-acetaminophen (PERCOCET)  MG per tablet 1 tablet (has no administration in time range)   remdesivir 200 mg in sodium chloride 0.9 % 290 mL IVPB (powder vial) (has no administration in time  range)     Followed by   remdesivir 100 mg in sodium chloride 0.9 % 250 mL IVPB (powder vial) (has no administration in time range)   acetaminophen (TYLENOL) tablet 1,000 mg (1,000 mg Oral Given 4/6/25 1700)   sodium chloride 0.9 % bolus 1,000 mL (1,000 mL Intravenous New Bag 4/6/25 1846)     ECG/EMG Results (last 24 hours)       Procedure Component Value Units Date/Time    ECG 12 Lead Dyspnea [[id number]] Collected: 04/06/25 1428     Updated: 04/06/25 1428     QT Interval 358 ms      QTC Interval 484 ms     Narrative:      Test Reason : Dyspnea  Blood Pressure :   */*   mmHG  Vent. Rate : 110 BPM     Atrial Rate : 138 BPM     P-R Int :   * ms          QRS Dur : 110 ms      QT Int : 358 ms       P-R-T Axes :   * -16 -16 degrees    QTcB Int : 484 ms    Atrial fibrillation with rapid ventricular response with premature  ventricular or aberrantly conducted complexes  Incomplete right bundle branch block  Nonspecific ST abnormality  Abnormal ECG  When compared with ECG of 30-Apr-2024 17:31,  Atrial fibrillation has replaced Sinus rhythm  Vent. rate has increased by  43 bpm  ST now depressed in Anterior leads    Referred By: EDMD           Confirmed By:     ECG 12 Lead Dyspnea [[id number]] Collected: 04/06/25 1656     Updated: 04/06/25 1654     QT Interval 384 ms      QTC Interval 503 ms     Narrative:      Test Reason : SOB  Blood Pressure :   */*   mmHG  Vent. Rate : 103 BPM     Atrial Rate : 103 BPM     P-R Int : 200 ms          QRS Dur : 110 ms      QT Int : 384 ms       P-R-T Axes :   * -16   1 degrees    QTcB Int : 503 ms    Sinus tachycardia with premature atrial complexes with aberrant conduction  Nonspecific ST abnormality  Abnormal ECG  When compared with ECG of 06-Apr-2025 14:28, (Unconfirmed)  Sinus rhythm has replaced Atrial fibrillation    Referred By: ER MD           Confirmed By:           ECG 12 Lead Dyspnea   Final Result   Test Reason : SOB   Blood Pressure :   */*   mmHG   Vent. Rate : 103 BPM      Atrial Rate : 103 BPM      P-R Int : 200 ms          QRS Dur : 110 ms       QT Int : 384 ms       P-R-T Axes :   * -16   1 degrees     QTcB Int : 503 ms      Sinus tachycardia with premature atrial complexes with aberrant conduction   Nonspecific ST abnormality   Abnormal ECG   Confirmed by RANDA PRYOR MD ([id number]) on 4/6/2025 5:50:20 PM      Referred By: SAROJ DUKES           Confirmed By: RANDA PRYOR MD      ECG 12 Lead Dyspnea   Final Result   Test Reason : Dyspnea   Blood Pressure :   */*   mmHG   Vent. Rate : 110 BPM     Atrial Rate : 138 BPM      P-R Int :   * ms          QRS Dur : 110 ms       QT Int : 358 ms       P-R-T Axes :   * -16 -16 degrees     QTcB Int : 484 ms      Atrial fibrillation with rapid ventricular response with premature   ventricular or aberrantly conducted complexes   Incomplete right bundle branch block   Nonspecific ST abnormality   Abnormal ECG   When compared with ECG of 30-Apr-2024 17:31,   Atrial fibrillation has replaced Sinus rhythm   Vent. rate has increased by  43 bpm   ST now depressed in Anterior leads   Confirmed by RANDA PRYOR MD ([id number]) on 4/6/2025 5:52:16 PM      Referred By: EDMD           Confirmed By: RANDA PRYOR MD                                                           Medical Decision Making  Pt is a 85 yo male presenting to ED with complaints of SOB and cough.  I had a discussion with the patient / family regarding ED course, diagnosis, diagnostic results and treatment plan including medications and admission.    DDx  Covid, Flu, Pneumonia, CHF, Afib RVR, Sepsis, resp failure    Problems Addressed:  Acute respiratory failure with hypoxia: complicated acute illness or injury  Anticoagulated: complicated acute illness or injury  Atrial fibrillation with RVR: complicated acute illness or injury  COVID-19: complicated acute illness or injury  Hypokalemia: complicated acute illness or injury    Amount and/or Complexity of Data Reviewed  External Data Reviewed: notes.      Details: Reviewed previous non ED visits including prior labs, imaging, available notes, medications, allergies and surgical hx.   Cards 1/30/25 Afib f/u  Labs: ordered. Decision-making details documented in ED Course.  Radiology: ordered and independent interpretation performed. Decision-making details documented in ED Course.  ECG/medicine tests: ordered and independent interpretation performed. Decision-making details documented in ED Course.    Risk  OTC drugs.  Prescription drug management.  Decision regarding hospitalization.        Final diagnoses:   Atrial fibrillation with RVR   COVID-19   Acute respiratory failure with hypoxia   Anticoagulated   Hypokalemia       ED Disposition  ED Disposition       ED Disposition   Decision to Admit    Condition   --    Comment   Level of Care: Telemetry [5]   Diagnosis: Acute hypoxemic respiratory failure due to COVID-19 [3958780]   Isolate for COVID?: Yes [1]   Certification: I Certify That Inpatient Hospital Services Are Medically Necessary For Greater Than 2 Midnights                 No follow-up provider specified.       Medication List      No changes were made to your prescriptions during this visit.            Nani Austin PA  04/06/25 2157      Electronically signed by Luis Wilson MD at 04/06/25 2308       Oxygen Therapy (since admission)       Date/Time SpO2 Device (Oxygen Therapy) Flow (L/min) (Oxygen Therapy) Oxygen Concentration (%) ETCO2 (mmHg)    04/07/25 1042 99 -- -- -- --    04/07/25 0900 -- nasal cannula 2 -- --    04/07/25 0652 100 -- -- -- --    04/07/25 0600 -- nasal cannula 2 -- --    04/07/25 0406 99 nasal cannula 2 -- --    04/07/25 0200 -- nasal cannula 2 -- --    04/07/25 0000 -- nasal cannula 2 -- --    04/06/25 2358 98 nasal cannula 2 -- --    04/06/25 2200 -- room air -- -- --    04/06/25 2108 99 nasal cannula 2 -- --    04/06/25 2045 95 -- -- -- --    04/06/25 2030 98 -- -- -- --    04/06/25 2015 95 -- -- -- --    04/06/25 2000 95  -- -- -- --    04/06/25 1945 95 -- -- -- --    04/06/25 1930 91 -- -- -- --    04/06/25 1915 100 -- -- -- --    04/06/25 1900 88 -- -- -- --    04/06/25 1830 93 -- -- -- --    04/06/25 1818 95 -- -- -- --    04/06/25 1817 87 -- -- -- --    04/06/25 1815 92 -- -- -- --    04/06/25 1801 94 -- -- -- --    04/06/25 1742 96 -- -- -- --    04/06/25 1731 93 -- -- -- --    04/06/25 1715 100 -- -- -- --    04/06/25 1421 97 room air -- -- --          Current Facility-Administered Medications   Medication Dose Route Frequency Provider Last Rate Last Admin    acetaminophen (TYLENOL) tablet 650 mg  650 mg Oral Q4H PRN Na Jarvis APRN        albuterol sulfate HFA (PROVENTIL HFA;VENTOLIN HFA;PROAIR HFA) inhaler 2 puff  2 puff Inhalation Q6H PRN Na Jarvis APRN        allopurinol (ZYLOPRIM) tablet 100 mg  100 mg Oral Daily Na Jarvis APRN   100 mg at 04/07/25 0940    amLODIPine (NORVASC) tablet 5 mg  5 mg Oral Daily Na Jarvis APRN   5 mg at 04/07/25 0941    apixaban (ELIQUIS) tablet 5 mg  5 mg Oral BID Na Jarvis, APRN   5 mg at 04/07/25 0940    benzonatate (TESSALON) capsule 100 mg  100 mg Oral TID PRN Na Jarvis APRN        sennosides-docusate (PERICOLACE) 8.6-50 MG per tablet 2 tablet  2 tablet Oral BID PRN Na Jarvis APRN        And    polyethylene glycol (MIRALAX) packet 17 g  17 g Oral Daily PRN Na Jarvis APRN        And    bisacodyl (DULCOLAX) EC tablet 5 mg  5 mg Oral Daily PRN Na Jarvis APRN        And    bisacodyl (DULCOLAX) suppository 10 mg  10 mg Rectal Daily PRN Na Jarvis APRN        carvedilol (COREG) tablet 25 mg  25 mg Oral BID With Meals Na Jarvis APRN   25 mg at 04/07/25 0941    dexAMETHasone (DECADRON) injection 6 mg  6 mg Intravenous Daily Na Jarvis APRN   6 mg at 04/06/25 2240    dextromethorphan polistirex ER (DELSYM) 30 MG/5ML oral suspension 60 mg  60 mg Oral Q12H PRN Na Jarvis APRN        DULoxetine (CYMBALTA) DR capsule 30 mg  30 mg  Oral Daily Na Jarvis APRN   30 mg at 04/07/25 0940    lisinopril (PRINIVIL,ZESTRIL) tablet 20 mg  20 mg Oral Q24H Na Jarvis APRN   20 mg at 04/07/25 0940    And    hydroCHLOROthiazide tablet 12.5 mg  12.5 mg Oral Q24H Na Jarvis APRN   12.5 mg at 04/07/25 0940    levothyroxine (SYNTHROID, LEVOTHROID) tablet 25 mcg  25 mcg Oral Q AM Na Jarvis APRN   25 mcg at 04/07/25 0641    Magnesium Standard Dose Replacement - Follow Nurse / BPA Driven Protocol   Not Applicable PRN Na Jarvis APRN        oxyCODONE-acetaminophen (PERCOCET)  MG per tablet 1 tablet  1 tablet Oral Q6H PRN Wendy Brar MD   1 tablet at 04/07/25 1022    Pharmacy Consult - Remdesivir for Mild to Moderate COVID-19 (Within 5 days of symptom onset)- only if contraindicated to Paxlovid   Not Applicable Continuous PRN Na Jarvis APRN        potassium chloride (KLOR-CON M20) CR tablet 40 mEq  40 mEq Oral Q4H Samir Muse MD   40 mEq at 04/07/25 1022    Potassium Replacement - Follow Nurse / BPA Driven Protocol   Not Applicable PRN Nani Austin PA        remdesivir 100 mg in sodium chloride 0.9 % 250 mL IVPB (powder vial)  100 mg Intravenous Q24H Lenard Cortes IV, PharmD        sodium chloride 0.9 % flush 10 mL  10 mL Intravenous PRN Luis Wilson MD        sodium chloride 0.9 % flush 10 mL  10 mL Intravenous Q12H Na Jarvis APRN   10 mL at 04/07/25 0941    sodium chloride 0.9 % flush 10 mL  10 mL Intravenous PRN Na Jarvis APRN        sodium chloride 0.9 % infusion 40 mL  40 mL Intravenous PRN Na Jarvis APRN        tamsulosin (FLOMAX) 24 hr capsule 0.4 mg  0.4 mg Oral Nightly Na Jarvis APRN   0.4 mg at 04/06/25 4951    timolol (TIMOPTIC) 0.5 % ophthalmic solution 1 drop  1 drop Right Eye BID Na Jarvis APRN   1 drop at 04/07/25 0941     Lab Results (all)       Procedure Component Value Units Date/Time    Respiratory Panel PCR w/COVID-19(SARS-CoV-2) NICK/KOJO/JED/PAD/COR/SHANICE  In-House, NP Swab in [facility], 2 HR TAT - Swab, Nasopharynx [[id number]]  (Abnormal) Collected: 04/07/25 1025    Specimen: Swab from Nasopharynx Updated: 04/07/25 1031     ADENOVIRUS, PCR Not Detected     Coronavirus 229E Not Detected     Coronavirus HKU1 Not Detected     Coronavirus NL63 Not Detected     Coronavirus OC43 Not Detected     COVID19 Detected     Human Metapneumovirus Not Detected     Human Rhinovirus/Enterovirus Not Detected     Influenza A PCR Not Detected     Influenza B PCR Not Detected     Parainfluenza Virus 1 Not Detected     Parainfluenza Virus 2 Not Detected     Parainfluenza Virus 3 Not Detected     Parainfluenza Virus 4 Not Detected     RSV, PCR Not Detected     Bordetella pertussis pcr Not Detected     Bordetella parapertussis PCR Not Detected     Chlamydophila pneumoniae PCR Not Detected     Mycoplasma pneumo by PCR Not Detected    Narrative:      In the setting of a positive respiratory panel with a viral infection PLUS a negative procalcitonin without other underlying concern for bacterial infection, consider observing off antibiotics or discontinuation of antibiotics and continue supportive care. If the respiratory panel is positive for atypical bacterial infection (Bordetella pertussis, Chlamydophila pneumoniae, or Mycoplasma pneumoniae), consider antibiotic de-escalation to target atypical bacterial infection.    Magnesium [[id number]]  (Normal) Collected: 04/07/25 0610    Specimen: Blood Updated: 04/07/25 0953     Magnesium 1.7 mg/dL     Folate [[id number]]  (Normal) Collected: 04/06/25 1448    Specimen: Blood Updated: 04/07/25 0932     Folate 8.56 ng/mL     Narrative:      Results may be falsely increased if patient taking Biotin.      Vitamin B12 [[id number]]  (Normal) Collected: 04/06/25 1448    Specimen: Blood Updated: 04/07/25 0932     Vitamin B-12 363 pg/mL     Narrative:      Results may be falsely increased if patient taking Biotin.      Comprehensive Metabolic Panel [[id number]]   (Abnormal) Collected: 04/07/25 0610    Specimen: Blood Updated: 04/07/25 0858     Glucose 115 mg/dL      BUN 12 mg/dL      Creatinine 0.88 mg/dL      Sodium 143 mmol/L      Potassium 3.6 mmol/L      Chloride 105 mmol/L      CO2 23.0 mmol/L      Calcium 9.0 mg/dL      Total Protein 6.1 g/dL      Albumin 3.7 g/dL      ALT (SGPT) 7 U/L      AST (SGOT) 17 U/L      Alkaline Phosphatase 85 U/L      Total Bilirubin 0.4 mg/dL      Globulin 2.4 gm/dL      Comment: Calculated Result        A/G Ratio 1.5 g/dL      BUN/Creatinine Ratio 13.6     Anion Gap 15.0 mmol/L      eGFR 84.8 mL/min/1.73     Narrative:      GFR Categories in Chronic Kidney Disease (CKD)      GFR Category          GFR (mL/min/1.73)    Interpretation  G1                     90 or greater         Normal or high (1)  G2                      60-89                Mild decrease (1)  G3a                   45-59                Mild to moderate decrease  G3b                   30-44                Moderate to severe decrease  G4                    15-29                Severe decrease  G5                    14 or less           Kidney failure          (1)In the absence of evidence of kidney disease, neither GFR category G1 or G2 fulfill the criteria for CKD.    eGFR calculation 2021 CKD-EPI creatinine equation, which does not include race as a factor    High Sensitivity Troponin T [[id number]]  (Abnormal) Collected: 04/07/25 0610    Specimen: Blood Updated: 04/07/25 0708     HS Troponin T 24 ng/L     Narrative:      High Sensitive Troponin T Reference Range:  <14.0 ng/L- Negative Female for AMI  <22.0 ng/L- Negative Male for AMI  >=14 - Abnormal Female indicating possible myocardial injury.  >=22 - Abnormal Male indicating possible myocardial injury.   Clinicians would have to utilize clinical acumen, EKG, Troponin, and serial changes to determine if it is an Acute Myocardial Infarction or myocardial injury due to an underlying chronic condition.         CBC &  Differential [[id number]]  (Abnormal) Collected: 04/07/25 0610    Specimen: Blood Updated: 04/07/25 0652    Narrative:      The following orders were created for panel order CBC & Differential.  Procedure                               Abnormality         Status                     ---------                               -----------         ------                     CBC Auto Differential[[id number]]        Abnormal            Final result                 Please view results for these tests on the individual orders.    CBC Auto Differential [[id number]]  (Abnormal) Collected: 04/07/25 0610    Specimen: Blood Updated: 04/07/25 0652     WBC 4.78 10*3/mm3      RBC 3.63 10*6/mm3      Hemoglobin 10.4 g/dL      Hematocrit 33.9 %      MCV 93.4 fL      MCH 28.7 pg      MCHC 30.7 g/dL      RDW 15.8 %      RDW-SD 54.4 fl      MPV 10.7 fL      Platelets 168 10*3/mm3      Neutrophil % 86.0 %      Lymphocyte % 9.6 %      Monocyte % 3.8 %      Eosinophil % 0.0 %      Basophil % 0.2 %      Immature Grans % 0.4 %      Neutrophils, Absolute 4.11 10*3/mm3      Lymphocytes, Absolute 0.46 10*3/mm3      Monocytes, Absolute 0.18 10*3/mm3      Eosinophils, Absolute 0.00 10*3/mm3      Basophils, Absolute 0.01 10*3/mm3      Immature Grans, Absolute 0.02 10*3/mm3      nRBC 0.0 /100 WBC     Folate RBC [[id number]] Collected: 04/07/25 0610    Specimen: Blood Updated: 04/07/25 0636    Ferritin [[id number]]  (Normal) Collected: 04/06/25 1704    Specimen: Blood Updated: 04/06/25 2214     Ferritin 58.14 ng/mL     Narrative:      Results may be falsely decreased if patient taking Biotin.      Iron Profile [[id number]]  (Abnormal) Collected: 04/06/25 1704    Specimen: Blood Updated: 04/06/25 2209     Iron 27 mcg/dL      Iron Saturation (TSAT) 9 %      Transferrin 205 mg/dL      TIBC 305 mcg/dL     Iron [[id number]]  (Abnormal) Collected: 04/06/25 1704    Specimen: Blood Updated: 04/06/25 2209     Iron 27 mcg/dL     C-reactive Protein [[id number]]  (Abnormal)  "Collected: 04/06/25 1704    Specimen: Blood Updated: 04/06/25 2136     C-Reactive Protein 3.78 mg/dL     Reticulocytes [[id number]]  (Normal) Collected: 04/06/25 1448    Specimen: Blood Updated: 04/06/25 2121     Reticulocyte % 1.41 %      Reticulocyte Absolute 0.0505 10*6/mm3     Magnesium [[id number]]  (Normal) Collected: 04/06/25 1704    Specimen: Blood Updated: 04/06/25 1823     Magnesium 1.7 mg/dL     High Sensitivity Troponin T 1Hr [[id number]]  (Abnormal) Collected: 04/06/25 1704    Specimen: Blood Updated: 04/06/25 1747     HS Troponin T 30 ng/L      Troponin T Numeric Delta 0 ng/L      Troponin T % Delta 0    Narrative:      High Sensitive Troponin T Reference Range:  <14.0 ng/L- Negative Female for AMI  <22.0 ng/L- Negative Male for AMI  >=14 - Abnormal Female indicating possible myocardial injury.  >=22 - Abnormal Male indicating possible myocardial injury.   Clinicians would have to utilize clinical acumen, EKG, Troponin, and serial changes to determine if it is an Acute Myocardial Infarction or myocardial injury due to an underlying chronic condition.         Procalcitonin [[id number]]  (Normal) Collected: 04/06/25 1448    Specimen: Blood Updated: 04/06/25 1540     Procalcitonin 0.08 ng/mL     Narrative:      As a Marker for Sepsis (Non-Neonates):    1. <0.5 ng/mL represents a low risk of severe sepsis and/or septic shock.  2. >2 ng/mL represents a high risk of severe sepsis and/or septic shock.    As a Marker for Lower Respiratory Tract Infections that require antibiotic therapy:    PCT on Admission    Antibiotic Therapy       6-12 Hrs later    >0.5                Strongly Recommended  >0.25 - <0.5        Recommended   0.1 - 0.25          Discouraged              Remeasure/reassess PCT  <0.1                Strongly Discouraged     Remeasure/reassess PCT    As 28 day mortality risk marker: \"Change in Procalcitonin Result\" (>80% or <=80%) if Day 0 (or Day 1) and Day 4 values are available. Refer to " http://[email or web address]-pct-calculator.com    Change in PCT <=80%  A decrease of PCT levels below or equal to 80% defines a positive change in PCT test result representing a higher risk for 28-day all-cause mortality of patients diagnosed with severe sepsis for septic shock.    Change in PCT >80%  A decrease of PCT levels of more than 80% defines a negative change in PCT result representing a lower risk for 28-day all-cause mortality of patients diagnosed with severe sepsis or septic shock.       Comprehensive Metabolic Panel [[id number]]  (Abnormal) Collected: 04/06/25 1448    Specimen: Blood Updated: 04/06/25 1535     Glucose 94 mg/dL      BUN 15 mg/dL      Creatinine 1.06 mg/dL      Sodium 141 mmol/L      Potassium 3.0 mmol/L      Chloride 101 mmol/L      CO2 29.0 mmol/L      Calcium 9.5 mg/dL      Total Protein 6.7 g/dL      Albumin 4.0 g/dL      ALT (SGPT) 7 U/L      AST (SGOT) 17 U/L      Alkaline Phosphatase 91 U/L      Total Bilirubin 0.5 mg/dL      Globulin 2.7 gm/dL      Comment: Calculated Result        A/G Ratio 1.5 g/dL      BUN/Creatinine Ratio 14.2     Anion Gap 11.0 mmol/L      eGFR 69.2 mL/min/1.73     Narrative:      GFR Categories in Chronic Kidney Disease (CKD)      GFR Category          GFR (mL/min/1.73)    Interpretation  G1                     90 or greater         Normal or high (1)  G2                      60-89                Mild decrease (1)  G3a                   45-59                Mild to moderate decrease  G3b                   30-44                Moderate to severe decrease  G4                    15-29                Severe decrease  G5                    14 or less           Kidney failure          (1)In the absence of evidence of kidney disease, neither GFR category G1 or G2 fulfill the criteria for CKD.    eGFR calculation 2021 CKD-EPI creatinine equation, which does not include race as a factor    Blood Culture - Blood, Arm, Right [[id number]] Collected: 04/06/25 1505    Specimen:  Blood from Arm, Right Updated: 04/06/25 1534    Blood Culture - Blood, Arm, Left [[id number]] Collected: 04/06/25 1516    Specimen: Blood from Arm, Left Updated: 04/06/25 1534    COVID PRE-OP / PRE-PROCEDURE SCREENING ORDER (NO ISOLATION) - Swab, Nasopharynx [[id number]]  (Abnormal) Collected: 04/06/25 1448    Specimen: Swab from Nasopharynx Updated: 04/06/25 1533    Narrative:      The following orders were created for panel order COVID PRE-OP / PRE-PROCEDURE SCREENING ORDER (NO ISOLATION) - Swab, Nasopharynx.  Procedure                               Abnormality         Status                     ---------                               -----------         ------                     COVID-19 and FLU A/B PCR...[[id number]]  Abnormal            Final result                 Please view results for these tests on the individual orders.    COVID-19 and FLU A/B PCR, 1 HR TAT - Swab, Nasopharynx [[id number]]  (Abnormal) Collected: 04/06/25 1448    Specimen: Swab from Nasopharynx Updated: 04/06/25 1533     COVID19 Detected     Influenza A PCR Not Detected     Influenza B PCR Not Detected    Narrative:      Fact sheet for providers: https://www.fda.gov/media/881882/download    Fact sheet for patients: https://www.fda.gov/media/388466/download    Test performed by PCR.  Influenza A and Influenza B negative results should be considered presumptive in samples that have a positive SARS-CoV-2 result.    Competitive inhibition studies showed that SARS-CoV-2 virus, when present at concentrations above 3.6E+04 copies/mL, can inhibit the detection and amplification of influenza A and influenza B virus RNA if present at or below 1.8E+02 copies/mL or 4.9E+02 copies/mL, respectively, and may lead to false negative influenza virus results. If co-infection with influenza A or influenza B virus is suspected in samples with a positive SARS-CoV-2 result, the sample should be re-tested with another FDA cleared, approved, or authorized influenza  test, if influenza virus detection would change clinical management.    BNP [[id number]]  (Normal) Collected: 04/06/25 1448    Specimen: Blood Updated: 04/06/25 1533     proBNP 611.4 pg/mL     Narrative:      This assay is used as an aid in the diagnosis of individuals suspected of having heart failure. It can be used as an aid in the diagnosis of acute decompensated heart failure (ADHF) in patients presenting with signs and symptoms of ADHF to the emergency department (ED). In addition, NT-proBNP of <300 pg/mL indicates ADHF is not likely.    Age Range Result Interpretation  NT-proBNP Concentration (pg/mL:      <50             Positive            >450                   Gray                 300-450                    Negative             <300    50-75           Positive            >900                  Gray                300-900                  Negative            <300      >75             Positive            >1800                  Gray                300-1800                  Negative            <300    High Sensitivity Troponin T [[id number]]  (Abnormal) Collected: 04/06/25 1448    Specimen: Blood Updated: 04/06/25 1533     HS Troponin T 30 ng/L     Narrative:      High Sensitive Troponin T Reference Range:  <14.0 ng/L- Negative Female for AMI  <22.0 ng/L- Negative Male for AMI  >=14 - Abnormal Female indicating possible myocardial injury.  >=22 - Abnormal Male indicating possible myocardial injury.   Clinicians would have to utilize clinical acumen, EKG, Troponin, and serial changes to determine if it is an Acute Myocardial Infarction or myocardial injury due to an underlying chronic condition.         Lactic Acid, Plasma [[id number]]  (Normal) Collected: 04/06/25 1448    Specimen: Blood Updated: 04/06/25 1520     Lactate 1.2 mmol/L      Comment: Falsely depressed results may occur on samples drawn from patients receiving N-Acetylcysteine (NAC) or Metamizole.       Urinalysis With Microscopic If Indicated (No  Culture) - Urine, Clean Catch [[id number]]  (Abnormal) Collected: 04/06/25 1450    Specimen: Urine, Clean Catch Updated: 04/06/25 1506     Color, UA Yellow     Appearance, UA Clear     pH, UA 7.0     Specific Gravity, UA 1.014     Glucose, UA Negative     Ketones, UA Negative     Bilirubin, UA Negative     Blood, UA Negative     Protein, UA Negative     Leuk Esterase, UA Trace     Nitrite, UA Negative     Urobilinogen, UA 1.0 E.U./dL    Urinalysis, Microscopic Only - Urine, Clean Catch [[id number]]  (Abnormal) Collected: 04/06/25 1450    Specimen: Urine, Clean Catch Updated: 04/06/25 1506     RBC, UA 0-2 /HPF      WBC, UA 3-5 /HPF      Bacteria, UA None Seen /HPF      Squamous Epithelial Cells, UA 0-2 /HPF      Hyaline Casts, UA 0-6 /LPF      Methodology Automated Microscopy    CBC & Differential [[id number]]  (Abnormal) Collected: 04/06/25 1448    Specimen: Blood Updated: 04/06/25 1505    Narrative:      The following orders were created for panel order CBC & Differential.  Procedure                               Abnormality         Status                     ---------                               -----------         ------                     CBC Auto Differential[[id number]]        Abnormal            Final result                 Please view results for these tests on the individual orders.    CBC Auto Differential [[id number]]  (Abnormal) Collected: 04/06/25 1448    Specimen: Blood Updated: 04/06/25 1505     WBC 7.23 10*3/mm3      RBC 3.59 10*6/mm3      Hemoglobin 10.4 g/dL      Hematocrit 32.9 %      MCV 91.6 fL      MCH 29.0 pg      MCHC 31.6 g/dL      RDW 15.7 %      RDW-SD 52.5 fl      MPV 12.2 fL      Platelets 196 10*3/mm3      Neutrophil % 81.5 %      Lymphocyte % 5.8 %      Monocyte % 11.9 %      Eosinophil % 0.4 %      Basophil % 0.1 %      Immature Grans % 0.3 %      Neutrophils, Absolute 5.89 10*3/mm3      Lymphocytes, Absolute 0.42 10*3/mm3      Monocytes, Absolute 0.86 10*3/mm3      Eosinophils,  Absolute 0.03 10*3/mm3      Basophils, Absolute 0.01 10*3/mm3      Immature Grans, Absolute 0.02 10*3/mm3      nRBC 0.0 /100 WBC     [place] Draw [[id number]] Collected: 04/06/25 1448    Specimen: Blood Updated: 04/06/25 1500    Narrative:      The following orders were created for panel order [place] Draw.  Procedure                               Abnormality         Status                     ---------                               -----------         ------                     Green Top (Gel)[[id number]]                                  Final result               Lavender Top[[id number]]                                     Final result               Gold Top - SST[[id number]]                                   Final result               Fitzgerald Top[[id number]]                                         Final result               Light Blue Top[[id number]]                                   Final result                 Please view results for these tests on the individual orders.    Green Top (Gel) [[id number]] Collected: 04/06/25 1448    Specimen: Blood Updated: 04/06/25 1500     Extra Tube Hold for add-ons.     Comment: Auto resulted.       Lavender Top [[id number]] Collected: 04/06/25 1448    Specimen: Blood Updated: 04/06/25 1500     Extra Tube hold for add-on     Comment: Auto resulted       Gold Top - SST [[id number]] Collected: 04/06/25 1448    Specimen: Blood Updated: 04/06/25 1500     Extra Tube Hold for add-ons.     Comment: Auto resulted.       Gray Top [[id number]] Collected: 04/06/25 1448    Specimen: Blood Updated: 04/06/25 1500     Extra Tube Hold for add-ons.     Comment: Auto resulted.       Light Blue Top [[id number]] Collected: 04/06/25 1448    Specimen: Blood Updated: 04/06/25 1500     Extra Tube Hold for add-ons.     Comment: Auto resulted             Imaging Results (All)       Procedure Component Value Units Date/Time    XR Chest 1 View [[id number]] Collected: 04/06/25 1547     Updated: 04/06/25 1656     Narrative:      XR CHEST 1 VW    Date of Exam: 4/6/2025 3:23 PM EDT    Indication: SOA triage protocol    Comparison: CT 1/2/2025.    Findings:  Emphysematous changes are again noted. There is no evidence of new focal opacity. There is no effusion or pneumothorax. Unchanged cardiac enlargement.      Impression:      Impression:  Stable chest without evidence of acute disease.      Electronically Signed: Rah Peña MD    4/6/2025 4:53 PM EDT    Workstation ID: [id number]          Physician Progress Notes (all)    No notes of this type exist for this encounter.       Consult Notes (all)    No notes of this type exist for this encounter.

## 2025-04-07 NOTE — PLAN OF CARE
Goal Outcome Evaluation:  Plan of Care Reviewed With: patient        Progress: no change  Outcome Evaluation: OT eval complete. Pt presents below baseline with elevated pain, decreased activity tolerance, strength, balance deficits, and ADL performance. Pt having head pain and L knee pain. SBA for bed mobility. Recommend IPOT POC and IRF at D/C, but will continue to monitor progress.    Anticipated Discharge Disposition (OT): inpatient rehabilitation facility

## 2025-04-08 LAB
ALBUMIN SERPL-MCNC: 3.7 G/DL (ref 3.5–5.2)
ALBUMIN/GLOB SERPL: 1.8 G/DL
ALP SERPL-CCNC: 85 U/L (ref 39–117)
ALT SERPL W P-5'-P-CCNC: 8 U/L (ref 1–41)
ANION GAP SERPL CALCULATED.3IONS-SCNC: 8 MMOL/L (ref 5–15)
AST SERPL-CCNC: 18 U/L (ref 1–40)
BASOPHILS # BLD AUTO: 0.01 10*3/MM3 (ref 0–0.2)
BASOPHILS NFR BLD AUTO: 0.1 % (ref 0–1.5)
BILIRUB SERPL-MCNC: 0.3 MG/DL (ref 0–1.2)
BUN SERPL-MCNC: 22 MG/DL (ref 8–23)
BUN/CREAT SERPL: 24.2 (ref 7–25)
CALCIUM SPEC-SCNC: 9.4 MG/DL (ref 8.6–10.5)
CHLORIDE SERPL-SCNC: 104 MMOL/L (ref 98–107)
CO2 SERPL-SCNC: 28 MMOL/L (ref 22–29)
CREAT SERPL-MCNC: 0.91 MG/DL (ref 0.76–1.27)
DEPRECATED RDW RBC AUTO: 52.8 FL (ref 37–54)
EGFRCR SERPLBLD CKD-EPI 2021: 83.1 ML/MIN/1.73
EOSINOPHIL # BLD AUTO: 0.01 10*3/MM3 (ref 0–0.4)
EOSINOPHIL NFR BLD AUTO: 0.1 % (ref 0.3–6.2)
ERYTHROCYTE [DISTWIDTH] IN BLOOD BY AUTOMATED COUNT: 15.6 % (ref 12.3–15.4)
FOLATE BLD-MCNC: 247 NG/ML
FOLATE RBC-MCNC: 742 NG/ML
GLOBULIN UR ELPH-MCNC: 2.1 GM/DL
GLUCOSE SERPL-MCNC: 118 MG/DL (ref 65–99)
HCT VFR BLD AUTO: 33.3 % (ref 37.5–51)
HCT VFR BLD AUTO: 34.5 % (ref 37.5–51)
HGB BLD-MCNC: 10.8 G/DL (ref 13–17.7)
IMM GRANULOCYTES # BLD AUTO: 0.03 10*3/MM3 (ref 0–0.05)
IMM GRANULOCYTES NFR BLD AUTO: 0.3 % (ref 0–0.5)
LYMPHOCYTES # BLD AUTO: 0.47 10*3/MM3 (ref 0.7–3.1)
LYMPHOCYTES NFR BLD AUTO: 4.3 % (ref 19.6–45.3)
MAGNESIUM SERPL-MCNC: 1.7 MG/DL (ref 1.6–2.4)
MCH RBC QN AUTO: 29.1 PG (ref 26.6–33)
MCHC RBC AUTO-ENTMCNC: 31.3 G/DL (ref 31.5–35.7)
MCV RBC AUTO: 93 FL (ref 79–97)
MONOCYTES # BLD AUTO: 0.3 10*3/MM3 (ref 0.1–0.9)
MONOCYTES NFR BLD AUTO: 2.7 % (ref 5–12)
NEUTROPHILS NFR BLD AUTO: 10.11 10*3/MM3 (ref 1.7–7)
NEUTROPHILS NFR BLD AUTO: 92.5 % (ref 42.7–76)
NRBC BLD AUTO-RTO: 0 /100 WBC (ref 0–0.2)
PLATELET # BLD AUTO: 208 10*3/MM3 (ref 140–450)
PMV BLD AUTO: 12 FL (ref 6–12)
POTASSIUM SERPL-SCNC: 4.3 MMOL/L (ref 3.5–5.2)
PROT SERPL-MCNC: 5.8 G/DL (ref 6–8.5)
RBC # BLD AUTO: 3.71 10*6/MM3 (ref 4.14–5.8)
SODIUM SERPL-SCNC: 140 MMOL/L (ref 136–145)
WBC NRBC COR # BLD AUTO: 10.93 10*3/MM3 (ref 3.4–10.8)

## 2025-04-08 PROCEDURE — 99232 SBSQ HOSP IP/OBS MODERATE 35: CPT | Performed by: INTERNAL MEDICINE

## 2025-04-08 PROCEDURE — 80053 COMPREHEN METABOLIC PANEL: CPT | Performed by: NURSE PRACTITIONER

## 2025-04-08 PROCEDURE — 83735 ASSAY OF MAGNESIUM: CPT | Performed by: NURSE PRACTITIONER

## 2025-04-08 PROCEDURE — 25010000002 DEXAMETHASONE PER 1 MG: Performed by: NURSE PRACTITIONER

## 2025-04-08 PROCEDURE — 25810000003 SODIUM CHLORIDE 0.9 % SOLUTION 250 ML FLEX CONT

## 2025-04-08 PROCEDURE — 85025 COMPLETE CBC W/AUTO DIFF WBC: CPT | Performed by: NURSE PRACTITIONER

## 2025-04-08 PROCEDURE — 25010000002 REMDESIVIR 100 MG/20ML SOLUTION 1 EACH VIAL

## 2025-04-08 RX ADMIN — APIXABAN 5 MG: 5 TABLET, FILM COATED ORAL at 09:21

## 2025-04-08 RX ADMIN — ALLOPURINOL 100 MG: 100 TABLET ORAL at 09:21

## 2025-04-08 RX ADMIN — LEVOTHYROXINE SODIUM 25 MCG: 0.03 TABLET ORAL at 06:21

## 2025-04-08 RX ADMIN — LISINOPRIL 20 MG: 20 TABLET ORAL at 09:21

## 2025-04-08 RX ADMIN — Medication 10 ML: at 09:22

## 2025-04-08 RX ADMIN — OXYCODONE HYDROCHLORIDE AND ACETAMINOPHEN 1 TABLET: 10; 325 TABLET ORAL at 17:57

## 2025-04-08 RX ADMIN — CARVEDILOL 25 MG: 12.5 TABLET, FILM COATED ORAL at 17:57

## 2025-04-08 RX ADMIN — SODIUM CHLORIDE 100 MG: 9 INJECTION, SOLUTION INTRAVENOUS at 22:39

## 2025-04-08 RX ADMIN — TAMSULOSIN HYDROCHLORIDE 0.4 MG: 0.4 CAPSULE ORAL at 22:38

## 2025-04-08 RX ADMIN — DEXAMETHASONE SODIUM PHOSPHATE 6 MG: 10 INJECTION INTRAMUSCULAR; INTRAVENOUS at 09:22

## 2025-04-08 RX ADMIN — DULOXETINE HYDROCHLORIDE 30 MG: 30 CAPSULE, DELAYED RELEASE ORAL at 09:21

## 2025-04-08 RX ADMIN — Medication 10 ML: at 22:41

## 2025-04-08 RX ADMIN — OXYCODONE HYDROCHLORIDE AND ACETAMINOPHEN 1 TABLET: 10; 325 TABLET ORAL at 09:22

## 2025-04-08 RX ADMIN — CARVEDILOL 25 MG: 12.5 TABLET, FILM COATED ORAL at 09:20

## 2025-04-08 RX ADMIN — APIXABAN 5 MG: 5 TABLET, FILM COATED ORAL at 22:38

## 2025-04-08 RX ADMIN — HYDROCHLOROTHIAZIDE 12.5 MG: 25 TABLET ORAL at 09:21

## 2025-04-08 RX ADMIN — TIMOLOL MALEATE 1 DROP: 5 SOLUTION/ DROPS OPHTHALMIC at 09:22

## 2025-04-08 RX ADMIN — TIMOLOL MALEATE 1 DROP: 5 SOLUTION/ DROPS OPHTHALMIC at 22:39

## 2025-04-08 RX ADMIN — AMLODIPINE BESYLATE 5 MG: 5 TABLET ORAL at 09:21

## 2025-04-08 NOTE — PROGRESS NOTES
Discharge Planning Assessment  [facility]     Patient Name: George Lomeli  MRN: [id number]  Today's Date: 4/8/2025    Admit Date: 4/6/2025        Discharge Needs Assessment       Row Name 04/08/25 1412       Living Environment    Current Living Arrangements home    Potentially Unsafe Housing Conditions none    Primary Care Provided by self    Provides Primary Care For no one    Quality of Family Relationships helpful       Discharge Needs Assessment    Equipment Currently Used at Home cane, straight    Concerns to be Addressed discharge planning    Equipment Needed After Discharge cane, straight                   Discharge Plan       Row Name 04/08/25 1413       Plan    Plan Comments Mr. Lomeli lives in [place] and has [place] [employer] and Linn Salas is his primary care provider. He uses [facility] for prescriptions. He has a diagnosis of covid and PT/OT evaluated him but he would be in isolation if he needed inpatient rehab. Case management is following for discharge planning needs.                                       Expected Discharge Date and Time       Expected Discharge Date Expected Discharge Time    Apr 8, 2025            Demographic Summary       Row Name 04/08/25 1411       General Information    Admission Type inpatient    Arrived From home    Referral Source patient    Reason for Consult discharge planning    Preferred Language English       Contact Information    Permission Granted to Share Info With     Contact Information Obtained for                    Functional Status       Row Name 04/08/25 1412       Functional Status    Usual Activity Tolerance good    Current Activity Tolerance good       Functional Status, IADL    Medications assistive person    Meal Preparation assistive person    Housekeeping assistive person    Laundry assistive person    Shopping assistive person                   Psychosocial    No documentation.                  Abuse/Neglect    No  documentation.                  Legal    No documentation.                  Substance Abuse    No documentation.                  Patient Forms    No documentation.                     GAEL Coombs

## 2025-04-08 NOTE — PROGRESS NOTES
[facility] Medicine Services  PROGRESS NOTE    Patient Name: George Lomeli  : 1940  MRN: [id number]    Date of Admission: 2025  Primary Care Physician: Linn Salas APRN    Subjective   Subjective     CC:  Cough    HPI:  Resting in bed in no acute distress and tells me that he feels better.  His cough has improved.  Denies any fever or chills.  No chest pain palpitation shortness of breat.  No nausea vomiting or diarrhea.      Objective   Objective     Vital Signs:   Temp:  [97.4 °F (36.3 °C)-98 °F (36.7 °C)] 97.9 °F (36.6 °C)  Heart Rate:  [63-73] 73  Resp:  [16] 16  BP: (122-139)/(88-97) 122/88  Flow (L/min) (Oxygen Therapy):  [2] 2     Physical Exam:  Constitutional: No acute distress, awake, alert  HENT: NCAT, mucous membranes moist  Respiratory: cough on deep inspiration, respiratory effort normal   Cardiovascular: RRR, no murmurs, rubs, or gallops  Gastrointestinal: Positive bowel sounds, soft, nontender, nondistended  Musculoskeletal: No bilateral ankle edema  Psychiatric: Appropriate affect, cooperative  Neurologic: Oriented x 3,  speech clear  Skin: No rashes     Results Reviewed:  LAB RESULTS:      Lab 25  1448   WBC 4.78  --  7.23   HEMOGLOBIN 10.4*  --  10.4*   HEMATOCRIT 33.9*  --  32.9*   PLATELETS 168  --  196   NEUTROS ABS 4.11  --  5.89   IMMATURE GRANS (ABS) 0.02  --  0.02   LYMPHS ABS 0.46*  --  0.42*   MONOS ABS 0.18  --  0.86   EOS ABS 0.00  --  0.03   MCV 93.4  --  91.6   CRP  --  3.78*  --    PROCALCITONIN  --   --  0.08   LACTATE  --   --  1.2   HSTROP T 24* 30* 30*         Lab 25  0610 25  17025  1448   SODIUM 143  --  141   POTASSIUM 3.6  --  3.0*   CHLORIDE 105  --  101   CO2 23.0  --  29.0   ANION GAP 15.0  --  11.0   BUN 12  --  15   CREATININE 0.88  --  1.06   EGFR 84.8  --  69.2   GLUCOSE 115*  --  94   CALCIUM 9.0  --  9.5   MAGNESIUM 1.7 1.7  --          Lab 25  0610 25  5208    TOTAL PROTEIN 6.1 6.7   ALBUMIN 3.7 4.0   GLOBULIN 2.4 2.7   ALT (SGPT) 7 7   AST (SGOT) 17 17   BILIRUBIN 0.4 0.5   ALK PHOS 85 91         Lab 04/07/25  0610 04/06/25  1704 04/06/25  1448   PROBNP  --   --  611.4   HSTROP T 24* 30* 30*             Lab 04/06/25  1704 04/06/25  1448   IRON 27*  27*  --    IRON SATURATION (TSAT) 9*  --    TIBC 305  --    TRANSFERRIN 205  --    FERRITIN 58.14  --    FOLATE  --  8.56   VITAMIN B 12  --  363         Brief Urine Lab Results  (Last result in the past 365 days)        Color   Clarity   Blood   Leuk Est   Nitrite   Protein   CREAT   Urine HCG        04/06/25 1450 Yellow   Clear   Negative   Trace   Negative   Negative                   Microbiology Results Abnormal       Procedure Component Value - Date/Time    Respiratory Panel PCR w/COVID-19(SARS-CoV-2) NICK/KOJO/JED/PAD/COR/SHANICE In-House, NP Swab in UTM/VTM, 2 HR TAT - Swab, Nasopharynx [[id number]]  (Abnormal) Collected: 04/07/25 1025    Lab Status: Final result Specimen: Swab from Nasopharynx Updated: 04/07/25 1031     ADENOVIRUS, PCR Not Detected     Coronavirus 229E Not Detected     Coronavirus HKU1 Not Detected     Coronavirus NL63 Not Detected     Coronavirus OC43 Not Detected     COVID19 Detected     Human Metapneumovirus Not Detected     Human Rhinovirus/Enterovirus Not Detected     Influenza A PCR Not Detected     Influenza B PCR Not Detected     Parainfluenza Virus 1 Not Detected     Parainfluenza Virus 2 Not Detected     Parainfluenza Virus 3 Not Detected     Parainfluenza Virus 4 Not Detected     RSV, PCR Not Detected     Bordetella pertussis pcr Not Detected     Bordetella parapertussis PCR Not Detected     Chlamydophila pneumoniae PCR Not Detected     Mycoplasma pneumo by PCR Not Detected    Narrative:      In the setting of a positive respiratory panel with a viral infection PLUS a negative procalcitonin without other underlying concern for bacterial infection, consider observing off antibiotics or  discontinuation of antibiotics and continue supportive care. If the respiratory panel is positive for atypical bacterial infection (Bordetella pertussis, Chlamydophila pneumoniae, or Mycoplasma pneumoniae), consider antibiotic de-escalation to target atypical bacterial infection.    COVID PRE-OP / PRE-PROCEDURE SCREENING ORDER (NO ISOLATION) - Swab, Nasopharynx [[id number]]  (Abnormal) Collected: 04/06/25 1448    Lab Status: Final result Specimen: Swab from Nasopharynx Updated: 04/06/25 1533    Narrative:      The following orders were created for panel order COVID PRE-OP / PRE-PROCEDURE SCREENING ORDER (NO ISOLATION) - Swab, Nasopharynx.  Procedure                               Abnormality         Status                     ---------                               -----------         ------                     COVID-19 and FLU A/B PCR...[[id number]]  Abnormal            Final result                 Please view results for these tests on the individual orders.    COVID-19 and FLU A/B PCR, 1 HR TAT - Swab, Nasopharynx [[id number]]  (Abnormal) Collected: 04/06/25 1448    Lab Status: Final result Specimen: Swab from Nasopharynx Updated: 04/06/25 1533     COVID19 Detected     Influenza A PCR Not Detected     Influenza B PCR Not Detected    Narrative:      Fact sheet for providers: https://www.fda.gov/media/468429/download    Fact sheet for patients: https://www.fda.gov/media/389427/download    Test performed by PCR.  Influenza A and Influenza B negative results should be considered presumptive in samples that have a positive SARS-CoV-2 result.    Competitive inhibition studies showed that SARS-CoV-2 virus, when present at concentrations above 3.6E+04 copies/mL, can inhibit the detection and amplification of influenza A and influenza B virus RNA if present at or below 1.8E+02 copies/mL or 4.9E+02 copies/mL, respectively, and may lead to false negative influenza virus results. If co-infection with influenza A or influenza B  virus is suspected in samples with a positive SARS-CoV-2 result, the sample should be re-tested with another FDA cleared, approved, or authorized influenza test, if influenza virus detection would change clinical management.            XR Chest 1 View  Result Date: 4/6/2025  XR CHEST 1 VW Date of Exam: 4/6/2025 3:23 PM EDT Indication: SOA triage protocol Comparison: CT 1/2/2025. Findings: Emphysematous changes are again noted. There is no evidence of new focal opacity. There is no effusion or pneumothorax. Unchanged cardiac enlargement.     Impression: Impression: Stable chest without evidence of acute disease. Electronically Signed: Rah Peña MD  4/6/2025 4:53 PM EDT  Workstation ID: [id number]      Results for orders placed during the hospital encounter of 11/21/23    Adult Transthoracic Echo Complete W/ Cont if Necessary Per Protocol    Interpretation Summary    Left ventricular systolic function is normal. Calculated left ventricular EF = 53% Left ventricular ejection fraction appears to be 51 - 55%.    Left ventricular diastolic function is consistent with (grade I) impaired relaxation.    The right ventricular cavity is mildly dilated.    The right atrial cavity is dilated.    Estimated right ventricular systolic pressure from tricuspid regurgitation is normal (<35 mmHg).      Current medications:  Scheduled Meds:allopurinol, 100 mg, Oral, Daily  amLODIPine, 5 mg, Oral, Daily  apixaban, 5 mg, Oral, BID  carvedilol, 25 mg, Oral, BID With Meals  dexAMETHasone, 6 mg, Intravenous, Daily  DULoxetine, 30 mg, Oral, Daily  lisinopril, 20 mg, Oral, Q24H   And  hydroCHLOROthiazide, 12.5 mg, Oral, Q24H  levothyroxine, 25 mcg, Oral, Q AM  remdesivir, 100 mg, Intravenous, Q24H  sodium chloride, 10 mL, Intravenous, Q12H  tamsulosin, 0.4 mg, Oral, Nightly  timolol, 1 drop, Right Eye, BID      Continuous Infusions:Pharmacy Consult - Remdesivir,       PRN Meds:.  acetaminophen    albuterol sulfate HFA    benzonatate     senna-docusate sodium **AND** polyethylene glycol **AND** bisacodyl **AND** bisacodyl    dextromethorphan polistirex ER    Magnesium Standard Dose Replacement - Follow Nurse / BPA Driven Protocol    oxyCODONE-acetaminophen    Pharmacy Consult - Remdesivir    Potassium Replacement - Follow Nurse / BPA Driven Protocol    sodium chloride    sodium chloride    sodium chloride    Assessment & Plan   Assessment & Plan     Active Hospital Problems    Diagnosis  POA    **Acute hypoxemic respiratory failure due to COVID-19 [U07.1, J96.01]  Yes    Atrial fibrillation with RVR [I48.91]  Yes    Elevated troponin [R79.89]  Yes    Anemia, chronic disease [D63.8]  Yes    Hypothyroidism (acquired) [E03.9]  Yes    Hypokalemia [E87.6]  Yes    Hypertension [I10]  Yes    Gout [M10.9]  Yes      Resolved Hospital Problems   No resolved problems to display.        Brief Hospital Course to date:  George Lomeli is a 84 y.o. male  w/ a hx of PAF (on Eliquis), hx of AAA s/p repair, HTN, chronic anemia, hypothyroidism, hx of thyroid cancer, hx of lymphoma, gout who presented to the ED w/ c/o a cough.      **Acute hypoxic respiratory failure 2/2 COVID-19   -symptom onset ~ 2 days ago: cough and mild shortness of breath   -no prior COVID vaccine per pt   -COVID detected, negative for Flu A/B; full respiratory panel pcr pending   -Currently patient is on 2 L of nasal cannula and saturating well.         **Atrial fibrillation w/ RVR   **Elevated troponin   **Hx of HTN   -initial EKG c/w A.Fib RVR; rates 's  -No chest pain and troponin is flat.  -Heart rate now is controlled.  -Patient has history of paroxysmal atrial fibrillation and is on anticoagulation.    **Hypokalemia   -K 3.0  -mag 1.7  -replace per protocol      **Hypothyroidism   **Hx of thyroid cancer   -continue synthroid      Chronic anemia   Gout   Chronic pain   -Continue home medication and monitor      Expected Discharge Location and Transportation: Home  Expected Discharge in  2 to 3 days  Expected Discharge Date: 4/8/2025; Expected Discharge Time:      VTE Prophylaxis:  Pharmacologic & mechanical VTE prophylaxis orders are present.         AM-PAC 6 Clicks Score (PT): 17 (04/07/25 2120)    CODE STATUS:   Code Status and Medical Interventions: CPR (Attempt to Resuscitate); Full Support   Ordered at: 04/06/25 2058     Code Status (Patient has no pulse and is not breathing):    CPR (Attempt to Resuscitate)     Medical Interventions (Patient has pulse or is breathing):    Full Support     Level Of Support Discussed With:    Patient       Samir Muse MD  04/08/25

## 2025-04-08 NOTE — PLAN OF CARE
Problem: Adult Inpatient Plan of Care  Goal: Absence of Hospital-Acquired Illness or Injury  Intervention: Identify and Manage Fall Risk  Recent Flowsheet Documentation  Taken 4/8/2025 0420 by Moriah Alanis, RN  Safety Promotion/Fall Prevention:   activity supervised   assistive device/personal items within reach   clutter free environment maintained   fall prevention program maintained   lighting adjusted   nonskid shoes/slippers when out of bed   room organization consistent   safety round/check completed  Taken 4/8/2025 0220 by Moriah Alanis, RN  Safety Promotion/Fall Prevention:   activity supervised   assistive device/personal items within reach   clutter free environment maintained   fall prevention program maintained   lighting adjusted   nonskid shoes/slippers when out of bed   room organization consistent   safety round/check completed  Taken 4/8/2025 0020 by Moriah Alanis, RN  Safety Promotion/Fall Prevention:   activity supervised   assistive device/personal items within reach   clutter free environment maintained   fall prevention program maintained   lighting adjusted   nonskid shoes/slippers when out of bed   room organization consistent   safety round/check completed  Taken 4/7/2025 2220 by Moriah Alanis, RN  Safety Promotion/Fall Prevention:   activity supervised   assistive device/personal items within reach   clutter free environment maintained   fall prevention program maintained   lighting adjusted   nonskid shoes/slippers when out of bed   room organization consistent   safety round/check completed  Taken 4/7/2025 2120 by Moriah Alanis, RN  Safety Promotion/Fall Prevention:   activity supervised   assistive device/personal items within reach   clutter free environment maintained   fall prevention program maintained   lighting adjusted   nonskid shoes/slippers when out of bed   room organization consistent   safety round/check completed  Intervention: Prevent Skin Injury  Recent  Flowsheet Documentation  Taken 4/8/2025 0420 by Moriah Alanis RN  Body Position: position changed independently  Taken 4/8/2025 0220 by Moriah Alanis RN  Body Position: position changed independently  Taken 4/8/2025 0020 by Moriah Alanis RN  Body Position: position changed independently  Intervention: Prevent Infection  Recent Flowsheet Documentation  Taken 4/8/2025 0420 by Moriah Alanis RN  Infection Prevention:   environmental surveillance performed   hand hygiene promoted   rest/sleep promoted   single patient room provided  Taken 4/8/2025 0220 by Moriah Alanis RN  Infection Prevention:   environmental surveillance performed   hand hygiene promoted   rest/sleep promoted   single patient room provided  Taken 4/8/2025 0020 by Moriah Alanis RN  Infection Prevention:   environmental surveillance performed   hand hygiene promoted   rest/sleep promoted   single patient room provided  Taken 4/7/2025 2220 by Moriah Alanis RN  Infection Prevention:   environmental surveillance performed   hand hygiene promoted   rest/sleep promoted   single patient room provided  Taken 4/7/2025 2120 by Moriah Alanis RN  Infection Prevention:   environmental surveillance performed   hand hygiene promoted   single patient room provided   rest/sleep promoted  Goal: Optimal Comfort and Wellbeing  Intervention: Provide Person-Centered Care  Recent Flowsheet Documentation  Taken 4/7/2025 2120 by Moriah Alanis RN  Trust Relationship/Rapport:   care explained   choices provided   questions answered   questions encouraged   thoughts/feelings acknowledged     Problem: Fall Injury Risk  Goal: Absence of Fall and Fall-Related Injury  Intervention: Promote Injury-Free Environment  Recent Flowsheet Documentation  Taken 4/8/2025 0420 by Moriah Alanis RN  Safety Promotion/Fall Prevention:   activity supervised   assistive device/personal items within reach   clutter free environment maintained   fall prevention  program maintained   lighting adjusted   nonskid shoes/slippers when out of bed   room organization consistent   safety round/check completed  Taken 4/8/2025 0220 by Moriah Alanis, RN  Safety Promotion/Fall Prevention:   activity supervised   assistive device/personal items within reach   clutter free environment maintained   fall prevention program maintained   lighting adjusted   nonskid shoes/slippers when out of bed   room organization consistent   safety round/check completed  Taken 4/8/2025 0020 by Moriah Alanis, RN  Safety Promotion/Fall Prevention:   activity supervised   assistive device/personal items within reach   clutter free environment maintained   fall prevention program maintained   lighting adjusted   nonskid shoes/slippers when out of bed   room organization consistent   safety round/check completed  Taken 4/7/2025 2220 by Moriah Alanis, RN  Safety Promotion/Fall Prevention:   activity supervised   assistive device/personal items within reach   clutter free environment maintained   fall prevention program maintained   lighting adjusted   nonskid shoes/slippers when out of bed   room organization consistent   safety round/check completed  Taken 4/7/2025 2120 by Moriah Alanis, RN  Safety Promotion/Fall Prevention:   activity supervised   assistive device/personal items within reach   clutter free environment maintained   fall prevention program maintained   lighting adjusted   nonskid shoes/slippers when out of bed   room organization consistent   safety round/check completed   Goal Outcome Evaluation:

## 2025-04-08 NOTE — PROGRESS NOTES
Continued Stay Note  [facility]     Patient Name: George Pagan  MRN: [id number]  Today's Date: 4/8/2025    Admit Date: 4/6/2025        Discharge Plan       Row Name 04/08/25 1112       Plan    Plan Comments Mr. Pagan has covid and he would be required to be in isolation before he would be ble to go to a inpatient rehab place and that could be a ten day isolation period.                   Discharge Codes    No documentation.                 Expected Discharge Date and Time       Expected Discharge Date Expected Discharge Time    Apr 8, 2025               GAEL Coombs

## 2025-04-09 VITALS
OXYGEN SATURATION: 97 % | TEMPERATURE: 97.6 F | HEIGHT: 70 IN | BODY MASS INDEX: 21.7 KG/M2 | WEIGHT: 151.6 LBS | DIASTOLIC BLOOD PRESSURE: 91 MMHG | SYSTOLIC BLOOD PRESSURE: 115 MMHG | HEART RATE: 61 BPM | RESPIRATION RATE: 16 BRPM

## 2025-04-09 LAB
ALBUMIN SERPL-MCNC: 3.4 G/DL (ref 3.5–5.2)
ALBUMIN/GLOB SERPL: 1.7 G/DL
ALP SERPL-CCNC: 77 U/L (ref 39–117)
ALT SERPL W P-5'-P-CCNC: 8 U/L (ref 1–41)
ANION GAP SERPL CALCULATED.3IONS-SCNC: 10 MMOL/L (ref 5–15)
AST SERPL-CCNC: 15 U/L (ref 1–40)
BASOPHILS # BLD AUTO: 0 10*3/MM3 (ref 0–0.2)
BASOPHILS NFR BLD AUTO: 0 % (ref 0–1.5)
BILIRUB SERPL-MCNC: 0.3 MG/DL (ref 0–1.2)
BUN SERPL-MCNC: 23 MG/DL (ref 8–23)
BUN/CREAT SERPL: 30.7 (ref 7–25)
CALCIUM SPEC-SCNC: 8.9 MG/DL (ref 8.6–10.5)
CHLORIDE SERPL-SCNC: 104 MMOL/L (ref 98–107)
CO2 SERPL-SCNC: 27 MMOL/L (ref 22–29)
CREAT SERPL-MCNC: 0.75 MG/DL (ref 0.76–1.27)
DEPRECATED RDW RBC AUTO: 52.4 FL (ref 37–54)
EGFRCR SERPLBLD CKD-EPI 2021: 89 ML/MIN/1.73
EOSINOPHIL # BLD AUTO: 0.02 10*3/MM3 (ref 0–0.4)
EOSINOPHIL NFR BLD AUTO: 0.2 % (ref 0.3–6.2)
ERYTHROCYTE [DISTWIDTH] IN BLOOD BY AUTOMATED COUNT: 15.5 % (ref 12.3–15.4)
GLOBULIN UR ELPH-MCNC: 2 GM/DL
GLUCOSE SERPL-MCNC: 105 MG/DL (ref 65–99)
HCT VFR BLD AUTO: 32 % (ref 37.5–51)
HGB BLD-MCNC: 9.9 G/DL (ref 13–17.7)
IMM GRANULOCYTES # BLD AUTO: 0.07 10*3/MM3 (ref 0–0.05)
IMM GRANULOCYTES NFR BLD AUTO: 0.6 % (ref 0–0.5)
LYMPHOCYTES # BLD AUTO: 0.45 10*3/MM3 (ref 0.7–3.1)
LYMPHOCYTES NFR BLD AUTO: 4 % (ref 19.6–45.3)
MAGNESIUM SERPL-MCNC: 1.6 MG/DL (ref 1.6–2.4)
MCH RBC QN AUTO: 28.4 PG (ref 26.6–33)
MCHC RBC AUTO-ENTMCNC: 30.9 G/DL (ref 31.5–35.7)
MCV RBC AUTO: 91.7 FL (ref 79–97)
MONOCYTES # BLD AUTO: 0.48 10*3/MM3 (ref 0.1–0.9)
MONOCYTES NFR BLD AUTO: 4.3 % (ref 5–12)
NEUTROPHILS NFR BLD AUTO: 10.21 10*3/MM3 (ref 1.7–7)
NEUTROPHILS NFR BLD AUTO: 90.9 % (ref 42.7–76)
NRBC BLD AUTO-RTO: 0 /100 WBC (ref 0–0.2)
PLATELET # BLD AUTO: 180 10*3/MM3 (ref 140–450)
PMV BLD AUTO: 11.2 FL (ref 6–12)
POTASSIUM SERPL-SCNC: 3.8 MMOL/L (ref 3.5–5.2)
PROT SERPL-MCNC: 5.4 G/DL (ref 6–8.5)
RBC # BLD AUTO: 3.49 10*6/MM3 (ref 4.14–5.8)
SODIUM SERPL-SCNC: 141 MMOL/L (ref 136–145)
WBC NRBC COR # BLD AUTO: 11.23 10*3/MM3 (ref 3.4–10.8)

## 2025-04-09 PROCEDURE — 63710000001 DEXAMETHASONE PER 0.25 MG: Performed by: INTERNAL MEDICINE

## 2025-04-09 PROCEDURE — 85025 COMPLETE CBC W/AUTO DIFF WBC: CPT | Performed by: NURSE PRACTITIONER

## 2025-04-09 PROCEDURE — 80053 COMPREHEN METABOLIC PANEL: CPT | Performed by: NURSE PRACTITIONER

## 2025-04-09 PROCEDURE — 83735 ASSAY OF MAGNESIUM: CPT | Performed by: NURSE PRACTITIONER

## 2025-04-09 RX ADMIN — LISINOPRIL 20 MG: 20 TABLET ORAL at 09:05

## 2025-04-09 RX ADMIN — OXYCODONE HYDROCHLORIDE AND ACETAMINOPHEN 1 TABLET: 10; 325 TABLET ORAL at 05:19

## 2025-04-09 RX ADMIN — Medication 10 ML: at 09:10

## 2025-04-09 RX ADMIN — AMLODIPINE BESYLATE 5 MG: 5 TABLET ORAL at 09:05

## 2025-04-09 RX ADMIN — DEXAMETHASONE 6 MG: 2 TABLET ORAL at 09:04

## 2025-04-09 RX ADMIN — CARVEDILOL 25 MG: 12.5 TABLET, FILM COATED ORAL at 09:05

## 2025-04-09 RX ADMIN — TIMOLOL MALEATE 1 DROP: 5 SOLUTION/ DROPS OPHTHALMIC at 09:10

## 2025-04-09 RX ADMIN — DULOXETINE HYDROCHLORIDE 30 MG: 30 CAPSULE, DELAYED RELEASE ORAL at 09:04

## 2025-04-09 RX ADMIN — ALLOPURINOL 100 MG: 100 TABLET ORAL at 09:05

## 2025-04-09 RX ADMIN — LEVOTHYROXINE SODIUM 25 MCG: 0.03 TABLET ORAL at 05:19

## 2025-04-09 RX ADMIN — HYDROCHLOROTHIAZIDE 12.5 MG: 25 TABLET ORAL at 09:05

## 2025-04-09 RX ADMIN — APIXABAN 5 MG: 5 TABLET, FILM COATED ORAL at 09:05

## 2025-04-09 NOTE — PLAN OF CARE
Problem: Adult Inpatient Plan of Care  Goal: Absence of Hospital-Acquired Illness or Injury  Intervention: Identify and Manage Fall Risk  Recent Flowsheet Documentation  Taken 4/9/2025 0438 by Moriah Alanis, RN  Safety Promotion/Fall Prevention:   activity supervised   assistive device/personal items within reach   clutter free environment maintained   fall prevention program maintained   lighting adjusted   nonskid shoes/slippers when out of bed   safety round/check completed   room organization consistent  Taken 4/9/2025 0238 by Moriah Alanis, RN  Safety Promotion/Fall Prevention:   activity supervised   assistive device/personal items within reach   clutter free environment maintained   fall prevention program maintained   lighting adjusted   nonskid shoes/slippers when out of bed   safety round/check completed   room organization consistent  Taken 4/9/2025 0038 by Moriah Alanis, RN  Safety Promotion/Fall Prevention:   activity supervised   assistive device/personal items within reach   clutter free environment maintained   fall prevention program maintained   lighting adjusted   nonskid shoes/slippers when out of bed   room organization consistent   safety round/check completed  Taken 4/8/2025 2238 by Moriah Alanis, RN  Safety Promotion/Fall Prevention:   activity supervised   assistive device/personal items within reach   clutter free environment maintained   fall prevention program maintained   lighting adjusted   nonskid shoes/slippers when out of bed   room organization consistent   safety round/check completed  Taken 4/8/2025 2045 by Moriah Alanis, RN  Safety Promotion/Fall Prevention:   activity supervised   assistive device/personal items within reach   clutter free environment maintained   fall prevention program maintained   lighting adjusted   nonskid shoes/slippers when out of bed   room organization consistent   safety round/check completed  Intervention: Prevent Infection  Recent  Flowsheet Documentation  Taken 4/9/2025 0438 by Moriah Alanis, RN  Infection Prevention:   environmental surveillance performed   hand hygiene promoted   rest/sleep promoted   single patient room provided  Taken 4/9/2025 0238 by Moriah Alanis RN  Infection Prevention:   environmental surveillance performed   hand hygiene promoted   single patient room provided   rest/sleep promoted  Taken 4/9/2025 0038 by Moriah Alanis RN  Infection Prevention:   environmental surveillance performed   hand hygiene promoted   rest/sleep promoted   single patient room provided  Taken 4/8/2025 2238 by Moriah Alanis RN  Infection Prevention:   environmental surveillance performed   hand hygiene promoted   rest/sleep promoted   single patient room provided  Taken 4/8/2025 2045 by Moriah Alanis, RN  Infection Prevention:   environmental surveillance performed   hand hygiene promoted   rest/sleep promoted   single patient room provided  Goal: Optimal Comfort and Wellbeing  Intervention: Provide Person-Centered Care  Recent Flowsheet Documentation  Taken 4/8/2025 2238 by Moriah Alanis RN  Trust Relationship/Rapport:   care explained   choices provided   questions answered   questions encouraged   thoughts/feelings acknowledged     Problem: Fall Injury Risk  Goal: Absence of Fall and Fall-Related Injury  Intervention: Promote Injury-Free Environment  Recent Flowsheet Documentation  Taken 4/9/2025 0438 by Moriah Alanis, RN  Safety Promotion/Fall Prevention:   activity supervised   assistive device/personal items within reach   clutter free environment maintained   fall prevention program maintained   lighting adjusted   nonskid shoes/slippers when out of bed   safety round/check completed   room organization consistent  Taken 4/9/2025 0238 by Moriah Alanis, RN  Safety Promotion/Fall Prevention:   activity supervised   assistive device/personal items within reach   clutter free environment maintained   fall  prevention program maintained   lighting adjusted   nonskid shoes/slippers when out of bed   safety round/check completed   room organization consistent  Taken 4/9/2025 0038 by Moriah Alanis, RN  Safety Promotion/Fall Prevention:   activity supervised   assistive device/personal items within reach   clutter free environment maintained   fall prevention program maintained   lighting adjusted   nonskid shoes/slippers when out of bed   room organization consistent   safety round/check completed  Taken 4/8/2025 2238 by Moriah Alanis, RN  Safety Promotion/Fall Prevention:   activity supervised   assistive device/personal items within reach   clutter free environment maintained   fall prevention program maintained   lighting adjusted   nonskid shoes/slippers when out of bed   room organization consistent   safety round/check completed  Taken 4/8/2025 2045 by Moriah Alanis, RN  Safety Promotion/Fall Prevention:   activity supervised   assistive device/personal items within reach   clutter free environment maintained   fall prevention program maintained   lighting adjusted   nonskid shoes/slippers when out of bed   room organization consistent   safety round/check completed   Goal Outcome Evaluation:

## 2025-04-09 NOTE — CASE MANAGEMENT/SOCIAL WORK
Continued Stay Note  [facility]     Patient Name: George Lomeli  MRN: [id number]  Today's Date: 4/9/2025    Admit Date: 4/6/2025    Plan: Home   Discharge Plan       Row Name 04/09/25 1921       Plan    Final Discharge Disposition Code 01 - home or self-care                   Discharge Codes    No documentation.                 Expected Discharge Date and Time       Expected Discharge Date Expected Discharge Time    Apr 9, 2025               GAEL Bonner

## 2025-04-09 NOTE — CASE MANAGEMENT/SOCIAL WORK
Case Management Discharge Note      Final Note: Patient to discharge home today. No needs voiced or identified. Private transport home.         Selected Continued Care - Admitted Since 4/6/2025       Destination    No services have been selected for the patient.                Durable Medical Equipment    No services have been selected for the patient.                Dialysis/Infusion    No services have been selected for the patient.                Home Medical Care    No services have been selected for the patient.                Therapy    No services have been selected for the patient.                Community Resources    No services have been selected for the patient.                Community & DME    No services have been selected for the patient.                    Transportation Services  Private: Car    Final Discharge Disposition Code: 01 - home or self-care

## 2025-04-10 ENCOUNTER — READMISSION MANAGEMENT (OUTPATIENT)
Dept: CALL CENTER | Facility: HOSPITAL | Age: 85
End: 2025-04-10
Payer: MEDICARE

## 2025-04-10 NOTE — OUTREACH NOTE
Prep Survey      Flowsheet Row Responses   Denominational facility patient discharged from? [facility]   Is LACE score < 7 ? No   Eligibility Readm Mgmt   Discharge diagnosis Acute hypoxemic respiratory failure due to COVID-19   Does the patient have one of the following disease processes/diagnoses(primary or secondary)? Other   Does the patient have Home health ordered? No   Is there a DME ordered? No   Medication alerts for this patient see avs   Prep survey completed? Yes            Jazmyn MARY - Registered Nurse

## 2025-04-10 NOTE — PAYOR COMM NOTE
"Ref# [id number]   Still Pending  Discharge Summary    OMAYRA Joshi, RN  Utilization Review  Phone [phone]  Fax [phone]    [facility]  [address] [place], [place] [address]         Parth Lomeli (84 y.o. Male)       Date of Birth   1940    Social Security Number       Address   [address] [address]    Home Phone   [phone]    MRN   [id number]       [facility]    Marital Status                               Admission Date   4/6/2025    Admission Type   Emergency    Admitting Provider   Samir Muse MD    Attending Provider       Department, Room/Bed   [facility] 5F, S522/1       Discharge Date   4/9/2025    Discharge Disposition   Home or Self Care    Discharge Destination                                 Attending Provider: (none)   Allergies: Contrast Dye (Echo Or Unknown Ct/mr)    Isolation: None   Infection: COVID (confirmed) (04/06/25)   Code Status: Prior    Ht: 177.8 cm (70\")   Wt: 68.8 kg (151 lb 9.6 oz)    Admission Cmt: None   Principal Problem: Acute hypoxemic respiratory failure due to COVID-19 [U07.1,J96.01]                   Active Insurance as of 4/6/2025       Primary Coverage       Payor Plan Insurance Group Employer/Plan Group    ANTHEM MEDICARE REPLACEMENT ANTHEM MEDICARE ADVANTAGE PPO [id number]       Payor Plan Address Payor Plan Phone Number Payor Plan Fax Number Effective Dates    [address] [phone]  2/1/2024 - None Entered    [facility] [id number]         Subscriber Name Subscriber Birth Date Member ID       PARTH LOMELI 1940 [id number]                     Emergency Contacts        (Rel.) Home Phone Work Phone Mobile Phone    Vikki Lomeli (Spouse) [phone] -- [phone]    Blanca Santiago (Daughter) [phone] -- [phone]              Oxygen Therapy (last 3 days) before discharge       Date/Time SpO2 Device (Oxygen Therapy) Flow (L/min) (Oxygen " Therapy) Oxygen Concentration (%) ETCO2 (mmHg)    04/09/25 1200 -- room air -- -- --    04/09/25 1000 -- room air -- -- --    04/09/25 0849 97 room air -- -- --    04/09/25 0800 -- room air -- -- --    04/09/25 0700 100 -- -- -- --    04/09/25 0630 -- nasal cannula 2 -- --    04/09/25 0438 -- nasal cannula 2 -- --    04/09/25 0352 99 -- -- -- --    04/09/25 0238 -- nasal cannula 2 -- --    04/09/25 0038 -- room air -- -- --    04/08/25 2313 100 -- -- -- --    04/08/25 2238 -- room air -- -- --    04/08/25 1910 100 room air -- -- --    04/08/25 1800 -- room air -- -- --    04/08/25 1608 100 -- -- -- --    04/08/25 1600 -- room air -- -- --    04/08/25 1400 -- room air -- -- --    04/08/25 1200 -- room air -- -- --    04/08/25 1116 100 -- -- -- --    04/08/25 1030 -- room air -- -- --    04/08/25 1000 -- nasal cannula 2 -- --    04/08/25 0800 -- room air -- -- --    04/08/25 0700 100 -- -- -- --    04/08/25 0630 -- nasal cannula 2 -- --    04/08/25 0420 -- nasal cannula 2 -- --    04/08/25 0330 100 -- -- -- --    04/08/25 0315 97 nasal cannula 2 -- --    04/08/25 0220 -- nasal cannula 2 -- --    04/08/25 0020 -- nasal cannula 2 -- --    04/07/25 2307 100 nasal cannula 2 -- --    04/07/25 2220 -- nasal cannula 2 -- --    04/07/25 2120 -- nasal cannula 2 -- --    04/07/25 1600 -- nasal cannula 2 -- --    04/07/25 1524 100 -- -- -- --    04/07/25 1042 99 -- -- -- --    04/07/25 0900 -- nasal cannula 2 -- --    04/07/25 0652 100 -- -- -- --    04/07/25 0600 -- nasal cannula 2 -- --    04/07/25 0406 99 nasal cannula 2 -- --    04/07/25 0200 -- nasal cannula 2 -- --    04/07/25 0000 -- nasal cannula 2 -- --    04/06/25 2358 98 nasal cannula 2 -- --    04/06/25 2200 -- room air -- -- --    04/06/25 2108 99 nasal cannula 2 -- --    04/06/25 2045 95 -- -- -- --    04/06/25 2030 98 -- -- -- --    04/06/25 2015 95 -- -- -- --    04/06/25 2000 95 -- -- -- --    04/06/25 1945 95 -- -- -- --    04/06/25 1930 91 -- -- -- --     25 1915 100 -- -- -- --    25 1900 88 -- -- -- --    25 1830 93 -- -- -- --    25 1818 95 -- -- -- --    25 1817 87 -- -- -- --    25 1815 92 -- -- -- --    25 1801 94 -- -- -- --    25 1742 96 -- -- -- --    25 1731 93 -- -- -- --    25 1715 100 -- -- -- --    25 1421 97 room air -- -- --             Physician Progress Notes (last 72 hours)        Samir Muse MD at 25 [facility]6              [facility] Medicine Services  PROGRESS NOTE    Patient Name: George Lomeli  : 1940  MRN: [id number]    Date of Admission: 2025  Primary Care Physician: Linn Salas APRN    Subjective   Subjective     CC:  Cough    HPI:  Resting in bed in no acute distress and tells me that he feels better.  His cough has improved.  Denies any fever or chills.  No chest pain palpitation shortness of breat.  No nausea vomiting or diarrhea.      Objective   Objective     Vital Signs:   Temp:  [97.4 °F (36.3 °C)-98 °F (36.7 °C)] 97.9 °F (36.6 °C)  Heart Rate:  [63-73] 73  Resp:  [16] 16  BP: (122-139)/(88-97) 122/88  Flow (L/min) (Oxygen Therapy):  [2] 2     Physical Exam:  Constitutional: No acute distress, awake, alert  HENT: NCAT, mucous membranes moist  Respiratory: cough on deep inspiration, respiratory effort normal   Cardiovascular: RRR, no murmurs, rubs, or gallops  Gastrointestinal: Positive bowel sounds, soft, nontender, nondistended  Musculoskeletal: No bilateral ankle edema  Psychiatric: Appropriate affect, cooperative  Neurologic: Oriented x 3,  speech clear  Skin: No rashes     Results Reviewed:  LAB RESULTS:      Lab 25  0610 25  1704 25  1448   WBC 4.78  --  7.23   HEMOGLOBIN 10.4*  --  10.4*   HEMATOCRIT 33.9*  --  32.9*   PLATELETS 168  --  196   NEUTROS ABS 4.11  --  5.89   IMMATURE GRANS (ABS) 0.02  --  0.02   LYMPHS ABS 0.46*  --  0.42*   MONOS ABS 0.18  --  0.86   EOS ABS 0.00  --  0.03   MCV 93.4   --  91.6   CRP  --  3.78*  --    PROCALCITONIN  --   --  0.08   LACTATE  --   --  1.2   HSTROP T 24* 30* 30*         Lab 04/07/25  0610 04/06/25  1704 04/06/25  1448   SODIUM 143  --  141   POTASSIUM 3.6  --  3.0*   CHLORIDE 105  --  101   CO2 23.0  --  29.0   ANION GAP 15.0  --  11.0   BUN 12  --  15   CREATININE 0.88  --  1.06   EGFR 84.8  --  69.2   GLUCOSE 115*  --  94   CALCIUM 9.0  --  9.5   MAGNESIUM 1.7 1.7  --          Lab 04/07/25  0610 04/06/25  1448   TOTAL PROTEIN 6.1 6.7   ALBUMIN 3.7 4.0   GLOBULIN 2.4 2.7   ALT (SGPT) 7 7   AST (SGOT) 17 17   BILIRUBIN 0.4 0.5   ALK PHOS 85 91         Lab 04/07/25  0610 04/06/25  1704 04/06/25  1448   PROBNP  --   --  611.4   HSTROP T 24* 30* 30*             Lab 04/06/25 1704 04/06/25  1448   IRON 27*  27*  --    IRON SATURATION (TSAT) 9*  --    TIBC 305  --    TRANSFERRIN 205  --    FERRITIN 58.14  --    FOLATE  --  8.56   VITAMIN B 12  --  363         Brief Urine Lab Results  (Last result in the past 365 days)        Color   Clarity   Blood   Leuk Est   Nitrite   Protein   CREAT   Urine HCG        04/06/25 1450 Yellow   Clear   Negative   Trace   Negative   Negative                   Microbiology Results Abnormal       Procedure Component Value - Date/Time    Respiratory Panel PCR w/COVID-19(SARS-CoV-2) NICK/KOJO/JED/PAD/COR/SHANICE In-House, NP Swab in UTM/VTM, 2 HR TAT - Swab, Nasopharynx [[id number]]  (Abnormal) Collected: 04/07/25 1025    Lab Status: Final result Specimen: Swab from Nasopharynx Updated: 04/07/25 1031     ADENOVIRUS, PCR Not Detected     Coronavirus 229E Not Detected     Coronavirus HKU1 Not Detected     Coronavirus NL63 Not Detected     Coronavirus OC43 Not Detected     COVID19 Detected     Human Metapneumovirus Not Detected     Human Rhinovirus/Enterovirus Not Detected     Influenza A PCR Not Detected     Influenza B PCR Not Detected     Parainfluenza Virus 1 Not Detected     Parainfluenza Virus 2 Not Detected     Parainfluenza Virus 3 Not Detected      Parainfluenza Virus 4 Not Detected     RSV, PCR Not Detected     Bordetella pertussis pcr Not Detected     Bordetella parapertussis PCR Not Detected     Chlamydophila pneumoniae PCR Not Detected     Mycoplasma pneumo by PCR Not Detected    Narrative:      In the setting of a positive respiratory panel with a viral infection PLUS a negative procalcitonin without other underlying concern for bacterial infection, consider observing off antibiotics or discontinuation of antibiotics and continue supportive care. If the respiratory panel is positive for atypical bacterial infection (Bordetella pertussis, Chlamydophila pneumoniae, or Mycoplasma pneumoniae), consider antibiotic de-escalation to target atypical bacterial infection.    COVID PRE-OP / PRE-PROCEDURE SCREENING ORDER (NO ISOLATION) - Swab, Nasopharynx [[id number]]  (Abnormal) Collected: 04/06/25 1448    Lab Status: Final result Specimen: Swab from Nasopharynx Updated: 04/06/25 1533    Narrative:      The following orders were created for panel order COVID PRE-OP / PRE-PROCEDURE SCREENING ORDER (NO ISOLATION) - Swab, Nasopharynx.  Procedure                               Abnormality         Status                     ---------                               -----------         ------                     COVID-19 and FLU A/B PCR...[[id number]]  Abnormal            Final result                 Please view results for these tests on the individual orders.    COVID-19 and FLU A/B PCR, 1 HR TAT - Swab, Nasopharynx [[id number]]  (Abnormal) Collected: 04/06/25 1448    Lab Status: Final result Specimen: Swab from Nasopharynx Updated: 04/06/25 1533     COVID19 Detected     Influenza A PCR Not Detected     Influenza B PCR Not Detected    Narrative:      Fact sheet for providers: https://www.fda.gov/media/350781/download    Fact sheet for patients: https://www.fda.gov/media/360574/download    Test performed by PCR.  Influenza A and Influenza B negative results should be  considered presumptive in samples that have a positive SARS-CoV-2 result.    Competitive inhibition studies showed that SARS-CoV-2 virus, when present at concentrations above 3.6E+04 copies/mL, can inhibit the detection and amplification of influenza A and influenza B virus RNA if present at or below 1.8E+02 copies/mL or 4.9E+02 copies/mL, respectively, and may lead to false negative influenza virus results. If co-infection with influenza A or influenza B virus is suspected in samples with a positive SARS-CoV-2 result, the sample should be re-tested with another FDA cleared, approved, or authorized influenza test, if influenza virus detection would change clinical management.            XR Chest 1 View  Result Date: 4/6/2025  XR CHEST 1 VW Date of Exam: 4/6/2025 3:23 PM EDT Indication: SOA triage protocol Comparison: CT 1/2/2025. Findings: Emphysematous changes are again noted. There is no evidence of new focal opacity. There is no effusion or pneumothorax. Unchanged cardiac enlargement.     Impression: Impression: Stable chest without evidence of acute disease. Electronically Signed: Rah Peña MD  4/6/2025 4:53 PM EDT  Workstation ID: [id number]      Results for orders placed during the hospital encounter of 11/21/23    Adult Transthoracic Echo Complete W/ Cont if Necessary Per Protocol    Interpretation Summary    Left ventricular systolic function is normal. Calculated left ventricular EF = 53% Left ventricular ejection fraction appears to be 51 - 55%.    Left ventricular diastolic function is consistent with (grade I) impaired relaxation.    The right ventricular cavity is mildly dilated.    The right atrial cavity is dilated.    Estimated right ventricular systolic pressure from tricuspid regurgitation is normal (<35 mmHg).      Current medications:  Scheduled Meds:allopurinol, 100 mg, Oral, Daily  amLODIPine, 5 mg, Oral, Daily  apixaban, 5 mg, Oral, BID  carvedilol, 25 mg, Oral, BID With  Meals  dexAMETHasone, 6 mg, Intravenous, Daily  DULoxetine, 30 mg, Oral, Daily  lisinopril, 20 mg, Oral, Q24H   And  hydroCHLOROthiazide, 12.5 mg, Oral, Q24H  levothyroxine, 25 mcg, Oral, Q AM  remdesivir, 100 mg, Intravenous, Q24H  sodium chloride, 10 mL, Intravenous, Q12H  tamsulosin, 0.4 mg, Oral, Nightly  timolol, 1 drop, Right Eye, BID      Continuous Infusions:Pharmacy Consult - Remdesivir,       PRN Meds:.  acetaminophen    albuterol sulfate HFA    benzonatate    senna-docusate sodium **AND** polyethylene glycol **AND** bisacodyl **AND** bisacodyl    dextromethorphan polistirex ER    Magnesium Standard Dose Replacement - Follow Nurse / BPA Driven Protocol    oxyCODONE-acetaminophen    Pharmacy Consult - Remdesivir    Potassium Replacement - Follow Nurse / BPA Driven Protocol    sodium chloride    sodium chloride    sodium chloride    Assessment & Plan   Assessment & Plan     Active Hospital Problems    Diagnosis  POA    **Acute hypoxemic respiratory failure due to COVID-19 [U07.1, J96.01]  Yes    Atrial fibrillation with RVR [I48.91]  Yes    Elevated troponin [R79.89]  Yes    Anemia, chronic disease [D63.8]  Yes    Hypothyroidism (acquired) [E03.9]  Yes    Hypokalemia [E87.6]  Yes    Hypertension [I10]  Yes    Gout [M10.9]  Yes      Resolved Hospital Problems   No resolved problems to display.        Brief Hospital Course to date:  George Lomeli is a 84 y.o. male  w/ a hx of PAF (on Eliquis), hx of AAA s/p repair, HTN, chronic anemia, hypothyroidism, hx of thyroid cancer, hx of lymphoma, gout who presented to the ED w/ c/o a cough.      **Acute hypoxic respiratory failure 2/2 COVID-19   -symptom onset ~ 2 days ago: cough and mild shortness of breath   -no prior COVID vaccine per pt   -COVID detected, negative for Flu A/B; full respiratory panel pcr pending   -Currently patient is on 2 L of nasal cannula and saturating well.         **Atrial fibrillation w/ RVR   **Elevated troponin   **Hx of HTN   -initial  EKG c/w A.Fib RVR; rates 's  -No chest pain and troponin is flat.  -Heart rate now is controlled.  -Patient has history of paroxysmal atrial fibrillation and is on anticoagulation.    **Hypokalemia   -K 3.0  -mag 1.7  -replace per protocol      **Hypothyroidism   **Hx of thyroid cancer   -continue synthroid      Chronic anemia   Gout   Chronic pain   -Continue home medication and monitor      Expected Discharge Location and Transportation: Home  Expected Discharge in 2 to 3 days  Expected Discharge Date: 2025; Expected Discharge Time:      VTE Prophylaxis:  Pharmacologic & mechanical VTE prophylaxis orders are present.         AM-PAC 6 Clicks Score (PT): 17 (25)    CODE STATUS:   Code Status and Medical Interventions: CPR (Attempt to Resuscitate); Full Support   Ordered at: 25     Code Status (Patient has no pulse and is not breathing):    CPR (Attempt to Resuscitate)     Medical Interventions (Patient has pulse or is breathing):    Full Support     Level Of Support Discussed With:    Patient       Samir Muse MD  25        Electronically signed by Samir Muse MD at 25 1058       Samir Muse MD at 25 1636              [facility] Medicine Services  PROGRESS NOTE    Patient Name: George Lomeli  : 1940  MRN: [id number]    Date of Admission: 2025  Primary Care Physician: Linn Salas APRN    Subjective   Subjective     CC:  Cough    HPI:  In bed in no acute distress and tells me that he feels better.  Denies any fever or chills.  No chest pain palpitation shortness of breat, cough has improved.  No nausea vomiting or diarrhea.      Objective   Objective     Vital Signs:   Temp:  [97.4 °F (36.3 °C)-98.4 °F (36.9 °C)] 97.4 °F (36.3 °C)  Heart Rate:  [] 64  Resp:  [16-18] 16  BP: ()/() 139/96  Flow (L/min) (Oxygen Therapy):  [2] 2     Physical Exam:  Constitutional: No acute distress, awake,  alert  HENT: NCAT, mucous membranes moist  Respiratory: Breath sounds, cough on deep inspiration, respiratory effort normal   Cardiovascular: RRR, no murmurs, rubs, or gallops  Gastrointestinal: Positive bowel sounds, soft, nontender, nondistended  Musculoskeletal: No bilateral ankle edema  Psychiatric: Appropriate affect, cooperative  Neurologic: Oriented x 3,  speech clear  Skin: No rashes     Results Reviewed:  LAB RESULTS:      Lab 04/07/25 0610 04/06/25 1704 04/06/25  1448   WBC 4.78  --  7.23   HEMOGLOBIN 10.4*  --  10.4*   HEMATOCRIT 33.9*  --  32.9*   PLATELETS 168  --  196   NEUTROS ABS 4.11  --  5.89   IMMATURE GRANS (ABS) 0.02  --  0.02   LYMPHS ABS 0.46*  --  0.42*   MONOS ABS 0.18  --  0.86   EOS ABS 0.00  --  0.03   MCV 93.4  --  91.6   CRP  --  3.78*  --    PROCALCITONIN  --   --  0.08   LACTATE  --   --  1.2   HSTROP T 24* 30* 30*         Lab 04/07/25 0610 04/06/25 1704 04/06/25  1448   SODIUM 143  --  141   POTASSIUM 3.6  --  3.0*   CHLORIDE 105  --  101   CO2 23.0  --  29.0   ANION GAP 15.0  --  11.0   BUN 12  --  15   CREATININE 0.88  --  1.06   EGFR 84.8  --  69.2   GLUCOSE 115*  --  94   CALCIUM 9.0  --  9.5   MAGNESIUM 1.7 1.7  --          Lab 04/07/25 0610 04/06/25  1448   TOTAL PROTEIN 6.1 6.7   ALBUMIN 3.7 4.0   GLOBULIN 2.4 2.7   ALT (SGPT) 7 7   AST (SGOT) 17 17   BILIRUBIN 0.4 0.5   ALK PHOS 85 91         Lab 04/07/25 0610 04/06/25 1704 04/06/25  1448   PROBNP  --   --  611.4   HSTROP T 24* 30* 30*             Lab 04/06/25 1704 04/06/25  1448   IRON 27*  27*  --    IRON SATURATION (TSAT) 9*  --    TIBC 305  --    TRANSFERRIN 205  --    FERRITIN 58.14  --    FOLATE  --  8.56   VITAMIN B 12  --  363         Brief Urine Lab Results  (Last result in the past 365 days)        Color   Clarity   Blood   Leuk Est   Nitrite   Protein   CREAT   Urine HCG        04/06/25 1450 Yellow   Clear   Negative   Trace   Negative   Negative                   Microbiology Results Abnormal        Procedure Component Value - Date/Time    Respiratory Panel PCR w/COVID-19(SARS-CoV-2) NICK/KOJO/JED/PAD/COR/SHANICE In-House, NP Swab in UTM/VTM, 2 HR TAT - Swab, Nasopharynx [[id number]]  (Abnormal) Collected: 04/07/25 1025    Lab Status: Final result Specimen: Swab from Nasopharynx Updated: 04/07/25 1031     ADENOVIRUS, PCR Not Detected     Coronavirus 229E Not Detected     Coronavirus HKU1 Not Detected     Coronavirus NL63 Not Detected     Coronavirus OC43 Not Detected     COVID19 Detected     Human Metapneumovirus Not Detected     Human Rhinovirus/Enterovirus Not Detected     Influenza A PCR Not Detected     Influenza B PCR Not Detected     Parainfluenza Virus 1 Not Detected     Parainfluenza Virus 2 Not Detected     Parainfluenza Virus 3 Not Detected     Parainfluenza Virus 4 Not Detected     RSV, PCR Not Detected     Bordetella pertussis pcr Not Detected     Bordetella parapertussis PCR Not Detected     Chlamydophila pneumoniae PCR Not Detected     Mycoplasma pneumo by PCR Not Detected    Narrative:      In the setting of a positive respiratory panel with a viral infection PLUS a negative procalcitonin without other underlying concern for bacterial infection, consider observing off antibiotics or discontinuation of antibiotics and continue supportive care. If the respiratory panel is positive for atypical bacterial infection (Bordetella pertussis, Chlamydophila pneumoniae, or Mycoplasma pneumoniae), consider antibiotic de-escalation to target atypical bacterial infection.    COVID PRE-OP / PRE-PROCEDURE SCREENING ORDER (NO ISOLATION) - Swab, Nasopharynx [[id number]]  (Abnormal) Collected: 04/06/25 1448    Lab Status: Final result Specimen: Swab from Nasopharynx Updated: 04/06/25 1533    Narrative:      The following orders were created for panel order COVID PRE-OP / PRE-PROCEDURE SCREENING ORDER (NO ISOLATION) - Swab, Nasopharynx.  Procedure                               Abnormality         Status                      ---------                               -----------         ------                     COVID-19 and FLU A/B PCR...[[id number]]  Abnormal            Final result                 Please view results for these tests on the individual orders.    COVID-19 and FLU A/B PCR, 1 HR TAT - Swab, Nasopharynx [[id number]]  (Abnormal) Collected: 04/06/25 1448    Lab Status: Final result Specimen: Swab from Nasopharynx Updated: 04/06/25 1533     COVID19 Detected     Influenza A PCR Not Detected     Influenza B PCR Not Detected    Narrative:      Fact sheet for providers: https://www.fda.gov/media/092026/download    Fact sheet for patients: https://www.fda.gov/media/463272/download    Test performed by PCR.  Influenza A and Influenza B negative results should be considered presumptive in samples that have a positive SARS-CoV-2 result.    Competitive inhibition studies showed that SARS-CoV-2 virus, when present at concentrations above 3.6E+04 copies/mL, can inhibit the detection and amplification of influenza A and influenza B virus RNA if present at or below 1.8E+02 copies/mL or 4.9E+02 copies/mL, respectively, and may lead to false negative influenza virus results. If co-infection with influenza A or influenza B virus is suspected in samples with a positive SARS-CoV-2 result, the sample should be re-tested with another FDA cleared, approved, or authorized influenza test, if influenza virus detection would change clinical management.            XR Chest 1 View  Result Date: 4/6/2025  XR CHEST 1 VW Date of Exam: 4/6/2025 3:23 PM EDT Indication: SOA triage protocol Comparison: CT 1/2/2025. Findings: Emphysematous changes are again noted. There is no evidence of new focal opacity. There is no effusion or pneumothorax. Unchanged cardiac enlargement.     Impression: Impression: Stable chest without evidence of acute disease. Electronically Signed: Rah Peña MD  4/6/2025 4:53 PM EDT  Workstation ID: [id number]      Results for orders  placed during the hospital encounter of 11/21/23    Adult Transthoracic Echo Complete W/ Cont if Necessary Per Protocol    Interpretation Summary    Left ventricular systolic function is normal. Calculated left ventricular EF = 53% Left ventricular ejection fraction appears to be 51 - 55%.    Left ventricular diastolic function is consistent with (grade I) impaired relaxation.    The right ventricular cavity is mildly dilated.    The right atrial cavity is dilated.    Estimated right ventricular systolic pressure from tricuspid regurgitation is normal (<35 mmHg).      Current medications:  Scheduled Meds:allopurinol, 100 mg, Oral, Daily  amLODIPine, 5 mg, Oral, Daily  apixaban, 5 mg, Oral, BID  carvedilol, 25 mg, Oral, BID With Meals  dexAMETHasone, 6 mg, Intravenous, Daily  DULoxetine, 30 mg, Oral, Daily  lisinopril, 20 mg, Oral, Q24H   And  hydroCHLOROthiazide, 12.5 mg, Oral, Q24H  levothyroxine, 25 mcg, Oral, Q AM  potassium chloride ER, 40 mEq, Oral, Q4H  remdesivir, 100 mg, Intravenous, Q24H  sodium chloride, 10 mL, Intravenous, Q12H  tamsulosin, 0.4 mg, Oral, Nightly  timolol, 1 drop, Right Eye, BID      Continuous Infusions:Pharmacy Consult - Remdesivir,       PRN Meds:.  acetaminophen    albuterol sulfate HFA    benzonatate    senna-docusate sodium **AND** polyethylene glycol **AND** bisacodyl **AND** bisacodyl    dextromethorphan polistirex ER    Magnesium Standard Dose Replacement - Follow Nurse / BPA Driven Protocol    oxyCODONE-acetaminophen    Pharmacy Consult - Remdesivir    Potassium Replacement - Follow Nurse / BPA Driven Protocol    sodium chloride    sodium chloride    sodium chloride    Assessment & Plan   Assessment & Plan     Active Hospital Problems    Diagnosis  POA    **Acute hypoxemic respiratory failure due to COVID-19 [U07.1, J96.01]  Yes    Atrial fibrillation with RVR [I48.91]  Yes    Elevated troponin [R79.89]  Yes    Anemia, chronic disease [D63.8]  Yes    Hypothyroidism (acquired)  [E03.9]  Yes    Hypokalemia [E87.6]  Yes    Hypertension [I10]  Yes    Gout [M10.9]  Yes      Resolved Hospital Problems   No resolved problems to display.        Brief Hospital Course to date:  George Lomeli is a 84 y.o. male  w/ a hx of PAF (on Eliquis), hx of AAA s/p repair, HTN, chronic anemia, hypothyroidism, hx of thyroid cancer, hx of lymphoma, gout who presented to the ED w/ c/o a cough.      **Acute hypoxic respiratory failure 2/2 COVID-19   -symptom onset ~ 2 days ago: cough and mild shortness of breath   -no prior COVID vaccine per pt   -COVID detected, negative for Flu A/B; full respiratory panel pcr pending   -Currently patient is on 2 L of nasal cannula and saturating well.  Tells me that he has improved.     **Atrial fibrillation w/ RVR   **Elevated troponin   **Hx of HTN   -initial EKG c/w A.Fib RVR; rates 's  -No chest pain and troponin is flat.  -Heart rate now is controlled.    **Hypokalemia   -K 3.0  -mag 1.7  -replace per protocol      **Hypothyroidism   **Hx of thyroid cancer   -continue synthroid      Chronic anemia   Gout   Chronic pain   -Continue home medication and monitor      Expected Discharge Location and Transportation: Home  Expected Discharge in 2 to 3 days  Expected Discharge Date: 4/8/2025; Expected Discharge Time:      VTE Prophylaxis:  Pharmacologic & mechanical VTE prophylaxis orders are present.         AM-PAC 6 Clicks Score (PT): 17 (04/07/25 1057)    CODE STATUS:   Code Status and Medical Interventions: CPR (Attempt to Resuscitate); Full Support   Ordered at: 04/06/25 2058     Code Status (Patient has no pulse and is not breathing):    CPR (Attempt to Resuscitate)     Medical Interventions (Patient has pulse or is breathing):    Full Support     Level Of Support Discussed With:    Patient       Samir Muse MD  04/07/25        Electronically signed by Samir Muse MD at 04/07/25 1640       Consult Notes (last 72 hours)  Notes from 04/07/25 1547 through  04/10/25 1547   No notes of this type exist for this encounter.       Discharge Summary    No notes of this type exist for this encounter.       Discharge Order (From admission, onward)       Start     Ordered    04/09/25 0900  Discharge patient  Once        Expected Discharge Date: 04/09/25   Discharge Disposition: Home or Self Care   Physician of Record for Attribution - Please select from Treatment Team: LAURA EDWARDS [[id number]]   Review needed by CMO to determine Physician of Record: No      Question Answer Comment   Physician of Record for Attribution - Please select from Treatment Team LAURA EDWARDS    Review needed by CMO to determine Physician of Record No        04/09/25 0903

## 2025-04-11 LAB
BACTERIA SPEC AEROBE CULT: NORMAL
BACTERIA SPEC AEROBE CULT: NORMAL

## 2025-04-14 ENCOUNTER — READMISSION MANAGEMENT (OUTPATIENT)
Dept: CALL CENTER | Facility: HOSPITAL | Age: 85
End: 2025-04-14
Payer: MEDICARE

## 2025-04-14 NOTE — OUTREACH NOTE
Medical Week 1 Survey      Flowsheet Row Responses   [facility] patient discharged from? [place]   Does the patient have one of the following disease processes/diagnoses(primary or secondary)? Other   Week 1 attempt successful? Yes   Call start time 1721   Call end time 1724   Discharge diagnosis Acute hypoxemic respiratory failure due to COVID-19   Meds reviewed with patient/caregiver? Yes   Is the patient having any side effects they believe may be caused by any medication additions or changes? No   Does the patient have all medications ordered at discharge? Yes   Is the patient taking all medications as directed (includes completed medication regime)? Yes   Does the patient have a primary care provider?  Yes   Does the patient have an appointment with their PCP within 7 days of discharge? Yes   Has the patient kept scheduled appointments due by today? N/A   Psychosocial issues? No   Did the patient receive a copy of their discharge instructions? Yes   Nursing interventions Reviewed instructions with patient   What is the patient's perception of their health status since discharge? Same   Is the patient/caregiver able to teach back signs and symptoms related to disease process for when to call PCP? Yes   Is the patient/caregiver able to teach back signs and symptoms related to disease process for when to call 911? Yes   Is the patient/caregiver able to teach back the hierarchy of who to call/visit for symptoms/problems? PCP, Specialist, Home health nurse, Urgent Care, ED, 911 Yes   Additional teach back comments Still has a cough. Has appt with PCP tomorrow to discuss.  If he worsens and has soa to go to ED.  Voiced his understanding   Week 1 call completed? Yes   Wrap up additional comments Pt to have f/u with PCP and get something to help with his cough.   Call end time 1724            Radha SCHMITZ - Licensed Nurse

## 2025-04-17 NOTE — DISCHARGE SUMMARY
[facility] Medicine Services  DISCHARGE SUMMARY    Patient Name: George Lomeli  : 1940  MRN: [id number]    Date of Admission: 2025  4:38 PM  Date of Discharge:  25  Primary Care Physician: Linn Salas APRN    Consults       No orders found from 3/8/2025 to 2025.            Hospital Course     Presenting Problem: cough    Active Hospital Problems    Diagnosis  POA    **Acute hypoxemic respiratory failure due to COVID-19 [U07.1, J96.01]  Yes    Atrial fibrillation with RVR [I48.91]  Yes    Elevated troponin [R79.89]  Yes    Anemia, chronic disease [D63.8]  Yes    Hypothyroidism (acquired) [E03.9]  Yes    Hypokalemia [E87.6]  Yes    Hypertension [I10]  Yes    Gout [M10.9]  Yes      Resolved Hospital Problems   No resolved problems to display.          Hospital Course:  George Lomeli is a 84 y.o. male  w/ a hx of PAF (on Eliquis), hx of AAA s/p repair, HTN, chronic anemia, hypothyroidism, hx of thyroid cancer, hx of lymphoma, gout who presented to the ED w/ c/o a cough.      **Acute hypoxemia 2/2 COVID-19   -symptom onset ~ 2 days ago: cough and mild shortness of breath   -no prior COVID vaccine per pt   -COVID detected, negative for Flu A/B; full respiratory panel pcr pending   -Currently patient is saturating well above 95% on room air.  -Cough has completely resolved and patient is back to his baseline except maybe mild weakness.        **Atrial fibrillation w/ RVR   **Elevated troponin   **Hx of HTN   -initial EKG c/w A.Fib RVR; rates 's  -No chest pain and troponin is flat.  -Heart rate now is controlled.  -Patient has history of paroxysmal atrial fibrillation and is on anticoagulation.       **Hypokalemia   -K 3.0  -mag 1.7  -replaced last potassium on was 3.8     **Hypothyroidism   **Hx of thyroid cancer   -continue synthroid      Chronic anemia   Gout   Chronic pain   -Continue home medication and monitor      Discharge Follow Up Recommendations for  outpatient labs/diagnostics:       Day of Discharge     HPI:   Resting in bed no acute distress and tells me he is fine.  Does not have any specific complaint.  Tells me that cough has resolved and he is saturating well on room air.  Denies any fever or chills.  No chest pain palpitation shortness of breath.  No nausea or vomiting or abdominal pain.    Review of Systems  As above    Vital Signs:          Physical Exam:  Constitutional: No acute distress, awake, alert  HENT: NCAT, mucous membranes moist  Respiratory: Clear to auscultation bilaterally, respiratory effort normal, saturating above 95% on room air.  Cardiovascular: RRR, no murmurs, rubs, or gallops  Gastrointestinal: Positive bowel sounds, soft, nontender, nondistended  Musculoskeletal: No bilateral ankle edema  Psychiatric: Appropriate affect, cooperative  Neurologic: Oriented x 3,  speech clear  Skin: No rashes     Pertinent  and/or Most Recent Results     LAB RESULTS:                              Brief Urine Lab Results  (Last result in the past 365 days)        Color   Clarity   Blood   Leuk Est   Nitrite   Protein   CREAT   Urine HCG        04/06/25 1450 Yellow   Clear   Negative   Trace   Negative   Negative                 Microbiology Results (last 10 days)       ** No results found for the last 240 hours. **            XR Chest 1 View  Result Date: 4/6/2025  XR CHEST 1 VW Date of Exam: 4/6/2025 3:23 PM EDT Indication: SOA triage protocol Comparison: CT 1/2/2025. Findings: Emphysematous changes are again noted. There is no evidence of new focal opacity. There is no effusion or pneumothorax. Unchanged cardiac enlargement.     Impression: Stable chest without evidence of acute disease. Electronically Signed: Rah Peña MD  4/6/2025 4:53 PM EDT  Workstation ID: [id number]              Results for orders placed during the hospital encounter of 11/21/23    Adult Transthoracic Echo Complete W/ Cont if Necessary Per Protocol    Interpretation  Summary    Left ventricular systolic function is normal. Calculated left ventricular EF = 53% Left ventricular ejection fraction appears to be 51 - 55%.    Left ventricular diastolic function is consistent with (grade I) impaired relaxation.    The right ventricular cavity is mildly dilated.    The right atrial cavity is dilated.    Estimated right ventricular systolic pressure from tricuspid regurgitation is normal (<35 mmHg).      Plan for Follow-up of Pending Labs/Results:     Discharge Details        Discharge Medications        Changes to Medications        Instructions Start Date   gabapentin 100 MG capsule  Commonly known as: Neurontin  What changed:   how much to take  how to take this  when to take this  additional instructions   Take 2 PO TID             Continue These Medications        Instructions Start Date   albuterol sulfate  (90 Base) MCG/ACT inhaler  Commonly known as: PROVENTIL HFA;VENTOLIN HFA;PROAIR HFA   2 puffs, Inhalation, Every 6 Hours PRN      albuterol (2.5 MG/3ML) 0.083% nebulizer solution  Commonly known as: PROVENTIL   USE 1 VIAL IN NEBULIZER EVERY 6 HOURS AS NEEDED FOR WHEEZING . APPOINTMENT REQUIRED FOR FUTURE REFILLS      allopurinol 100 MG tablet  Commonly known as: ZYLOPRIM   100 mg, Daily      amLODIPine 5 MG tablet  Commonly known as: NORVASC   5 mg, Daily      apixaban 5 MG tablet tablet  Commonly known as: Eliquis   5 mg, Oral, 2 Times Daily      benzonatate 100 MG capsule  Commonly known as: TESSALON   100 mg, Oral, 3 Times Daily PRN      carvedilol 25 MG tablet  Commonly known as: COREG   25 mg, 2 Times Daily With Meals      docusate sodium 50 MG capsule  Commonly known as: COLACE   50 mg, As Needed      DULoxetine 30 MG capsule  Commonly known as: CYMBALTA   TAKE 1 CAPSULE BY MOUTH ONCE DAILY FOR HAND NUMBNESS/PAIN      levothyroxine 25 MCG tablet  Commonly known as: SYNTHROID, LEVOTHROID   25 mcg, Daily      lisinopril-hydrochlorothiazide 20-12.5 MG per  tablet  Commonly known as: PRINZIDE,ZESTORETIC   TAKE 1 TABLET BY MOUTH ONCE DAILY FOR BLOOD PRESSURE WITH LEG EDEMA      oxyCODONE-acetaminophen  MG per tablet  Commonly known as: PERCOCET   1 tablet, Every 6 Hours PRN      potassium chloride 10 MEQ CR tablet   10 mEq, Oral, 2 Times Daily      tamsulosin 0.4 MG capsule 24 hr capsule  Commonly known as: FLOMAX   1 capsule, Nightly      timolol 0.5 % ophthalmic solution  Commonly known as: TIMOPTIC   Administer 1 drop to the right eye 2 (Two) Times a Day.               Allergies   Allergen Reactions    Contrast Dye (Echo Or Unknown Ct/Mr) Nausea And Vomiting         Discharge Disposition:  Home or Self Care    Diet:  Hospital:No active diet order      Diet Instructions       Diet: Cardiac Diets; Healthy Heart (2-3 Na+); Thin (IDDSI 0)      Discharge Diet: Cardiac Diets    Cardiac Diet: Healthy Heart (2-3 Na+)    Fluid Consistency: Thin (IDDSI 0)             Activity:  Activity Instructions       Activity as Tolerated              Restrictions or Other Recommendations:         CODE STATUS:    Code Status and Medical Interventions: CPR (Attempt to Resuscitate); Full Support   Ordered at: 04/06/25 2058     Code Status (Patient has no pulse and is not breathing):    CPR (Attempt to Resuscitate)     Medical Interventions (Patient has pulse or is breathing):    Full Support     Level Of Support Discussed With:    Patient       Future Appointments   Date Time Provider Department Center   5/9/2025 11:20 AM Maryellen Holley APRN [facility]   7/7/2025  1:00 PM [facility] CT 1 [facility] [place]   7/14/2025  1:30 PM Thor Ruggiero MD [facility]   1/28/2026 10:00 AM Janeen Bravo APRN [facility]       Additional Instructions for the Follow-ups that You Need to Schedule       Discharge Follow-up with PCP   As directed       Currently Documented PCP:    Linn Salas APRN    PCP Phone Number:    [phone]     Follow Up Details: within one week                       Samir Muse MD  04/17/25      Time Spent on Discharge:  I spent  35  minutes on this discharge activity which included: face-to-face encounter with the patient, reviewing the data in the system, coordination of the care with the nursing staff as well as consultants, documentation, and entering orders.

## 2025-04-18 ENCOUNTER — APPOINTMENT (OUTPATIENT)
Dept: CT IMAGING | Facility: HOSPITAL | Age: 85
End: 2025-04-18
Payer: MEDICARE

## 2025-04-18 ENCOUNTER — HOSPITAL ENCOUNTER (EMERGENCY)
Facility: HOSPITAL | Age: 85
Discharge: HOME | End: 2025-04-18
Attending: EMERGENCY MEDICINE
Payer: MEDICARE

## 2025-04-18 VITALS
SYSTOLIC BLOOD PRESSURE: 126 MMHG | HEART RATE: 82 BPM | BODY MASS INDEX: 22.9 KG/M2 | RESPIRATION RATE: 16 BRPM | TEMPERATURE: 98.2 F | HEIGHT: 70 IN | DIASTOLIC BLOOD PRESSURE: 83 MMHG | WEIGHT: 160 LBS | OXYGEN SATURATION: 96 %

## 2025-04-18 DIAGNOSIS — R05.9 COUGH IN ADULT: Primary | ICD-10-CM

## 2025-04-18 LAB
ALBUMIN SERPL-MCNC: 3.6 G/DL (ref 3.5–5.2)
ALBUMIN/GLOB SERPL: 1.3 G/DL
ALP SERPL-CCNC: 91 U/L (ref 39–117)
ALT SERPL W P-5'-P-CCNC: 25 U/L (ref 1–41)
ANION GAP SERPL CALCULATED.3IONS-SCNC: 11 MMOL/L (ref 5–15)
AST SERPL-CCNC: 22 U/L (ref 1–40)
BASOPHILS # BLD AUTO: 0.01 10*3/MM3 (ref 0–0.2)
BASOPHILS NFR BLD AUTO: 0.1 % (ref 0–1.5)
BILIRUB SERPL-MCNC: 0.3 MG/DL (ref 0–1.2)
BUN SERPL-MCNC: 16 MG/DL (ref 8–23)
BUN/CREAT SERPL: 23.9 (ref 7–25)
CALCIUM SPEC-SCNC: 9.1 MG/DL (ref 8.6–10.5)
CHLORIDE SERPL-SCNC: 104 MMOL/L (ref 98–107)
CO2 SERPL-SCNC: 28 MMOL/L (ref 22–29)
CREAT SERPL-MCNC: 0.67 MG/DL (ref 0.76–1.27)
DEPRECATED RDW RBC AUTO: 51.8 FL (ref 37–54)
EGFRCR SERPLBLD CKD-EPI 2021: 92.1 ML/MIN/1.73
EOSINOPHIL # BLD AUTO: 0 10*3/MM3 (ref 0–0.4)
EOSINOPHIL NFR BLD AUTO: 0 % (ref 0.3–6.2)
ERYTHROCYTE [DISTWIDTH] IN BLOOD BY AUTOMATED COUNT: 15.7 % (ref 12.3–15.4)
GLOBULIN UR ELPH-MCNC: 2.8 GM/DL
GLUCOSE SERPL-MCNC: 141 MG/DL (ref 65–99)
HCT VFR BLD AUTO: 30.7 % (ref 37.5–51)
HGB BLD-MCNC: 9.9 G/DL (ref 13–17.7)
IMM GRANULOCYTES # BLD AUTO: 0.09 10*3/MM3 (ref 0–0.05)
IMM GRANULOCYTES NFR BLD AUTO: 0.6 % (ref 0–0.5)
LYMPHOCYTES # BLD AUTO: 0.31 10*3/MM3 (ref 0.7–3.1)
LYMPHOCYTES NFR BLD AUTO: 2.2 % (ref 19.6–45.3)
MCH RBC QN AUTO: 29.3 PG (ref 26.6–33)
MCHC RBC AUTO-ENTMCNC: 32.2 G/DL (ref 31.5–35.7)
MCV RBC AUTO: 90.8 FL (ref 79–97)
MONOCYTES # BLD AUTO: 0.13 10*3/MM3 (ref 0.1–0.9)
MONOCYTES NFR BLD AUTO: 0.9 % (ref 5–12)
NEUTROPHILS NFR BLD AUTO: 13.8 10*3/MM3 (ref 1.7–7)
NEUTROPHILS NFR BLD AUTO: 96.2 % (ref 42.7–76)
NRBC BLD AUTO-RTO: 0 /100 WBC (ref 0–0.2)
PLATELET # BLD AUTO: 261 10*3/MM3 (ref 140–450)
PMV BLD AUTO: 12.2 FL (ref 6–12)
POTASSIUM SERPL-SCNC: 3 MMOL/L (ref 3.5–5.2)
PROT SERPL-MCNC: 6.4 G/DL (ref 6–8.5)
RBC # BLD AUTO: 3.38 10*6/MM3 (ref 4.14–5.8)
SODIUM SERPL-SCNC: 143 MMOL/L (ref 136–145)
WBC NRBC COR # BLD AUTO: 14.34 10*3/MM3 (ref 3.4–10.8)

## 2025-04-18 PROCEDURE — 25510000001 IOPAMIDOL PER 1 ML: Performed by: EMERGENCY MEDICINE

## 2025-04-18 PROCEDURE — 99285 EMERGENCY DEPT VISIT HI MDM: CPT

## 2025-04-18 PROCEDURE — 71275 CT ANGIOGRAPHY CHEST: CPT

## 2025-04-18 PROCEDURE — 85025 COMPLETE CBC W/AUTO DIFF WBC: CPT | Performed by: EMERGENCY MEDICINE

## 2025-04-18 PROCEDURE — 36415 COLL VENOUS BLD VENIPUNCTURE: CPT

## 2025-04-18 PROCEDURE — 80053 COMPREHEN METABOLIC PANEL: CPT | Performed by: EMERGENCY MEDICINE

## 2025-04-18 RX ORDER — SODIUM CHLORIDE 0.9 % (FLUSH) 0.9 %
10 SYRINGE (ML) INJECTION AS NEEDED
Status: DISCONTINUED | OUTPATIENT
Start: 2025-04-18 | End: 2025-04-18 | Stop reason: HOSPADM

## 2025-04-18 RX ORDER — IOPAMIDOL 755 MG/ML
100 INJECTION, SOLUTION INTRAVASCULAR
Status: COMPLETED | OUTPATIENT
Start: 2025-04-18 | End: 2025-04-18

## 2025-04-18 RX ADMIN — IOPAMIDOL 85 ML: 755 INJECTION, SOLUTION INTRAVENOUS at 13:42

## 2025-04-19 NOTE — ED PROVIDER NOTES
Subjective   History of Present Illness  84-year-old male who was sent to the ER due to widening mediastinum identified on x-ray.  The patient has had a cough going on for several weeks after being previously diagnosed with COVID-19.  However this is unchanged.  He denies shortness of breath.  Has had normal oxygen saturations.  He did have an outpatient chest x-ray performed earlier today when he followed up with his primary care physician.  It showed an interval increase caliber of the thoracic aorta and a CT scan was recommended.  Therefore the patient's primary care physician referred him here to the ER.  He has no other complaints and appears well      Review of Systems   Constitutional:  Negative for chills, fatigue and fever.   HENT:  Negative for congestion, ear pain, postnasal drip, sinus pressure and sore throat.    Eyes:  Negative for pain, redness and visual disturbance.   Respiratory:  Positive for cough. Negative for chest tightness and shortness of breath.    Cardiovascular:  Negative for chest pain, palpitations and leg swelling.   Gastrointestinal:  Negative for abdominal pain, anal bleeding, blood in stool, diarrhea, nausea and vomiting.   Endocrine: Negative for polydipsia and polyuria.   Genitourinary:  Negative for difficulty urinating, dysuria, frequency and urgency.   Musculoskeletal:  Negative for arthralgias, back pain and neck pain.   Skin:  Negative for pallor and rash.   Allergic/Immunologic: Negative for environmental allergies and immunocompromised state.   Neurological:  Negative for dizziness, weakness and headaches.   Hematological:  Negative for adenopathy.   Psychiatric/Behavioral:  Negative for confusion, self-injury and suicidal ideas. The patient is not nervous/anxious.    All other systems reviewed and are negative.      Past Medical History:   Diagnosis Date    AAA (abdominal aortic aneurysm)     Arthritis     Broken neck     Gout     Hypertension     Lymphoma     Lymphoma      had radiation    Migraine     Sciatica     Thyroid cancer     ???    Thyroid disease        No Known Allergies    Past Surgical History:   Procedure Laterality Date    ABDOMINAL AORTIC ANEURYSM REPAIR W/ ENDOLUMINAL GRAFT      BACK SURGERY      lumbar    CATARACT EXTRACTION EXTRACAPSULAR W/ INTRAOCULAR LENS IMPLANTATION      EYE SURGERY      MULTIPLE TOOTH EXTRACTIONS      NECK SURGERY      PINS/RODS IN NECK       Family History   Problem Relation Age of Onset    Hypertension Mother     Hypertension Father     Hypertension Sister        Social History     Socioeconomic History    Marital status:     Number of children: 2   Tobacco Use    Smoking status: Former     Current packs/day: 0.00     Average packs/day: 1 pack/day for 15.0 years (15.0 ttl pk-yrs)     Types: Cigarettes     Start date:      Quit date:      Years since quittin.3     Passive exposure: Past    Smokeless tobacco: Never   Vaping Use    Vaping status: Never Used   Substance and Sexual Activity    Alcohol use: No    Drug use: No    Sexual activity: Defer           Objective   Physical Exam  Vitals and nursing note reviewed.   Constitutional:       General: He is not in acute distress.     Appearance: Normal appearance. He is well-developed. He is not toxic-appearing or diaphoretic.   HENT:      Head: Normocephalic and atraumatic.      Right Ear: External ear normal.      Left Ear: External ear normal.      Nose: Nose normal.   Eyes:      General: Lids are normal.      Pupils: Pupils are equal, round, and reactive to light.   Neck:      Trachea: No tracheal deviation.   Cardiovascular:      Rate and Rhythm: Normal rate and regular rhythm.      Pulses: No decreased pulses.      Heart sounds: Normal heart sounds. No murmur heard.     No friction rub. No gallop.   Pulmonary:      Effort: Pulmonary effort is normal. No respiratory distress.      Breath sounds: Normal breath sounds. No decreased breath sounds, wheezing, rhonchi or rales.    Abdominal:      General: Bowel sounds are normal.      Palpations: Abdomen is soft.      Tenderness: There is no abdominal tenderness. There is no guarding or rebound.   Musculoskeletal:         General: No deformity. Normal range of motion.      Cervical back: Normal range of motion and neck supple.   Lymphadenopathy:      Cervical: No cervical adenopathy.   Skin:     General: Skin is warm and dry.      Findings: No rash.   Neurological:      Mental Status: He is alert and oriented to person, place, and time.      Cranial Nerves: No cranial nerve deficit.      Sensory: No sensory deficit.   Psychiatric:         Speech: Speech normal.         Behavior: Behavior normal.         Thought Content: Thought content normal.         Judgment: Judgment normal.         Procedures           ED Course                                                       Medical Decision Making  Differential includes aortic dissection, aortic aneurysm, pneumonia, pneumothorax, other unspecified etiology.    Labs show leukocytosis, baseline H&H, mild hypokalemia, no other acute abnormalities.    CT angiogram the chest interpreted by myself shows normal lungs, no PE, no aortic dissection.    The patient will be discharged with advised outpatient follow-up.    Problems Addressed:  Cough in adult: complicated acute illness or injury with systemic symptoms    Amount and/or Complexity of Data Reviewed  Independent Historian: spouse     Details: Wife and family provide additional history.  External Data Reviewed: labs and radiology.  Labs: ordered. Decision-making details documented in ED Course.  Radiology: ordered and independent interpretation performed. Decision-making details documented in ED Course.    Risk  Prescription drug management.        Final diagnoses:   Cough in adult       ED Disposition  ED Disposition       ED Disposition   Discharge    Condition   Stable    Comment   --               Linn Salas, APRN  [address] [place] [address]  [address] [place] [id number]  [phone]    In 1 week           Medication List        Changed      gabapentin 100 MG capsule  Commonly known as: Neurontin  Take 2 PO TID  What changed:   how much to take  how to take this  when to take this  additional instructions                 Tomasa Sandoval MD  04/18/25 0856

## 2025-04-23 ENCOUNTER — READMISSION MANAGEMENT (OUTPATIENT)
Dept: CALL CENTER | Facility: HOSPITAL | Age: 85
End: 2025-04-23
Payer: MEDICARE

## 2025-04-23 NOTE — OUTREACH NOTE
Medical Week 2 Survey      Flowsheet Row Responses   [facility] patient discharged from? [place]   Does the patient have one of the following disease processes/diagnoses(primary or secondary)? Other   Week 2 attempt successful? Yes   Call start time 1503   Discharge diagnosis Acute hypoxemic respiratory failure due to COVID-19   Call end time 1505   Person spoke with today (if not patient) and relationship patient   Meds reviewed with patient/caregiver? Yes   Does the patient have all medications ordered at discharge? Yes   Is the patient taking all medications as directed (includes completed medication regime)? Yes   Comments regarding appointments Cardiology on May 9th   Does the patient have a primary care provider?  Yes   Does the patient have an appointment with their PCP within 7 days of discharge? Yes   Comments regarding PCP Linn Salas   Has the patient kept scheduled appointments due by today? Yes   Psychosocial issues? No   Did the patient receive a copy of their discharge instructions? Yes   Nursing interventions Reviewed instructions with patient   What is the patient's perception of their health status since discharge? Improving   Is the patient/caregiver able to teach back the hierarchy of who to call/visit for symptoms/problems? PCP, Specialist, Home health nurse, Urgent Care, ED, 911 Yes   If the patient is a current smoker, are they able to teach back resources for cessation? Not a smoker   Week 2 Call Completed? Yes   Is the patient interested in additional calls from an ambulatory ? No   Would this patient benefit from a Referral to Amb Social Work? No   Wrap up additional comments patient is doing very well.  He denies needs.   Call end time 1505            FLAQUITA WATTS - Registered Nurse

## 2025-05-02 ENCOUNTER — READMISSION MANAGEMENT (OUTPATIENT)
Dept: CALL CENTER | Facility: HOSPITAL | Age: 85
End: 2025-05-02
Payer: MEDICARE

## 2025-05-02 NOTE — OUTREACH NOTE
Medical Week 3 Survey      Flowsheet Row Responses   [facility] patient discharged from? [place]   Does the patient have one of the following disease processes/diagnoses(primary or secondary)? Other   Week 3 attempt successful? Yes   Call start time 1155   Call end time 1157   Discharge diagnosis Acute hypoxemic respiratory failure due to COVID-19   Is the patient taking all medications as directed (includes completed medication regime)? Yes   Does the patient have a primary care provider?  Yes   Comments regarding PCP Patient states he has seen PCP   Comments Cardiology appt 5/9   Psychosocial issues? No   What is the patient's perception of their health status since discharge? Improving   Is the patient/caregiver able to teach back signs and symptoms related to disease process for when to call PCP? Yes   Is the patient/caregiver able to teach back signs and symptoms related to disease process for when to call 911? Yes   Is the patient/caregiver able to teach back the hierarchy of who to call/visit for symptoms/problems? PCP, Specialist, Home health nurse, Urgent Care, ED, 911 Yes   Week 3 Call Completed? Yes   Graduated Yes   Is the patient interested in additional calls from an ambulatory ? No   Would this patient benefit from a Referral to Amb Social Work? No   Wrap up additional comments Patient reports doing well. No questions or concerns at this time.   Call end time 1157            MARIAH MCCRACKEN - Registered Nurse

## 2025-05-08 PROBLEM — I48.21 PERMANENT ATRIAL FIBRILLATION: Status: ACTIVE | Noted: 2023-10-05

## 2025-05-08 NOTE — PROGRESS NOTES
Subjective:     Encounter Date:05/09/2025      Patient ID: George Lomeli is a 84 y.o.  -American male from [place], [place], retired from [employer].     PCP: Kaycee Douglas MD  REFERRING PROVIDER: YOVANI Griffith  RADIATION ONCOLOGIST: Milo Christianson MD  PULMONOLOGIST: YOVANI Neil  ONCOLOGIST: Thor Ruggiero MD.    Chief Complaint:   Chief Complaint   Patient presents with    PAF (paroxysmal atrial fibrillation)     Pt denies current symptoms during triage.     Problem List:  Permanent atrial fibrillation  Onset August 2023   CHADsVasc 3, on Eliquis  Echocardiogram 11/21/2023: LVEF 53%, LV diastolic function consistent with grade 1 impaired relaxation, RV cavity mildly dilated, RA cavity dilated, RVSP less than 35 mmHg  Asymptomatic November 2023  Hypertension  Hypothyroidism  Shortness of breath/bronchiectasis  Cardiac PET September 2017 showing rest EF 50%, stress EF 54%, no evidence of ischemia  CTA chest 4/18/2025: Ascending aorta measured 3.8 cm, mild calcified atherosclerotic disease, no evidence of PE, emphysema and chronic lung changes but no acute cardiopulmonary process  Follicular lymphoma  Osteoarthritis  Sciatica  AAA, status post prior stenting-data deficit, CT abdomen/pelvis July 2023 showed re-demonstrated aortoiliac aneurysm present, status post prior stenting, without definite stent component migration, interval enlargement or acute hemorrhage.   Former tobacco abuse; 1 pack/day x 15 years, quit 1997  [facility] 3-day hospitalization April 2025 for respiratory failure due to COVID, patient had not been formerly vaccinated, had brief atrial fib with RVR in the setting of electrolyte imbalances  Surgical history:  Lumbar back surgery  Cataract extraction with lens implant  Cervical neck surgery  AAA with stent    No Known Allergies    Current Outpatient Medications   Medication Instructions    albuterol (PROVENTIL) (2.5 MG/3ML) 0.083% nebulizer solution USE 1 VIAL IN NEBULIZER  EVERY 6 HOURS AS NEEDED FOR WHEEZING . APPOINTMENT REQUIRED FOR FUTURE REFILLS    albuterol sulfate  (90 Base) MCG/ACT inhaler 2 puffs, Inhalation, Every 6 Hours PRN    allopurinol (ZYLOPRIM) 100 mg, Daily    amLODIPine (NORVASC) 5 mg, Daily    apixaban (ELIQUIS) 5 mg, Oral, 2 Times Daily    Breyna 160-4.5 MCG/ACT inhaler 2 puffs, 2 Times Daily - RT    carvedilol (COREG) 25 mg, 2 Times Daily With Meals    gabapentin (Neurontin) 100 MG capsule Take 2 PO TID    latanoprost (XALATAN) 0.005 % ophthalmic solution 1 drop, Daily    levothyroxine (SYNTHROID, LEVOTHROID) 25 mcg, Daily    lisinopril-hydrochlorothiazide (PRINZIDE,ZESTORETIC) 20-12.5 MG per tablet TAKE 1 TABLET BY MOUTH ONCE DAILY FOR BLOOD PRESSURE WITH LEG EDEMA    oxyCODONE-acetaminophen (PERCOCET)  MG per tablet 1 tablet, Every 6 Hours PRN    potassium chloride 10 MEQ CR tablet 10 mEq, Oral, 2 Times Daily    tamsulosin (FLOMAX) 0.4 MG capsule 24 hr capsule 1 capsule, Nightly    timolol (TIMOPTIC) 0.5 % ophthalmic solution Administer 1 drop to the right eye 2 (Two) Times a Day.         HISTORY OF PRESENT ILLNESS:  The patient is here after an 17-month hiatus.  Patient followed by CTS after AAA repair.  He had recent [facility] 3-day hospitalization April 2025 for respiratory failure due to COVID, patient had not been formerly vaccinated.  During hospitalization patient was initially hypokalemic.  He had atrial fib with RVR with rates up to the 140s.  Since the patient's hospitalization he feels much better.  He denies any chest pain, shortness of breath, palpitations, dizziness, presyncope, or syncope.  He is rather sedentary at home but does walk around with his cane within his home.  He has upcoming labs next month with his primary care physician and will have the results sent to me for review.  Patient has not been monitoring his blood pressure at home but will start doing so more frequently.  He has had some unintentional weight loss but says that  "he uses Ensure and is still eating but he does not eat as much as he used to.  He is accompanied in office today by his wife.      ROS   All other systems reviewed and otherwise negative.    Procedures       Objective:       Vitals:    05/09/25 1144 05/09/25 1146   BP: 106/70 104/76   BP Location: Left arm Left arm   Patient Position: Sitting Standing   Cuff Size: Adult Adult   Pulse: 85 99   SpO2: 98% 98%   Weight: 67 kg (147 lb 9.6 oz)    Height: 177.8 cm (70\")      Body mass index is 21.18 kg/m².  Wt Readings from Last 2 Encounters:   05/09/25 67 kg (147 lb 9.6 oz)   04/18/25 72.6 kg (160 lb)        Constitutional:       Appearance: Healthy appearance. Not in distress.   Neck:      Vascular: No JVR. JVD normal.   Pulmonary:      Effort: Pulmonary effort is normal.      Breath sounds: Normal breath sounds. No wheezing. No rhonchi. No rales.   Chest:      Chest wall: Not tender to palpatation.   Cardiovascular:      PMI at left midclavicular line. Normal rate. Irregularly irregular rhythm. Normal S1. Normal S2.       Murmurs: There is no murmur.      No gallop.  No click. No rub.   Pulses:     Intact distal pulses.   Edema:     Peripheral edema absent.   Abdominal:      General: Bowel sounds are normal.      Palpations: Abdomen is soft.      Tenderness: There is no abdominal tenderness.   Musculoskeletal: Normal range of motion.         General: No tenderness. Skin:     General: Skin is warm and dry.   Neurological:      General: No focal deficit present.      Mental Status: Alert and oriented to person, place and time.           Lab Review:   Lab Results   Component Value Date    GLUCOSE 141 (H) 04/18/2025    BUN 16 04/18/2025    CREATININE 0.67 (L) 04/18/2025    EGFRIFAFRI 109 01/03/2022    BCR 23.9 04/18/2025    CO2 28.0 04/18/2025    CALCIUM 9.1 04/18/2025    ALBUMIN 3.6 04/18/2025    AST 22 04/18/2025    ALT 25 04/18/2025       Lab Results   Component Value Date    WBC 14.34 (H) 04/18/2025    HGB 9.9 (L) " 04/18/2025    HCT 30.7 (L) 04/18/2025    MCV 90.8 04/18/2025     04/18/2025       Lab Results   Component Value Date    HGBA1C 5.60 09/22/2017       Lab Results   Component Value Date    TSH 4.594 09/22/2017       Lab Results   Component Value Date    CHOL 143 09/22/2017     Lab Results   Component Value Date    TRIG 66 09/22/2017     Lab Results   Component Value Date    HDL 37 (L) 09/22/2017     Lab Results   Component Value Date    LDL 98 09/22/2017           Lab Results   Component Value Date    BNP 54.0 09/21/2017                 Results for orders placed during the hospital encounter of 11/21/23    Adult Transthoracic Echo Complete W/ Cont if Necessary Per Protocol    Interpretation Summary    Left ventricular systolic function is normal. Calculated left ventricular EF = 53% Left ventricular ejection fraction appears to be 51 - 55%.    Left ventricular diastolic function is consistent with (grade I) impaired relaxation.    The right ventricular cavity is mildly dilated.    The right atrial cavity is dilated.    Estimated right ventricular systolic pressure from tricuspid regurgitation is normal (<35 mmHg).     CTA chest 4/18/2025: Ascending aorta measured 3.8 cm, mild calcified atherosclerotic disease, no evidence of PE, emphysema and chronic lung changes but no acute cardiopulmonary process       Advance Care Planning   ACP discussion was held with the patient during this visit. Patient does not have an advance directive, declines further assistance.      Assessment:     Overall continued acceptable course with no new interim cardiopulmonary complaints with fair functional status on limited activity. We will defer additional diagnostic or therapeutic intervention from a cardiac perspective at this time.Hopefully I will get to review the patient's next laboratory testing results.         Diagnosis Plan   1. Permanent atrial fibrillation  Continue Coreg, Eliquis      2. Primary hypertension  Controlled,  continue current cardiac medications, patient to monitor blood pressure and heart rate more frequently at home      3. Infrarenal abdominal aortic aneurysm (AAA) without rupture  Patient has upcoming appointment with CTS January 2026             Plan:         Patient to continue current medications and close follow up with the above providers.  Tentative cardiology follow up in November/December 2025 or patient may return sooner PRN.  Patient has upcoming follow-up with CTS  Encouraged patient to start monitoring blood pressure and heart rate more frequently at home      Electronically signed by YOVANI Beltrán, 05/09/25, 12:06 PM EDT.

## 2025-05-09 ENCOUNTER — OFFICE VISIT (OUTPATIENT)
Dept: CARDIOLOGY | Facility: CLINIC | Age: 85
End: 2025-05-09
Payer: MEDICARE

## 2025-05-09 VITALS
OXYGEN SATURATION: 98 % | WEIGHT: 147.6 LBS | DIASTOLIC BLOOD PRESSURE: 76 MMHG | HEIGHT: 70 IN | HEART RATE: 99 BPM | BODY MASS INDEX: 21.13 KG/M2 | SYSTOLIC BLOOD PRESSURE: 104 MMHG

## 2025-05-09 DIAGNOSIS — I71.43 INFRARENAL ABDOMINAL AORTIC ANEURYSM (AAA) WITHOUT RUPTURE: ICD-10-CM

## 2025-05-09 DIAGNOSIS — I10 PRIMARY HYPERTENSION: ICD-10-CM

## 2025-05-09 DIAGNOSIS — I48.21 PERMANENT ATRIAL FIBRILLATION: Primary | ICD-10-CM

## 2025-05-09 RX ORDER — BUDESONIDE AND FORMOTEROL FUMARATE 160; 4.5 UG/1; UG/1
2 AEROSOL, METERED RESPIRATORY (INHALATION)
COMMUNITY
Start: 2025-03-10

## 2025-05-09 RX ORDER — LATANOPROST 50 UG/ML
1 SOLUTION/ DROPS OPHTHALMIC DAILY
COMMUNITY
Start: 2025-04-03

## 2025-06-26 ENCOUNTER — TRANSCRIBE ORDERS (OUTPATIENT)
Dept: ADMINISTRATIVE | Facility: HOSPITAL | Age: 85
End: 2025-06-26
Payer: MEDICARE

## 2025-06-26 DIAGNOSIS — K40.90 INGUINAL HERNIA, RIGHT: Primary | ICD-10-CM

## 2025-07-04 ENCOUNTER — APPOINTMENT (OUTPATIENT)
Dept: CT IMAGING | Facility: HOSPITAL | Age: 85
End: 2025-07-04
Payer: MEDICARE

## 2025-07-04 ENCOUNTER — APPOINTMENT (OUTPATIENT)
Dept: GENERAL RADIOLOGY | Facility: HOSPITAL | Age: 85
End: 2025-07-04
Payer: MEDICARE

## 2025-07-04 ENCOUNTER — HOSPITAL ENCOUNTER (EMERGENCY)
Facility: HOSPITAL | Age: 85
Discharge: HOME | End: 2025-07-04
Attending: EMERGENCY MEDICINE
Payer: MEDICARE

## 2025-07-04 VITALS
HEIGHT: 70 IN | SYSTOLIC BLOOD PRESSURE: 124 MMHG | TEMPERATURE: 98.6 F | RESPIRATION RATE: 20 BRPM | OXYGEN SATURATION: 89 % | BODY MASS INDEX: 22.19 KG/M2 | DIASTOLIC BLOOD PRESSURE: 71 MMHG | HEART RATE: 103 BPM | WEIGHT: 155 LBS

## 2025-07-04 DIAGNOSIS — Z86.79 HISTORY OF HYPERTENSION: ICD-10-CM

## 2025-07-04 DIAGNOSIS — J47.1 ACUTE EXACERBATION OF BRONCHIECTASIS: Primary | ICD-10-CM

## 2025-07-04 LAB
ALBUMIN SERPL-MCNC: 3.8 G/DL (ref 3.5–5.2)
ALBUMIN/GLOB SERPL: 1.3 G/DL
ALP SERPL-CCNC: 95 U/L (ref 39–117)
ALT SERPL W P-5'-P-CCNC: 12 U/L (ref 1–41)
ANION GAP SERPL CALCULATED.3IONS-SCNC: 11.8 MMOL/L (ref 5–15)
AST SERPL-CCNC: 24 U/L (ref 1–40)
BASOPHILS # BLD AUTO: 0.01 10*3/MM3 (ref 0–0.2)
BASOPHILS NFR BLD AUTO: 0.1 % (ref 0–1.5)
BILIRUB SERPL-MCNC: 0.5 MG/DL (ref 0–1.2)
BUN SERPL-MCNC: 16.6 MG/DL (ref 8–23)
BUN/CREAT SERPL: 14.8 (ref 7–25)
C3 FRG RBC-MCNC: NORMAL
CALCIUM SPEC-SCNC: 9.1 MG/DL (ref 8.6–10.5)
CHLORIDE SERPL-SCNC: 101 MMOL/L (ref 98–107)
CO2 SERPL-SCNC: 30.2 MMOL/L (ref 22–29)
CREAT SERPL-MCNC: 1.12 MG/DL (ref 0.76–1.27)
DEPRECATED RDW RBC AUTO: 51.4 FL (ref 37–54)
EGFRCR SERPLBLD CKD-EPI 2021: 64.4 ML/MIN/1.73
EOSINOPHIL # BLD AUTO: 0.09 10*3/MM3 (ref 0–0.4)
EOSINOPHIL NFR BLD AUTO: 1.1 % (ref 0.3–6.2)
ERYTHROCYTE [DISTWIDTH] IN BLOOD BY AUTOMATED COUNT: 15.9 % (ref 12.3–15.4)
FLUAV RNA RESP QL NAA+PROBE: NOT DETECTED
FLUBV RNA NPH QL NAA+NON-PROBE: NOT DETECTED
GLOBULIN UR ELPH-MCNC: 2.9 GM/DL
GLUCOSE SERPL-MCNC: 91 MG/DL (ref 65–99)
HCT VFR BLD AUTO: 34.8 % (ref 37.5–51)
HGB BLD-MCNC: 10.9 G/DL (ref 13–17.7)
HOLD SPECIMEN: NORMAL
IMM GRANULOCYTES # BLD AUTO: 0.03 10*3/MM3 (ref 0–0.05)
IMM GRANULOCYTES NFR BLD AUTO: 0.4 % (ref 0–0.5)
LYMPHOCYTES # BLD AUTO: 1.1 10*3/MM3 (ref 0.7–3.1)
LYMPHOCYTES NFR BLD AUTO: 13.5 % (ref 19.6–45.3)
MCH RBC QN AUTO: 27.9 PG (ref 26.6–33)
MCHC RBC AUTO-ENTMCNC: 31.3 G/DL (ref 31.5–35.7)
MCV RBC AUTO: 89.2 FL (ref 79–97)
MONOCYTES # BLD AUTO: 1.49 10*3/MM3 (ref 0.1–0.9)
MONOCYTES NFR BLD AUTO: 18.2 % (ref 5–12)
NEUTROPHILS NFR BLD AUTO: 5.45 10*3/MM3 (ref 1.7–7)
NEUTROPHILS NFR BLD AUTO: 66.7 % (ref 42.7–76)
NRBC BLD AUTO-RTO: 0 /100 WBC (ref 0–0.2)
NT-PROBNP SERPL-MCNC: 817 PG/ML (ref 0–1800)
PLAT MORPH BLD: NORMAL
PLATELET # BLD AUTO: 179 10*3/MM3 (ref 140–450)
PMV BLD AUTO: 11.6 FL (ref 6–12)
POTASSIUM SERPL-SCNC: 3.3 MMOL/L (ref 3.5–5.2)
PROT SERPL-MCNC: 6.7 G/DL (ref 6–8.5)
QT INTERVAL: 370 MS
QTC INTERVAL: 460 MS
RBC # BLD AUTO: 3.9 10*6/MM3 (ref 4.14–5.8)
RSV RNA RESP QL NAA+PROBE: NOT DETECTED
SARS-COV-2 RNA RESP QL NAA+PROBE: NOT DETECTED
SODIUM SERPL-SCNC: 143 MMOL/L (ref 136–145)
TROPONIN T SERPL HS-MCNC: 29 NG/L
WBC MORPH BLD: NORMAL
WBC NRBC COR # BLD AUTO: 8.17 10*3/MM3 (ref 3.4–10.8)
WHOLE BLOOD HOLD COAG: NORMAL
WHOLE BLOOD HOLD SPECIMEN: NORMAL

## 2025-07-04 PROCEDURE — 84484 ASSAY OF TROPONIN QUANT: CPT | Performed by: EMERGENCY MEDICINE

## 2025-07-04 PROCEDURE — 63710000001 PREDNISONE PER 1 MG: Performed by: EMERGENCY MEDICINE

## 2025-07-04 PROCEDURE — 93005 ELECTROCARDIOGRAM TRACING: CPT | Performed by: EMERGENCY MEDICINE

## 2025-07-04 PROCEDURE — 71250 CT THORAX DX C-: CPT

## 2025-07-04 PROCEDURE — 85025 COMPLETE CBC W/AUTO DIFF WBC: CPT | Performed by: EMERGENCY MEDICINE

## 2025-07-04 PROCEDURE — 36415 COLL VENOUS BLD VENIPUNCTURE: CPT

## 2025-07-04 PROCEDURE — 71046 X-RAY EXAM CHEST 2 VIEWS: CPT

## 2025-07-04 PROCEDURE — 85007 BL SMEAR W/DIFF WBC COUNT: CPT | Performed by: EMERGENCY MEDICINE

## 2025-07-04 PROCEDURE — 80053 COMPREHEN METABOLIC PANEL: CPT | Performed by: EMERGENCY MEDICINE

## 2025-07-04 PROCEDURE — 83880 ASSAY OF NATRIURETIC PEPTIDE: CPT | Performed by: EMERGENCY MEDICINE

## 2025-07-04 PROCEDURE — 94640 AIRWAY INHALATION TREATMENT: CPT

## 2025-07-04 PROCEDURE — 99284 EMERGENCY DEPT VISIT MOD MDM: CPT

## 2025-07-04 PROCEDURE — 87637 SARSCOV2&INF A&B&RSV AMP PRB: CPT | Performed by: EMERGENCY MEDICINE

## 2025-07-04 RX ORDER — ALBUTEROL SULFATE 90 UG/1
2 INHALANT RESPIRATORY (INHALATION) EVERY 4 HOURS PRN
Qty: 100 G | Refills: 0 | Status: SHIPPED | OUTPATIENT
Start: 2025-07-04

## 2025-07-04 RX ORDER — AZITHROMYCIN 500 MG/1
500 TABLET, FILM COATED ORAL DAILY
Qty: 5 TABLET | Refills: 0 | Status: SHIPPED | OUTPATIENT
Start: 2025-07-04

## 2025-07-04 RX ORDER — IPRATROPIUM BROMIDE AND ALBUTEROL SULFATE 2.5; .5 MG/3ML; MG/3ML
3 SOLUTION RESPIRATORY (INHALATION) ONCE
Status: COMPLETED | OUTPATIENT
Start: 2025-07-04 | End: 2025-07-04

## 2025-07-04 RX ORDER — AZITHROMYCIN 250 MG/1
500 TABLET, FILM COATED ORAL ONCE
Status: COMPLETED | OUTPATIENT
Start: 2025-07-04 | End: 2025-07-04

## 2025-07-04 RX ORDER — PREDNISONE 50 MG/1
50 TABLET ORAL DAILY
Qty: 4 TABLET | Refills: 0 | Status: SHIPPED | OUTPATIENT
Start: 2025-07-04

## 2025-07-04 RX ORDER — SODIUM CHLORIDE 0.9 % (FLUSH) 0.9 %
10 SYRINGE (ML) INJECTION AS NEEDED
Status: DISCONTINUED | OUTPATIENT
Start: 2025-07-04 | End: 2025-07-04 | Stop reason: HOSPADM

## 2025-07-04 RX ORDER — PREDNISONE 20 MG/1
60 TABLET ORAL ONCE
Status: COMPLETED | OUTPATIENT
Start: 2025-07-04 | End: 2025-07-04

## 2025-07-04 RX ADMIN — AMOXICILLIN AND CLAVULANATE POTASSIUM 1 TABLET: 875; 125 TABLET, FILM COATED ORAL at 19:20

## 2025-07-04 RX ADMIN — AZITHROMYCIN DIHYDRATE 500 MG: 250 TABLET ORAL at 19:20

## 2025-07-04 RX ADMIN — PREDNISONE 60 MG: 20 TABLET ORAL at 19:20

## 2025-07-04 RX ADMIN — IPRATROPIUM BROMIDE AND ALBUTEROL SULFATE 3 ML: .5; 3 SOLUTION RESPIRATORY (INHALATION) at 18:59

## 2025-07-04 NOTE — ED PROVIDER NOTES
[place]    EMERGENCY DEPARTMENT ENCOUNTER      Pt Name: George Lomeli  MRN: [id number]  YOB: 1940  Date of evaluation: 7/4/2025  Provider: Aime Keller MD    CHIEF COMPLAINT       Chief Complaint   Patient presents with    Cough         HISTORY OF PRESENT ILLNESS   George Lomeli is a 85 y.o. male who presents to the emergency department with complaint of cough that started a few days ago.  Patient denies any shortness of breath.  Has been coughing up some clear mucus.  Denies any chest pain, fever, or chills.      Nursing notes were reviewed.    REVIEW OF SYSTEMS     ROS:  A chief complaint appropriate review of systems was completed and is negative except as noted in the HPI.      PAST MEDICAL HISTORY     Past Medical History:   Diagnosis Date    AAA (abdominal aortic aneurysm)     Arthritis     Broken neck     Gout     Hypertension     Lymphoma     Lymphoma     had radiation    Migraine     Sciatica     Thyroid cancer     ???    Thyroid disease          SURGICAL HISTORY       Past Surgical History:   Procedure Laterality Date    ABDOMINAL AORTIC ANEURYSM REPAIR W/ ENDOLUMINAL GRAFT      BACK SURGERY      lumbar    CATARACT EXTRACTION EXTRACAPSULAR W/ INTRAOCULAR LENS IMPLANTATION      EYE SURGERY      MULTIPLE TOOTH EXTRACTIONS      NECK SURGERY      PINS/RODS IN NECK         CURRENT MEDICATIONS     No current facility-administered medications for this encounter.    Current Outpatient Medications:     albuterol sulfate  (90 Base) MCG/ACT inhaler, Inhale 2 puffs Every 4 (Four) Hours As Needed for Wheezing., Disp: 100 g, Rfl: 0    allopurinol (ZYLOPRIM) 100 MG tablet, Take 1 tablet by mouth Daily., Disp: , Rfl:     amLODIPine (NORVASC) 5 MG tablet, Take 1 tablet by mouth Daily., Disp: , Rfl:     amoxicillin-clavulanate (AUGMENTIN) 875-125 MG per tablet, Take 1 tablet by mouth 2 (Two) Times a Day for 7 days., Disp: 14 tablet, Rfl: 0    apixaban (Eliquis) 5 MG tablet tablet, Take 1 tablet by  mouth 2 (Two) Times a Day., Disp: 60 tablet, Rfl: 11    azithromycin (ZITHROMAX) 500 MG tablet, Take 1 tablet by mouth Daily., Disp: 5 tablet, Rfl: 0    Breyna 160-4.5 MCG/ACT inhaler, Inhale 2 puffs 2 (Two) Times a Day., Disp: , Rfl:     carvedilol (COREG) 25 MG tablet, Take 1 tablet by mouth 2 (Two) Times a Day With Meals., Disp: , Rfl:     gabapentin (Neurontin) 100 MG capsule, Take 2 PO TID (Patient taking differently: Take 1 capsule by mouth 2 (Two) Times a Day.), Disp: 180 capsule, Rfl: 5    latanoprost (XALATAN) 0.005 % ophthalmic solution, Administer 1 drop to both eyes Daily., Disp: , Rfl:     levothyroxine (SYNTHROID, LEVOTHROID) 25 MCG tablet, Take 1 tablet by mouth Daily., Disp: , Rfl:     lisinopril-hydrochlorothiazide (PRINZIDE,ZESTORETIC) 20-12.5 MG per tablet, TAKE 1 TABLET BY MOUTH ONCE DAILY FOR BLOOD PRESSURE WITH LEG EDEMA, Disp: , Rfl:     oxyCODONE-acetaminophen (PERCOCET)  MG per tablet, Take 1 tablet by mouth Every 6 (Six) Hours As Needed for Moderate Pain., Disp: , Rfl:     potassium chloride 10 MEQ CR tablet, Take 1 tablet by mouth 2 (Two) Times a Day., Disp: 60 tablet, Rfl: 3    predniSONE (DELTASONE) 50 MG tablet, Take 1 tablet by mouth Daily., Disp: 4 tablet, Rfl: 0    tamsulosin (FLOMAX) 0.4 MG capsule 24 hr capsule, Take 1 capsule by mouth Every Night., Disp: , Rfl:     timolol (TIMOPTIC) 0.5 % ophthalmic solution, Administer 1 drop to the right eye 2 (Two) Times a Day., Disp: , Rfl:     ALLERGIES     Patient has no known allergies.    FAMILY HISTORY       Family History   Problem Relation Age of Onset    Hypertension Mother     Hypertension Father     Hypertension Sister           SOCIAL HISTORY       Social History     Socioeconomic History    Marital status:     Number of children: 2   Tobacco Use    Smoking status: Former     Current packs/day: 0.00     Average packs/day: 1 pack/day for 15.0 years (15.0 ttl pk-yrs)     Types: Cigarettes     Start date: 1982     Quit  date:      Years since quittin.5     Passive exposure: Past    Smokeless tobacco: Never   Vaping Use    Vaping status: Never Used   Substance and Sexual Activity    Alcohol use: No    Drug use: No    Sexual activity: Defer         PHYSICAL EXAM    (up to 7 for level 4, 8 or more for level 5)     Vitals:    25 1839 25 1859 25 1900 25 1901   BP:       Patient Position:       Pulse: 93  116 103   Resp:  20     Temp:       TempSrc:       SpO2: 92%  (!) 89%    Weight:       Height:           General: Awake, alert, no acute distress.  HEENT: Conjunctivae normal.  Neck: Trachea midline.  Cardiac: Irregular rhythm, no murmurs, rubs, or gallops  Lungs: Lungs are clear to auscultation, there is no wheezing, rhonchi, or rales. There is no use of accessory muscles.  Abdomen: Abdomen is soft, nontender, nondistended. There are no firm or pulsatile masses, no rebound rigidity or guarding.   Musculoskeletal: No deformity.  Neuro: Alert   Dermatology: Skin is warm and dry  Psych: Mentation is grossly normal, cognition is grossly normal. Affect is appropriate.        DIAGNOSTIC RESULTS     EKG: All EKGs are interpreted by the Emergency Department Physician who either signs or Co-signs this chart in the absence of a cardiologist.    ECG 12 Lead Dyspnea   Preliminary Result   Test Reason : Dyspnea   Blood Pressure :   */*   mmHG   Vent. Rate :  93 BPM     Atrial Rate : 120 BPM      P-R Int :   * ms          QRS Dur : 106 ms       QT Int : 370 ms       P-R-T Axes :   * -21  35 degrees     QTcB Int : 460 ms      Atrial fibrillation with premature ventricular or aberrantly conducted   complexes   Incomplete right bundle branch block   Nonspecific T wave abnormality   Abnormal ECG   When compared with ECG of 2025 05:26,   Atrial fibrillation has replaced Sinus rhythm   Vent. rate has increased by  31 bpm   Nonspecific T wave abnormality now evident in Anterior leads      Referred By: EDMD            Confirmed By:       Telemetry Scan   Final Result      Telemetry Scan   Final Result            RADIOLOGY:   [x] Radiologist's Report Reviewed:  CT Chest Without Contrast Diagnostic   Final Result   1.There is a large bulla in the medial left lower lobe with a small air-fluid level. The wall thickening and air-fluid level is new from January 2025.   2.Mild emphysema.   3.Bronchiectasis left lower lobe.   4.Mild to moderate coronary artery atherosclerotic calcification.   5.Small pericardial effusion.               Electronically Signed: Laxmi Pulliam MD     7/4/2025 6:40 PM EDT     Workstation ID: [id number]      XR Chest 2 View   Final Result   Impression:   No acute findings.         Electronically Signed: Lang Perez MD     7/4/2025 4:38 PM EDT     Workstation ID: [id number]          I ordered and independently reviewed the above noted radiographic studies.        LABS:    I have reviewed and interpreted all of the currently available lab results from this visit (if applicable):  Results for orders placed or performed during the hospital encounter of 07/04/25   COVID-19, FLU A/B, RSV PCR 1 HR TAT - Swab, Nasopharynx    Collection Time: 07/04/25  4:10 PM    Specimen: Nasopharynx; Swab   Result Value Ref Range    COVID19 Not Detected Not Detected - Ref. Range    Influenza A PCR Not Detected Not Detected    Influenza B PCR Not Detected Not Detected    RSV, PCR Not Detected Not Detected   Comprehensive Metabolic Panel    Collection Time: 07/04/25  4:10 PM    Specimen: Blood   Result Value Ref Range    Glucose 91 65 - 99 mg/dL    BUN 16.6 8.0 - 23.0 mg/dL    Creatinine 1.12 0.76 - 1.27 mg/dL    Sodium 143 136 - 145 mmol/L    Potassium 3.3 (L) 3.5 - 5.2 mmol/L    Chloride 101 98 - 107 mmol/L    CO2 30.2 (H) 22.0 - 29.0 mmol/L    Calcium 9.1 8.6 - 10.5 mg/dL    Total Protein 6.7 6.0 - 8.5 g/dL    Albumin 3.8 3.5 - 5.2 g/dL    ALT (SGPT) 12 1 - 41 U/L    AST (SGOT) 24 1 - 40 U/L    Alkaline Phosphatase 95 39 - 117 U/L     Total Bilirubin 0.5 0.0 - 1.2 mg/dL    Globulin 2.9 gm/dL    A/G Ratio 1.3 g/dL    BUN/Creatinine Ratio 14.8 7.0 - 25.0    Anion Gap 11.8 5.0 - 15.0 mmol/L    eGFR 64.4 >60.0 mL/min/1.73   BNP    Collection Time: 07/04/25  4:10 PM    Specimen: Blood   Result Value Ref Range    proBNP 817.0 0.0 - 1,800.0 pg/mL   High Sensitivity Troponin T    Collection Time: 07/04/25  4:10 PM    Specimen: Blood   Result Value Ref Range    HS Troponin T 29 (H) <22 ng/L   CBC Auto Differential    Collection Time: 07/04/25  4:10 PM    Specimen: Blood   Result Value Ref Range    WBC 8.17 3.40 - 10.80 10*3/mm3    RBC 3.90 (L) 4.14 - 5.80 10*6/mm3    Hemoglobin 10.9 (L) 13.0 - 17.7 g/dL    Hematocrit 34.8 (L) 37.5 - 51.0 %    MCV 89.2 79.0 - 97.0 fL    MCH 27.9 26.6 - 33.0 pg    MCHC 31.3 (L) 31.5 - 35.7 g/dL    RDW 15.9 (H) 12.3 - 15.4 %    RDW-SD 51.4 37.0 - 54.0 fl    MPV 11.6 6.0 - 12.0 fL    Platelets 179 140 - 450 10*3/mm3    Neutrophil % 66.7 42.7 - 76.0 %    Lymphocyte % 13.5 (L) 19.6 - 45.3 %    Monocyte % 18.2 (H) 5.0 - 12.0 %    Eosinophil % 1.1 0.3 - 6.2 %    Basophil % 0.1 0.0 - 1.5 %    Immature Grans % 0.4 0.0 - 0.5 %    Neutrophils, Absolute 5.45 1.70 - 7.00 10*3/mm3    Lymphocytes, Absolute 1.10 0.70 - 3.10 10*3/mm3    Monocytes, Absolute 1.49 (H) 0.10 - 0.90 10*3/mm3    Eosinophils, Absolute 0.09 0.00 - 0.40 10*3/mm3    Basophils, Absolute 0.01 0.00 - 0.20 10*3/mm3    Immature Grans, Absolute 0.03 0.00 - 0.05 10*3/mm3    nRBC 0.0 0.0 - 0.2 /100 WBC   Scan Slide    Collection Time: 07/04/25  4:10 PM    Specimen: Blood   Result Value Ref Range    RBC Fragments Slight/1+ None Seen    WBC Morphology Normal Normal    Platelet Morphology Normal Normal   Green Top (Gel)    Collection Time: 07/04/25  4:10 PM   Result Value Ref Range    Extra Tube Hold for add-ons.    Lavender Top    Collection Time: 07/04/25  4:10 PM   Result Value Ref Range    Extra Tube hold for add-on    Gold Top - SST    Collection Time: 07/04/25  4:10 PM    Result Value Ref Range    Extra Tube Hold for add-ons.    Gray Top    Collection Time: 07/04/25  4:10 PM   Result Value Ref Range    Extra Tube Hold for add-ons.    Light Blue Top    Collection Time: 07/04/25  4:10 PM   Result Value Ref Range    Extra Tube Hold for add-ons.    ECG 12 Lead Dyspnea    Collection Time: 07/04/25  7:17 PM   Result Value Ref Range    QT Interval 370 ms    QTC Interval 460 ms        If labs were ordered, I independently reviewed the results and considered them in treating the patient.      EMERGENCY DEPARTMENT COURSE and DIFFERENTIAL DIAGNOSIS/MDM:   Vitals:  AS OF 21:19 EDT    BP - 124/71  HR - 103  TEMP - 98.6 °F (37 °C) (Oral)  O2 SATS - (!) 89%        Discussion below represents my analysis of pertinent findings related to patient's condition, differential diagnosis, treatment plan and final disposition.      Differential diagnosis:  The differential diagnosis associated with the patient's presentation includes: COPD exacerbation, CHF exacerbation, pneumonia, pneumothorax, PE, ACS, dysrhythmia      Independent interpretations (ECG/rhythm strip/X-ray/US/CT scan): I independently interpreted the patient's chest x-ray and cardiac monitor.  No evidence of focal infiltrate, patient in A-fib.      Additional sources:  Discussed/obtained information from independent historians:   [] Spouse:   [] Parent:   [] Friend:   [] EMS:   [] Other:  External (non-ED) record review:   [] Inpatient record:   [] Office record:   [] Outpatient record:   [] Prior Outpatient labs:   [] Prior Outpatient radiology:   [] Primary Care record:   [] Outside ED record:   [x] Other: I reviewed echo from 2023.  EF noted to be 53%.      Patient's care impacted by:   [] Diabetes   [] Hypertension   [] Coronary Artery Disease   [] Cancer   [x] Other: Atrial fibrillation, bronchiectasis    Care significantly affected by Social Determinants of Health (housing and economic circumstances, unemployment)    [] Yes     [x]  No   If yes, Patient's care significantly limited by  Social Determinants of Health including:    [] Inadequate housing    [] Low income    [] Alcoholism and drug addiction in family    [] Problems related to primary support group    [] Unemployment    [] Problems related to employment    [] Other Social Determinants of Health:       Consideration of admission/observation vs discharge: Considered observation, ever patient very well-appearing, breathing comfortably in the ED, feel that he is stable for outpatient follow-up      I considered prescription management with:    [] Pain medication:   [] Antiviral:   [x] Antibiotic: Started on Augmentin, azithromycin, prescribed prednisone as well   [] Other:      ED Course:    ED Course as of 07/07/25 2119 Fri Jul 04, 2025 1846 On reexamination, the patient remains nontoxic and well-appearing.  She is resting comfortably in bed.  Suspect that this is an exacerbation of his underlying bronchiectasis.  I will start him on prednisone and antibiotics for the next few days.  I considered cardiac etiology, the patient has no chest pain and no clinical or laboratory evidence of CHF.  COVID and flu swabs are negative.  He is not significantly dyspneic, tachycardic, or hypoxic, has productive cough, and feel that clinical presentation is more consistent with infectious etiology/bronchiectasis exacerbation than PE. [NS]      ED Course User Index  [NS] Aime Keller MD           I had a discussion with the patient/family regarding diagnosis, diagnostic results, treatment plan, and medications.  The patient/family indicated understanding of these instructions.  I spent adequate time at the bedside preceding discharge necessary to personally discuss the aftercare instructions, giving patient education, providing explanations of the results of our evaluations/findings, and my decision making to assure that the patient/family understand the plan of care.  Time was allotted to  answer questions at that time and throughout the ED course.  Emphasis was placed on timely follow-up after discharge.  I also discussed the potential for the development of an acute emergent condition requiring further evaluation, admission, or even surgical intervention. I discussed that we found nothing during the visit today indicating the need for further workup, admission, or the presence of an unstable medical condition.  I encouraged the patient to return to the emergency department immediately for ANY concerns, worsening, new complaints, or if symptoms persist and unable to seek follow-up in a timely fashion.  The patient/family expressed understanding and agreement with this plan.  The patient will follow-up with their PCP in 1-2 days for reevaluation.         FINAL IMPRESSION      1. Acute exacerbation of bronchiectasis    2. History of hypertension          DISPOSITION/PLAN     ED Disposition       ED Disposition   Discharge    Condition   Stable    Comment   --                 Comment: Please note this report has been produced using speech recognition software.      Aiem Keller MD  Attending Emergency Physician             Aime Keller MD  07/07/25 [id number]

## 2025-07-24 DIAGNOSIS — J47.9 BRONCHIECTASIS WITHOUT COMPLICATION: ICD-10-CM

## 2025-07-24 RX ORDER — ALBUTEROL SULFATE 0.83 MG/ML
SOLUTION RESPIRATORY (INHALATION) EVERY 6 HOURS PRN
Qty: 360 ML | Refills: 0 | OUTPATIENT
Start: 2025-07-24

## 2025-08-02 LAB
QT INTERVAL: 370 MS
QTC INTERVAL: 460 MS